# Patient Record
Sex: MALE | HISPANIC OR LATINO | Employment: FULL TIME | URBAN - METROPOLITAN AREA
[De-identification: names, ages, dates, MRNs, and addresses within clinical notes are randomized per-mention and may not be internally consistent; named-entity substitution may affect disease eponyms.]

---

## 2017-03-30 ENCOUNTER — TELEPHONE (OUTPATIENT)
Dept: NEPHROLOGY | Facility: MEDICAL CENTER | Age: 27
End: 2017-03-30

## 2017-03-30 DIAGNOSIS — N18.6 ESRD (END STAGE RENAL DISEASE) (HCC): ICD-10-CM

## 2017-03-30 NOTE — TELEPHONE ENCOUNTER
1. Caller Name: Shandra-transplant nurse with [employer]                      Call Back Number: -    2. Message: Requesting referral to [facility] so that they can finish his work up for transplant.    3. Patient approves office to leave a detailed voicemail/MyChart message: N/A

## 2017-04-24 ENCOUNTER — OFFICE VISIT (OUTPATIENT)
Dept: CARDIOLOGY | Facility: MEDICAL CENTER | Age: 27
End: 2017-04-24
Payer: COMMERCIAL

## 2017-04-24 VITALS
HEART RATE: 82 BPM | OXYGEN SATURATION: 97 % | BODY MASS INDEX: 26.9 KG/M2 | SYSTOLIC BLOOD PRESSURE: 138 MMHG | WEIGHT: 181.6 LBS | HEIGHT: 69 IN | DIASTOLIC BLOOD PRESSURE: 86 MMHG

## 2017-04-24 DIAGNOSIS — N18.6 ESRD (END STAGE RENAL DISEASE) (HCC): ICD-10-CM

## 2017-04-24 DIAGNOSIS — I10 ESSENTIAL HYPERTENSION: ICD-10-CM

## 2017-04-24 DIAGNOSIS — Z01.818 PREOPERATIVE CLEARANCE: Primary | ICD-10-CM

## 2017-04-24 LAB — EKG IMPRESSION: NORMAL

## 2017-04-24 PROCEDURE — 93000 ELECTROCARDIOGRAM COMPLETE: CPT | Performed by: INTERNAL MEDICINE

## 2017-04-24 PROCEDURE — 99214 OFFICE O/P EST MOD 30 MIN: CPT | Performed by: INTERNAL MEDICINE

## 2017-04-24 NOTE — PROGRESS NOTES
Arrhythmia Clinic Note (New patient)     DOS: 4/24/2017    Referring physician: Dr. Shipman    Chief complaint/Reason for consult: Cardiac evaluation prior to renal transplant    HPI:  Patient is a 25 yo male with history of ESRD, presumably because of long standing severe malignant hypertension, who presents for work up for renal transplant. He has no cardiac history. Nor does he have any cardiac symptoms suggestive of cardiac pathology. Denies chest pain, palpitations, HF symptoms. His nephrology office has ordered an echo and a cardiac stress test. We are awaiting the results.    ROS (+ highlighted in red):  Constitutional: Fevers/chills/fatigue/weightloss  HEENT: Blurry vision/eye pain/sore throat/hearing loss  Respiratory: Shortness of breath/cough  Cardiovascular: Chest pain/palpitations/edema/orthopnea/syncope  GI: Nausea/vomitting/diarrhea  MSK: Arthralgias/myagias/muscle weakness  Skin: Rash/sores  Neurological: Numbness/tremors/vertigo  Endocrine: Excessive thirst/polyuria/cold intolerance/heat intolerance  Psych: Depression/anxiety    Past Medical History   Diagnosis Date   • Hypertension    • Dialysis care    • Renal disorder      dailysis pt. 3 times per week.    • Dialysis        Past Surgical History   Procedure Laterality Date   • Other orthopedic surgery       r arm fx   • Av fistula creation  12/29/2010     Performed by ANT ALONZO at SURGERY [facility]   • Av fistula creation  6/7/2012     Performed by ANAI TYSON at SURGERY [facility]   • Cath placement  6/7/2012     Performed by ANAI TYSON at [facility] ORS   • Recovery  7/3/2012     Performed by SURGERY, IR-RECOVERY at SURGERY SAME DAY [facility] ORS   • Recovery  7/20/2012     Performed by SURGERY, IR-RECOVERY at SURGERY SAME DAY [facility] ORS   • Av fistula creation  8/21/2012     Performed by ANAI TYSON at [facility]   • Av fistula revision Right 11/28/2016     Procedure: AV FISTULA REVISION  "UPPER EXTREMITY (ANEURYSM REDUCTION);  Surgeon: Slade Perez M.D.;  Location: SURGERY [facility];  Service:        Social History     Social History   • Marital Status:      Spouse Name: N/A   • Number of Children: N/A   • Years of Education: N/A     Occupational History   • Not on file.     Social History Main Topics   • Smoking status: Never Smoker    • Smokeless tobacco: Not on file      Comment: quit 1/2009   • Alcohol Use: No   • Drug Use: No   • Sexual Activity: Not on file     Other Topics Concern   • Not on file     Social History Narrative       History reviewed. No pertinent family history.    No Known Allergies    Current Outpatient Prescriptions   Medication Sig Dispense Refill   • metoprolol SR (TOPROL XL) 25 MG TABLET SR 24 HR Take 25 mg by mouth every day.     • losartan (COZAAR) 100 MG Tab Take 100 mg by mouth every day.     • hydrALAZINE (APRESOLINE) 25 MG Tab Take 25 mg by mouth 3 times a day.     • clonidine (CATAPRES) 0.1 MG/24HR PATCH WEEKLY Apply 1 Patch to skin as directed every 7 days. On Mon     • amlodipine (NORVASC) 10 MG TABS Take 1 Tab by mouth every day. 30 Tab 3   • sevelamer (RENAGEL) 800 MG TABS Take 4 Tabs by mouth 3 times a day, with meals. 360 Tab 3   • cinacalcet (SENSIPAR) 30 MG TABS Take 1 Tab by mouth every day. 30 Tab 11   • hydrocodone-acetaminophen (NORCO) 5-325 MG Tab per tablet Take 1-2 Tabs by mouth every four hours as needed. (Patient not taking: Reported on 4/24/2017) 30 Tab 0     No current facility-administered medications for this visit.       Physical Exam:  Filed Vitals:    04/24/17 1624   BP: 138/86   Pulse: 82   Height: 1.753 m (5' 9.02\")   Weight: 82.373 kg (181 lb 9.6 oz)   SpO2: 97%     General appearance: NAD, conversant   Eyes: anicteric sclerae, moist conjunctivae; no lid-lag; PERRLA  HENT: Atraumatic; oropharynx clear with moist mucous membranes and no mucosal ulcerations; normal hard and soft palate  Neck: Trachea midline; FROM, supple, no " thyromegaly or lymphadenopathy  Lungs: CTA, with normal respiratory effort and no intercostal retractions  CV: RRR, no MRGs, no JVD   Abdomen: Soft, non-tender; no masses or HSM  Extremities: No peripheral edema or extremity lymphadenopathy. RUE fistula with palpable thrill.  Skin: Normal temperature, turgor and texture; no rash, ulcers or subcutaneous nodules  Psych: Appropriate affect, alert and oriented to person, place and time    Data:  Labs reviewed and outside records reviewed    EKG interpreted by me:  NSR, ? LVH    Impression/Plan:  1. Preoperative clearance     2. ESRD (end stage renal disease) (CMS-HCC)     3. Essential hypertension       -Will f/u on echo and stress test  -If no significant abnormalities he has no cardiac contraindications to proceeding with renal transplant    Elaine Rea MD

## 2017-04-24 NOTE — MR AVS SNAPSHOT
"        Elier Poseygo   2017 4:20 PM   Office Visit   MRN: [id number]    Department:  [facility]   Dept Phone:  [phone]    Description:  Male : 1990   Provider:  Elaine Rea M.D.           Reason for Visit     New Patient           Allergies as of 2017     No Known Allergies      You were diagnosed with     Palpitations   [785.1.ICD-9-CM]         Vital Signs     Blood Pressure Pulse Height Weight Body Mass Index Oxygen Saturation    138/86 mmHg 82 1.753 m (5' 9.02\") 82.373 kg (181 lb 9.6 oz) 26.81 kg/m2 97%    Smoking Status                   Never Smoker            Basic Information     Date Of Birth Sex Race Ethnicity Preferred Language    1990 Male  or   Origin (Mongolian,Kittitian,Congolese,Tajik, etc) English      Your appointments     May 04, 2017  8:15 AM   ECHO with [facility]   ECHOCARDIOLOGY [facility])    [address]  [place] [address]   [phone]           No prep            May 04, 2017 10:00 AM   US ABDOMEN FASTING (30 MINUTES) with [facility] US 1   [facility] IMAGING - ULTRASOUND [facility]  [address]  [place] [address]   [phone]           NPO 8 hours.  For  Abdomen, NPO 2 hours, schedule Abdomen Complete and include \" Abdomen\" in Appt Notes.            May 09, 2017  3:00 PM   NM CARDIAC STRESS TEST (30) with [facility] NM FORTE 1   [facility])    [address]  [place] [address]   [phone]           NPO for 4 hours prior to scan.  No caffeine for 24 hours prior to test (decaf, coffee, cola, tea, chocolate, Excedrin, and Anacin).  No Viagra or other ED meds for 48 hours.  Warn patient of length of test and to bring list of meds.              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   6/6/2012 - Present    HTN (hypertension) I10   6/6/2012 - Present    End stage renal disease (CMS-HCC) N18.6   11/28/2016 - Present      Health Maintenance        Date Due Completion Dates    IMM HEP B VACCINE (1 of 3 - Primary Series) 1989 ---    IMM HEP A VACCINE (1 of 2 - Standard Series) 8/18/1990 ---    IMM VARICELLA (CHICKENPOX) VACCINE (1 of 2 - 2 Dose Adolescent Series) 8/18/2002 ---    IMM DTaP/Tdap/Td Vaccine (1 - Tdap) 8/18/2008 ---    IMM PNEUMOCOCCAL 19-64 (ADULT) HIGHEST RISK SERIES (2 of 3 - PCV13) 6/6/2013 6/6/2012            Results       Current Immunizations     Influenza TIV (IM) 12/8/2011  8:00 PM    Pneumococcal polysaccharide vaccine (PPSV-23) 6/6/2012  6:48 AM    Tuberculin Skin Test  Incomplete      Below and/or attached are the medications your provider expects you to take. Review all of your home medications and newly ordered medications with your provider and/or pharmacist. Follow medication instructions as directed by your provider and/or pharmacist. Please keep your medication list with you and share with your provider. Update the information when medications are discontinued, doses are changed, or new medications (including over-the-counter products) are added; and carry medication information at all times in the event of emergency situations     Allergies:  No Known Allergies          Medications  Valid as of: April 24, 2017 -  4:50 PM    Generic Name Brand Name Tablet Size Instructions for use    AmLODIPine Besylate (Tab) NORVASC 10 MG Take 1 Tab by mouth every day.        Cinacalcet HCl (Tab) SENSIPAR 30 MG Take 1 Tab by mouth every day.        CloNIDine HCl (PATCH WEEKLY)  CATAPRES 0.1 MG/24HR Apply 1 Patch to skin as directed every 7 days. On Mon        HydrALAZINE HCl (Tab) APRESOLINE 25 MG Take 25 mg by mouth 3 times a day.        Hydrocodone-Acetaminophen (Tab) NORCO 5-325 MG Take 1-2 Tabs by mouth every four hours as needed.        Losartan Potassium (Tab) COZAAR 100 MG Take 100 mg by mouth every day.        Metoprolol Succinate (TABLET SR 24 HR) TOPROL XL 25 MG Take 25 mg by mouth every day.        Sevelamer HCl (Tab) RENAGEL 800 MG Take 4 Tabs by mouth 3 times a day, with meals.        .                 Medicines prescribed today were sent to:     [facility] #[id number] - [place], [place] - [address] AT [address] [place] [address]    Phone: [phone] Fax: [phone]    Open 24 Hours?: No      Medication refill instructions:       If your prescription bottle indicates you have medication refills left, it is not necessary to call your provider’s office. Please contact your pharmacy and they will refill your medication.    If your prescription bottle indicates you do not have any refills left, you may request refills at any time through one of the following ways: The online [employer] system (except Urgent Care), by calling your provider’s office, or by asking your pharmacy to contact your provider’s office with a refill request. Medication refills are processed only during regular business hours and may not be available until the next business day. Your provider may request additional information or to have a follow-up visit with you prior to refilling your medication.   *Please Note: Medication refills are assigned a new Rx number when refilled electronically. Your pharmacy may indicate that no refills were authorized even though a new prescription for the same medication is available at the pharmacy. Please request the medicine by name with the pharmacy before contacting your provider for a refill.           MyChart Status: Patient  Declined

## 2017-05-03 DIAGNOSIS — I10 ESSENTIAL HYPERTENSION: ICD-10-CM

## 2017-05-03 DIAGNOSIS — I10 HTN (HYPERTENSION), MALIGNANT: ICD-10-CM

## 2017-05-03 DIAGNOSIS — N18.6 END STAGE RENAL DISEASE (HCC): ICD-10-CM

## 2017-05-04 ENCOUNTER — TELEPHONE (OUTPATIENT)
Dept: CARDIOLOGY | Facility: MEDICAL CENTER | Age: 27
End: 2017-05-04

## 2017-05-04 ENCOUNTER — HOSPITAL ENCOUNTER (OUTPATIENT)
Dept: CARDIOLOGY | Facility: MEDICAL CENTER | Age: 27
End: 2017-05-04
Attending: INTERNAL MEDICINE | Admitting: RADIOLOGY
Payer: COMMERCIAL

## 2017-05-04 ENCOUNTER — HOSPITAL ENCOUNTER (OUTPATIENT)
Dept: RADIOLOGY | Facility: MEDICAL CENTER | Age: 27
End: 2017-05-04
Attending: INTERNAL MEDICINE
Payer: COMMERCIAL

## 2017-05-04 DIAGNOSIS — I15.9 SECONDARY HYPERTENSION: ICD-10-CM

## 2017-05-04 LAB
LV EJECT FRACT  99904: 60
LV EJECT FRACT MOD 2C 99903: 58.14
LV EJECT FRACT MOD 4C 99902: 64.96
LV EJECT FRACT MOD BP 99901: 60.1

## 2017-05-04 PROCEDURE — 76700 US EXAM ABDOM COMPLETE: CPT

## 2017-05-04 PROCEDURE — 93306 TTE W/DOPPLER COMPLETE: CPT

## 2017-05-04 NOTE — TELEPHONE ENCOUNTER
----- Message from Emily Lopez L.P.N. sent at 4/24/2017  4:50 PM PDT -----  Look for echo, stress test results.

## 2017-05-23 ENCOUNTER — HOSPITAL ENCOUNTER (OUTPATIENT)
Dept: RADIOLOGY | Facility: MEDICAL CENTER | Age: 27
End: 2017-05-23
Attending: INTERNAL MEDICINE
Payer: COMMERCIAL

## 2017-05-23 DIAGNOSIS — I10 ESSENTIAL HYPERTENSION: ICD-10-CM

## 2017-05-23 DIAGNOSIS — N18.6 END STAGE RENAL DISEASE (HCC): ICD-10-CM

## 2017-05-23 DIAGNOSIS — I10 HTN (HYPERTENSION), MALIGNANT: ICD-10-CM

## 2017-05-23 PROCEDURE — 71020 DX-CHEST-2 VIEWS: CPT

## 2017-05-23 PROCEDURE — 700111 HCHG RX REV CODE 636 W/ 250 OVERRIDE (IP)

## 2017-05-23 PROCEDURE — A9502 TC99M TETROFOSMIN: HCPCS

## 2017-05-23 RX ORDER — REGADENOSON 0.08 MG/ML
INJECTION, SOLUTION INTRAVENOUS
Status: COMPLETED
Start: 2017-05-23 | End: 2017-05-23

## 2017-05-23 RX ADMIN — REGADENOSON 0.4 MG: 0.08 INJECTION, SOLUTION INTRAVENOUS at 08:50

## 2017-05-30 ENCOUNTER — TELEPHONE (OUTPATIENT)
Dept: CARDIOLOGY | Facility: MEDICAL CENTER | Age: 27
End: 2017-05-30

## 2017-05-30 NOTE — TELEPHONE ENCOUNTER
Pt. And wife advised. Clearance letter for kidney transplant faxed to Dr. Theodore, Kidney Transplant Team, attention : Shandra Loo RN, [phone].

## 2017-08-07 ENCOUNTER — APPOINTMENT (OUTPATIENT)
Dept: RADIOLOGY | Facility: MEDICAL CENTER | Age: 27
End: 2017-08-07
Attending: EMERGENCY MEDICINE
Payer: COMMERCIAL

## 2017-08-07 ENCOUNTER — HOSPITAL ENCOUNTER (EMERGENCY)
Facility: MEDICAL CENTER | Age: 27
End: 2017-08-08
Attending: EMERGENCY MEDICINE
Payer: COMMERCIAL

## 2017-08-07 VITALS
DIASTOLIC BLOOD PRESSURE: 112 MMHG | TEMPERATURE: 98.4 F | WEIGHT: 176.59 LBS | HEIGHT: 70 IN | BODY MASS INDEX: 25.28 KG/M2 | RESPIRATION RATE: 16 BRPM | OXYGEN SATURATION: 98 % | SYSTOLIC BLOOD PRESSURE: 177 MMHG | HEART RATE: 73 BPM

## 2017-08-07 DIAGNOSIS — S01.01XA SCALP LACERATION, INITIAL ENCOUNTER: ICD-10-CM

## 2017-08-07 DIAGNOSIS — S09.90XA CLOSED HEAD INJURY, INITIAL ENCOUNTER: ICD-10-CM

## 2017-08-07 PROCEDURE — A9270 NON-COVERED ITEM OR SERVICE: HCPCS | Performed by: EMERGENCY MEDICINE

## 2017-08-07 PROCEDURE — 70450 CT HEAD/BRAIN W/O DYE: CPT

## 2017-08-07 PROCEDURE — 90471 IMMUNIZATION ADMIN: CPT

## 2017-08-07 PROCEDURE — 700111 HCHG RX REV CODE 636 W/ 250 OVERRIDE (IP)

## 2017-08-07 PROCEDURE — 305308 HCHG STAPLER,SKIN,DISP.

## 2017-08-07 PROCEDURE — 90715 TDAP VACCINE 7 YRS/> IM: CPT

## 2017-08-07 PROCEDURE — 304999 HCHG REPAIR-SIMPLE/INTERMED LEVEL 1

## 2017-08-07 PROCEDURE — 99284 EMERGENCY DEPT VISIT MOD MDM: CPT

## 2017-08-07 PROCEDURE — 700102 HCHG RX REV CODE 250 W/ 637 OVERRIDE(OP): Performed by: EMERGENCY MEDICINE

## 2017-08-07 RX ORDER — HYDROCODONE BITARTRATE AND ACETAMINOPHEN 5; 325 MG/1; MG/1
1 TABLET ORAL ONCE
Status: COMPLETED | OUTPATIENT
Start: 2017-08-07 | End: 2017-08-07

## 2017-08-07 RX ADMIN — HYDROCODONE BITARTRATE AND ACETAMINOPHEN 1 TABLET: 5; 325 TABLET ORAL at 22:41

## 2017-08-07 RX ADMIN — CLOSTRIDIUM TETANI TOXOID ANTIGEN (FORMALDEHYDE INACTIVATED), CORYNEBACTERIUM DIPHTHERIAE TOXOID ANTIGEN (FORMALDEHYDE INACTIVATED), BORDETELLA PERTUSSIS TOXOID ANTIGEN (GLUTARALDEHYDE INACTIVATED), BORDETELLA PERTUSSIS FILAMENTOUS HEMAGGLUTININ ANTIGEN (FORMALDEHYDE INACTIVATED), BORDETELLA PERTUSSIS PERTACTIN ANTIGEN, AND BORDETELLA PERTUSSIS FIMBRIAE 2/3 ANTIGEN 0.5 ML: 5; 2; 2.5; 5; 3; 5 INJECTION, SUSPENSION INTRAMUSCULAR at 23:38

## 2017-08-07 ASSESSMENT — PAIN SCALES - GENERAL: PAINLEVEL_OUTOF10: 4

## 2017-08-07 NOTE — ED AVS SNAPSHOT
Home Care Instructions                                                                                                                Elier Bustillos   MRN: [id number]    Department:  [facility], Emergency Dept   Date of Visit:  8/7/2017            [facility], Emergency Dept    [address]    [place] [address]    Phone:  [phone]      You were seen by     Guero Duke M.D.      Your Diagnosis Was     Closed head injury, initial encounter     S09.90XA       These are the medications you received during your hospitalization from 08/07/2017 2046 to 08/08/2017 0003     Date/Time Order Dose Route Action    08/07/2017 2241 hydrocodone-acetaminophen (NORCO) 5-325 MG per tablet 1 Tab 1 Tab Oral Given    08/07/2017 2338 tetanus-dipth-acell pertussis (ADACEL) inj 0.5 mL 0.5 mL Intramuscular Given      Follow-up Information     1. Follow up with Pcp Pt States None In 1 week.    Specialty:  Family Medicine    Why:  For repeat head injury exam        2. Follow up with [facility], Emergency Dept.    Specialty:  Emergency Medicine    Why:  For suture removal, For wound re-check    Contact information    [address] [id number] [phone]      Medication Information     Review all of your home medications and newly ordered medications with your primary doctor and/or pharmacist as soon as possible. Follow medication instructions as directed by your doctor and/or pharmacist.     Please keep your complete medication list with you and share with your physician. Update the information when medications are discontinued, doses are changed, or new medications (including over-the-counter products) are added; and carry medication information at all times in the event of emergency situations.               Medication List      ASK your doctor about these medications        Instructions    Morning Afternoon Evening  Bedtime    amlodipine 10 MG Tabs   Commonly known as:  NORVASC        Take 1 Tab by mouth every day.   Dose:  10 mg                        cinacalcet 30 MG Tabs   Commonly known as:  SENSIPAR        Take 1 Tab by mouth every day.   Dose:  30 mg                        clonidine 0.1 MG/24HR Ptwk   Commonly known as:  CATAPRES        Apply 1 Patch to skin as directed every 7 days. On Mon   Dose:  1 Patch                        hydrALAZINE 25 MG Tabs   Commonly known as:  APRESOLINE        Take 25 mg by mouth 3 times a day.   Dose:  25 mg                        hydrocodone-acetaminophen 5-325 MG Tabs per tablet   Last time this was given:  1 Tab on 8/7/2017 10:41 PM   Commonly known as:  NORCO        Take 1-2 Tabs by mouth every four hours as needed.   Dose:  1-2 Tab                        losartan 100 MG Tabs   Commonly known as:  COZAAR        Take 100 mg by mouth every day.   Dose:  100 mg                        metoprolol SR 25 MG Tb24   Commonly known as:  TOPROL XL        Take 25 mg by mouth every day.   Dose:  25 mg                        sevelamer 800 MG Tabs   Commonly known as:  RENAGEL        Take 4 Tabs by mouth 3 times a day, with meals.   Dose:  3200 mg                                Procedures and tests performed during your visit     CT-HEAD W/O        Discharge Instructions       Traumatismo en la adelia - Adultos  (Head Injury, Adult)  Tiene cisco lesión en la adelia. Después de sufrir cisco lesión en la adelia, es normal tener helen de adelia y vomitar. Si se duerme, debería resultar fácil despertarlo. Algunas veces debe permanecer en el hospital. La mayoría de los problemas ocurren hollie las primeras 24 horas. Los efectos secundarios pueden aparecer entre los 7 y 10 días posteriores a la lesión.   ¿CUÁLES SON LOS TIPOS DE LESIONES EN LA ADELIA?  Las lesiones en la adelia pueden ser leves y provocar un bulto. Algunas lesiones en la adelia pueden ser más graves. Algunas de las lesiones graves en la  adelia son:  · Lesión que provoque un impacto en el cerebro (conmoción).  · Hematoma en el cerebro (contusión). Melvin significa que hay hemorragia en el cerebro que puede causar un edema.  · Fisura en el cráneo (fractura de cráneo).  · Hemorragia en el cerebro que se acumula, se coagula y forma un bulto (hematoma).  ¿CUÁNDO BRITTNI OBTENER AYUDA DE INMEDIATO?   · Está confundido o somnoliento.  · No pueden despertarlo.  · Tiene malestar estomacal (náuseas) o vómitos.  · Los mareos o la inestabilidad empeoran.  · Sufre helen de adelia intensos y prolongados que no se alivian con medicamentos. [place] los medicamentos solamente serene se lo haya indicado el médico.  · No puede  los brazos o las piernas normalmente.  · No puede caminar.  · Observa cambios en los puntos negros en el centro de la parte coloreada del candida (pupila).  · Presenta carl secreción karma o con maxwell que proviene de la nariz o de los oídos.  · Tiene dificultad para finn.  Coretta las próximas 24 horas posteriores a la lesión, debe permanecer con alguna persona que pueda cuidarlo. Esta persona debe pedir ayuda de inmediato (llamar al 911 en los [place] si usted empieza a tener temblores y no puede controlarlos (tiene convulsiones), se desmaya o no puede despertarse.  ¿CÓMO PUEDO PREVENIR CARL LESIÓN EN LA ADELIA EN EL FUTURO?  · Use un cinturón de seguridad.  · Use un jakob si kierra en bicicleta y practica deportes, serene fútbol americano.  · Evite las actividades peligrosas que puedan realizarse en la casa.  ¿CUÁNDO PUEDO RETOMAR LAS ACTIVIDADES NORMALES Y EL ATLETISMO?  Consulte a chau médico antes de hacer estas actividades. No debe hacer actividades normales ni practicar deportes de contacto hasta 1 semana después de que hayan desaparecido los siguientes síntomas:  · Dolor de adelia fred.  · Mareos.  · Atención deficiente.  · Confusión.  · Problemas de memoria.  · Malestar estomacal o vómitos.  · Cansancio.  · Irritabilidad.  · Intolerancia a la  ai brillante o los ruidos jeremy.  · Ansiedad o depresión.  · Sueño agitado.  ASEGÚRESE DE QUE:   · Comprende estas instrucciones.  · Controlará chau afección.  · Recibirá ayuda de inmediato si no mejora o si empeora.     Esta información no tiene serene fin reemplazar el consejo del médico. Asegúrese de hacerle al médico cualquier pregunta que tenga.     Document Released: 10/08/2014 Document Revised: 01/08/2016  [employer] Interactive Patient Education ©2016 [employer]    Cuidado de un desgarro en los adultos  (Laceration Care, Adult)  Un desgarro es un ifeanyi que atraviesa todas las capas de la piel y llega al tejido que se encuentra debajo de la piel. Algunos desgarros cicatrizan por sí solos. Otros se deben cerrar con puntos (suturas), grapas, tiras adhesivas o adhesivo para la piel. El cuidado adecuado de un desgarro reduce al mínimo el riesgo de infecciones y ayuda a cisco mejor cicatrización.  CÓMO CUIDAR DEL DESGARRO  Si se utilizaron suturas o grapas:  · Mantenga la herida limpia y seca.  · Si le colocaron cisco venda (vendaje), debe cambiarla al menos cisco vez al día o serene se lo haya indicado el médico. También debe cambiarla si se moja o se ensucia.  · Mantenga la herida completamente seca hollie las primeras 24 horas o serene se lo haya indicado el médico. Transcurrido daren tiempo, puede ducharse o robbin román de inmersión. No obstante, asegúrese de no sumergir la herida en agua hasta que le hayan quitado las suturas o las grapas.  · Limpie la herida cisco vez al día o serene se lo haya indicado el médico:  ¨ Lave la herida con agua y jabón.  ¨ Enjuáguela con agua para quitar todo el jabón.  ¨ Seque dando palmaditas con cisco toalla limpia. No frote la herida.  · Después de limpiar la herida, aplique cisco delgada capa de ungüento con antibiótico serene se lo haya indicado el médico. [place] ayudará a prevenir las infecciones y a evitar que el vendaje se adhiera a la herida.  · Las suturas o las grapas deben retirarse serene  lo haya indicado el médico.  Si se utilizaron tiras adhesivas:  · Mantenga la herida limpia y seca.  · Si le colocaron cisco venda (vendaje), debe cambiarla al menos cisco vez al día o serene se lo haya indicado el médico. También debe cambiarla si se moja o se ensucia.  · No deje que las tiras adhesivas se mojen. Puede bañarse o ducharse, mariam tenga cuidado de no mojar la herida.  · Si se moja, séquela dando palmaditas con cisco toalla limpia. No frote la herida.  · Las tiras adhesivas se caen solas. Puede recortar las tiras a medida que la herida cicatriza. No quite las tiras adhesivas que aún están pegadas a la herida. Ellas se caerán cuando sea el momento.  Si se utilizó pegamento para la piel:  · Trate de mantener la herida seca; sin embargo, puede mojarla ligeramente cuando se bañe o se duche. No sumerja la herida en el agua, por ejemplo, al nadar.  · Después de ducharse o bañarse, seque la herida con cuidado dando palmaditas con cisco toalla limpia. No frote la herida.  · No practique actividades que lo denilson transpirar mucho hasta que el adhesivo se haya salido solo.  · No aplique líquidos, cremas ni ungüentos medicinales en la herida mientras esté el adhesivo. De lo contrario, puede despegar la película de adhesivo antes de que la herida cicatrice.  · Si le colocaron cisco venda (vendaje), debe cambiarla al menos cisco vez al día o serene se lo haya indicado el médico. También debe cambiarla si se moja o se ensucia.  · Si la herida está cubierta con un vendaje, tenga cuidado de no aplicar cinta adhesiva directamente sobre el adhesivo. De lo contrario, puede hacer que el adhesivo se despegue antes de que la herida haya cicatrizado.  · No toque el adhesivo. Normalmente, el adhesivo permanece sobre la piel de 5 a 10 días y luego se sale solo.  Instrucciones generales  · [place] los medicamentos de venta angelika y los recetados solamente serene se lo haya indicado el médico.  · Si le recetaron un ungüento o un medicamento con  antibiótico, aplíquelo o tómelo serene se lo haya indicado el médico. No deje de usarlo aunque la afección mejore.  · Para ayudar a evitar la formación de cicatrices, cúbrase la herida con pantalla solar siempre que esté al aire angelika después de que le hayan retirado los puntos o las tiras adhesivas o cuando todavía tenga el adhesivo en la piel y la herida haya cicatrizado. Use cisco pantalla solar con factor de protección solar (FPS) de por lo menos 30.  · No se rasque ni se toque la herida.  · Concurra a todas las visitas de control serene se lo haya indicado el médico. [place] es importante.  · Controle la herida todos los días para detectar signos de infección. Esté atento a lo siguiente:  ¨ Dolor, hinchazón o enrojecimiento.  ¨ Líquido, maxwell o pus.  · Cuando esté sentado o acostado, eleve la ed de la lesión por encima del nivel del corazón, si es posible.  SOLICITE ATENCIÓN MÉDICA SI:  · Le aplicaron la antitetánica y tiene hinchazón, dolor intenso, enrojecimiento o hemorragia en el sitio de la inyección.  · Tiene fiebre.  · La herida estaba cerrada y se abre.  · Percibe que sale mal olor de la herida o del vendaje.  · Nota un cuerpo extraño en la herida, serene un trozo de au o salvatore.  · El dolor no se guilherme con los medicamentos.  · Tiene más enrojecimiento, hinchazón o dolor en el lugar de la herida.  · Observa líquido, maxwell o pus que salen de la herida.  · Observa que la piel cerca de la herida cambia de color.  · Debe cambiar el vendaje con frecuencia debido a que hay secreción de líquido, maxwell o pus de la herida.  · Aparece cisco nueva erupción cutánea.  · Tiene entumecimiento alrededor de la herida.  SOLICITE ATENCIÓN MÉDICA DE INMEDIATO SI:  · Tiene mucha hinchazón alrededor de la herida.  · El dolor aumenta repentinamente y es intenso.  · Tiene bultos dolorosos cerca de la herida o en la piel en cualquier parte del cuerpo.  · Tiene cisco línea geremias que sale de la herida.  · La herida está en la mano o en  el pie y no puede  correctamente maxi de los dedos.  · La herida está en la mano o en el pie y observa que los dedos tienen un garcía pálido o azulado.     Esta información no tiene serene fin reemplazar el consejo del médico. Asegúrese de hacerle al médico cualquier pregunta que tenga.     Document Released: 12/18/2006 Document Revised: 05/03/2016  Elsevier Interactive Patient Education ©2016 [employer]            Patient Information     Patient Information    Following emergency treatment: all patient requiring follow-up care must return either to a private physician or a clinic if your condition worsens before you are able to obtain further medical attention, please return to the emergency room.     Billing Information    At [facility], we work to make the billing process streamlined for our patients.  Our Representatives are here to answer any questions you may have regarding your hospital bill.  If you have insurance coverage and have supplied your insurance information to us, we will submit a claim to your insurer on your behalf.  Should you have any questions regarding your bill, we can be reached online or by phone as follows:  Online: You are able pay your bills online or live chat with our representatives about any billing questions you may have. We are here to help Monday - Friday from 8:00am to 7:30pm and 9:00am - 12:00pm on Saturdays.  Please visit [email or web address]  for more information.   Phone:  [phone] or [phone]    Please note that your emergency physician, surgeon, pathologist, radiologist, anesthesiologist, and other specialists are not employed by [facility] and will therefore bill separately for their services.  Please contact them directly for any questions concerning their bills at the numbers below:     Emergency Physician Services:  [phone]  [facility]:  [phone]  [facility]:  [phone]  [facility]:  [phone]    1. Your final bill may vary from the amount quoted upon discharge if all procedures are not complete at that time, or if your doctor has additional procedures of which we are not aware. You will receive an additional bill if you return to the Emergency Department at [facility] for suture removal regardless of the facility of which the sutures were placed.     2. Please arrange for settlement of this account at the emergency registration.    3. All self-pay accounts are due in full at the time of treatment.  If you are unable to meet this obligation then payment is expected within 4-5 days.     4. If you have had radiology studies (CT, X-ray, Ultrasound, MRI), you have received a preliminary result during your emergency department visit. Please contact the radiology department [phone] to receive a copy of your final result. Please discuss the Final result with your primary physician or with the follow up physician provided.     Crisis Hotline:  [place] Crisis Hotline:  3-487-YLTSEZK or 1-480.235.6628  [place] Crisis Hotline:    1-662.331.9089 or 385-477-3716         ED Discharge Follow Up Questions    1. In order to provide you with very good care, we would like to follow up with a phone call in the next few days.  May we have your permission to contact you?     YES /  NO    2. What is the best phone number to call you? (       )_____-__________    3. What is the best time to call you?      Morning  /  Afternoon  /  Evening                   Patient Signature:  ____________________________________________________________    Date:  ____________________________________________________________

## 2017-08-07 NOTE — ED AVS SNAPSHOT
"[employer]" Access Code: Activation code not generated  Current "[employer]" Status: Patient Declined    Your email address is not on file at [facility].  Email Addresses are required for you to sign up for "[employer]", please contact [phone] to verify your personal information and to provide your email address prior to attempting to register for "[employer]".    Elier Bustillos  [address] [place] [address]    [employer]  A secure, online tool to manage your health information     [facility]’s "[employer]"® is a secure, online tool that connects you to your personalized health information from the privacy of your home -- day or night - making it very easy for you to manage your healthcare. Once the activation process is completed, you can even access your medical information using the "[employer]" precious, which is available for free in the Apple Precious store or Google Play store.     To learn more about "[employer]", visit [email or web address]    There are two levels of access available (as shown below):   My Chart Features  [facility] Primary Care Doctor [facility]  Specialists [facility]  Urgent  Care Non-[facility] Primary Care Doctor   Email your healthcare team securely and privately 24/7 X X X    Manage appointments: schedule your next appointment; view details of past/upcoming appointments X      Request prescription refills. X      View recent personal medical records, including lab and immunizations X X X X   View health record, including health history, allergies, medications X X X X   Read reports about your outpatient visits, procedures, consult and ER notes X X X X   See your discharge summary, which is a recap of your hospital and/or ER visit that includes your diagnosis, lab results, and care plan X X  X     How to register for [employer]:  Once your e-mail address has been verified, follow the following steps to sign up for [employer].     1. Go to  [email or web address]  2. Click on the Sign Up Now box, which takes  you to the New Member Sign Up page. You will need to provide the following information:  a. Enter your Touch[employer] Access Code exactly as it appears at the top of this page. (You will not need to use this code after you’ve completed the sign-up process. If you do not sign up before the expiration date, you must request a new code.)   b. Enter your date of birth.   c. Enter your home email address.   d. Click Submit, and follow the next screen’s instructions.  3. Create a Touch[employer] ID. This will be your Touch[employer] login ID and cannot be changed, so think of one that is secure and easy to remember.  4. Create a [employer] password. You can change your password at any time.  5. Enter your Password Reset Question and Answer. This can be used at a later time if you forget your password.   6. Enter your e-mail address. This allows you to receive e-mail notifications when new information is available in Touch[employer].  7. Click Sign Up. You can now view your health information.    For assistance activating your [employer] account, call [phone]

## 2017-08-07 NOTE — ED AVS SNAPSHOT
8/8/2017    Elier Bustillos  [address] [place] [address]    Dear Elier:    [facility] wants to ensure your discharge home is safe and you or your loved ones have had all of your questions answered regarding your care after you leave the hospital.    Below is a list of resources and contact information should you have any questions regarding your hospital stay, follow-up instructions, or active medical symptoms.    Questions or Concerns Regarding… Contact   Medical Questions Related to Your Discharge  (7 days a week, 8am-5pm) Contact a Nurse Care Coordinator   [phone]   Medical Questions Not Related to Your Discharge  (24 hours a day / 7 days a week)  Contact the Nurse Health Line   [phone]    Medications or Discharge Instructions Refer to your discharge packet   or contact your [facility] Primary Care Provider   [phone]   Follow-up Appointment(s) Schedule your appointment via [employer]   or contact Scheduling [phone]   Billing Review your statement via [employer]  or contact Billing [phone]   Medical Records Review your records via [employer]   or contact Medical Records [phone]     You may receive a telephone call within two days of discharge. This call is to make certain you understand your discharge instructions and have the opportunity to have any questions answered. You can also easily access your medical information, test results and upcoming appointments via the [employer] free online health management tool. You can learn more and sign up at [employer]om. For assistance setting up your [employer] account, please call [phone].    Once again, we want to ensure your discharge home is safe and that you have a clear understanding of any next steps in your care. If you have any questions or concerns, please do not hesitate to contact us, we are here for you. Thank you for choosing [facility] for your healthcare needs.    Sincerely,    Your [facility] Healthcare Team

## 2017-08-08 PROCEDURE — 99284 EMERGENCY DEPT VISIT MOD MDM: CPT

## 2017-08-08 PROCEDURE — 305308 HCHG STAPLER,SKIN,DISP.

## 2017-08-08 PROCEDURE — 304999 HCHG REPAIR-SIMPLE/INTERMED LEVEL 1
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
## 2017-08-08 NOTE — DISCHARGE INSTRUCTIONS
Traumatismo en la adelia - Adultos  (Head Injury, Adult)  Tiene cisco lesión en la adelia. Después de sufrir cisco lesión en la adelia, es normal tener helen de adelia y vomitar. Si se duerme, debería resultar fácil despertarlo. Algunas veces debe permanecer en el hospital. La mayoría de los problemas ocurren hollie las primeras 24 horas. Los efectos secundarios pueden aparecer entre los 7 y 10 días posteriores a la lesión.   ¿CUÁLES SON LOS TIPOS DE LESIONES EN LA ADELIA?  Las lesiones en la adelia pueden ser leves y provocar un bulto. Algunas lesiones en la adelia pueden ser más graves. Algunas de las lesiones graves en la adelia son:  · Lesión que provoque un impacto en el cerebro (conmoción).  · Hematoma en el cerebro (contusión). [place] significa que hay hemorragia en el cerebro que puede causar un edema.  · Fisura en el cráneo (fractura de cráneo).  · Hemorragia en el cerebro que se acumula, se coagula y forma un bulto (hematoma).  ¿CUÁNDO BRITTNI OBTENER AYUDA DE INMEDIATO?   · Está confundido o somnoliento.  · No pueden despertarlo.  · Tiene malestar estomacal (náuseas) o vómitos.  · Los mareos o la inestabilidad empeoran.  · Sufre helen de adelia intensos y prolongados que no se alivian con medicamentos. [place] los medicamentos solamente serene se lo haya indicado el médico.  · No puede  los brazos o las piernas normalmente.  · No puede caminar.  · Observa cambios en los puntos negros en el centro de la parte coloreada del candida (pupila).  · Presenta cisco secreción karma o con maxwell que proviene de la nariz o de los oídos.  · Tiene dificultad para finn.  Hollie las próximas 24 horas posteriores a la lesión, debe permanecer con alguna persona que pueda cuidarlo. Esta persona debe pedir ayuda de inmediato (llamar al 911 en los [place] si usted empieza a tener temblores y no puede controlarlos (tiene convulsiones), se desmaya o no puede despertarse.  ¿CÓMO PUEDO PREVENIR CISCO LESIÓN EN LA ADELIA EN EL  FUTURO?  · Use un cinturón de seguridad.  · Use un jakob si kierra en bicicleta y practica deportes, serene fútbol americano.  · Evite las actividades peligrosas que puedan realizarse en la casa.  ¿CUÁNDO PUEDO RETOMAR LAS ACTIVIDADES NORMALES Y EL ATLETISMO?  Consulte a chau médico antes de hacer estas actividades. No debe hacer actividades normales ni practicar deportes de contacto hasta 1 semana después de que hayan desaparecido los siguientes síntomas:  · Dolor de flavia fred.  · Mareos.  · Atención deficiente.  · Confusión.  · Problemas de memoria.  · Malestar estomacal o vómitos.  · Cansancio.  · Irritabilidad.  · Intolerancia a la ai brillante o los ruidos jeremy.  · Ansiedad o depresión.  · Sueño agitado.  ASEGÚRESE DE QUE:   · Comprende estas instrucciones.  · Controlará chau afección.  · Recibirá ayuda de inmediato si no mejora o si empeora.     Esta información no tiene serene fin reemplazar el consejo del médico. Asegúrese de hacerle al médico cualquier pregunta que tenga.     Document Released: 10/08/2014 Document Revised: 01/08/2016  Elsevier Interactive Patient Education ©2016 [employer]    Cuidado de un desgarro en los adultos  (Laceration Care, Adult)  Un desgarro es un ifeanyi que atraviesa todas las capas de la piel y llega al tejido que se encuentra debajo de la piel. Algunos desgarros cicatrizan por sí solos. Otros se deben cerrar con puntos (suturas), grapas, tiras adhesivas o adhesivo para la piel. El cuidado adecuado de un desgarro reduce al mínimo el riesgo de infecciones y ayuda a cisco mejor cicatrización.  CÓMO CUIDAR DEL DESGARRO  Si se utilizaron suturas o grapas:  · Mantenga la herida limpia y seca.  · Si le colocaron cisco venda (vendaje), debe cambiarla al menos cisco vez al día o serene se lo haya indicado el médico. También debe cambiarla si se moja o se ensucia.  · Mantenga la herida completamente seca hollie las primeras 24 horas o serene se lo haya indicado el médico. Transcurrido daren  tiempo, puede ducharse o robbin román de inmersión. No obstante, asegúrese de no sumergir la herida en agua hasta que le hayan quitado las suturas o las grapas.  · Limpie la herida cisco vez al día o serene se lo haya indicado el médico:  ¨ Lave la herida con agua y jabón.  ¨ Enjuáguela con agua para quitar todo el jabón.  ¨ Seque dando palmaditas con cisco toalla limpia. No frote la herida.  · Después de limpiar la herida, aplique cisco delgada capa de ungüento con antibiótico serene se lo haya indicado el médico. [place] ayudará a prevenir las infecciones y a evitar que el vendaje se adhiera a la herida.  · Las suturas o las grapas deben retirarse serene lo haya indicado el médico.  Si se utilizaron tiras adhesivas:  · Mantenga la herida limpia y seca.  · Si le colocaron cisco venda (vendaje), debe cambiarla al menos cisco vez al día o serene se lo haya indicado el médico. También debe cambiarla si se moja o se ensucia.  · No deje que las tiras adhesivas se mojen. Puede bañarse o ducharse, mariam tenga cuidado de no mojar la herida.  · Si se moja, séquela dando palmaditas con cisco toalla limpia. No frote la herida.  · Las tiras adhesivas se caen solas. Puede recortar las tiras a medida que la herida cicatriza. No quite las tiras adhesivas que aún están pegadas a la herida. Ellas se caerán cuando sea el momento.  Si se utilizó pegamento para la piel:  · Trate de mantener la herida seca; sin embargo, puede mojarla ligeramente cuando se bañe o se duche. No sumerja la herida en el agua, por ejemplo, al nadar.  · Después de ducharse o bañarse, seque la herida con cuidado dando palmaditas con cisco toalla limpia. No frote la herida.  · No practique actividades que lo denilson transpirar mucho hasta que el adhesivo se haya salido solo.  · No aplique líquidos, cremas ni ungüentos medicinales en la herida mientras esté el adhesivo. De lo contrario, puede despegar la película de adhesivo antes de que la herida cicatrice.  · Si le colocaron cisco venda  (vendaje), debe cambiarla al menos cisco vez al día o serene se lo haya indicado el médico. También debe cambiarla si se moja o se ensucia.  · Si la herida está cubierta con un vendaje, tenga cuidado de no aplicar cinta adhesiva directamente sobre el adhesivo. De lo contrario, puede hacer que el adhesivo se despegue antes de que la herida haya cicatrizado.  · No toque el adhesivo. Normalmente, el adhesivo permanece sobre la piel de 5 a 10 días y luego se sale solo.  Instrucciones generales  · [place] los medicamentos de venta angelika y los recetados solamente serene se lo haya indicado el médico.  · Si le recetaron un ungüento o un medicamento con antibiótico, aplíquelo o tómelo serene se lo haya indicado el médico. No deje de usarlo aunque la afección mejore.  · Para ayudar a evitar la formación de cicatrices, cúbrase la herida con pantalla solar siempre que esté al aire angelika después de que le hayan retirado los puntos o las tiras adhesivas o cuando todavía tenga el adhesivo en la piel y la herida haya cicatrizado. Use cisco pantalla solar con factor de protección solar (FPS) de por lo menos 30.  · No se rasque ni se toque la herida.  · Concurra a todas las visitas de control serene se lo haya indicado el médico. [place] es importante.  · Controle la herida todos los días para detectar signos de infección. Esté atento a lo siguiente:  ¨ Dolor, hinchazón o enrojecimiento.  ¨ Líquido, maxwell o pus.  · Cuando esté sentado o acostado, eleve la ed de la lesión por encima del nivel del corazón, si es posible.  SOLICITE ATENCIÓN MÉDICA SI:  · Le aplicaron la antitetánica y tiene hinchazón, dolor intenso, enrojecimiento o hemorragia en el sitio de la inyección.  · Tiene fiebre.  · La herida estaba cerrada y se abre.  · Percibe que sale mal olor de la herida o del vendaje.  · Nota un cuerpo extraño en la herida, serene un trozo de au o salvatore.  · El dolor no se guilherme con los medicamentos.  · Tiene más enrojecimiento, hinchazón o dolor en  el lugar de la herida.  · Observa líquido, maxwell o pus que salen de la herida.  · Observa que la piel cerca de la herida cambia de color.  · Debe cambiar el vendaje con frecuencia debido a que hay secreción de líquido, maxwell o pus de la herida.  · Aparece cisco nueva erupción cutánea.  · Tiene entumecimiento alrededor de la herida.  SOLICITE ATENCIÓN MÉDICA DE INMEDIATO SI:  · Tiene mucha hinchazón alrededor de la herida.  · El dolor aumenta repentinamente y es intenso.  · Tiene bultos dolorosos cerca de la herida o en la piel en cualquier parte del cuerpo.  · Tiene cisco línea geremias que sale de la herida.  · La herida está en la mano o en el pie y no puede  correctamente maxi de los dedos.  · La herida está en la mano o en el pie y observa que los dedos tienen un garcía pálido o azulado.     Esta información no tiene serene fin reemplazar el consejo del médico. Asegúrese de hacerle al médico cualquier pregunta que tenga.     Document Released: 12/18/2006 Document Revised: 05/03/2016  [employer] Interactive Patient Education ©2016 Elsevier Inc.
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
## 2017-08-08 NOTE — ED NOTES
Discharge instructions given. Educated on when to return to ed and when to have staples removed. Pt verbalized understanding.

## 2017-08-18 ENCOUNTER — HOSPITAL ENCOUNTER (EMERGENCY)
Facility: MEDICAL CENTER | Age: 27
End: 2017-08-18
Payer: COMMERCIAL

## 2017-08-18 VITALS — HEIGHT: 70 IN | BODY MASS INDEX: 24.62 KG/M2 | WEIGHT: 171.96 LBS

## 2017-08-18 PROCEDURE — 99281 EMR DPT VST MAYX REQ PHY/QHP: CPT

## 2017-08-19 NOTE — ED NOTES
Presents for 3 staples removal. Head wound appears healing properly with no sign of infection, nor exudate.

## 2018-05-23 ENCOUNTER — HOSPITAL ENCOUNTER (EMERGENCY)
Facility: MEDICAL CENTER | Age: 28
End: 2018-05-23
Attending: EMERGENCY MEDICINE
Payer: COMMERCIAL

## 2018-05-23 ENCOUNTER — APPOINTMENT (OUTPATIENT)
Dept: RADIOLOGY | Facility: MEDICAL CENTER | Age: 28
End: 2018-05-23
Attending: EMERGENCY MEDICINE
Payer: COMMERCIAL

## 2018-05-23 VITALS
BODY MASS INDEX: 24.07 KG/M2 | OXYGEN SATURATION: 97 % | HEIGHT: 71 IN | TEMPERATURE: 98.2 F | WEIGHT: 171.96 LBS | HEART RATE: 68 BPM | DIASTOLIC BLOOD PRESSURE: 82 MMHG | SYSTOLIC BLOOD PRESSURE: 134 MMHG | RESPIRATION RATE: 18 BRPM

## 2018-05-23 DIAGNOSIS — R03.0 ELEVATED BLOOD PRESSURE READING: ICD-10-CM

## 2018-05-23 DIAGNOSIS — N18.6 ESRD (END STAGE RENAL DISEASE) (HCC): ICD-10-CM

## 2018-05-23 DIAGNOSIS — R51.9 ACUTE NONINTRACTABLE HEADACHE, UNSPECIFIED HEADACHE TYPE: ICD-10-CM

## 2018-05-23 LAB
ANION GAP SERPL CALC-SCNC: 12 MMOL/L (ref 0–11.9)
BASOPHILS # BLD AUTO: 0.2 % (ref 0–1.8)
BASOPHILS # BLD: 0.01 K/UL (ref 0–0.12)
BUN SERPL-MCNC: 39 MG/DL (ref 8–22)
CALCIUM SERPL-MCNC: 9.9 MG/DL (ref 8.5–10.5)
CHLORIDE SERPL-SCNC: 98 MMOL/L (ref 96–112)
CO2 SERPL-SCNC: 28 MMOL/L (ref 20–33)
CREAT SERPL-MCNC: 8.51 MG/DL (ref 0.5–1.4)
EOSINOPHIL # BLD AUTO: 0.02 K/UL (ref 0–0.51)
EOSINOPHIL NFR BLD: 0.5 % (ref 0–6.9)
ERYTHROCYTE [DISTWIDTH] IN BLOOD BY AUTOMATED COUNT: 38.5 FL (ref 35.9–50)
GLUCOSE SERPL-MCNC: 97 MG/DL (ref 65–99)
HCT VFR BLD AUTO: 30.9 % (ref 42–52)
HGB BLD-MCNC: 10.9 G/DL (ref 14–18)
IMM GRANULOCYTES # BLD AUTO: 0.01 K/UL (ref 0–0.11)
IMM GRANULOCYTES NFR BLD AUTO: 0.2 % (ref 0–0.9)
LYMPHOCYTES # BLD AUTO: 0.61 K/UL (ref 1–4.8)
LYMPHOCYTES NFR BLD: 14.6 % (ref 22–41)
MCH RBC QN AUTO: 32.3 PG (ref 27–33)
MCHC RBC AUTO-ENTMCNC: 35.3 G/DL (ref 33.7–35.3)
MCV RBC AUTO: 91.7 FL (ref 81.4–97.8)
MONOCYTES # BLD AUTO: 0.24 K/UL (ref 0–0.85)
MONOCYTES NFR BLD AUTO: 5.7 % (ref 0–13.4)
NEUTROPHILS # BLD AUTO: 3.3 K/UL (ref 1.82–7.42)
NEUTROPHILS NFR BLD: 78.8 % (ref 44–72)
NRBC # BLD AUTO: 0 K/UL
NRBC BLD-RTO: 0 /100 WBC
PLATELET # BLD AUTO: 149 K/UL (ref 164–446)
PMV BLD AUTO: 10.4 FL (ref 9–12.9)
POTASSIUM SERPL-SCNC: 4 MMOL/L (ref 3.6–5.5)
RBC # BLD AUTO: 3.37 M/UL (ref 4.7–6.1)
SODIUM SERPL-SCNC: 138 MMOL/L (ref 135–145)
TROPONIN I SERPL-MCNC: 0.02 NG/ML (ref 0–0.04)
WBC # BLD AUTO: 4.2 K/UL (ref 4.8–10.8)

## 2018-05-23 PROCEDURE — A9270 NON-COVERED ITEM OR SERVICE: HCPCS | Performed by: EMERGENCY MEDICINE

## 2018-05-23 PROCEDURE — 85025 COMPLETE CBC W/AUTO DIFF WBC: CPT

## 2018-05-23 PROCEDURE — 84484 ASSAY OF TROPONIN QUANT: CPT

## 2018-05-23 PROCEDURE — 70450 CT HEAD/BRAIN W/O DYE: CPT

## 2018-05-23 PROCEDURE — 99284 EMERGENCY DEPT VISIT MOD MDM: CPT

## 2018-05-23 PROCEDURE — 700102 HCHG RX REV CODE 250 W/ 637 OVERRIDE(OP): Performed by: EMERGENCY MEDICINE

## 2018-05-23 PROCEDURE — 80048 BASIC METABOLIC PNL TOTAL CA: CPT

## 2018-05-23 PROCEDURE — 94760 N-INVAS EAR/PLS OXIMETRY 1: CPT

## 2018-05-23 RX ORDER — CLONIDINE HYDROCHLORIDE 0.1 MG/1
0.2 TABLET ORAL ONCE
Status: COMPLETED | OUTPATIENT
Start: 2018-05-23 | End: 2018-05-23

## 2018-05-23 RX ADMIN — CLONIDINE HYDROCHLORIDE 0.2 MG: 0.1 TABLET ORAL at 09:58

## 2018-05-23 ASSESSMENT — PAIN SCALES - GENERAL: PAINLEVEL_OUTOF10: 10

## 2018-05-23 ASSESSMENT — LIFESTYLE VARIABLES: DO YOU DRINK ALCOHOL: NO

## 2018-05-23 NOTE — DISCHARGE INSTRUCTIONS
Patient has a history of hypertension. Advised he replace his Clonidine patch. Please follow up with a primary physician for blood pressure management, diabetic screening, and all other preventive health concerns.

## 2018-05-23 NOTE — ED NOTES
Pt states headache started after completing dialysis yesterday; typical schedule (T/Th/Sat). Per pt completed dialysis as scheduled; denies any blurred vision, dizziness or previous history of headaches

## 2018-05-23 NOTE — ED PROVIDER NOTES
ED Provider Note    Scribed for Dennis Perera M.D. by Ani Dominguez. 5/23/2018  9:36 AM    Primary care provider: None  Means of arrival: Walk in  History obtained from: Patient  History limited by: None    CHIEF COMPLAINT  •  Headache    HPI  Elier Bustillos is a 27 y.o. male who presents to the Emergency Department for a headache with an onset of 1 day. History of end stage renal disorder and receives dialysis. He developed a headache yesterday evening after dialysis. He will commonly experience headaches after dialysis. Symptoms are described as constant pain to his occiput radiating to his right forehead. He presents hypertensive at 164/106 and states he is complaint with his anti-hypertensive medications.  Negative for head trauma, vision changes, neck pain, back pain, chest pain, focal weakness or numbness.       REVIEW OF SYSTEMS  Pertinent positives include occipital and right forehead headache and hypertension.   Pertinent negatives include no head trauma, vision changes, neck pain, back pain, chest pain, focal weakness or numbness.    All other systems reviewed and negative. See HPI for further details. C.       PAST MEDICAL HISTORY  Patient has a past medical history of Dialysis; Dialysis care; Hypertension; and Renal disorder.      SURGICAL HISTORY  Patient has a past surgical history that includes other orthopedic surgery; av fistula creation (12/29/2010); av fistula creation (6/7/2012); cath placement (6/7/2012); recovery (7/3/2012); recovery (7/20/2012); av fistula creation (8/21/2012); and av fistula revision (Right, 11/28/2016).      SOCIAL HISTORY  Social History   Substance Use Topics   • Smoking status: Never Smoker   • Smokeless tobacco: Never Used      Comment: quit 1/2009   • Alcohol use No      History   Drug Use No       FAMILY HISTORY  History reviewed. No pertinent family history.       CURRENT MEDICATIONS  Home Medications     Reviewed by Aryan Fink R.N.  "(Registered Nurse) on 05/23/18 at 0940  Med List Status: Partial   Medication Last Dose Status   amlodipine (NORVASC) 10 MG TABS 8/18/2017 Active   cinacalcet (SENSIPAR) 30 MG TABS 8/18/2017 Active   clonidine (CATAPRES) 0.1 MG/24HR PATCH WEEKLY 8/18/2017 Active   hydrALAZINE (APRESOLINE) 25 MG Tab 8/18/2017 Active   losartan (COZAAR) 100 MG Tab 8/18/2017 Active   metoprolol SR (TOPROL XL) 25 MG TABLET SR 24 HR 8/18/2017 Active   sevelamer (RENAGEL) 800 MG TABS 8/18/2017 Active                ALLERGIES  None      PHYSICAL EXAM  VITAL SIGNS: BP (!) 164/106   Pulse 81   Temp 36.8 °C (98.2 °F)   Resp 17   Ht 1.803 m (5' 11\")   Wt 78 kg (171 lb 15.3 oz)   SpO2 98%   BMI 23.98 kg/m²     Nursing note and vitals reviewed.    Constitutional: Well-developed and well-nourished. Mild distress secondary to pain.   HENT: Head is normocephalic and atraumatic. Oropharynx is clear and moist without exudate or erythema.   Eyes: Pupils are equal, round, and reactive to light. Conjunctiva are normal.   Neck: No signs of meningismus. Normal ROM. Non tender.  Cardiovascular: Normal rate and regular rhythm. No murmur heard. Normal radial pulses.  Pulmonary/Chest: Breath sounds normal. No wheezes or rales.   Abdominal: Soft and non-tender. No distention    Musculoskeletal: Extremities exhibit normal range of motion without edema or tenderness. Right AV fistula.   Neurological: Awake, alert and oriented to person, place, and time. No focal deficits noted.  Skin: Skin is warm and dry. No rash.   Psychiatric: Normal mood and affect. Appropriate for clinical situation.      DIAGNOSTIC STUDIES / PROCEDURES    LABS  Results for orders placed or performed during the hospital encounter of 05/23/18   CBC WITH DIFFERENTIAL   Result Value Ref Range    WBC 4.2 (L) 4.8 - 10.8 K/uL    RBC 3.37 (L) 4.70 - 6.10 M/uL    Hemoglobin 10.9 (L) 14.0 - 18.0 g/dL    Hematocrit 30.9 (L) 42.0 - 52.0 %    MCV 91.7 81.4 - 97.8 fL    MCH 32.3 27.0 - 33.0 pg    " MCHC 35.3 33.7 - 35.3 g/dL    RDW 38.5 35.9 - 50.0 fL    Platelet Count 149 (L) 164 - 446 K/uL    MPV 10.4 9.0 - 12.9 fL    Neutrophils-Polys 78.80 (H) 44.00 - 72.00 %    Lymphocytes 14.60 (L) 22.00 - 41.00 %    Monocytes 5.70 0.00 - 13.40 %    Eosinophils 0.50 0.00 - 6.90 %    Basophils 0.20 0.00 - 1.80 %    Immature Granulocytes 0.20 0.00 - 0.90 %    Nucleated RBC 0.00 /100 WBC    Neutrophils (Absolute) 3.30 1.82 - 7.42 K/uL    Lymphs (Absolute) 0.61 (L) 1.00 - 4.80 K/uL    Monos (Absolute) 0.24 0.00 - 0.85 K/uL    Eos (Absolute) 0.02 0.00 - 0.51 K/uL    Baso (Absolute) 0.01 0.00 - 0.12 K/uL    Immature Granulocytes (abs) 0.01 0.00 - 0.11 K/uL    NRBC (Absolute) 0.00 K/uL   BASIC METABOLIC PANEL   Result Value Ref Range    Sodium 138 135 - 145 mmol/L    Potassium 4.0 3.6 - 5.5 mmol/L    Chloride 98 96 - 112 mmol/L    Co2 28 20 - 33 mmol/L    Glucose 97 65 - 99 mg/dL    Bun 39 (H) 8 - 22 mg/dL    Creatinine 8.51 (HH) 0.50 - 1.40 mg/dL    Calcium 9.9 8.5 - 10.5 mg/dL    Anion Gap 12.0 (H) 0.0 - 11.9   TROPONIN   Result Value Ref Range    Troponin I 0.02 0.00 - 0.04 ng/mL   ESTIMATED GFR   Result Value Ref Range    GFR If  9 (A) >60 mL/min/1.73 m 2    GFR If Non African American 7 (A) >60 mL/min/1.73 m 2      All labs reviewed by me.      RADIOLOGY  CT-HEAD W/O   Final Result      Head CT without contrast within normal limits. No evidence of acute cerebral infarction, hemorrhage or mass lesion.        The radiologist's interpretation of all radiological studies have been reviewed by me.      COURSE & MEDICAL DECISION MAKING  Pertinent Labs & Imaging studies reviewed. (See chart for details)    Differential Diagnoses include but are not limited to: hypertension headache, intracranial hemorrhage or electrolyte abnormality.    9:36 AM Obtained and reviewed patient's electronic medical record which indicates an ED visit in 08/2017 after a head injury.    9:40 AM Patient seen and examined at bedside.  "Patient presents for a headache.  Exam shows no evidence of meningismus.    Initial orders in the Emergency Department included CT head and laboratory testing: CBC with differential, BMP, estimated GFR and troponin.  Initial treatment in the Emergency Department included 0.2 Clonidine PO.  Patient verbalized their understanding and agreement to this plan.    11:56 AM On repeat evaluation, headache is improved. Blood pressure has improved with Clonidine. Recommended he replace the Clonidine patch today.  CT shows no evidence of intracranial hemorrhage.     Discharge plan was discussed with the patient and includes following up with his physician as needed.  The patient will return for new or persisting symptoms including worsened headache, focal weakness or numbness or any additional concerns. The patient verbalizes understanding and will comply.      Patient is stable at the time of discharge.  Vital signs were reviewed: /104   Pulse 70   Temp 36.8 °C (98.2 °F)   Resp 17   Ht 1.803 m (5' 11\")   Wt 78 kg (171 lb 15.3 oz)   SpO2 98%   BMI 23.98 kg/m²        Decision Making:  Patient presents today with headache.  He has had a similar headache multiple times in the past.  This was not a thunderclap headache.  Is not the worst headache of the patient's life.  CT scan was obtained given the patient's elevated blood pressure and did not demonstrate any evidence of intracranial hemorrhage.  The patient is symptomatically significantly improved.  Blood pressure is improved after some oral clonidine.      DISPOSITION  Patient will be discharged home in stable condition.      FOLLOW UP  [facility], Emergency Dept  [address] [id number] [phone]    If symptoms worsen    Your Physician  Varies    Schedule an appointment as soon as possible for a visit        The patient is referred to a primary physician for blood pressure management, diabetic screening, and for all other " preventative health concerns.      DIAGNOSIS  1. Acute nonintractable headache, unspecified headache type    2. Elevated blood pressure reading    3. ESRD (end stage renal disease) (HCC)           The note accurately reflects work and decisions made by me.  Dennis Perera  5/23/2018  1:14 PM     Ani PANIAGUA (Scribe), am scribing for, and in the presence of, Dennis Perera M.D.    Electronically signed by: Ani Dominguez (Alexanderibe), 5/23/2018    IDennis M.D. personally performed the services described in this documentation, as scribed by Ani Dominguez in my presence, and it is both accurate and complete.

## 2018-05-23 NOTE — ED TRIAGE NOTES
Chief Complaint   Patient presents with   • Headache     since last night     Pt ambulated to triage , c/o head ache since last night with light sensitivity. Denies blurred vision. He is a dialysis pt, last dialysis was yesterday. bp 164/106

## 2018-05-23 NOTE — ED NOTES
Patient discharged with instructions for Hypertension, headache with prescriptions x0 signs and symptoms explained to patient for reasons to return to emergency department, Patient is understanding and has no further questions. Pt. To follow up with PCP for refill of BP medication. Pt understanding and ambulated to lobby with steady gait.

## 2018-05-23 NOTE — ED NOTES
[facility] Fall Risk Assessment Tool    Present to ED because of fall  NO  (Syncope, seizure, or ALOC)  Age>70   NO  Altered Mental Status:  (Intoxicated with Alcohol or Substance Confusion,  Inability to follow instructions, disorientation)   NO  Impaired Mobility:  Ambulates or transfers with assistive devices or assist  Ambulates with unsteady gait and no assistance  Unable to ambulate or transfer   NO  Nursing Judgement  (Bowel or bladder incontinence, diarrhea, urinary   frequency or urgency, leg weakness, orthostatic   hypotension, dizziness or vertigo, narcotic use).   NO

## 2018-11-09 ENCOUNTER — HOSPITAL ENCOUNTER (EMERGENCY)
Facility: MEDICAL CENTER | Age: 28
End: 2018-11-10
Attending: EMERGENCY MEDICINE
Payer: COMMERCIAL

## 2018-11-09 DIAGNOSIS — R10.13 EPIGASTRIC ABDOMINAL PAIN: ICD-10-CM

## 2018-11-09 DIAGNOSIS — R03.0 ELEVATED BLOOD PRESSURE READING: ICD-10-CM

## 2018-11-09 DIAGNOSIS — N18.9 CHRONIC KIDNEY DISEASE, UNSPECIFIED CKD STAGE: ICD-10-CM

## 2018-11-09 DIAGNOSIS — R11.2 NON-INTRACTABLE VOMITING WITH NAUSEA, UNSPECIFIED VOMITING TYPE: ICD-10-CM

## 2018-11-09 PROCEDURE — 96374 THER/PROPH/DIAG INJ IV PUSH: CPT

## 2018-11-09 PROCEDURE — 84100 ASSAY OF PHOSPHORUS: CPT

## 2018-11-09 PROCEDURE — 94760 N-INVAS EAR/PLS OXIMETRY 1: CPT

## 2018-11-09 PROCEDURE — 700111 HCHG RX REV CODE 636 W/ 250 OVERRIDE (IP): Performed by: EMERGENCY MEDICINE

## 2018-11-09 PROCEDURE — 83735 ASSAY OF MAGNESIUM: CPT

## 2018-11-09 PROCEDURE — 85025 COMPLETE CBC W/AUTO DIFF WBC: CPT

## 2018-11-09 PROCEDURE — 83690 ASSAY OF LIPASE: CPT

## 2018-11-09 PROCEDURE — 82330 ASSAY OF CALCIUM: CPT

## 2018-11-09 PROCEDURE — 80053 COMPREHEN METABOLIC PANEL: CPT

## 2018-11-09 PROCEDURE — 99285 EMERGENCY DEPT VISIT HI MDM: CPT

## 2018-11-09 PROCEDURE — 93005 ELECTROCARDIOGRAM TRACING: CPT | Performed by: EMERGENCY MEDICINE

## 2018-11-09 RX ORDER — AMLODIPINE BESYLATE 5 MG/1
5 TABLET ORAL ONCE
Status: COMPLETED | OUTPATIENT
Start: 2018-11-10 | End: 2018-11-10

## 2018-11-09 RX ORDER — HYDRALAZINE HYDROCHLORIDE 25 MG/1
25 TABLET, FILM COATED ORAL ONCE
Status: COMPLETED | OUTPATIENT
Start: 2018-11-10 | End: 2018-11-10

## 2018-11-09 RX ORDER — METOCLOPRAMIDE HYDROCHLORIDE 5 MG/ML
10 INJECTION INTRAMUSCULAR; INTRAVENOUS ONCE
Status: COMPLETED | OUTPATIENT
Start: 2018-11-09 | End: 2018-11-09

## 2018-11-09 RX ADMIN — METOCLOPRAMIDE 10 MG: 5 INJECTION, SOLUTION INTRAMUSCULAR; INTRAVENOUS at 23:52

## 2018-11-09 ASSESSMENT — PAIN SCALES - GENERAL: PAINLEVEL_OUTOF10: 10

## 2018-11-10 ENCOUNTER — APPOINTMENT (OUTPATIENT)
Dept: RADIOLOGY | Facility: MEDICAL CENTER | Age: 28
End: 2018-11-10
Attending: EMERGENCY MEDICINE
Payer: COMMERCIAL

## 2018-11-10 VITALS
WEIGHT: 157.85 LBS | RESPIRATION RATE: 18 BRPM | TEMPERATURE: 99.6 F | SYSTOLIC BLOOD PRESSURE: 211 MMHG | BODY MASS INDEX: 25.37 KG/M2 | DIASTOLIC BLOOD PRESSURE: 135 MMHG | HEIGHT: 66 IN | OXYGEN SATURATION: 94 % | HEART RATE: 101 BPM

## 2018-11-10 LAB
ALBUMIN SERPL BCP-MCNC: 4 G/DL (ref 3.2–4.9)
ALBUMIN/GLOB SERPL: 1.5 G/DL
ALP SERPL-CCNC: 56 U/L (ref 30–99)
ALT SERPL-CCNC: 11 U/L (ref 2–50)
ANION GAP SERPL CALC-SCNC: 16 MMOL/L (ref 0–11.9)
AST SERPL-CCNC: 12 U/L (ref 12–45)
BASOPHILS # BLD AUTO: 0.4 % (ref 0–1.8)
BASOPHILS # BLD: 0.04 K/UL (ref 0–0.12)
BILIRUB SERPL-MCNC: 0.5 MG/DL (ref 0.1–1.5)
BUN SERPL-MCNC: 56 MG/DL (ref 8–22)
CA-I SERPL-SCNC: 1.1 MMOL/L (ref 1.1–1.3)
CALCIUM SERPL-MCNC: 10.4 MG/DL (ref 8.5–10.5)
CHLORIDE SERPL-SCNC: 93 MMOL/L (ref 96–112)
CO2 SERPL-SCNC: 29 MMOL/L (ref 20–33)
CREAT SERPL-MCNC: 9 MG/DL (ref 0.5–1.4)
EKG IMPRESSION: NORMAL
EOSINOPHIL # BLD AUTO: 0.08 K/UL (ref 0–0.51)
EOSINOPHIL NFR BLD: 0.7 % (ref 0–6.9)
ERYTHROCYTE [DISTWIDTH] IN BLOOD BY AUTOMATED COUNT: 42.7 FL (ref 35.9–50)
GLOBULIN SER CALC-MCNC: 2.6 G/DL (ref 1.9–3.5)
GLUCOSE SERPL-MCNC: 101 MG/DL (ref 65–99)
HCT VFR BLD AUTO: 36.6 % (ref 42–52)
HGB BLD-MCNC: 12.5 G/DL (ref 14–18)
IMM GRANULOCYTES # BLD AUTO: 0.03 K/UL (ref 0–0.11)
IMM GRANULOCYTES NFR BLD AUTO: 0.3 % (ref 0–0.9)
LIPASE SERPL-CCNC: 23 U/L (ref 11–82)
LYMPHOCYTES # BLD AUTO: 1.43 K/UL (ref 1–4.8)
LYMPHOCYTES NFR BLD: 13.2 % (ref 22–41)
MAGNESIUM SERPL-MCNC: 2.7 MG/DL (ref 1.5–2.5)
MCH RBC QN AUTO: 32.4 PG (ref 27–33)
MCHC RBC AUTO-ENTMCNC: 34.2 G/DL (ref 33.7–35.3)
MCV RBC AUTO: 94.8 FL (ref 81.4–97.8)
MONOCYTES # BLD AUTO: 0.97 K/UL (ref 0–0.85)
MONOCYTES NFR BLD AUTO: 8.9 % (ref 0–13.4)
NEUTROPHILS # BLD AUTO: 8.31 K/UL (ref 1.82–7.42)
NEUTROPHILS NFR BLD: 76.5 % (ref 44–72)
NRBC # BLD AUTO: 0 K/UL
NRBC BLD-RTO: 0 /100 WBC
PHOSPHATE SERPL-MCNC: 6 MG/DL (ref 2.5–4.5)
PLATELET # BLD AUTO: 149 K/UL (ref 164–446)
PMV BLD AUTO: 10.4 FL (ref 9–12.9)
POTASSIUM SERPL-SCNC: 4.5 MMOL/L (ref 3.6–5.5)
PROT SERPL-MCNC: 6.6 G/DL (ref 6–8.2)
RBC # BLD AUTO: 3.86 M/UL (ref 4.7–6.1)
SODIUM SERPL-SCNC: 138 MMOL/L (ref 135–145)
WBC # BLD AUTO: 10.9 K/UL (ref 4.8–10.8)

## 2018-11-10 PROCEDURE — A9270 NON-COVERED ITEM OR SERVICE: HCPCS | Performed by: EMERGENCY MEDICINE

## 2018-11-10 PROCEDURE — 76705 ECHO EXAM OF ABDOMEN: CPT

## 2018-11-10 PROCEDURE — 700102 HCHG RX REV CODE 250 W/ 637 OVERRIDE(OP): Performed by: EMERGENCY MEDICINE

## 2018-11-10 RX ORDER — SUCRALFATE 1 G/1
1 TABLET ORAL ONCE
Status: COMPLETED | OUTPATIENT
Start: 2018-11-10 | End: 2018-11-10

## 2018-11-10 RX ORDER — ONDANSETRON 4 MG/1
4 TABLET, ORALLY DISINTEGRATING ORAL EVERY 6 HOURS PRN
Qty: 10 TAB | Refills: 0 | Status: SHIPPED | OUTPATIENT
Start: 2018-11-10 | End: 2018-11-11

## 2018-11-10 RX ORDER — RANITIDINE 150 MG/1
150 TABLET ORAL 2 TIMES DAILY
Qty: 30 TAB | Refills: 0 | Status: SHIPPED | OUTPATIENT
Start: 2018-11-10 | End: 2021-09-29

## 2018-11-10 RX ORDER — CLONIDINE HYDROCHLORIDE 0.1 MG/1
0.2 TABLET ORAL ONCE
Status: COMPLETED | OUTPATIENT
Start: 2018-11-10 | End: 2018-11-10

## 2018-11-10 RX ORDER — MORPHINE SULFATE 4 MG/ML
4 INJECTION, SOLUTION INTRAMUSCULAR; INTRAVENOUS ONCE
Status: DISCONTINUED | OUTPATIENT
Start: 2018-11-10 | End: 2018-11-10 | Stop reason: HOSPADM

## 2018-11-10 RX ADMIN — AMLODIPINE BESYLATE 5 MG: 5 TABLET ORAL at 00:12

## 2018-11-10 RX ADMIN — CLONIDINE HYDROCHLORIDE 0.2 MG: 0.1 TABLET ORAL at 02:15

## 2018-11-10 RX ADMIN — SUCRALFATE 1 G: 1 TABLET ORAL at 00:38

## 2018-11-10 RX ADMIN — HYDRALAZINE HYDROCHLORIDE 25 MG: 25 TABLET, FILM COATED ORAL at 00:27

## 2018-11-10 NOTE — ED TRIAGE NOTES
"Elier Rhodes Apollo  28 y.o.   BP (!) 190/135   Pulse (!) 104   Temp 37.6 °C (99.6 °F)   Resp 16   Ht 1.676 m (5' 6\")   Wt 71.6 kg (157 lb 13.6 oz)   SpO2 94%   BMI 25.48 kg/m²     Chief Complaint   Patient presents with   • N/V     since Tuesday, Dialysis lastnight,pt states he has chills since Tuesday   • Hypertension     History of, denies vision changes, ha.        Pt amb to triage with steady gait for above complaint. RT arm fistula Thrill present. Site free of redness. Pt takes daily medications but missed meds last week due to insurance issues, that issue is now resolved.  Pt is alert and oriented, speaking in full sentences, follows commands and responds appropriately to questions. NAD. Resp are even and unlabored.  Pt placed in lobby. Pt educated on triage process. Pt encouraged to alert staff for any changes.    "

## 2018-11-10 NOTE — ED NOTES
Patient tolerated PO challenge. BP remains elevated. MD Jin aware and in to speak with patient and wife about plan of care.

## 2018-11-10 NOTE — DISCHARGE INSTRUCTIONS
Thank you for trusting our Emergency Department with your health care.  The exact cause of your abdominal pain could not be determined today.  An Emergency Department visit offers a brief snapshot in time and your condition may change or evolve over time.  Some medical conditions are difficult to diagnose, especially when early or atypical.  Therefore, it is important for you to have a recheck with your provider or back in the Emergency Department in 12-24 hours as instructed unless you are feeling completely better.  If your condition worsens or you have any new symptoms or concerns, please return to the Emergency Department right away.

## 2018-11-10 NOTE — ED NOTES
MD Jin aware of continued hypertension after home meds. Awaiting new orders. Ultrasound tech at bedside

## 2018-11-10 NOTE — ED PROVIDER NOTES
ED Provider Note    CHIEF COMPLAINT  Chief Complaint   Patient presents with   • N/V     since Tuesday, Dialysis lastnight,pt states he has chills since Tuesday   • Hypertension     History of, denies vision changes, ha.         HPI    Primary care provider: Pcp Pt States None   History obtained from: Patient and wife  History limited by: None     Elier Bustillos is a 28 y.o. male who presents to the ED with wife complaining of severe sharp epigastric abdominal pain starting 2 days ago and has been constant since.  No radiation and denies pain anywhere else.  No prior history of similar pain.  He has had several episodes of nausea and vomiting with this pain.  He denies fever/diarrhea/constipation/dysuria.  He has not noticed anything that makes his pain better or worse.  Patient with history of renal failure due to hypertension and has been on hemodialysis for 5 years.  He goes on Tuesdays, Thursdays and Sundays and has not missed any sessions.  He also noticed that his blood pressure has been running high despite taking his medications as prescribed.  No recent changes to his medications.  He states that his blood pressure is usually no higher than 160 systolic.    REVIEW OF SYSTEMS  Please see HPI for pertinent positives/negatives.  All other systems reviewed and are negative.     PAST MEDICAL HISTORY  Past Medical History:   Diagnosis Date   • Dialysis    • Dialysis care    • Hypertension    • Renal disorder     dailysis pt. 3 times per week.         SURGICAL HISTORY  Past Surgical History:   Procedure Laterality Date   • AV FISTULA REVISION Right 11/28/2016    Procedure: AV FISTULA REVISION UPPER EXTREMITY (ANEURYSM REDUCTION);  Surgeon: Slade Perez M.D.;  Location: SURGERY [facility];  Service:    • AV FISTULA CREATION  8/21/2012    Performed by ANAI TYSON at SURGERY [facility]   • RECOVERY  7/20/2012    Performed by SURGERY, IR-RECOVERY at SURGERY SAME DAY [facility]   •  "RECOVERY  7/3/2012    Performed by SURGERY, IR-RECOVERY at SURGERY SAME DAY [facility] ORS   • AV FISTULA CREATION  6/7/2012    Performed by ANAI TYSON at SURGERY [facility] ORS   • CATH PLACEMENT  6/7/2012    Performed by ANAI TYSON at SURGERY [facility] ORS   • AV FISTULA CREATION  12/29/2010    Performed by ANT ALONZO at SURGERY [facility] ORS   • OTHER ORTHOPEDIC SURGERY      r arm fx        SOCIAL HISTORY  Social History     Social History Main Topics   • Smoking status: Never Smoker   • Smokeless tobacco: Never Used      Comment: quit 1/2009   • Alcohol use No   • Drug use: No   • Sexual activity: Not on file        FAMILY HISTORY  History reviewed. No pertinent family history.     CURRENT MEDICATIONS  Home Medications    **Home medications have not yet been reviewed for this encounter**          ALLERGIES  No Known Allergies     PHYSICAL EXAM  VITAL SIGNS: BP (!) 211/135   Pulse (!) 101   Temp 37.6 °C (99.6 °F)   Resp 18   Ht 1.676 m (5' 6\")   Wt 71.6 kg (157 lb 13.6 oz)   SpO2 94%   BMI 25.48 kg/m²  @BRITNEY[[id number]::@     Pulse ox interpretation: 92% I interpret this pulse ox as normal     Cardiac monitor interpretation: Sinus rhythm with heart rate in the 100s as interpreted by me.  The patient presented with hypertension and epigastric abdominal pain and cardiac monitor was ordered to monitor for dysrhythmia.    Constitutional: Well developed, well nourished, alert in no apparent distress, nontoxic appearance    HENT: No external signs of trauma, normocephalic, oropharynx moist and clear, nose normal    Eyes: PERRL, conjunctiva without erythema, no discharge, no icterus    Neck: Soft and supple, trachea midline, no stridor, no tenderness, no LAD, no JVD, good ROM    Cardiovascular: Tachycardia, no murmurs/rubs/gallops, strong distal pulses and good perfusion    Thorax & Lungs: No respiratory distress, CTAB   Abdomen: Soft, mild epigastric tenderness to palpation, nondistended, no " guarding, no rebound, normal BS    Back: No CVAT    Extremities: No cyanosis, no edema, no gross deformity, good ROM, no tenderness, intact distal pulses with brisk cap refill, right arm hemodialysis fistula  Skin: Warm, dry, no pallor/cyanosis, no rash noted    Lymphatic: No lymphadenopathy noted    Neuro: A/O times 3, no focal deficits noted    Psychiatric: Cooperative        DIAGNOSTIC STUDIES / PROCEDURES    EKG  12 Lead EKG obtained at 0010 and interpreted by me:   Rate: 107   Rhythm: Sinus tachycardia  Ectopy: None  Intervals: Normal   Axis: Normal   QRS: Normal   ST segments: Normal  T Waves: Normal    Clinical Impression: Sinus tachycardia without acute ischemic changes or dysrhythmia       LABS  All labs reviewed by me.     Results for orders placed or performed during the hospital encounter of 11/09/18   CBC WITH DIFFERENTIAL   Result Value Ref Range    WBC 10.9 (H) 4.8 - 10.8 K/uL    RBC 3.86 (L) 4.70 - 6.10 M/uL    Hemoglobin 12.5 (L) 14.0 - 18.0 g/dL    Hematocrit 36.6 (L) 42.0 - 52.0 %    MCV 94.8 81.4 - 97.8 fL    MCH 32.4 27.0 - 33.0 pg    MCHC 34.2 33.7 - 35.3 g/dL    RDW 42.7 35.9 - 50.0 fL    Platelet Count 149 (L) 164 - 446 K/uL    MPV 10.4 9.0 - 12.9 fL    Neutrophils-Polys 76.50 (H) 44.00 - 72.00 %    Lymphocytes 13.20 (L) 22.00 - 41.00 %    Monocytes 8.90 0.00 - 13.40 %    Eosinophils 0.70 0.00 - 6.90 %    Basophils 0.40 0.00 - 1.80 %    Immature Granulocytes 0.30 0.00 - 0.90 %    Nucleated RBC 0.00 /100 WBC    Neutrophils (Absolute) 8.31 (H) 1.82 - 7.42 K/uL    Lymphs (Absolute) 1.43 1.00 - 4.80 K/uL    Monos (Absolute) 0.97 (H) 0.00 - 0.85 K/uL    Eos (Absolute) 0.08 0.00 - 0.51 K/uL    Baso (Absolute) 0.04 0.00 - 0.12 K/uL    Immature Granulocytes (abs) 0.03 0.00 - 0.11 K/uL    NRBC (Absolute) 0.00 K/uL   COMP METABOLIC PANEL   Result Value Ref Range    Sodium 138 135 - 145 mmol/L    Potassium 4.5 3.6 - 5.5 mmol/L    Chloride 93 (L) 96 - 112 mmol/L    Co2 29 20 - 33 mmol/L    Anion Gap 16.0  (H) 0.0 - 11.9    Glucose 101 (H) 65 - 99 mg/dL    Bun 56 (H) 8 - 22 mg/dL    Creatinine 9.00 (HH) 0.50 - 1.40 mg/dL    Calcium 10.4 8.5 - 10.5 mg/dL    AST(SGOT) 12 12 - 45 U/L    ALT(SGPT) 11 2 - 50 U/L    Alkaline Phosphatase 56 30 - 99 U/L    Total Bilirubin 0.5 0.1 - 1.5 mg/dL    Albumin 4.0 3.2 - 4.9 g/dL    Total Protein 6.6 6.0 - 8.2 g/dL    Globulin 2.6 1.9 - 3.5 g/dL    A-G Ratio 1.5 g/dL   MAGNESIUM   Result Value Ref Range    Magnesium 2.7 (H) 1.5 - 2.5 mg/dL   PHOSPHORUS   Result Value Ref Range    Phosphorus 6.0 (H) 2.5 - 4.5 mg/dL   IONIZED CALCIUM   Result Value Ref Range    Ionized Calcium 1.1 1.1 - 1.3 mmol/L   ESTIMATED GFR   Result Value Ref Range    GFR If  8 (A) >60 mL/min/1.73 m 2    GFR If Non African American 7 (A) >60 mL/min/1.73 m 2   LIPASE   Result Value Ref Range    Lipase 23 11 - 82 U/L   EKG (NOW)   Result Value Ref Range    Report       [facility] Emergency Dept.    Test Date:  2018-11-10  Pt Name:    DIAMANTE MILLS        Department: ER  MRN:        [id number]                      Room:       [facility]  Gender:     Male                         Technician: [id number]  :        1990                   Requested By:CHIDI RIOS  Order #:    [id number]                    Reading MD:    Measurements  Intervals                                Axis  Rate:       107                          P:          77  DE:         136                          QRS:        71  QRSD:       90                           T:          66  QT:         356  QTc:        475    Interpretive Statements  SINUS TACHYCARDIA  BORDERLINE PROLONGED QT INTERVAL  Compared to ECG 2017 16:28:10  Sinus rhythm no longer present  Inferior Q waves no longer present  Q waves no longer present          RADIOLOGY  The radiologist's interpretation of all radiological studies have been reviewed by me.     US-RUQ   Final Result      1. Gallbladder sludge. No sonographic evidence of acute  cholecystitis.      2. Atrophic echogenic right kidney.                COURSE & MEDICAL DECISION MAKING  Nursing notes, VS, PMSFHx reviewed in chart.     Review of past medical records shows the patient was last seen in this ED May 23, 2018 for headache.      Differential diagnoses considered include but are not limited to: AMI, ileus, cholecystitis/ascending cholangitis, gallstone/biliary colic, pancreatitis, PUD, gastritis      History and physical exam as above.  Patient with main concern of epigastric abdominal pain along with nausea and vomiting as well as elevated blood pressure.  His nausea was treated with Reglan and subsequently Carafate for his epigastric abdominal pain.  He had not taken his blood pressure medicine tonight and was given his usual doses.  EKG showed sinus tachycardia without evidence of acute ischemic changes or dysrhythmia.  Lab abnormalities appear fairly consistent with his prior results.  Gallbladder ultrasound with findings as above.  Findings discussed with the patient and his wife.  Patient declined morphine for his pain and reports that his pain is very minimal now.  His nausea is improved.  His blood pressure remained elevated despite clonidine in the ED.  However, no clinical signs of hypertensive emergency.  No signs of acute abdomen on recheck to suggest a surgical process.  I discussed with them keeping a journal of his blood pressure readings for outpatient follow-up.  Also discussed with them worrisome signs and symptoms and return to ED precautions.  He will be prescribed Zofran and Zantac for his nausea and epigastric abdominal pain.  They verbalized understanding and agreed with plan of care with no further questions or concerns.      The patient is referred to a primary physician for blood pressure management, diabetic screening, and for all other preventative health concerns.       FINAL IMPRESSION  1. Epigastric abdominal pain Acute   2. Non-intractable vomiting with  nausea, unspecified vomiting type Acute   3. Elevated blood pressure reading Acute   4. Chronic kidney disease, unspecified CKD stage Chronic          DISPOSITION  Patient will be discharged home in stable condition.       FOLLOW UP  Please follow up with your doctor    Call in 2 days      [facility], Emergency Dept  [address]  [place] [id number] [phone]    If symptoms worsen         OUTPATIENT MEDICATIONS  Discharge Medication List as of 11/10/2018  3:34 AM      START taking these medications    Details   ondansetron (ZOFRAN ODT) 4 MG TABLET DISPERSIBLE Take 1 Tab by mouth every 6 hours as needed., Disp-10 Tab, R-0, Print Rx Paper      raNITidine (ZANTAC) 150 MG Tab Take 1 Tab by mouth 2 times a day., Disp-30 Tab, R-0, Print Rx Paper                Electronically signed by: Neil Jin, 11/9/2018 11:27 PM      Portions of this record were made with voice recognition software.  Despite my review, spelling/grammar/context errors may still remain.  Interpretation of this chart should be taken in this context.

## 2018-11-10 NOTE — ED NOTES
Agree with initial triage assessment. patient ambulatory to room 13 with wife. Changed into hospital gown and placed on monitor.

## 2018-11-11 ENCOUNTER — HOSPITAL ENCOUNTER (INPATIENT)
Facility: MEDICAL CENTER | Age: 28
LOS: 7 days | DRG: 356 | End: 2018-11-18
Attending: EMERGENCY MEDICINE | Admitting: INTERNAL MEDICINE
Payer: COMMERCIAL

## 2018-11-11 ENCOUNTER — APPOINTMENT (OUTPATIENT)
Dept: RADIOLOGY | Facility: MEDICAL CENTER | Age: 28
DRG: 356 | End: 2018-11-11
Attending: EMERGENCY MEDICINE
Payer: COMMERCIAL

## 2018-11-11 ENCOUNTER — OFFICE VISIT (OUTPATIENT)
Dept: URGENT CARE | Facility: CLINIC | Age: 28
End: 2018-11-11
Payer: COMMERCIAL

## 2018-11-11 VITALS
DIASTOLIC BLOOD PRESSURE: 110 MMHG | OXYGEN SATURATION: 97 % | TEMPERATURE: 99.7 F | RESPIRATION RATE: 14 BRPM | HEIGHT: 66 IN | BODY MASS INDEX: 23.63 KG/M2 | WEIGHT: 147 LBS | HEART RATE: 101 BPM | SYSTOLIC BLOOD PRESSURE: 220 MMHG

## 2018-11-11 DIAGNOSIS — R11.10 VOMITING, INTRACTABILITY OF VOMITING NOT SPECIFIED, PRESENCE OF NAUSEA NOT SPECIFIED, UNSPECIFIED VOMITING TYPE: ICD-10-CM

## 2018-11-11 DIAGNOSIS — I10 UNCONTROLLED HYPERTENSION: ICD-10-CM

## 2018-11-11 DIAGNOSIS — H53.8 BLURRY VISION, BILATERAL: ICD-10-CM

## 2018-11-11 DIAGNOSIS — N18.6 ESRD (END STAGE RENAL DISEASE) (HCC): ICD-10-CM

## 2018-11-11 PROBLEM — A41.9 SEPSIS (HCC): Status: ACTIVE | Noted: 2018-11-11

## 2018-11-11 PROBLEM — K55.9 ISCHEMIC BOWEL DISEASE (HCC): Status: ACTIVE | Noted: 2018-11-11

## 2018-11-11 LAB
ALBUMIN SERPL BCP-MCNC: 3.1 G/DL (ref 3.2–4.9)
ALBUMIN SERPL BCP-MCNC: 3.7 G/DL (ref 3.2–4.9)
ALBUMIN/GLOB SERPL: 1.4 G/DL
ALP SERPL-CCNC: 57 U/L (ref 30–99)
ALT SERPL-CCNC: 14 U/L (ref 2–50)
ANION GAP SERPL CALC-SCNC: 19 MMOL/L (ref 0–11.9)
AST SERPL-CCNC: 7 U/L (ref 12–45)
BASOPHILS # BLD AUTO: 0.3 % (ref 0–1.8)
BASOPHILS # BLD: 0.05 K/UL (ref 0–0.12)
BILIRUB SERPL-MCNC: 0.7 MG/DL (ref 0.1–1.5)
BUN SERPL-MCNC: 89 MG/DL (ref 8–22)
BUN SERPL-MCNC: 92 MG/DL (ref 8–22)
CALCIUM SERPL-MCNC: 8.7 MG/DL (ref 8.5–10.5)
CALCIUM SERPL-MCNC: 9.3 MG/DL (ref 8.5–10.5)
CHLORIDE SERPL-SCNC: 88 MMOL/L (ref 96–112)
CHLORIDE SERPL-SCNC: 91 MMOL/L (ref 96–112)
CO2 SERPL-SCNC: 25 MMOL/L (ref 20–33)
CO2 SERPL-SCNC: 26 MMOL/L (ref 20–33)
CREAT SERPL-MCNC: 13.78 MG/DL (ref 0.5–1.4)
CREAT SERPL-MCNC: 14.21 MG/DL (ref 0.5–1.4)
EOSINOPHIL # BLD AUTO: 0.03 K/UL (ref 0–0.51)
EOSINOPHIL NFR BLD: 0.2 % (ref 0–6.9)
ERYTHROCYTE [DISTWIDTH] IN BLOOD BY AUTOMATED COUNT: 41.7 FL (ref 35.9–50)
GLOBULIN SER CALC-MCNC: 2.6 G/DL (ref 1.9–3.5)
GLUCOSE SERPL-MCNC: 113 MG/DL (ref 65–99)
GLUCOSE SERPL-MCNC: 123 MG/DL (ref 65–99)
HCT VFR BLD AUTO: 36.9 % (ref 42–52)
HGB BLD-MCNC: 13.3 G/DL (ref 14–18)
IMM GRANULOCYTES # BLD AUTO: 0.06 K/UL (ref 0–0.11)
IMM GRANULOCYTES NFR BLD AUTO: 0.3 % (ref 0–0.9)
LACTATE BLD-SCNC: 0.8 MMOL/L (ref 0.5–2)
LACTATE BLD-SCNC: 0.9 MMOL/L (ref 0.5–2)
LIPASE SERPL-CCNC: 79 U/L (ref 11–82)
LYMPHOCYTES # BLD AUTO: 1.34 K/UL (ref 1–4.8)
LYMPHOCYTES NFR BLD: 7.3 % (ref 22–41)
MCH RBC QN AUTO: 33.3 PG (ref 27–33)
MCHC RBC AUTO-ENTMCNC: 36 G/DL (ref 33.7–35.3)
MCV RBC AUTO: 92.5 FL (ref 81.4–97.8)
MONOCYTES # BLD AUTO: 1.28 K/UL (ref 0–0.85)
MONOCYTES NFR BLD AUTO: 7 % (ref 0–13.4)
NEUTROPHILS # BLD AUTO: 15.5 K/UL (ref 1.82–7.42)
NEUTROPHILS NFR BLD: 84.9 % (ref 44–72)
NRBC # BLD AUTO: 0 K/UL
NRBC BLD-RTO: 0 /100 WBC
PHOSPHATE SERPL-MCNC: 8.2 MG/DL (ref 2.5–4.5)
PLATELET # BLD AUTO: 147 K/UL (ref 164–446)
PMV BLD AUTO: 10.6 FL (ref 9–12.9)
POTASSIUM SERPL-SCNC: 5 MMOL/L (ref 3.6–5.5)
POTASSIUM SERPL-SCNC: 5.8 MMOL/L (ref 3.6–5.5)
PROT SERPL-MCNC: 6.3 G/DL (ref 6–8.2)
RBC # BLD AUTO: 3.99 M/UL (ref 4.7–6.1)
SODIUM SERPL-SCNC: 133 MMOL/L (ref 135–145)
SODIUM SERPL-SCNC: 133 MMOL/L (ref 135–145)
WBC # BLD AUTO: 18.3 K/UL (ref 4.8–10.8)

## 2018-11-11 PROCEDURE — 700111 HCHG RX REV CODE 636 W/ 250 OVERRIDE (IP): Performed by: ANESTHESIOLOGY

## 2018-11-11 PROCEDURE — 83605 ASSAY OF LACTIC ACID: CPT

## 2018-11-11 PROCEDURE — 74176 CT ABD & PELVIS W/O CONTRAST: CPT

## 2018-11-11 PROCEDURE — 160041 HCHG SURGERY MINUTES - EA ADDL 1 MIN LEVEL 4: Performed by: SURGERY

## 2018-11-11 PROCEDURE — 80069 RENAL FUNCTION PANEL: CPT

## 2018-11-11 PROCEDURE — 36415 COLL VENOUS BLD VENIPUNCTURE: CPT

## 2018-11-11 PROCEDURE — 83690 ASSAY OF LIPASE: CPT

## 2018-11-11 PROCEDURE — 700101 HCHG RX REV CODE 250: Performed by: INTERNAL MEDICINE

## 2018-11-11 PROCEDURE — 160002 HCHG RECOVERY MINUTES (STAT): Performed by: SURGERY

## 2018-11-11 PROCEDURE — 160009 HCHG ANES TIME/MIN: Performed by: SURGERY

## 2018-11-11 PROCEDURE — 99204 OFFICE O/P NEW MOD 45 MIN: CPT | Performed by: PHYSICIAN ASSISTANT

## 2018-11-11 PROCEDURE — A4314 CATH W/DRAINAGE 2-WAY LATEX: HCPCS | Performed by: SURGERY

## 2018-11-11 PROCEDURE — 96375 TX/PRO/DX INJ NEW DRUG ADDON: CPT

## 2018-11-11 PROCEDURE — 500371 HCHG DRAIN, BLAKE 10MM: Performed by: SURGERY

## 2018-11-11 PROCEDURE — 96374 THER/PROPH/DIAG INJ IV PUSH: CPT

## 2018-11-11 PROCEDURE — 0WJP0ZZ INSPECTION OF GASTROINTESTINAL TRACT, OPEN APPROACH: ICD-10-PCS | Performed by: SURGERY

## 2018-11-11 PROCEDURE — 700105 HCHG RX REV CODE 258: Performed by: INTERNAL MEDICINE

## 2018-11-11 PROCEDURE — 87040 BLOOD CULTURE FOR BACTERIA: CPT

## 2018-11-11 PROCEDURE — 502704 HCHG DEVICE, LIGASURE IMPACT: Performed by: SURGERY

## 2018-11-11 PROCEDURE — 0WJG0ZZ INSPECTION OF PERITONEAL CAVITY, OPEN APPROACH: ICD-10-PCS | Performed by: SURGERY

## 2018-11-11 PROCEDURE — 700101 HCHG RX REV CODE 250

## 2018-11-11 PROCEDURE — A9270 NON-COVERED ITEM OR SERVICE: HCPCS | Performed by: EMERGENCY MEDICINE

## 2018-11-11 PROCEDURE — 99223 1ST HOSP IP/OBS HIGH 75: CPT | Performed by: INTERNAL MEDICINE

## 2018-11-11 PROCEDURE — 85025 COMPLETE CBC W/AUTO DIFF WBC: CPT

## 2018-11-11 PROCEDURE — 80053 COMPREHEN METABOLIC PANEL: CPT

## 2018-11-11 PROCEDURE — A9270 NON-COVERED ITEM OR SERVICE: HCPCS | Performed by: INTERNAL MEDICINE

## 2018-11-11 PROCEDURE — 770020 HCHG ROOM/CARE - TELE (206)

## 2018-11-11 PROCEDURE — 700111 HCHG RX REV CODE 636 W/ 250 OVERRIDE (IP): Performed by: INTERNAL MEDICINE

## 2018-11-11 PROCEDURE — 51798 US URINE CAPACITY MEASURE: CPT

## 2018-11-11 PROCEDURE — 700102 HCHG RX REV CODE 250 W/ 637 OVERRIDE(OP): Performed by: EMERGENCY MEDICINE

## 2018-11-11 PROCEDURE — 160048 HCHG OR STATISTICAL LEVEL 1-5: Performed by: SURGERY

## 2018-11-11 PROCEDURE — 700111 HCHG RX REV CODE 636 W/ 250 OVERRIDE (IP)

## 2018-11-11 PROCEDURE — 700102 HCHG RX REV CODE 250 W/ 637 OVERRIDE(OP): Performed by: INTERNAL MEDICINE

## 2018-11-11 PROCEDURE — 160035 HCHG PACU - 1ST 60 MINS PHASE I: Performed by: SURGERY

## 2018-11-11 PROCEDURE — 500389 HCHG DRAIN, RESERVOIR SUCT JP 100CC: Performed by: SURGERY

## 2018-11-11 PROCEDURE — 160036 HCHG PACU - EA ADDL 30 MINS PHASE I: Performed by: SURGERY

## 2018-11-11 PROCEDURE — 501838 HCHG SUTURE GENERAL: Performed by: SURGERY

## 2018-11-11 PROCEDURE — 86706 HEP B SURFACE ANTIBODY: CPT

## 2018-11-11 PROCEDURE — 160029 HCHG SURGERY MINUTES - 1ST 30 MINS LEVEL 4: Performed by: SURGERY

## 2018-11-11 PROCEDURE — 500002 HCHG ADHESIVE, DERMABOND: Performed by: SURGERY

## 2018-11-11 PROCEDURE — 700111 HCHG RX REV CODE 636 W/ 250 OVERRIDE (IP): Performed by: EMERGENCY MEDICINE

## 2018-11-11 PROCEDURE — 99291 CRITICAL CARE FIRST HOUR: CPT

## 2018-11-11 PROCEDURE — 501445 HCHG STAPLER, SKIN DISP: Performed by: SURGERY

## 2018-11-11 RX ORDER — BISACODYL 10 MG
10 SUPPOSITORY, RECTAL RECTAL
Status: DISCONTINUED | OUTPATIENT
Start: 2018-11-11 | End: 2018-11-18 | Stop reason: HOSPADM

## 2018-11-11 RX ORDER — POLYETHYLENE GLYCOL 3350 17 G/17G
1 POWDER, FOR SOLUTION ORAL
Status: DISCONTINUED | OUTPATIENT
Start: 2018-11-11 | End: 2018-11-18 | Stop reason: HOSPADM

## 2018-11-11 RX ORDER — AMOXICILLIN 250 MG
2 CAPSULE ORAL 2 TIMES DAILY
Status: DISCONTINUED | OUTPATIENT
Start: 2018-11-11 | End: 2018-11-18 | Stop reason: HOSPADM

## 2018-11-11 RX ORDER — HALOPERIDOL 5 MG/ML
1 INJECTION INTRAMUSCULAR
Status: DISCONTINUED | OUTPATIENT
Start: 2018-11-11 | End: 2018-11-11 | Stop reason: HOSPADM

## 2018-11-11 RX ORDER — LOSARTAN POTASSIUM 50 MG/1
100 TABLET ORAL DAILY
Status: DISCONTINUED | OUTPATIENT
Start: 2018-11-12 | End: 2018-11-11

## 2018-11-11 RX ORDER — DEXTROSE MONOHYDRATE 25 G/50ML
25 INJECTION, SOLUTION INTRAVENOUS ONCE
Status: COMPLETED | OUTPATIENT
Start: 2018-11-11 | End: 2018-11-11

## 2018-11-11 RX ORDER — LABETALOL HYDROCHLORIDE 5 MG/ML
5 INJECTION, SOLUTION INTRAVENOUS
Status: DISCONTINUED | OUTPATIENT
Start: 2018-11-11 | End: 2018-11-11 | Stop reason: HOSPADM

## 2018-11-11 RX ORDER — HYDROMORPHONE HYDROCHLORIDE 1 MG/ML
0.2 INJECTION, SOLUTION INTRAMUSCULAR; INTRAVENOUS; SUBCUTANEOUS
Status: DISCONTINUED | OUTPATIENT
Start: 2018-11-11 | End: 2018-11-11 | Stop reason: HOSPADM

## 2018-11-11 RX ORDER — HYDRALAZINE HYDROCHLORIDE 25 MG/1
25 TABLET, FILM COATED ORAL 3 TIMES DAILY
Status: DISCONTINUED | OUTPATIENT
Start: 2018-11-12 | End: 2018-11-18 | Stop reason: HOSPADM

## 2018-11-11 RX ORDER — METOPROLOL TARTRATE 1 MG/ML
1 INJECTION, SOLUTION INTRAVENOUS
Status: DISCONTINUED | OUTPATIENT
Start: 2018-11-11 | End: 2018-11-11 | Stop reason: HOSPADM

## 2018-11-11 RX ORDER — OXYCODONE HYDROCHLORIDE AND ACETAMINOPHEN 5; 325 MG/1; MG/1
1 TABLET ORAL
Status: DISCONTINUED | OUTPATIENT
Start: 2018-11-11 | End: 2018-11-11 | Stop reason: HOSPADM

## 2018-11-11 RX ORDER — HEPARIN SODIUM 5000 [USP'U]/ML
5000 INJECTION, SOLUTION INTRAVENOUS; SUBCUTANEOUS EVERY 12 HOURS
Status: DISCONTINUED | OUTPATIENT
Start: 2018-11-12 | End: 2018-11-18 | Stop reason: HOSPADM

## 2018-11-11 RX ORDER — METOPROLOL SUCCINATE 25 MG/1
25 TABLET, EXTENDED RELEASE ORAL DAILY
Status: DISCONTINUED | OUTPATIENT
Start: 2018-11-12 | End: 2018-11-14

## 2018-11-11 RX ORDER — PROMETHAZINE HYDROCHLORIDE 25 MG/1
12.5-25 TABLET ORAL EVERY 4 HOURS PRN
Status: DISCONTINUED | OUTPATIENT
Start: 2018-11-11 | End: 2018-11-18 | Stop reason: HOSPADM

## 2018-11-11 RX ORDER — OXYCODONE HYDROCHLORIDE 10 MG/1
10 TABLET ORAL
Status: DISCONTINUED | OUTPATIENT
Start: 2018-11-11 | End: 2018-11-12

## 2018-11-11 RX ORDER — SODIUM CHLORIDE 9 MG/ML
500 INJECTION, SOLUTION INTRAVENOUS
Status: COMPLETED | OUTPATIENT
Start: 2018-11-11 | End: 2018-11-13

## 2018-11-11 RX ORDER — HYDROMORPHONE HYDROCHLORIDE 1 MG/ML
1 INJECTION, SOLUTION INTRAMUSCULAR; INTRAVENOUS; SUBCUTANEOUS ONCE
Status: COMPLETED | OUTPATIENT
Start: 2018-11-11 | End: 2018-11-11

## 2018-11-11 RX ORDER — ACETAMINOPHEN 325 MG/1
650 TABLET ORAL EVERY 6 HOURS PRN
Status: DISCONTINUED | OUTPATIENT
Start: 2018-11-11 | End: 2018-11-18 | Stop reason: HOSPADM

## 2018-11-11 RX ORDER — AMLODIPINE BESYLATE 10 MG/1
10 TABLET ORAL
Status: DISCONTINUED | OUTPATIENT
Start: 2018-11-12 | End: 2018-11-18 | Stop reason: HOSPADM

## 2018-11-11 RX ORDER — FAMOTIDINE 20 MG/1
20 TABLET, FILM COATED ORAL DAILY
Status: DISCONTINUED | OUTPATIENT
Start: 2018-11-12 | End: 2018-11-18 | Stop reason: HOSPADM

## 2018-11-11 RX ORDER — SODIUM CHLORIDE 9 MG/ML
INJECTION, SOLUTION INTRAVENOUS CONTINUOUS
Status: DISCONTINUED | OUTPATIENT
Start: 2018-11-11 | End: 2018-11-13

## 2018-11-11 RX ORDER — HYDROMORPHONE HYDROCHLORIDE 1 MG/ML
0.5 INJECTION, SOLUTION INTRAMUSCULAR; INTRAVENOUS; SUBCUTANEOUS
Status: DISCONTINUED | OUTPATIENT
Start: 2018-11-11 | End: 2018-11-12

## 2018-11-11 RX ORDER — CINACALCET 30 MG/1
30 TABLET, FILM COATED ORAL DAILY
Status: DISCONTINUED | OUTPATIENT
Start: 2018-11-12 | End: 2018-11-18 | Stop reason: HOSPADM

## 2018-11-11 RX ORDER — ONDANSETRON 4 MG/1
4 TABLET, ORALLY DISINTEGRATING ORAL EVERY 4 HOURS PRN
Status: DISCONTINUED | OUTPATIENT
Start: 2018-11-11 | End: 2018-11-18 | Stop reason: HOSPADM

## 2018-11-11 RX ORDER — HYDROMORPHONE HYDROCHLORIDE 1 MG/ML
0.1 INJECTION, SOLUTION INTRAMUSCULAR; INTRAVENOUS; SUBCUTANEOUS
Status: DISCONTINUED | OUTPATIENT
Start: 2018-11-11 | End: 2018-11-11 | Stop reason: HOSPADM

## 2018-11-11 RX ORDER — ONDANSETRON 2 MG/ML
4 INJECTION INTRAMUSCULAR; INTRAVENOUS ONCE
Status: COMPLETED | OUTPATIENT
Start: 2018-11-11 | End: 2018-11-11

## 2018-11-11 RX ORDER — HYDROMORPHONE HYDROCHLORIDE 1 MG/ML
0.4 INJECTION, SOLUTION INTRAMUSCULAR; INTRAVENOUS; SUBCUTANEOUS
Status: DISCONTINUED | OUTPATIENT
Start: 2018-11-11 | End: 2018-11-11 | Stop reason: HOSPADM

## 2018-11-11 RX ORDER — HYDRALAZINE HYDROCHLORIDE 20 MG/ML
5 INJECTION INTRAMUSCULAR; INTRAVENOUS
Status: DISCONTINUED | OUTPATIENT
Start: 2018-11-11 | End: 2018-11-11 | Stop reason: HOSPADM

## 2018-11-11 RX ORDER — PROMETHAZINE HYDROCHLORIDE 25 MG/1
12.5-25 SUPPOSITORY RECTAL EVERY 4 HOURS PRN
Status: DISCONTINUED | OUTPATIENT
Start: 2018-11-11 | End: 2018-11-18 | Stop reason: HOSPADM

## 2018-11-11 RX ORDER — ONDANSETRON 2 MG/ML
4 INJECTION INTRAMUSCULAR; INTRAVENOUS EVERY 4 HOURS PRN
Status: DISCONTINUED | OUTPATIENT
Start: 2018-11-11 | End: 2018-11-18 | Stop reason: HOSPADM

## 2018-11-11 RX ORDER — OXYCODONE HYDROCHLORIDE AND ACETAMINOPHEN 5; 325 MG/1; MG/1
2 TABLET ORAL
Status: DISCONTINUED | OUTPATIENT
Start: 2018-11-11 | End: 2018-11-11 | Stop reason: HOSPADM

## 2018-11-11 RX ORDER — OXYCODONE HYDROCHLORIDE 5 MG/1
5 TABLET ORAL
Status: DISCONTINUED | OUTPATIENT
Start: 2018-11-11 | End: 2018-11-12

## 2018-11-11 RX ORDER — CLONIDINE HYDROCHLORIDE 0.1 MG/1
0.2 TABLET ORAL ONCE
Status: COMPLETED | OUTPATIENT
Start: 2018-11-11 | End: 2018-11-11

## 2018-11-11 RX ORDER — SEVELAMER CARBONATE 800 MG/1
3200 TABLET, FILM COATED ORAL
Status: DISCONTINUED | OUTPATIENT
Start: 2018-11-12 | End: 2018-11-18 | Stop reason: HOSPADM

## 2018-11-11 RX ADMIN — HYDROMORPHONE HYDROCHLORIDE 0.5 MG: 1 INJECTION, SOLUTION INTRAMUSCULAR; INTRAVENOUS; SUBCUTANEOUS at 21:51

## 2018-11-11 RX ADMIN — ONDANSETRON 4 MG: 2 INJECTION INTRAMUSCULAR; INTRAVENOUS at 15:52

## 2018-11-11 RX ADMIN — HYDROMORPHONE HYDROCHLORIDE 0.4 MG: 1 INJECTION, SOLUTION INTRAMUSCULAR; INTRAVENOUS; SUBCUTANEOUS at 21:09

## 2018-11-11 RX ADMIN — HYDRALAZINE HYDROCHLORIDE 5 MG: 20 INJECTION INTRAMUSCULAR; INTRAVENOUS at 20:32

## 2018-11-11 RX ADMIN — OXYCODONE HYDROCHLORIDE 10 MG: 10 TABLET ORAL at 23:03

## 2018-11-11 RX ADMIN — CLONIDINE HYDROCHLORIDE 0.2 MG: 0.1 TABLET ORAL at 15:52

## 2018-11-11 RX ADMIN — SODIUM CHLORIDE: 9 INJECTION, SOLUTION INTRAVENOUS at 23:05

## 2018-11-11 RX ADMIN — INSULIN HUMAN 10 UNITS: 100 INJECTION, SOLUTION PARENTERAL at 23:41

## 2018-11-11 RX ADMIN — HYDROMORPHONE HYDROCHLORIDE 0.4 MG: 1 INJECTION, SOLUTION INTRAMUSCULAR; INTRAVENOUS; SUBCUTANEOUS at 20:55

## 2018-11-11 RX ADMIN — HYDRALAZINE HYDROCHLORIDE 5 MG: 20 INJECTION INTRAMUSCULAR; INTRAVENOUS at 20:43

## 2018-11-11 RX ADMIN — HYDROMORPHONE HYDROCHLORIDE 0.5 MG: 1 INJECTION, SOLUTION INTRAMUSCULAR; INTRAVENOUS; SUBCUTANEOUS at 18:30

## 2018-11-11 RX ADMIN — LIDOCAINE HYDROCHLORIDE 30 ML: 20 SOLUTION OROPHARYNGEAL at 16:15

## 2018-11-11 RX ADMIN — METRONIDAZOLE 500 MG: 500 INJECTION, SOLUTION INTRAVENOUS at 23:06

## 2018-11-11 RX ADMIN — CLONIDINE 1 PATCH: 0.1 PATCH TRANSDERMAL at 23:07

## 2018-11-11 RX ADMIN — DEXTROSE MONOHYDRATE 50 ML: 500 INJECTION PARENTERAL at 23:25

## 2018-11-11 RX ADMIN — HYDROMORPHONE HYDROCHLORIDE 0.2 MG: 1 INJECTION, SOLUTION INTRAMUSCULAR; INTRAVENOUS; SUBCUTANEOUS at 21:00

## 2018-11-11 RX ADMIN — CEFTRIAXONE SODIUM 2 G: 2 INJECTION, POWDER, FOR SOLUTION INTRAMUSCULAR; INTRAVENOUS at 18:43

## 2018-11-11 RX ADMIN — HYDROMORPHONE HYDROCHLORIDE 1 MG: 1 INJECTION, SOLUTION INTRAMUSCULAR; INTRAVENOUS; SUBCUTANEOUS at 22:35

## 2018-11-11 RX ADMIN — STANDARDIZED SENNA CONCENTRATE AND DOCUSATE SODIUM 2 TABLET: 8.6; 5 TABLET, FILM COATED ORAL at 23:02

## 2018-11-11 ASSESSMENT — PAIN SCALES - GENERAL
PAINLEVEL_OUTOF10: 6
PAINLEVEL_OUTOF10: 6
PAINLEVEL_OUTOF10: 8
PAINLEVEL_OUTOF10: 10
PAINLEVEL_OUTOF10: 8
PAINLEVEL_OUTOF10: 5
PAINLEVEL_OUTOF10: 8
PAINLEVEL_OUTOF10: 5

## 2018-11-11 ASSESSMENT — ENCOUNTER SYMPTOMS
COUGH: 0
BLURRED VISION: 1
WEIGHT LOSS: 0
CHILLS: 0
FEVER: 0
DIAPHORESIS: 0
HEADACHES: 1
ABDOMINAL PAIN: 0
DIZZINESS: 0
WEAKNESS: 0
PALPITATIONS: 0
VOMITING: 1
SHORTNESS OF BREATH: 0

## 2018-11-11 ASSESSMENT — LIFESTYLE VARIABLES: EVER_SMOKED: YES

## 2018-11-11 NOTE — ED TRIAGE NOTES
"Chief Complaint   Patient presents with   • Sent by MD     Sent from , pt reports that he has been having nausea x days, did not vomit today. Pt reports that this morning he developed blurry vision. It has been constant. Pt is dialysis pt and next dialysis tonight. Pt has not been eating last 5 days. Pt is hypertensive in triage.   Blood pressure (!) 187/120, pulse 100, temperature 37.2 °C (99 °F), temperature source Temporal, resp. rate 16, height 1.753 m (5' 9\"), weight 72.3 kg (159 lb 6.3 oz), SpO2 97 %.    Charge RN notified of pt.  Pt informed of wait times. Educated on triage process.  Asked to return to triage RN for any new or worsening of symptoms. Thanked for patience.        "

## 2018-11-11 NOTE — PROGRESS NOTES
Subjective:      Elier Bustillos is a 28 y.o. male who presents with Blurred Vision (Gastritis x 4 days )            Blurred Vision   This is a new problem. The current episode started today. The problem occurs constantly. The problem has been unchanged. Associated symptoms include headaches and vomiting. Pertinent negatives include no abdominal pain, chest pain, chills, coughing, diaphoresis, fever, rash or weakness. The symptoms are aggravated by eating. He has tried nothing for the symptoms.       Review of Systems   Constitutional: Positive for malaise/fatigue. Negative for chills, diaphoresis, fever and weight loss.   Eyes: Positive for blurred vision.   Respiratory: Negative for cough and shortness of breath.    Cardiovascular: Negative for chest pain and palpitations.   Gastrointestinal: Positive for vomiting. Negative for abdominal pain.   Skin: Negative for itching and rash.   Neurological: Positive for headaches. Negative for dizziness and weakness.   All other systems reviewed and are negative.    PMH:  has a past medical history of Dialysis; Dialysis care; Hypertension; and Renal disorder.  MEDS:   Current Outpatient Prescriptions:   •  metoprolol SR (TOPROL XL) 25 MG TABLET SR 24 HR, Take 25 mg by mouth every day., Disp: , Rfl:   •  losartan (COZAAR) 100 MG Tab, Take 100 mg by mouth every day., Disp: , Rfl:   •  hydrALAZINE (APRESOLINE) 25 MG Tab, Take 25 mg by mouth 3 times a day., Disp: , Rfl:   •  clonidine (CATAPRES) 0.1 MG/24HR PATCH WEEKLY, Apply 1 Patch to skin as directed every 7 days. On Mon, Disp: , Rfl:   •  amlodipine (NORVASC) 10 MG TABS, Take 1 Tab by mouth every day., Disp: 30 Tab, Rfl: 3  •  cinacalcet (SENSIPAR) 30 MG TABS, Take 1 Tab by mouth every day., Disp: 30 Tab, Rfl: 11  •  ondansetron (ZOFRAN ODT) 4 MG TABLET DISPERSIBLE, Take 1 Tab by mouth every 6 hours as needed., Disp: 10 Tab, Rfl: 0  •  raNITidine (ZANTAC) 150 MG Tab, Take 1 Tab by mouth 2 times a day., Disp:  "30 Tab, Rfl: 0  •  sevelamer (RENAGEL) 800 MG TABS, Take 4 Tabs by mouth 3 times a day, with meals., Disp: 360 Tab, Rfl: 3  ALLERGIES: No Known Allergies  SURGHX:   Past Surgical History:   Procedure Laterality Date   • AV FISTULA REVISION Right 11/28/2016    Procedure: AV FISTULA REVISION UPPER EXTREMITY (ANEURYSM REDUCTION);  Surgeon: Slade Perez M.D.;  Location: [facility];  Service:    • AV FISTULA CREATION  8/21/2012    Performed by ANAI TYSON at SURGERY [facility]   • RECOVERY  7/20/2012    Performed by SURGERY, IR-RECOVERY at SURGERY [facility]   • RECOVERY  7/3/2012    Performed by SURGERY, IR-RECOVERY at [facility]GERY [facility]   • AV FISTULA CREATION  6/7/2012    Performed by ANAI TYSON at SURGERY [facility]   • CATH PLACEMENT  6/7/2012    Performed by ANAI TYSON at SURGERY [facility]   • AV FISTULA CREATION  12/29/2010    Performed by ANT ALONZO at SURGERY [facility]   • OTHER ORTHOPEDIC SURGERY      r arm fx     SOCHX:  reports that he has never smoked. He has never used smokeless tobacco. He reports that he does not drink alcohol or use drugs.  FH: Family history was reviewed, no pertinent findings to report  Medications, Allergies, and current problem list reviewed today in Epic       Objective:     BP (!) 220/110   Pulse (!) 101   Temp 37.6 °C (99.7 °F)   Resp 14   Ht 1.676 m (5' 6\")   Wt 66.7 kg (147 lb)   SpO2 97%   BMI 23.73 kg/m²      Physical Exam   Constitutional: He is oriented to person, place, and time. He appears well-developed and well-nourished. He appears ill.   HENT:   Head: Normocephalic and atraumatic. Head is without raccoon's eyes, without Montanez's sign and without contusion.   Right Ear: Hearing, tympanic membrane, external ear and ear canal normal.   Left Ear: Hearing, tympanic membrane, external ear and ear canal normal.   Nose: Nose normal.   Mouth/Throat: Oropharynx is clear and moist.   Eyes: Pupils " are equal, round, and reactive to light. Conjunctivae and EOM are normal.   Neck: Normal range of motion. Neck supple.   Cardiovascular: Normal rate, regular rhythm and normal heart sounds.  Exam reveals no gallop and no friction rub.    No murmur heard.  Pulmonary/Chest: Effort normal and breath sounds normal.   Abdominal: Soft. Bowel sounds are normal.   Musculoskeletal: Normal range of motion.   Neurological: He is alert and oriented to person, place, and time. He has normal strength and normal reflexes. He displays normal reflexes. No cranial nerve deficit or sensory deficit. He exhibits normal muscle tone. He displays a negative Romberg sign. Coordination and gait normal. GCS eye subscore is 4. GCS verbal subscore is 5. GCS motor subscore is 6.   CN II-XII intact. Cerebellar function  intact. Motor coordination intact.  strength strong and symmetrical bilaterally. Proprioception intact. Strength 5/5 equal in the upper and lower extremities. Facial features symmetric with equal movement. No focal deficits. Lower extremities normal- no sensory deficit. Distal n/v intact     Skin: Skin is warm and dry.   Psychiatric: He has a normal mood and affect. His behavior is normal. Judgment and thought content normal.   Vitals reviewed.              Assessment/Plan:   Patient is a 28-year-old male who presents with blurry vision, vomiting and high blood pressure.  Blurry vision set on today.  PMH of kidney failure.  He was seen in the emergency department 2 days ago for epigastric pain and vomiting.  Ultrasounds were done at that time which were negative for gallbladder disease.  He was given diagnosis of gastroenteritis.  His wife states that his symptoms have worsened and he cannot eat anything for the past 4 days.  He is not tolerating oral hydration as well.  Today he developed blurry vision and has a blood pressure of 220/110.  His eyesight was checked which was 20/100 and left and right.  20/70 in both.  Patient  appears ill.  We discussed diagnosis differential and due to his symptoms of high blood pressure and blurry vision and vomiting discussed this is an emergency.  Patient declines ambulance.  They will go by private vehicle to the emergency department.    1. Uncontrolled hypertension  -Transfer to ED     2. Blurry vision, bilateral      3. Vomiting, intractability of vomiting not specified, presence of nausea not specified, unspecified vomiting type      4. ESRD (end stage renal disease) (HCC)

## 2018-11-12 LAB
ALBUMIN SERPL BCP-MCNC: 3 G/DL (ref 3.2–4.9)
ALBUMIN SERPL BCP-MCNC: 3 G/DL (ref 3.2–4.9)
ALBUMIN/GLOB SERPL: 1.4 G/DL
ALP SERPL-CCNC: 45 U/L (ref 30–99)
ALT SERPL-CCNC: 9 U/L (ref 2–50)
ANION GAP SERPL CALC-SCNC: 17 MMOL/L (ref 0–11.9)
APPEARANCE UR: CLEAR
AST SERPL-CCNC: <5 U/L (ref 12–45)
BACTERIA #/AREA URNS HPF: NEGATIVE /HPF
BILIRUB SERPL-MCNC: 0.5 MG/DL (ref 0.1–1.5)
BILIRUB UR QL STRIP.AUTO: NEGATIVE
BUN SERPL-MCNC: 96 MG/DL (ref 8–22)
BUN SERPL-MCNC: 97 MG/DL (ref 8–22)
CALCIUM SERPL-MCNC: 8.7 MG/DL (ref 8.5–10.5)
CALCIUM SERPL-MCNC: 8.8 MG/DL (ref 8.5–10.5)
CHLORIDE SERPL-SCNC: 92 MMOL/L (ref 96–112)
CHLORIDE SERPL-SCNC: 92 MMOL/L (ref 96–112)
CO2 SERPL-SCNC: 24 MMOL/L (ref 20–33)
CO2 SERPL-SCNC: 25 MMOL/L (ref 20–33)
COLOR UR: YELLOW
CREAT SERPL-MCNC: 14.84 MG/DL (ref 0.5–1.4)
CREAT SERPL-MCNC: 15.03 MG/DL (ref 0.5–1.4)
EPI CELLS #/AREA URNS HPF: NEGATIVE /HPF
ERYTHROCYTE [DISTWIDTH] IN BLOOD BY AUTOMATED COUNT: 43.8 FL (ref 35.9–50)
GLOBULIN SER CALC-MCNC: 2.2 G/DL (ref 1.9–3.5)
GLUCOSE SERPL-MCNC: 108 MG/DL (ref 65–99)
GLUCOSE SERPL-MCNC: 108 MG/DL (ref 65–99)
GLUCOSE UR STRIP.AUTO-MCNC: 250 MG/DL
HAV IGM SERPL QL IA: NEGATIVE
HBV CORE IGM SER QL: NEGATIVE
HBV SURFACE AB SERPL IA-ACNC: 12.42 MIU/ML (ref 0–10)
HBV SURFACE AG SER QL: NEGATIVE
HCT VFR BLD AUTO: 30.6 % (ref 42–52)
HCV AB SER QL: NEGATIVE
HGB BLD-MCNC: 10.5 G/DL (ref 14–18)
HYALINE CASTS #/AREA URNS LPF: ABNORMAL /LPF
KETONES UR STRIP.AUTO-MCNC: NEGATIVE MG/DL
LACTATE BLD-SCNC: 0.8 MMOL/L (ref 0.5–2)
LACTATE BLD-SCNC: 0.9 MMOL/L (ref 0.5–2)
LEUKOCYTE ESTERASE UR QL STRIP.AUTO: NEGATIVE
MCH RBC QN AUTO: 31.7 PG (ref 27–33)
MCHC RBC AUTO-ENTMCNC: 32.9 G/DL (ref 33.7–35.3)
MCV RBC AUTO: 96.3 FL (ref 81.4–97.8)
MICRO URNS: ABNORMAL
NITRITE UR QL STRIP.AUTO: NEGATIVE
PH UR STRIP.AUTO: 8.5 [PH]
PHOSPHATE SERPL-MCNC: 9.1 MG/DL (ref 2.5–4.5)
PLATELET # BLD AUTO: 132 K/UL (ref 164–446)
PMV BLD AUTO: 10.8 FL (ref 9–12.9)
POTASSIUM SERPL-SCNC: 5.8 MMOL/L (ref 3.6–5.5)
POTASSIUM SERPL-SCNC: 5.9 MMOL/L (ref 3.6–5.5)
PROT SERPL-MCNC: 5.2 G/DL (ref 6–8.2)
PROT UR QL STRIP: 300 MG/DL
RBC # BLD AUTO: 3.25 M/UL (ref 4.7–6.1)
RBC # URNS HPF: ABNORMAL /HPF
RBC UR QL AUTO: ABNORMAL
SODIUM SERPL-SCNC: 134 MMOL/L (ref 135–145)
SODIUM SERPL-SCNC: 135 MMOL/L (ref 135–145)
SP GR UR STRIP.AUTO: 1.01
UROBILINOGEN UR STRIP.AUTO-MCNC: 0.2 MG/DL
WBC # BLD AUTO: 15.1 K/UL (ref 4.8–10.8)
WBC #/AREA URNS HPF: ABNORMAL /HPF

## 2018-11-12 PROCEDURE — 80069 RENAL FUNCTION PANEL: CPT

## 2018-11-12 PROCEDURE — 770020 HCHG ROOM/CARE - TELE (206)

## 2018-11-12 PROCEDURE — 36415 COLL VENOUS BLD VENIPUNCTURE: CPT

## 2018-11-12 PROCEDURE — 700111 HCHG RX REV CODE 636 W/ 250 OVERRIDE (IP): Performed by: SURGERY

## 2018-11-12 PROCEDURE — 700111 HCHG RX REV CODE 636 W/ 250 OVERRIDE (IP): Performed by: INTERNAL MEDICINE

## 2018-11-12 PROCEDURE — 51798 US URINE CAPACITY MEASURE: CPT

## 2018-11-12 PROCEDURE — 80053 COMPREHEN METABOLIC PANEL: CPT

## 2018-11-12 PROCEDURE — 90935 HEMODIALYSIS ONE EVALUATION: CPT

## 2018-11-12 PROCEDURE — 700101 HCHG RX REV CODE 250: Performed by: INTERNAL MEDICINE

## 2018-11-12 PROCEDURE — A9270 NON-COVERED ITEM OR SERVICE: HCPCS | Performed by: HOSPITALIST

## 2018-11-12 PROCEDURE — 80074 ACUTE HEPATITIS PANEL: CPT

## 2018-11-12 PROCEDURE — 700102 HCHG RX REV CODE 250 W/ 637 OVERRIDE(OP): Performed by: HOSPITALIST

## 2018-11-12 PROCEDURE — 700102 HCHG RX REV CODE 250 W/ 637 OVERRIDE(OP): Performed by: INTERNAL MEDICINE

## 2018-11-12 PROCEDURE — 700105 HCHG RX REV CODE 258: Performed by: INTERNAL MEDICINE

## 2018-11-12 PROCEDURE — 81001 URINALYSIS AUTO W/SCOPE: CPT

## 2018-11-12 PROCEDURE — 700101 HCHG RX REV CODE 250: Performed by: HOSPITALIST

## 2018-11-12 PROCEDURE — 700111 HCHG RX REV CODE 636 W/ 250 OVERRIDE (IP): Performed by: HOSPITALIST

## 2018-11-12 PROCEDURE — 85027 COMPLETE CBC AUTOMATED: CPT

## 2018-11-12 PROCEDURE — 99232 SBSQ HOSP IP/OBS MODERATE 35: CPT | Performed by: HOSPITALIST

## 2018-11-12 PROCEDURE — A9270 NON-COVERED ITEM OR SERVICE: HCPCS | Performed by: INTERNAL MEDICINE

## 2018-11-12 PROCEDURE — 83605 ASSAY OF LACTIC ACID: CPT

## 2018-11-12 PROCEDURE — 302129 PCA PLUS: Performed by: HOSPITALIST

## 2018-11-12 RX ORDER — DEXTROSE MONOHYDRATE 25 G/50ML
25 INJECTION, SOLUTION INTRAVENOUS ONCE
Status: COMPLETED | OUTPATIENT
Start: 2018-11-12 | End: 2018-11-12

## 2018-11-12 RX ORDER — OXYCODONE HYDROCHLORIDE 5 MG/1
5 TABLET ORAL
Status: DISCONTINUED | OUTPATIENT
Start: 2018-11-12 | End: 2018-11-14

## 2018-11-12 RX ORDER — METRONIDAZOLE 500 MG/1
500 TABLET ORAL EVERY 8 HOURS
Status: DISCONTINUED | OUTPATIENT
Start: 2018-11-12 | End: 2018-11-12

## 2018-11-12 RX ORDER — HYDROMORPHONE HYDROCHLORIDE 1 MG/ML
1 INJECTION, SOLUTION INTRAMUSCULAR; INTRAVENOUS; SUBCUTANEOUS
Status: DISCONTINUED | OUTPATIENT
Start: 2018-11-12 | End: 2018-11-12

## 2018-11-12 RX ORDER — OXYCODONE HYDROCHLORIDE 10 MG/1
10 TABLET ORAL
Status: DISCONTINUED | OUTPATIENT
Start: 2018-11-12 | End: 2018-11-12

## 2018-11-12 RX ORDER — METRONIDAZOLE 500 MG/1
500 TABLET ORAL EVERY 12 HOURS
Status: COMPLETED | OUTPATIENT
Start: 2018-11-12 | End: 2018-11-17

## 2018-11-12 RX ADMIN — DEXTROSE MONOHYDRATE 50 ML: 25 INJECTION, SOLUTION INTRAVENOUS at 05:58

## 2018-11-12 RX ADMIN — Medication: at 13:48

## 2018-11-12 RX ADMIN — OXYCODONE HYDROCHLORIDE 10 MG: 10 TABLET ORAL at 02:45

## 2018-11-12 RX ADMIN — INSULIN HUMAN 10 UNITS: 100 INJECTION, SOLUTION PARENTERAL at 06:10

## 2018-11-12 RX ADMIN — METRONIDAZOLE 500 MG: 500 INJECTION, SOLUTION INTRAVENOUS at 05:58

## 2018-11-12 RX ADMIN — OXYCODONE HYDROCHLORIDE 5 MG: 5 TABLET ORAL at 16:20

## 2018-11-12 RX ADMIN — SEVELAMER CARBONATE 3200 MG: 800 TABLET, FILM COATED ORAL at 17:25

## 2018-11-12 RX ADMIN — SODIUM CHLORIDE: 9 INJECTION, SOLUTION INTRAVENOUS at 13:48

## 2018-11-12 RX ADMIN — FAMOTIDINE 20 MG: 20 TABLET, FILM COATED ORAL at 05:12

## 2018-11-12 RX ADMIN — HEPARIN SODIUM 5000 UNITS: 5000 INJECTION, SOLUTION INTRAVENOUS; SUBCUTANEOUS at 05:09

## 2018-11-12 RX ADMIN — HEPARIN SODIUM 5000 UNITS: 5000 INJECTION, SOLUTION INTRAVENOUS; SUBCUTANEOUS at 17:25

## 2018-11-12 RX ADMIN — HYDRALAZINE HYDROCHLORIDE 25 MG: 25 TABLET, FILM COATED ORAL at 17:25

## 2018-11-12 RX ADMIN — HYDRALAZINE HYDROCHLORIDE 25 MG: 25 TABLET, FILM COATED ORAL at 05:10

## 2018-11-12 RX ADMIN — METOPROLOL SUCCINATE 25 MG: 25 TABLET, EXTENDED RELEASE ORAL at 05:13

## 2018-11-12 RX ADMIN — OXYCODONE HYDROCHLORIDE 10 MG: 10 TABLET ORAL at 05:58

## 2018-11-12 RX ADMIN — OXYCODONE HYDROCHLORIDE 10 MG: 10 TABLET ORAL at 09:25

## 2018-11-12 RX ADMIN — CINACALCET HYDROCHLORIDE 30 MG: 30 TABLET, COATED ORAL at 05:13

## 2018-11-12 RX ADMIN — STANDARDIZED SENNA CONCENTRATE AND DOCUSATE SODIUM 2 TABLET: 8.6; 5 TABLET, FILM COATED ORAL at 17:26

## 2018-11-12 RX ADMIN — STANDARDIZED SENNA CONCENTRATE AND DOCUSATE SODIUM 2 TABLET: 8.6; 5 TABLET, FILM COATED ORAL at 05:12

## 2018-11-12 RX ADMIN — AMLODIPINE BESYLATE 10 MG: 10 TABLET ORAL at 05:12

## 2018-11-12 RX ADMIN — METRONIDAZOLE 500 MG: 500 TABLET ORAL at 17:25

## 2018-11-12 RX ADMIN — HYDROMORPHONE HYDROCHLORIDE 1 MG: 1 INJECTION, SOLUTION INTRAMUSCULAR; INTRAVENOUS; SUBCUTANEOUS at 10:22

## 2018-11-12 ASSESSMENT — PAIN SCALES - GENERAL
PAINLEVEL_OUTOF10: 9
PAINLEVEL_OUTOF10: 2
PAINLEVEL_OUTOF10: 4
PAINLEVEL_OUTOF10: 0
PAINLEVEL_OUTOF10: 10
PAINLEVEL_OUTOF10: 2
PAINLEVEL_OUTOF10: 8
PAINLEVEL_OUTOF10: 6
PAINLEVEL_OUTOF10: 5

## 2018-11-12 ASSESSMENT — ENCOUNTER SYMPTOMS
DIAPHORESIS: 0
EYES NEGATIVE: 1
NEUROLOGICAL NEGATIVE: 1
NAUSEA: 0
CARDIOVASCULAR NEGATIVE: 1
FEVER: 0
DOUBLE VISION: 0
DIZZINESS: 0
ABDOMINAL PAIN: 1
NERVOUS/ANXIOUS: 0
BLOOD IN STOOL: 0
BRUISES/BLEEDS EASILY: 0
HEARTBURN: 0
CHILLS: 0
COUGH: 0
FOCAL WEAKNESS: 0
HEADACHES: 0
VOMITING: 0
RESPIRATORY NEGATIVE: 1
PALPITATIONS: 0
HEMOPTYSIS: 0
WHEEZING: 0
MUSCULOSKELETAL NEGATIVE: 1
CONSTIPATION: 1
PSYCHIATRIC NEGATIVE: 1
DIARRHEA: 0
NAUSEA: 1
LOSS OF CONSCIOUSNESS: 0
CONSTITUTIONAL NEGATIVE: 1
SEIZURES: 0

## 2018-11-12 ASSESSMENT — COGNITIVE AND FUNCTIONAL STATUS - GENERAL
DAILY ACTIVITIY SCORE: 24
MOBILITY SCORE: 24
SUGGESTED CMS G CODE MODIFIER DAILY ACTIVITY: CH
SUGGESTED CMS G CODE MODIFIER MOBILITY: CH

## 2018-11-12 ASSESSMENT — PATIENT HEALTH QUESTIONNAIRE - PHQ9
2. FEELING DOWN, DEPRESSED, IRRITABLE, OR HOPELESS: NOT AT ALL
SUM OF ALL RESPONSES TO PHQ9 QUESTIONS 1 AND 2: 0
1. LITTLE INTEREST OR PLEASURE IN DOING THINGS: NOT AT ALL

## 2018-11-12 NOTE — ASSESSMENT & PLAN NOTE
This is sepsis (without associated acute organ dysfunction).   Patient does not appear to be septic.  The patient's white blood cell count is elevated secondary to the inflammation.  Lactic acid levels are normal.  Vital signs are normal.  He has not been hypotensive.

## 2018-11-12 NOTE — PROGRESS NOTES
Progress Note               Author: Faith Martin Date & Time created: 11/12/2018  11:19 AM     Interval History:   Feeling overall better overnight. Having incisional pain. +flatus, no BM. Tolerating clear liquids.     Review of Systems:  Review of Systems   Constitutional: Negative for chills and fever.   Gastrointestinal: Positive for abdominal pain. Negative for nausea and vomiting.       Physical Exam:  Physical Exam   Constitutional: He is oriented to person, place, and time. He appears well-developed and well-nourished. No distress.   HENT:   Head: Normocephalic and atraumatic.   Eyes: Conjunctivae are normal.   Neck: Normal range of motion.   Pulmonary/Chest: Effort normal.   Abdominal: Soft. He exhibits no distension. There is tenderness.   Incision c/d/i   Neurological: He is alert and oriented to person, place, and time.   Skin: Skin is warm and dry. He is not diaphoretic.   Psychiatric: He has a normal mood and affect. His behavior is normal.       Labs:          Recent Labs      11/09/18 2359 11/11/18 1545 11/11/18 2117 11/12/18   0202   SODIUM  138  133*  133*  135  134*   POTASSIUM  4.5  5.0  5.8*  5.9*  5.8*   CHLORIDE  93*  88*  91*  92*  92*   CO2  29  26  25  24  25   BUN  56*  89*  92*  96*  97*   CREATININE  9.00*  13.78*  14.21*  14.84*  15.03*   MAGNESIUM  2.7*   --    --    --    PHOSPHORUS  6.0*   --   8.2*  9.1*   CALCIUM  10.4  9.3  8.7  8.8  8.7     Recent Labs      11/09/18 2359 11/11/18   1545  11/11/18 2117 11/12/18   0202   ALTSGPT  11  14   --   9   ASTSGOT  12  7*   --   <5*   ALKPHOSPHAT  56  57   --   45   TBILIRUBIN  0.5  0.7   --   0.5   LIPASE  23  79   --    --    GLUCOSE  101*  113*  123*  108*  108*     Recent Labs      11/09/18 2359 11/11/18   1545  11/12/18   0202   RBC  3.86*  3.99*  3.25*   HEMOGLOBIN  12.5*  13.3*  10.5*   HEMATOCRIT  36.6*  36.9*  30.6*   PLATELETCT  149*  147*  132*     Recent Labs      11/09/18   9669  11/11/18   1543   18   0202   WBC  10.9*  18.3*  15.1*   NEUTSPOLYS  76.50*  84.90*   --    LYMPHOCYTES  13.20*  7.30*   --    MONOCYTES  8.90  7.00   --    EOSINOPHILS  0.70  0.20   --    BASOPHILS  0.40  0.30   --    ASTSGOT  12  7*  <5*   ALTSGPT  11  14  9   ALKPHOSPHAT  56  57  45   TBILIRUBIN  0.5  0.7  0.5     Hemodynamics:  Temp (24hrs), Av.1 °C (98.7 °F), Min:36.3 °C (97.3 °F), Max:37.4 °C (99.4 °F)  Temperature: 37.1 °C (98.8 °F)  Pulse  Av.4  Min: 75  Max: 109Heart Rate (Monitored): 90  Blood Pressure: 158/90, NIBP: 123/85     Respiratory:    Respiration: 18, Pulse Oximetry: 93 %           Fluids:    Intake/Output Summary (Last 24 hours) at 18 1119  Last data filed at 18 0600   Gross per 24 hour   Intake           818.75 ml   Output               10 ml   Net           808.75 ml     Weight: 75.9 kg (167 lb 5.3 oz)  GI/Nutrition:  Orders Placed This Encounter   Procedures   • Diet NPO     Standing Status:   Standing     Number of Occurrences:   1     Order Specific Question:   Restrict to:     Answer:   Sips with Medications [3]     Medical Decision Making, by Problem:  Active Hospital Problems    Diagnosis   • *Ischemic bowel disease (HCC) [K55.9]   • ESRD (end stage renal disease) on dialysis (HCC) [N18.6, Z99.2]   • Sepsis (HCC) [A41.9]       Plan:  PCA for pain control for surgical incision.   No evidence of bowel ischemia, cholecystitis or other intra-abdominal infection on ex lap, though there was certainly edema of the proximal small bowel and mesentery. No bowel resection indicated.   Advance to full liquid diet today.   Will follow.     Quality-Core Measures   Reviewed items::  Labs reviewed

## 2018-11-12 NOTE — CONSULTS
DATE OF SERVICE:  11/11/2018    REQUESTING PHYSICIAN:  Dr. Montgomery of the emergency department.    REASON FOR REQUEST:  Suspected ischemic intestine.    CHIEF COMPLAINT:  Abdominal pain, nausea and vomiting.    HISTORY OF PRESENT ILLNESS:  This is a 28-year-old gentleman who is dialysis   dependent with severe hypertension who has had 6 days of abdominal pain and   vomiting.  He presented to the emergency department 2 days ago and was found   to have a relatively normal white blood cell count and a gallbladder   ultrasound that was fairly normal looking without evidence of acute   cholecystitis.  He was given pain medicine, which has not helped him and he   feels the pain has gotten worse.  He has not had any vomiting today, but had   some even yesterday.  He has not been unable to eat anything and has barely   been able to keep down liquids for about 6 days now.  He has severe epigastric   pain.  He has never had pain like this before.  His wife is at bedside and   assisting with interpretation, though he understands most of what I am saying.    PAST MEDICAL HISTORY:  Severe hypertension, end-stage renal disease, dialysis   dependent.  He has dialysis per [facility] on Tuesday, Thursday, and   Sunday.  Tonight is his usual dialysis today.    PAST SURGICAL HISTORY:  His right arm fistula was initially created by Dr. Magallanes in 2010.  He has had multiple revisions since that time by Dr. Ayoub   and Dr. Perez.    FAMILY HISTORY:  No problems with bleeding or anesthesia.    SOCIAL HISTORY:  He is  and has multiple children.  He lives here in   [place].  He does not smoke, use alcohol or any other drugs.    ALLERGIES:  No known drug allergies.    REVIEW OF SYSTEMS:  He states he has been in his usual state of health up   until Monday.    PHYSICAL EXAMINATION:  VITAL SIGNS:  Temperature is 37.2, heart rate is 93, blood pressure 156/95 on   the monitor, respiratory rate 17, and weight is 72.3  kilos.  GENERAL:  He is alert and oriented x4, and very uncomfortable appearing, lying   on the hospital bed.  He winces every time he moves.  HEENT:  Normocephalic, atraumatic.  He appears very thin.  There is some loss   of the buccal fat pads.  His dentition is in moderately poor repair.  Mucous   membranes are dry.  NECK:  Supple.  No lymphadenopathy or thyromegaly.  HEART:  Regular rate.  LUNGS:  Clear.  ABDOMEN:  Soft, exquisitely tender with rebound and guarding and other   peritoneal signs, especially in the left upper quadrant and epigastrium.    Guarding and rebound are minimal to none in the right lower quadrant.  EXTREMITIES:  He has a very enlarged forearm fistula on the right with an   excellent bruit and thrill.  No clubbing, cyanosis or edema.  VASCULAR:  2+ radial and DP pulses bilaterally.  SKIN:  No rashes or lesions.  Warm and dry.    LABORATORY DATA:  Show a white blood cell count of 18.3, H and H of 13.3 and   36.9, platelets of 147, left shift with 84% neutrophils.  Chemistry shows   sodium of 133, potassium 5.0, chloride 88, bicarbonate 26, glucose 113, BUN   89, creatinine 13.78 up from baseline of about 8.6-8, AST is 7, ALT is 14,   alkaline phosphatase is 57, total bilirubin 0.7, lipase is 79.  Lactate is   0.9.    IMAGING:  CT of the abdomen and pelvis shows multiple thick walled loops of   small intestine within the left upper quadrant with surrounding mesenteric   inflammatory change.  Given the patient's history of renal failure on   dialysis, this is most suspicious for ischemic bowel related to dialysis.    Crohn's disease or infectious enteritis would be less likely.  Small amount of   free fluid dependently within the pelvis and atrophic kidneys.  Ultrasound of   the right upper quadrant done on 11/10/2018 shows gallbladder sludge.  No   sonographic evidence of acute cholecystitis.  The gallbladder wall thickness   is 3 mm.  No pericholecystic fluid.  Common duct is normal at 0.46  dolly.    ASSESSMENT AND PLAN:  This is a 28-year-old gentleman with end-stage renal   disease, on dialysis with an exam and imaging concerning for intestinal   ischemia.  I discussed with the patient and his wife that this requires a   surgical evaluation comprised of an exploratory laparotomy for inspection of   the intestine and removal of all compromised intestine.  We discussed the   possibilities that I may be able to re-anastomose him and close his abdomen at   this initial surgery.  We also discussed that it was possible that I may have   to leave his abdomen open and bring him back to the operating room for a   second look.  She understands that he would need to stay on a ventilator in   the ICU if that were to happen.  Dr. Banks also evaluated him and contacted   his nephrologist who does not feel that he needs emergent dialysis tonight.  I   appreciate his assistance.  All of his and his family's questions were   answered to their apparent satisfaction and they agreed to proceed.  We will   proceed to the operating room on an emergent basis.       ____________________________________     MD COCO Alcantara / NTS    DD:  11/11/2018 19:09:19  DT:  11/11/2018 20:16:47    D#:  [id number]  Job#:  [id number]

## 2018-11-12 NOTE — CONSULTS
[facility] Consultation Note       DATE OF SERVICE:  11/12/2018     REQUESTING PHYSICIAN:  Dr. Montgomery of the emergency department.     REASON FOR REQUEST: Providing maintenance   RRT      CHIEF COMPLAINT:  Abdominal pain, nausea and vomiting.     HISTORY OF PRESENT ILLNESS:  This is a 28-year-old gentleman who is on  nocturnal HD, severe hypertension who presented with  6 days of abdominal pain and   Vomiting, he could not keep anything down except for some fluid over the last 6 days.  CT of the abdomen and pelvis shows multiple thick walled loops of  small intestine within the left upper quadrant with surrounding mesenteric inflammatory change.  Ultrasound of the right upper quadrant done on 11/10/2018 shows gallbladder sludge.  No   sonographic evidence of acute cholecystitis. He was taken from the ER straight to the OR for suspected ischemic Bowel.      PAST MEDICAL HISTORY:    Severe hypertension  End-stage renal disease on nocturnal HD, Tuesday, Thursday, and Sunday.      PAST SURGICAL HISTORY:    Right arm AVF fistula creation    FAMILY HISTORY:   Noncontributary       SOCIAL HISTORY:  He denies smoking, alcohol or elicit drug abuse       ALLERGIES:  No known drug allergies.     REVIEW OF SYSTEMS:   Negative except for what  Have been  mentioned in HPI        PHYSICAL EXAMINATION:    11/12/2018 11/12/2018      Pulse: 81 75 86    Temp: - 36.3 °C (97.3 °F) -    BP: 117/82 129/90 -    Resp: - 18 -          GENERAL:  He is alert and oriented x3,   HEENT:  Normocephalic, atraumatic.    NECK:  Supple.  No lymphadenopathy or thyromegaly.  HEART:  S1 S2 RRR  LUNGS: Clear to auscultation.   ABDOMEN:  Soft, tender.   EXTREMITIES:   No clubbing, cyanosis or edema.        Recent Labs      11/09/18   2359  11/11/18   1545  11/11/18   2117  11/12/18   0202   SODIUM  138  133*  133*  135  134*   POTASSIUM  4.5  5.0  5.8*  5.9*  5.8*   CHLORIDE  93*  88*  91*  92*  92*   CO2  29  26  25  24  25   BUN  56*  89*  92*   96*  97*   CREATININE  9.00*  13.78*  14.21*  14.84*  15.03*   MAGNESIUM  2.7*   --    --    --    PHOSPHORUS  6.0*   --   8.2*  9.1*   CALCIUM  10.4  9.3  8.7  8.8  8.7     Recent Labs      18   1545  18   0202   RBC  3.86*  3.99*  3.25*   HEMOGLOBIN  12.5*  13.3*  10.5*   HEMATOCRIT  36.6*  36.9*  30.6*   PLATELETCT  149*  147*  132*     Recent Labs      18   1545  18   2117  18   0202   ALTSGPT  11  14   --   9   ASTSGOT  12  7*   --   <5*   ALKPHOSPHAT  56  57   --   45   TBILIRUBIN  0.5  0.7   --   0.5   LIPASE  23  79   --    --    GLUCOSE  101*  113*  123*  108*  108*     Weight: 75.9 kg (167 lb 5.3 oz)    Temp  Av.1 °C (98.7 °F)  Min: 36.3 °C (97.3 °F)  Max: 37.4 °C (99.4 °F)  Recent Labs      18   1545  18   0202   WBC  10.9*  18.3*  15.1*   NEUTSPOLYS  76.50*  84.90*   --    LYMPHOCYTES  13.20*  7.30*   --    MONOCYTES  8.90  7.00   --    EOSINOPHILS  0.70  0.20   --    BASOPHILS  0.40  0.30   --    ASTSGOT  12  7*  <5*   ALTSGPT  11  14  9   ALKPHOSPHAT  56  57  45   TBILIRUBIN  0.5  0.7  0.5       Radiology  CT-RENAL COLIC EVALUATION(A/P W/O)   Final Result      1.  Multiple thick-walled loops of small intestine within the left upper quadrant with surrounding mesenteric inflammatory change. Given the patient's history of renal failure on dialysis, this would be most suspicious for ischemic bowel related to    dialysis. Crohn's disease or infectious enteritis would be less likely possibility.      2.  Small amount of free fluid dependently within the pelvis.      3.  Atrophic kidneys.      This was discussed with SUJEY MERIDA at 5:40 PM on 2018.          ASSESSMENT AND PLAN:    # End-stage renal disease, on nocturnal Hemodialysis.  # CT scan concerning  for intestinal ischemia, S/p exploratory laparotomy  # Anemia of CKD  # BMD of CKD   # Hypertension  # Ischemic bowel disease (HCC)  # Sepsis      - We will arrange for HD this Am with 2 k path and no heparin   - Continue Empiric antibiotics for possible underling abdominal infectious process   - Obtain PTH, Vit D and  Phos levels   - HGB target 10-11 , ESAs per outpatient protocol   - Renal dosing of meds per eGFR less than 10    Thank you

## 2018-11-12 NOTE — CARE PLAN
Problem: Communication  Goal: The ability to communicate needs accurately and effectively will improve  Pt able to communicate needs to staff.    Problem: Infection  Goal: Will remain free from infection  Pt receiving antibiotics.

## 2018-11-12 NOTE — ASSESSMENT & PLAN NOTE
Patient came in with progressively worsening abdominal pain since Tuesday of last week.  T patient's abdomen was evaluated by surgery and took him for an exploratory laparotomy.  The surgery findings showed no ischemia but edema and inflammation.  The intestines were affected in the left upper quadrant with edema and of the mesentery and free clear serous fluid.  Postoperatively the patient still has severe pain 10 out of 10 and at this point the patient has been started on a PCA.    11/14-denies any abdominal pain today  Nausea and vomiting today  Continue antibiotics  Diet per surgery  Dilaudid PCA, will consider transition to as needed, will follow up with surgery    11/17 improving abdominal discomfort  Status post hemodialysis  Ongoing intermittent nausea and vomiting, Zofran scheduled in a.m. and as needed  Surgery signed off

## 2018-11-12 NOTE — PROGRESS NOTES
Assumed care of pt.  Assessment complete.  No signs of distress.  Pt denies any pain.  Discussed plan of care.  Call light within reach.  Bed alarm active.  Treaded socks on pt.  Will continue to monitor.

## 2018-11-12 NOTE — OP REPORT
DATE OF SERVICE:  11/11/2018    PREOPERATIVE DIAGNOSES:  End-stage renal disease, dialysis dependent, severe   hypertension, and ischemic intestine.    POSTOPERATIVE DIAGNOSES:  Intestinal ischemia versus infectious enteritis,   end-stage renal disease, dialysis dependent, and severe hypertension.    PROCEDURE:  Exploratory laparotomy.    SURGEON:  Faith Martin MD    ASSISTANT:  STEPHANIE Vargas    ANESTHESIOLOGIST:  Jameson Orantes DO    ANESTHESIA:  General with endotracheal anesthesia.    SPECIMENS:  None.    ESTIMATED BLOOD LOSS:  10 mL    FINDINGS:  The patient has edematous small bowel in the left upper quadrant   with edema of the mesentery and a clear serous free fluid.  No evidence of   necrosis or clinically significant ischemia.  No other abnormalities noted.    INDICATIONS:  This is a 28-year-old man who is on dialysis who presented with   nearly a week of severe abdominal pain, anorexia, nausea and vomiting.  He was   evaluated in the emergency department 2 days ago with minimal findings and   returned today when the pain did not resolve even with pain medicine.  A CT   scan showed signs of ischemia and he had la peritonitis on exam and so I   recommended he come to the operating room for an exploratory laparotomy and   possible bowel resection.  We discussed risks, benefits, and alternatives with   risks including, but not limited to bleeding, infection, damage to   surrounding structures including large and small intestine, need for an open   abdomen with an ICU stay and possible staged procedure, possible loss of the   majority of his intestine and need for prolonged ICU stay.  The patient and   his wife understood and agreed to proceed.  The patient understands a lot of   English, but his wife is assisted with interpretation as well.    DESCRIPTION OF PROCEDURE:  The patient was brought to the operating room and   he was placed in a comfortable supine position on the operating table  with all   appropriate points padded.  General anesthesia was induced without   complications.  A time-out was held and completed confirming correct patient,   correct site, correct procedure and SCDs on and functioning.  He received   antibiotics prior to incision.  His abdomen was clipped, prepped with   ChloraPrep and draped in the usual sterile fashion.  I made a vertical upper   midline incision with a 10 blade and took this down to the fascia using Bovie   electrocautery.  The fascia was elevated and entered just superior to the   umbilicus in the midline.  The fascia was opened along the length of the   incision and the peritoneum was grasped and again entered with a cut mode on   the Bovie electrocautery having this elevated a bit quite high above the   intraabdominal contents.  This also was opened along the length of the   incision.  On initial inspection, there was only clear serous fluid.  I   delivered the omentum and transverse colon out of the abdomen and delivered   the small intestine through the wound.  I ran the small intestine in its   entirety from the ligament of Treitz to the ileocecal valve.  He has dilated   edematous intestine starting about 10 cm from the ileocecal valve and   extending for about 40 cm.  The intestine is pink and peristalsis well.  There   is edema of the mesentery.  There are no signs of necrosis or active   ischemia.  The remainder of the intestine is decompressed and also very   healthy looking.  The remainder of the colon is pink and viable.  No   abnormalities noted in the stomach or liver.  The abdomen was then cleared of   the serous free fluid and irrigated with a liter of warm saline.  We then   closed the fascial incision using interrupted 0 Vicryl, irrigated the   subcutaneous tissue and closed the skin using running subcuticular 4-0   Monocryl.  This was then dressed with Dermabond.  The patient was then awoken   from anesthesia in good condition having  tolerated the procedure well.    Findings were discussed with the patient's wife and Dr. Banks after surgery.       ____________________________________     MD COCO Alcantara / HALLE    DD:  11/11/2018 21:40:32  DT:  11/11/2018 22:34:08    D#:  [id number]  Job#:  [id number]    cc: HETAL ROBERTSON [facility], _____ _____

## 2018-11-12 NOTE — PROGRESS NOTES
General Surgery Note  29 yo man dialysis dependent, severe hypertension, here with 6 days of abdominal pain and vomiting. Work up two days ago here on Friday was unrevealing. He has continued to feel poorly.   Alert, uncomfortable, Welsh speaking.   Abd soft, exquisitely tender, +rebound, +guarding +peritoneal signs  WBC 18  CT with thickened small bowel, concerning for ischemia, limited by non con study  A/P: Exam and imaging concerning for intestinal ischemia  To OR emergently for ex lap. Discussed with patient and wife, who assisted with interpretation. Discussed risks of bleeding, infection, possible open abdomen, others as dictated. Patient and wife agree to proceed. Dictation: [id number]

## 2018-11-12 NOTE — OR SURGEON
Immediate Post OP Note    PreOp Diagnosis: Ischemic intestine    PostOp Diagnosis: Ischemia vs. Infectious enteritis    Procedure(s):  EXPLORATORY LAPAROTOMY - Wound Class: Clean Contaminated    Surgeon(s):  Faith Martin M.D.    Anesthesiologist/Type of Anesthesia:  Anesthesiologist: Jameson Orantes D.O./General    Surgical Staff:  Circulator: Valarie Rhodes R.N.  Operating Room Assistant (ORA): David Presley Circulator: eNla Holt R.N.  Relief Scrub: Concha Redd  Scrub Person: Tino Hernandez  Count Waldo: Casie Otero R.N.; Shree Reyes    Specimens removed if any:  * No specimens in log *    Estimated Blood Loss: 10mL    Findings: Edematous small bowel in the left upper quadrant with edema of the mesentery and clear serous free fluid. No evidence of necrosis or clinically significant ischemia.     Complications: none apparent    Dictation: [id number]        11/11/2018 8:27 PM Faith Martin M.D.

## 2018-11-12 NOTE — ED NOTES
Pt medicated for pain, cultures drawn, ABX hung before transport to surgery.  Report called to OR RN.

## 2018-11-12 NOTE — PROGRESS NOTES
Pt arrived on unit. Transferred to hospital bed. Tele monitor placed and SR on monitor. Bed in lowest position, locked, and alarm activated. Call light within reach. Post-op VS in place

## 2018-11-12 NOTE — PROGRESS NOTES
Hospital Medicine Daily Progress Note    Date of Service  11/12/2018    Chief Complaint  28 y.o. male admitted 11/11/2018 with abdominal pain    Hospital Course   The patient's abdominal pain has been progressively worse since Tuesday of last week.  It had escalated 10 out of 10 and was accompanied by nausea but no vomiting.  He does not say that he has had any diarrhea.  Patient was a valid by surgery had to go for urgent exploratory laparotomy.  The laparotomy finds that in the right upper quadrant the small intestines are swollen tender inflamed but does not appear to be ischemic or infected.  Patient at this point will be continued on pain management fluid resuscitation we will monitor at this point for him to be able to pass gas and have a diet.    Interval Problem Update  Overnight patient's pain has escalated to 10 out of 10 once again and thus he was started on a PCA with morphine.  Continue at this point with fluid resuscitation monitor at this point diet and monitor for bowel movements.    Consultants/Specialty  Surgery    Code Status  Full code    Disposition  To be determined    Review of Systems  Review of Systems   Constitutional: Negative.  Negative for chills, diaphoresis and fever.   HENT: Negative.    Eyes: Negative.  Negative for double vision.   Respiratory: Negative.  Negative for cough, hemoptysis and wheezing.    Cardiovascular: Negative.  Negative for chest pain, palpitations and leg swelling.   Gastrointestinal: Positive for abdominal pain, constipation and nausea. Negative for blood in stool, diarrhea, heartburn and vomiting.   Genitourinary: Negative.  Negative for frequency, hematuria and urgency.   Musculoskeletal: Negative.  Negative for joint pain.   Skin: Negative.  Negative for itching and rash.   Neurological: Negative.  Negative for dizziness, focal weakness, seizures, loss of consciousness and headaches.   Endo/Heme/Allergies: Negative.  Does not bruise/bleed easily.    Psychiatric/Behavioral: Negative.  Negative for suicidal ideas. The patient is not nervous/anxious.    All other systems reviewed and are negative.       Physical Exam  Temp:  [36.3 °C (97.3 °F)-37.4 °C (99.4 °F)] 37.1 °C (98.8 °F)  Pulse:  [] 87  Resp:  [12-19] 18  BP: (113-187)/() 176/107    Physical Exam   Constitutional: He is oriented to person, place, and time. He appears well-developed and well-nourished. He is cooperative.  Non-toxic appearance. He does not have a sickly appearance. He appears ill.   HENT:   Head: Normocephalic and atraumatic.   Right Ear: External ear normal.   Left Ear: External ear normal.   Nose: Nose normal.   Mouth/Throat: Oropharynx is clear and moist.   Eyes: Pupils are equal, round, and reactive to light. Conjunctivae and EOM are normal.   Neck: Normal range of motion. Neck supple. No JVD present. No thyromegaly present.   Cardiovascular: Normal rate and regular rhythm.    No murmur heard.  Pulmonary/Chest: He has no wheezes. He has no rales. He exhibits no tenderness.   Abdominal: He exhibits distension. He exhibits no mass. There is tenderness. There is no rebound and no guarding.       Musculoskeletal: Normal range of motion. He exhibits no edema or tenderness.   Lymphadenopathy:     He has no cervical adenopathy.   Neurological: He is alert and oriented to person, place, and time. He has normal reflexes. No cranial nerve deficit. Coordination normal.   Skin: Skin is warm. No rash noted. He is diaphoretic. No erythema.   Psychiatric: He has a normal mood and affect. His behavior is normal. Judgment and thought content normal.   Nursing note and vitals reviewed.      Fluids    Intake/Output Summary (Last 24 hours) at 11/12/18 1356  Last data filed at 11/12/18 0900   Gross per 24 hour   Intake           918.75 ml   Output               10 ml   Net           908.75 ml       Laboratory  Recent Labs      11/09/18   2359  11/11/18   1545  11/12/18   0202   WBC  10.9*   18.3*  15.1*   RBC  3.86*  3.99*  3.25*   HEMOGLOBIN  12.5*  13.3*  10.5*   HEMATOCRIT  36.6*  36.9*  30.6*   MCV  94.8  92.5  96.3   MCH  32.4  33.3*  31.7   MCHC  34.2  36.0*  32.9*   RDW  42.7  41.7  43.8   PLATELETCT  149*  147*  132*   MPV  10.4  10.6  10.8     Recent Labs      11/11/18   1545  11/11/18   2117  11/12/18   0202   SODIUM  133*  133*  135  134*   POTASSIUM  5.0  5.8*  5.9*  5.8*   CHLORIDE  88*  91*  92*  92*   CO2  26  25  24  25   GLUCOSE  113*  123*  108*  108*   BUN  89*  92*  96*  97*   CREATININE  13.78*  14.21*  14.84*  15.03*   CALCIUM  9.3  8.7  8.8  8.7                   Imaging   CT-RENAL COLIC EVALUATION(A/P W/O)   Final Result      1.  Multiple thick-walled loops of small intestine within the left upper quadrant with surrounding mesenteric inflammatory change. Given the patient's history of renal failure on dialysis, this would be most suspicious for ischemic bowel related to    dialysis. Crohn's disease or infectious enteritis would be less likely possibility.      2.  Small amount of free fluid dependently within the pelvis.      3.  Atrophic kidneys.      This was discussed with SUJEY MERIDA at 5:40 PM on 11/11/2018.           Assessment/Plan  * Ischemic bowel disease (HCC)   Assessment & Plan    Patient came in with progressively worsening abdominal pain since Tuesday of last week.  T patient's abdomen was evaluated by surgery and took him for an exploratory laparotomy.  The surgery findings showed no ischemia but edema and inflammation.  The intestines were affected in the left upper quadrant with edema and of the mesentery and free clear serous fluid.  Postoperatively the patient still has severe pain 10 out of 10 and at this point the patient has been started on a PCA.     ESRD (end stage renal disease) on dialysis (HCC)- (present on admission)   Assessment & Plan    Continue with hemodialysis as scheduled every Monday when Friday.   of nephrology has been  following.     Sepsis ([facility])   Assessment & Plan    This is sepsis (without associated acute organ dysfunction).   Patient does not appear to be septic.  The patient's white blood cell count is elevated secondary to the inflammation.  Lactic acid levels are normal.  Vital signs are normal.  He has not been hypotensive.          VTE prophylaxis: Heparin

## 2018-11-12 NOTE — CARE PLAN
Problem: Safety  Goal: Will remain free from injury  Outcome: PROGRESSING AS EXPECTED  Pt remains free from injury. Bed in lowest position, locked, and alarm activated. Nonskid footwear in place. Call light and personal belongings within reach. Hourly rounding.    Problem: Pain Management  Goal: Pain level will decrease to patient's comfort goal  Outcome: PROGRESSING AS EXPECTED   11/12/18 0245 11/12/18 0400   OTHER   Pain Scale 0 - 10  5 --    Non Verbal Scale  --  Calm;Sleeping;Unlabored Breathing   Comfort Goal --  Comfort at Rest   Discussed plan of care for pain management. Medication information per MAR, and non-pharmacological interventions: ice, rest/repositioning. Pt verbalizes agreement and understanding.

## 2018-11-12 NOTE — PROGRESS NOTES
Pt continues to have pain 10/10 in abdomen unrelieved by repeat 0.5mg dilaudid (see MAR). MD paged regarding pain management, diet, and VTE prophylaxis orders.

## 2018-11-12 NOTE — PROGRESS NOTES
AM labs resulted. BUN and creatinine continue to increase. K remains 5.8. Per Dr. Banks orders, Dr. Najjar with nephrology paged to assess need for dialysis.

## 2018-11-12 NOTE — ED NOTES
Med rec updated and complete.  Allergies reviewed.  Interviewed family ( wife) who is currently at bedside.  Wife verified medications and last doses taken.  No antibiotic use in last 30 days at home.

## 2018-11-12 NOTE — PROGRESS NOTES
2 RN skin assessment performed by NAUN Montoya and NAUN Swann. Midline abdominal incision. EDNA closed with dermabond. No other skin breakdown present on admission.

## 2018-11-12 NOTE — H&P
Hospital Medicine History & Physical Note    Date of Service  11/11/2018    Primary Care Physician  Pcp Pt States None    Consultants  Dr. Martin, Surgery  Dr. Najjar, Nephrology    Code Status  full    Chief Complaint  Sent by MD      History of Presenting Illness  28 y.o. male who presented 11/11/2018 with abdominal pain.  History of hypertensive kidney problems, ESRD on dialysis over 5 years, see [facility]    Tuesday prior to admission he complained of epigastric pain. Food made it worse. Nausea. Denied diarrhea. No unusual food, recent travel. Wife says at home, people were having cold like symptoms but no abdominal pain. He was first seen at ED last Friday and discharged as his abdominal pain improved and discharged on pain medications.  Today having blurry vision. Dizziness. Chills, No fevers. Epigastric pain returned and is constant no, even with pain medications  Ate very little in the morning. Resulting in nausea and continue pain.  He has now GI history or bowel surgeries. Denies smoking or alcohol.  Not clear on this as he said he was supposed to be on dialysis today (but today is Sunday). [facility] Nephrology mentioned he likely is a MWF.    At the ED, afebrile but hypertensive.   US gallbladder:  1. Gallbladder sludge. No sonographic evidence of acute cholecystitis.  2. Atrophic echogenic right kidney.  CT Ab/P:  1.  Multiple thick-walled loops of small intestine within the left upper quadrant with surrounding mesenteric inflammatory change. Given the patient's history of renal failure on dialysis, this would be most suspicious for ischemic bowel related to   dialysis. Crohn's disease or infectious enteritis would be less likely possibility.  2.  Small amount of free fluid dependently within the pelvis.  3.  Atrophic kidneys.  Labs notable for azotemia and elevated Cr at 11.  Transaminases, hepatobiliary enzymes and lipase normal.  I instructed ED to call Surgery  Dr. Martin consulted.  Will need to go to OR urgently  I called Dr. Israel. Reviewed initial labs. Currently not volume overloaded/respiratory distress. Not hyperkalemic. Not acidotic. Hence no urgent need for dialysis prior to OR.  When I saw him at ED, looked ill but otherwise alert. Epigastric tenderness. Auscultation clear. Wife at bedside. I personally reported and explained CT findings to patient and wife as they were asking.      Review of Systems  Review of Systems   Unable to perform ROS: Critical illness       Past Medical History   has a past medical history of Dialysis; Dialysis care; Hypertension; and Renal disorder.    Surgical History   has a past surgical history that includes other orthopedic surgery; av fistula creation (12/29/2010); av fistula creation (6/7/2012); cath placement (6/7/2012); recovery (7/3/2012); recovery (7/20/2012); av fistula creation (8/21/2012); and av fistula revision (Right, 11/28/2016).     Family History  family history is not on file.     Social History   reports that he has never smoked. He has never used smokeless tobacco. He reports that he does not drink alcohol or use drugs.    Allergies  No Known Allergies    Medications  Prior to Admission Medications   Prescriptions Last Dose Informant Patient Reported? Taking?   amlodipine (NORVASC) 10 MG TABS Taking at 1500 Rx Bottle (For Med Information) No No   Sig: Take 1 Tab by mouth every day.   cinacalcet (SENSIPAR) 30 MG TABS Taking at 1500 Rx Bottle (For Med Information) No No   Sig: Take 1 Tab by mouth every day.   clonidine (CATAPRES) 0.1 MG/24HR PATCH WEEKLY Taking at PM Patient Yes No   Sig: Apply 1 Patch to skin as directed every 7 days. On Mon   hydrALAZINE (APRESOLINE) 25 MG Tab Taking at 1500 Rx Bottle (For Med Information) Yes No   Sig: Take 25 mg by mouth 3 times a day.   losartan (COZAAR) 100 MG Tab Taking at 1500 Rx Bottle (For Med Information) Yes No   Sig: Take 100 mg by mouth every day.   metoprolol SR (TOPROL XL) 25 MG TABLET SR  24 HR Taking at 1500 Rx Bottle (For Med Information) Yes No   Sig: Take 25 mg by mouth every day.   ondansetron (ZOFRAN ODT) 4 MG TABLET DISPERSIBLE   No No   Sig: Take 1 Tab by mouth every 6 hours as needed.   raNITidine (ZANTAC) 150 MG Tab   No No   Sig: Take 1 Tab by mouth 2 times a day.   sevelamer (RENAGEL) 800 MG TABS 8/18/2017 at 2100 Patient No No   Sig: Take 4 Tabs by mouth 3 times a day, with meals.      Facility-Administered Medications: None       Physical Exam  Temp:  [37.2 °C (99 °F)] 37.2 °C (99 °F)  Pulse:  [] 109  Resp:  [16-17] 17  BP: (187)/(120) 187/120    Physical Exam   Constitutional: He appears well-developed and well-nourished.   HENT:   Head: Normocephalic and atraumatic.   Eyes: Conjunctivae and EOM are normal. No scleral icterus.   Neck: Normal range of motion. Neck supple.   Cardiovascular: Normal rate and regular rhythm.  Exam reveals no gallop and no friction rub.    No murmur heard.  Pulmonary/Chest: Effort normal and breath sounds normal. No respiratory distress. He has no wheezes. He has no rales.   Abdominal: Soft. Bowel sounds are normal. He exhibits no distension. There is tenderness (Epigastric). There is no rebound and no guarding.   Musculoskeletal: He exhibits no edema or tenderness.   AV fistula with thrill   Neurological: He is alert.   Skin: Skin is warm.   Psychiatric: He has a normal mood and affect. His behavior is normal.       Laboratory:  Recent Labs      11/09/18 2359 11/11/18   1545   WBC  10.9*  18.3*   RBC  3.86*  3.99*   HEMOGLOBIN  12.5*  13.3*   HEMATOCRIT  36.6*  36.9*   MCV  94.8  92.5   MCH  32.4  33.3*   MCHC  34.2  36.0*   RDW  42.7  41.7   PLATELETCT  149*  147*   MPV  10.4  10.6     Recent Labs      11/09/18   2359  11/11/18   1545   SODIUM  138  133*   POTASSIUM  4.5  5.0   CHLORIDE  93*  88*   CO2  29  26   GLUCOSE  101*  113*   BUN  56*  89*   CREATININE  9.00*  13.78*   CALCIUM  10.4  9.3     Recent Labs      11/09/18   2359  11/11/18    1545   ALTSGPT  11  14   ASTSGOT  12  7*   ALKPHOSPHAT  56  57   TBILIRUBIN  0.5  0.7   LIPASE  23  79   GLUCOSE  101*  113*                 No results for input(s): TROPONINI in the last 72 hours.    Urinalysis:    No results found     Imaging:  CT-RENAL COLIC EVALUATION(A/P W/O)   Final Result      1.  Multiple thick-walled loops of small intestine within the left upper quadrant with surrounding mesenteric inflammatory change. Given the patient's history of renal failure on dialysis, this would be most suspicious for ischemic bowel related to    dialysis. Crohn's disease or infectious enteritis would be less likely possibility.      2.  Small amount of free fluid dependently within the pelvis.      3.  Atrophic kidneys.      This was discussed with SUJEY MERIDA at 5:40 PM on 11/11/2018.            Assessment/Plan:  I anticipate this patient will require at least two midnights for appropriate medical management, necessitating inpatient admission.    * Ischemic bowel disease (HCC)   Assessment & Plan    Ordered antibiotics, IV fluids, pain cntrol  Dr. Martin to OR for ex lap 11/11  Will get labs again after surgery     ESRD (end stage renal disease) on dialysis (HCC)- (present on admission)   Assessment & Plan    Called consulted Dr. Turcios  Postoperative labs similar, not hyperkalemic, not acidotic. No indication now of dialysis.  Dr. Israel, Nephrology reviewed and is following.     Sepsis (HCC)   Assessment & Plan    This is sepsis (without associated acute organ dysfunction).   Probably more due to abdominal process  Empiric antibiotics covering abdomen  Ordered lactic acid. Came back normal range.  IV fluids  Follow cultures.         VTE prophylaxis: heparin SQ hold prior to procedure    Reviewed the chart, notes, vitals, labs, imaging, ordering labs, evaluating Elier Bustillos for assessment, enacting the plan above. Counseled Elier Bustillos and coordinating care including  review of records, pertinent lab data and studies, as well as discussing diagnostic evaluation and work up, planned therapeutic interventions and future disposition of care. Discussed with ED cooper, Dr. Martin, Dr. Israel. Reassurance. Medical decision making is therefore complex. Time was devoted to counseling and coordinating care including review of records, pertinent lab data and studies, as well as discussing diagnostic evaluation and work up, planned therapeutic interventions and future disposition of care. Where indicated, the assessment and plan reflect discussion of patient with consultants, other healthcare providers, family members, and additional research needed to obtain further information in formulating the plan of care for Elier Bustillos. This time spent includes no procedures or overlap with other providers.

## 2018-11-13 LAB
ALBUMIN SERPL BCP-MCNC: 2.9 G/DL (ref 3.2–4.9)
BUN SERPL-MCNC: 72 MG/DL (ref 8–22)
CALCIUM SERPL-MCNC: 8.4 MG/DL (ref 8.5–10.5)
CHLORIDE SERPL-SCNC: 95 MMOL/L (ref 96–112)
CO2 SERPL-SCNC: 26 MMOL/L (ref 20–33)
CREAT SERPL-MCNC: 11.13 MG/DL (ref 0.5–1.4)
GLUCOSE SERPL-MCNC: 101 MG/DL (ref 65–99)
PHOSPHATE SERPL-MCNC: 7.7 MG/DL (ref 2.5–4.5)
POTASSIUM SERPL-SCNC: 5 MMOL/L (ref 3.6–5.5)
SODIUM SERPL-SCNC: 135 MMOL/L (ref 135–145)

## 2018-11-13 PROCEDURE — 5A1D70Z PERFORMANCE OF URINARY FILTRATION, INTERMITTENT, LESS THAN 6 HOURS PER DAY: ICD-10-PCS | Performed by: INTERNAL MEDICINE

## 2018-11-13 PROCEDURE — 90935 HEMODIALYSIS ONE EVALUATION: CPT

## 2018-11-13 PROCEDURE — 700102 HCHG RX REV CODE 250 W/ 637 OVERRIDE(OP): Performed by: HOSPITALIST

## 2018-11-13 PROCEDURE — 700102 HCHG RX REV CODE 250 W/ 637 OVERRIDE(OP): Performed by: INTERNAL MEDICINE

## 2018-11-13 PROCEDURE — A9270 NON-COVERED ITEM OR SERVICE: HCPCS | Performed by: HOSPITALIST

## 2018-11-13 PROCEDURE — 700111 HCHG RX REV CODE 636 W/ 250 OVERRIDE (IP): Performed by: FAMILY MEDICINE

## 2018-11-13 PROCEDURE — 36415 COLL VENOUS BLD VENIPUNCTURE: CPT

## 2018-11-13 PROCEDURE — 51798 US URINE CAPACITY MEASURE: CPT

## 2018-11-13 PROCEDURE — 770020 HCHG ROOM/CARE - TELE (206)

## 2018-11-13 PROCEDURE — 80069 RENAL FUNCTION PANEL: CPT

## 2018-11-13 PROCEDURE — 700111 HCHG RX REV CODE 636 W/ 250 OVERRIDE (IP): Performed by: INTERNAL MEDICINE

## 2018-11-13 PROCEDURE — 700111 HCHG RX REV CODE 636 W/ 250 OVERRIDE (IP): Performed by: SURGERY

## 2018-11-13 PROCEDURE — 99232 SBSQ HOSP IP/OBS MODERATE 35: CPT | Performed by: INTERNAL MEDICINE

## 2018-11-13 PROCEDURE — 700105 HCHG RX REV CODE 258: Performed by: INTERNAL MEDICINE

## 2018-11-13 PROCEDURE — A9270 NON-COVERED ITEM OR SERVICE: HCPCS | Performed by: INTERNAL MEDICINE

## 2018-11-13 RX ORDER — HYDRALAZINE HYDROCHLORIDE 20 MG/ML
10 INJECTION INTRAMUSCULAR; INTRAVENOUS ONCE
Status: COMPLETED | OUTPATIENT
Start: 2018-11-13 | End: 2018-11-13

## 2018-11-13 RX ADMIN — SEVELAMER CARBONATE 3200 MG: 800 TABLET, FILM COATED ORAL at 06:35

## 2018-11-13 RX ADMIN — HEPARIN SODIUM 5000 UNITS: 5000 INJECTION, SOLUTION INTRAVENOUS; SUBCUTANEOUS at 18:04

## 2018-11-13 RX ADMIN — CINACALCET HYDROCHLORIDE 30 MG: 30 TABLET, COATED ORAL at 06:35

## 2018-11-13 RX ADMIN — Medication: at 11:27

## 2018-11-13 RX ADMIN — HYDRALAZINE HYDROCHLORIDE 25 MG: 25 TABLET, FILM COATED ORAL at 06:36

## 2018-11-13 RX ADMIN — SODIUM CHLORIDE: 9 INJECTION, SOLUTION INTRAVENOUS at 06:34

## 2018-11-13 RX ADMIN — ONDANSETRON 4 MG: 2 INJECTION INTRAMUSCULAR; INTRAVENOUS at 21:13

## 2018-11-13 RX ADMIN — HYDRALAZINE HYDROCHLORIDE 10 MG: 20 INJECTION INTRAMUSCULAR; INTRAVENOUS at 21:13

## 2018-11-13 RX ADMIN — STANDARDIZED SENNA CONCENTRATE AND DOCUSATE SODIUM 2 TABLET: 8.6; 5 TABLET, FILM COATED ORAL at 18:04

## 2018-11-13 RX ADMIN — METRONIDAZOLE 500 MG: 500 TABLET ORAL at 18:04

## 2018-11-13 RX ADMIN — ONDANSETRON 4 MG: 2 INJECTION INTRAMUSCULAR; INTRAVENOUS at 04:21

## 2018-11-13 RX ADMIN — HEPARIN SODIUM 5000 UNITS: 5000 INJECTION, SOLUTION INTRAVENOUS; SUBCUTANEOUS at 06:34

## 2018-11-13 RX ADMIN — SEVELAMER CARBONATE 3200 MG: 800 TABLET, FILM COATED ORAL at 11:27

## 2018-11-13 RX ADMIN — FAMOTIDINE 20 MG: 20 TABLET, FILM COATED ORAL at 06:35

## 2018-11-13 RX ADMIN — HYDRALAZINE HYDROCHLORIDE 25 MG: 25 TABLET, FILM COATED ORAL at 11:28

## 2018-11-13 RX ADMIN — Medication: at 16:05

## 2018-11-13 RX ADMIN — STANDARDIZED SENNA CONCENTRATE AND DOCUSATE SODIUM 2 TABLET: 8.6; 5 TABLET, FILM COATED ORAL at 06:36

## 2018-11-13 RX ADMIN — HYDRALAZINE HYDROCHLORIDE 25 MG: 25 TABLET, FILM COATED ORAL at 18:04

## 2018-11-13 RX ADMIN — CEFTRIAXONE SODIUM 2 G: 2 INJECTION, POWDER, FOR SOLUTION INTRAMUSCULAR; INTRAVENOUS at 06:34

## 2018-11-13 RX ADMIN — SODIUM CHLORIDE: 9 INJECTION, SOLUTION INTRAVENOUS at 03:08

## 2018-11-13 RX ADMIN — METRONIDAZOLE 500 MG: 500 TABLET ORAL at 06:36

## 2018-11-13 RX ADMIN — METOPROLOL SUCCINATE 25 MG: 25 TABLET, EXTENDED RELEASE ORAL at 06:36

## 2018-11-13 RX ADMIN — AMLODIPINE BESYLATE 10 MG: 10 TABLET ORAL at 06:36

## 2018-11-13 RX ADMIN — SEVELAMER CARBONATE 3200 MG: 800 TABLET, FILM COATED ORAL at 18:04

## 2018-11-13 ASSESSMENT — PAIN SCALES - GENERAL
PAINLEVEL_OUTOF10: 5
PAINLEVEL_OUTOF10: 5
PAINLEVEL_OUTOF10: 2
PAINLEVEL_OUTOF10: 5
PAINLEVEL_OUTOF10: 2
PAINLEVEL_OUTOF10: 2
PAINLEVEL_OUTOF10: 3
PAINLEVEL_OUTOF10: 2

## 2018-11-13 ASSESSMENT — ENCOUNTER SYMPTOMS
WHEEZING: 0
CONSTIPATION: 1
ABDOMINAL PAIN: 1
DIZZINESS: 0
VOMITING: 0
HEADACHES: 0
NAUSEA: 0
WEAKNESS: 1
FEVER: 0
HEMOPTYSIS: 0
PSYCHIATRIC NEGATIVE: 1
CARDIOVASCULAR NEGATIVE: 1
DIAPHORESIS: 0
FOCAL WEAKNESS: 0
HEARTBURN: 0
BLURRED VISION: 0
DIARRHEA: 0
NERVOUS/ANXIOUS: 0
CHILLS: 0
MYALGIAS: 0
MUSCULOSKELETAL NEGATIVE: 1
SHORTNESS OF BREATH: 1
PALPITATIONS: 0
LOSS OF CONSCIOUSNESS: 0
DOUBLE VISION: 0
DEPRESSION: 0

## 2018-11-13 NOTE — PROGRESS NOTES
HEMODIALYSIS NOTES:     HD today x 3.5 hours per Dr. Ewing. Initiated at 1147 and ended at 1518. Patient tolerated treatment. Please see paper flowsheet for details.    UF Net: 2,000 mL    Blood returned. Applied gauze and held R AVF site for 10 minutes. Verified no bleeding. Bruit and thrill present post dialysis. Instructions given to Primary RN that if bleeding occurs on the AVF site, change dressing and held the site with pressure.     Report given to CHELSEA Jansen RN.

## 2018-11-13 NOTE — PROGRESS NOTES
"[facility] -  PROGRESS NOTE               Author: Jimenez Ewing M.D. Date & Time: 11/13/2018  7:00 AM     HPI:  This is a 28-year-old gentleman who is on  nocturnal HD, severe hypertension who presented with  6 days of abdominal pain and Vomiting, he could not keep anything down except for some fluid over the last 6 days.  CT of the abdomen and pelvis shows multiple thick walled loops of  small intestine within the left upper quadrant with surrounding mesenteric inflammatory change.  Ultrasound of the right upper quadrant done on 11/10/2018 shows gallbladder sludge.  No   sonographic evidence of acute cholecystitis. He was taken from the ER straight to the OR for suspected ischemic Bowel.     DAILY NEPHROLOGY SUMMARY:  11/12: consult done  11/13: s/p ex-lap, no e/o ischemic bowel. S/p iHD.  Mild abdominal pain    PAST FAMILY HISTORY: Reviewed and Unchanged  SOCIAL HISTORY: Reviewed and Unchanged  CURRENT MEDICATIONS: Reviewed  IMAGING STUDIES: Reviewed    ROS  General: No weakness, no malaise  HEENT: No vision changes, no hearing changes  CV: No chest pain, no palpitations  Lungs: No cough; no PND  GI: No nausea; + abdominal pain  : No dysuria or gross hematuria  MSK: No joint pain; no trauma  Skin: No rashes; no lesions  Neuro: No tremors; no LOC  Psych: No depression; no anxiety    PHYSICAL EXAM  VS:  BP (!) 172/74 Comment: rn notified  Pulse 93   Temp 37.2 °C (99 °F)   Resp 16   Ht 1.753 m (5' 9\")   Wt 75.1 kg (165 lb 9.1 oz)   SpO2 93%   BMI 24.45 kg/m²   GENERAL: Well apearing, no acute distress  HEAD: Normocephalic, atraumatic  EYES: No scleral icterus; normal conjunctiva  NECK: Supple, trachea midline  CV: RRR, S1 and S2 present  LUNGS: Clear to ausculation bilaterally, No rhales or wheezes  ABDOMEN: Soft, non-tender  EXTREMETIES: no lower extremity edema, no clubbing or cyanosis  SKIN: Warm and dry, no rashes  NEURO: A&O, no focal deficits  PSYCH: Cooperative, appropriate " mood and affect  ACCESS: Aneurysmal right upper extremity AV fistula.  Positive thrill    Fluids:  In: 1683.8 [P.O.:240; I.V.:843.8; Other:100; Dialysis:500]  Out: 3200     LABS:  Recent Results (from the past 24 hour(s))   HEPATITIS PANEL ACUTE(4 COMPONENTS)    Collection Time: 11/12/18  9:43 AM   Result Value Ref Range    Hepatitis B Surface Antigen Negative Negative    Hepatitis B Cors Ab,IgM Negative Negative    Hepatitis C Antibody Negative Negative    Hepatitis A Virus Ab, IgM Negative Negative   Urinalysis    Collection Time: 11/12/18  9:00 PM   Result Value Ref Range    Color Yellow     Character Clear     Specific Gravity 1.012 <1.035    Ph 8.5 (A) 5.0 - 8.0    Glucose 250 (A) Negative mg/dL    Ketones Negative Negative mg/dL    Protein 300 (A) Negative mg/dL    Bilirubin Negative Negative    Urobilinogen, Urine 0.2 Negative    Nitrite Negative Negative    Leukocyte Esterase Negative Negative    Occult Blood Trace (A) Negative    Micro Urine Req Microscopic    URINE MICROSCOPIC (W/UA)    Collection Time: 11/12/18  9:00 PM   Result Value Ref Range    WBC 0-2 (A) /hpf    RBC 2-5 (A) /hpf    Bacteria Negative None /hpf    Epithelial Cells Negative /hpf    Hyaline Cast 0-2 /lpf       (click the triangle to expand results)      ASSESSMENT:  # End-stage renal disease, on nocturnal Hemodialysis T/T/Sun.  # Anemia of CKD  # CKD-MBD  # Hypertension  # Bowel edema: ex-lap neg for ischemia, cholecystitis or other intra-abdominal infection      PLAN:  -iHD today, then to continue T/T/Sun iHD during stay  -Dose all meds for ESRD  -Renal Diet  -Limit IVFs/Strict I/Os  -Limit peripheral lines, avoid PICCs  -Continue home blood pressure meds  -Continue home phos binders and Sensipar  -Holding Epogen  -Antibiotics per primary       Thank you

## 2018-11-13 NOTE — PROGRESS NOTES
Pt dialyzed for 3 hrs today from 1215 to 1515 per MD order.  She tolerated tx well.  Net UF 2.0L.   8 mins to achieve hemostasis and dsgs applied  To AVF sites.  See paper dialysis flow sheet for details.  Report called to CHELSEA Jansen RN.

## 2018-11-13 NOTE — PROGRESS NOTES
Progress Note               Author: Faith Martin Date & Time created: 11/13/2018  11:57 AM     Interval History:  Feels his pain is about the same. Small amount of flatus, no BM.     Review of Systems:  Review of Systems   Constitutional: Negative for chills and fever.   Gastrointestinal: Positive for abdominal pain. Negative for nausea and vomiting.       Physical Exam:  Physical Exam   Constitutional: He is oriented to person, place, and time. He appears well-developed and well-nourished. No distress.   HENT:   Head: Normocephalic and atraumatic.   Eyes: Conjunctivae are normal.   Neck: Normal range of motion.   Pulmonary/Chest: Effort normal.   Abdominal: Soft. He exhibits no distension. There is tenderness. There is no rebound and no guarding.   Incision c/d/i. Tenderness much improved, no peritoneal signs. Most pain around umbilicus (inferior edge of the incision).    Neurological: He is alert and oriented to person, place, and time.   Skin: Skin is warm and dry. He is not diaphoretic.       Labs:          Recent Labs      11/11/18 2117 11/12/18 0202 11/13/18   0829   SODIUM  133*  135  134*  135   POTASSIUM  5.8*  5.9*  5.8*  5.0   CHLORIDE  91*  92*  92*  95*   CO2  25  24  25  26   BUN  92*  96*  97*  72*   CREATININE  14.21*  14.84*  15.03*  11.13*   PHOSPHORUS  8.2*  9.1*  7.7*   CALCIUM  8.7  8.8  8.7  8.4*     Recent Labs      11/11/18   1545  11/11/18 2117 11/12/18 0202  11/13/18   0829   ALTSGPT  14   --   9   --    ASTSGOT  7*   --   <5*   --    ALKPHOSPHAT  57   --   45   --    TBILIRUBIN  0.7   --   0.5   --    LIPASE  79   --    --    --    GLUCOSE  113*  123*  108*  108*  101*     Recent Labs      11/11/18   1545  11/12/18   0202   RBC  3.99*  3.25*   HEMOGLOBIN  13.3*  10.5*   HEMATOCRIT  36.9*  30.6*   PLATELETCT  147*  132*     Recent Labs      11/11/18   1545  11/12/18   0202   WBC  18.3*  15.1*   NEUTSPOLYS  84.90*   --    LYMPHOCYTES  7.30*   --    MONOCYTES  7.00    --    EOSINOPHILS  0.20   --    BASOPHILS  0.30   --    ASTSGOT  7*  <5*   ALTSGPT  14  9   ALKPHOSPHAT  57  45   TBILIRUBIN  0.7  0.5     Hemodynamics:  Temp (24hrs), Av.3 °C (99.2 °F), Min:37.2 °C (99 °F), Max:37.4 °C (99.3 °F)  Temperature: 37.3 °C (99.1 °F)  Pulse  Av.2  Min: 75  Max: 109   Blood Pressure: 158/112     Respiratory:    Respiration: 16, Pulse Oximetry: 91 %           Fluids:    Intake/Output Summary (Last 24 hours) at 18 1157  Last data filed at 18 0723   Gross per 24 hour   Intake             1607 ml   Output             3200 ml   Net            -1593 ml     Weight: 75.1 kg (165 lb 9.1 oz)  GI/Nutrition:  Orders Placed This Encounter   Procedures   • Diet Order Full Liquid     Standing Status:   Standing     Number of Occurrences:   1     Order Specific Question:   Diet:     Answer:   Full Liquid [11]     Medical Decision Making, by Problem:  Active Hospital Problems    Diagnosis   • *Ischemic bowel disease (HCC) [K55.9]   • ESRD (end stage renal disease) on dialysis (HCC) [N18.6, Z99.2]   • Sepsis (HCC) [A41.9]       Plan:  Advance diet as tolerated. Patient prefers to stay with clear liquids today.   Increased PCA dose and increased time interval as he feels that it is not helping much. Dose now 1.5mg q 10 min (from 0.75 q 8 min).  Will follow.   AM CBC/BMP    Quality-Core Measures

## 2018-11-13 NOTE — PROGRESS NOTES
[facility] Medicine Daily Progress Note    Date of Service  11/13/2018    Chief Complaint  28 y.o. male admitted 11/11/2018 with abdominal pain    Hospital Course   The patient's abdominal pain has been progressively worse since Tuesday of last week.  It had escalated 10 out of 10 and was accompanied by nausea but no vomiting.  He does not say that he has had any diarrhea.  Patient was a valid by surgery had to go for urgent exploratory laparotomy.  The laparotomy finds that in the right upper quadrant the small intestines are swollen tender inflamed but does not appear to be ischemic or infected.  Patient at this point will be continued on pain management fluid resuscitation we will monitor at this point for him to be able to pass gas and have a diet.    Interval Problem Update  Patient seen during hemodialysis  Reports diffuse abdominal pain, pain level of 5/10 with minimal relief with pain prescription  Denies any nausea    Consultants/Specialty  Surgery    Code Status  Full code    Disposition  To be determined    Review of Systems  Review of Systems   Constitutional: Negative for chills, diaphoresis, fever and malaise/fatigue.   HENT: Negative.  Negative for congestion and hearing loss.    Eyes: Negative for blurred vision and double vision.   Respiratory: Positive for shortness of breath. Negative for hemoptysis and wheezing.    Cardiovascular: Negative.  Negative for palpitations and leg swelling.   Gastrointestinal: Positive for abdominal pain and constipation. Negative for diarrhea, heartburn, nausea and vomiting.   Genitourinary: Negative.  Negative for dysuria and urgency.   Musculoskeletal: Negative.  Negative for joint pain and myalgias.   Skin: Negative.  Negative for itching and rash.   Neurological: Positive for weakness. Negative for dizziness, focal weakness, loss of consciousness and headaches.   Endo/Heme/Allergies: Negative.    Psychiatric/Behavioral: Negative.  Negative for depression and  suicidal ideas. The patient is not nervous/anxious.    All other systems reviewed and are negative.       Physical Exam  Temp:  [37.1 °C (98.7 °F)-37.4 °C (99.3 °F)] 37.1 °C (98.7 °F)  Pulse:  [] 98  Resp:  [16] 16  BP: (140-172)/() 162/94    Physical Exam   Constitutional: He is oriented to person, place, and time. He appears well-developed and well-nourished. He is cooperative.  Non-toxic appearance. He does not have a sickly appearance. He appears ill. No distress.   HENT:   Head: Normocephalic and atraumatic.   Mouth/Throat: Oropharynx is clear and moist. No oropharyngeal exudate.   Eyes: Pupils are equal, round, and reactive to light. EOM are normal. Right eye exhibits no discharge. Left eye exhibits no discharge.   Neck: Normal range of motion. Neck supple. No JVD present. No thyromegaly present.   Cardiovascular: Normal rate, regular rhythm and intact distal pulses.    No murmur heard.  Pulmonary/Chest: Effort normal. No respiratory distress. He has no wheezes. He has rales. He exhibits no tenderness.   Abdominal: He exhibits no distension. There is tenderness. There is no rebound.       Musculoskeletal: Normal range of motion. He exhibits no edema or tenderness.   Lymphadenopathy:     He has no cervical adenopathy.   Neurological: He is alert and oriented to person, place, and time. He has normal reflexes. No cranial nerve deficit. He exhibits normal muscle tone.   Skin: Skin is warm and dry. He is not diaphoretic. No erythema. No pallor.   Psychiatric: He has a normal mood and affect. His behavior is normal. Judgment and thought content normal.   Nursing note and vitals reviewed.      Fluids    Intake/Output Summary (Last 24 hours) at 11/13/18 1653  Last data filed at 11/13/18 1520   Gross per 24 hour   Intake             1607 ml   Output             3200 ml   Net            -1593 ml       Laboratory  Recent Labs      11/11/18   1545  11/12/18   0202   WBC  18.3*  15.1*   RBC  3.99*  3.25*    HEMOGLOBIN  13.3*  10.5*   HEMATOCRIT  36.9*  30.6*   MCV  92.5  96.3   MCH  33.3*  31.7   MCHC  36.0*  32.9*   RDW  41.7  43.8   PLATELETCT  147*  132*   MPV  10.6  10.8     Recent Labs      11/11/18 2117  11/12/18   0202  11/13/18   0829   SODIUM  133*  135  134*  135   POTASSIUM  5.8*  5.9*  5.8*  5.0   CHLORIDE  91*  92*  92*  95*   CO2  25  24  25  26   GLUCOSE  123*  108*  108*  101*   BUN  92*  96*  97*  72*   CREATININE  14.21*  14.84*  15.03*  11.13*   CALCIUM  8.7  8.8  8.7  8.4*                   Imaging   CT-RENAL COLIC EVALUATION(A/P W/O)   Final Result      1.  Multiple thick-walled loops of small intestine within the left upper quadrant with surrounding mesenteric inflammatory change. Given the patient's history of renal failure on dialysis, this would be most suspicious for ischemic bowel related to    dialysis. Crohn's disease or infectious enteritis would be less likely possibility.      2.  Small amount of free fluid dependently within the pelvis.      3.  Atrophic kidneys.      This was discussed with SUJEY MERIDA at 5:40 PM on 11/11/2018.           Assessment/Plan  * Ischemic bowel disease (HCC)   Assessment & Plan    Patient came in with progressively worsening abdominal pain since Tuesday of last week.  T patient's abdomen was evaluated by surgery and took him for an exploratory laparotomy.  The surgery findings showed no ischemia but edema and inflammation.  The intestines were affected in the left upper quadrant with edema and of the mesentery and free clear serous fluid.  Postoperatively the patient still has severe pain 10 out of 10 and at this point the patient has been started on a PCA.    11/13- on PCA, adjusted per surgery  Diet per surgery  Tolerating CLD     ESRD (end stage renal disease) on dialysis (HCC)- (present on admission)   Assessment & Plan    Continue with hemodialysis , T, Th,Sat per nephro /Francis     Sepsis ([facility])   Assessment & Plan    This is  sepsis (without associated acute organ dysfunction).   Patient does not appear to be septic.  The patient's white blood cell count is elevated secondary to the inflammation.  Lactic acid levels are normal.  Vital signs are normal.  He has not been hypotensive.          VTE prophylaxis: Heparin

## 2018-11-13 NOTE — CARE PLAN
Problem: Safety  Goal: Will remain free from injury  Outcome: PROGRESSING AS EXPECTED  Pt remains free from injury. Bed in lowest position, locked, and alarm activated. Nonskid footwear in place. Call light and personal belongings within reach. Hourly rounding.    Problem: Pain Management  Goal: Pain level will decrease to patient's comfort goal  Outcome: PROGRESSING AS EXPECTED   11/12/18 2000 11/13/18 0200   OTHER   Pain Scale 0 - 10  --  2   Non Verbal Scale  Calm;Sleeping;Unlabored Breathing --    Comfort Goal --  Comfort at Rest

## 2018-11-13 NOTE — CARE PLAN
Problem: Venous Thromboembolism (VTW)/Deep Vein Thrombosis (DVT) Prevention:  Goal: Patient will participate in Venous Thrombosis (VTE)/Deep Vein Thrombosis (DVT)Prevention Measures  Pt receiving heparin for VTE prophylaxis.    Problem: Urinary Elimination:  Goal: Ability to reestablish a normal urinary elimination pattern will improve  Pt not urinating, straight cath'ed x2.

## 2018-11-13 NOTE — PROGRESS NOTES
Assumed pt care. Pt ambulated to BR attempted to void, unable to void. Back to bed. RN will straight cath pt per MD order. PCA pump settings verified. Bed in lowest position, locked, and alarm activated.  in place. Call light within reach. SR on monitor.

## 2018-11-14 ENCOUNTER — APPOINTMENT (OUTPATIENT)
Dept: RADIOLOGY | Facility: MEDICAL CENTER | Age: 28
DRG: 356 | End: 2018-11-14
Attending: NURSE PRACTITIONER
Payer: COMMERCIAL

## 2018-11-14 PROBLEM — R07.9 CHEST PAIN: Status: ACTIVE | Noted: 2018-11-14

## 2018-11-14 LAB
ANION GAP SERPL CALC-SCNC: 8 MMOL/L (ref 0–11.9)
BASOPHILS # BLD AUTO: 0.2 % (ref 0–1.8)
BASOPHILS # BLD: 0.02 K/UL (ref 0–0.12)
BUN SERPL-MCNC: 41 MG/DL (ref 8–22)
CALCIUM SERPL-MCNC: 8.3 MG/DL (ref 8.5–10.5)
CHLORIDE SERPL-SCNC: 97 MMOL/L (ref 96–112)
CO2 SERPL-SCNC: 28 MMOL/L (ref 20–33)
CREAT SERPL-MCNC: 7.11 MG/DL (ref 0.5–1.4)
EKG IMPRESSION: NORMAL
EOSINOPHIL # BLD AUTO: 0.08 K/UL (ref 0–0.51)
EOSINOPHIL NFR BLD: 0.9 % (ref 0–6.9)
ERYTHROCYTE [DISTWIDTH] IN BLOOD BY AUTOMATED COUNT: 44.6 FL (ref 35.9–50)
GLUCOSE SERPL-MCNC: 99 MG/DL (ref 65–99)
HCT VFR BLD AUTO: 28.7 % (ref 42–52)
HGB BLD-MCNC: 9.4 G/DL (ref 14–18)
IMM GRANULOCYTES # BLD AUTO: 0.02 K/UL (ref 0–0.11)
IMM GRANULOCYTES NFR BLD AUTO: 0.2 % (ref 0–0.9)
LYMPHOCYTES # BLD AUTO: 1.15 K/UL (ref 1–4.8)
LYMPHOCYTES NFR BLD: 13 % (ref 22–41)
MCH RBC QN AUTO: 32.3 PG (ref 27–33)
MCHC RBC AUTO-ENTMCNC: 32.8 G/DL (ref 33.7–35.3)
MCV RBC AUTO: 98.6 FL (ref 81.4–97.8)
MONOCYTES # BLD AUTO: 0.88 K/UL (ref 0–0.85)
MONOCYTES NFR BLD AUTO: 10 % (ref 0–13.4)
NEUTROPHILS # BLD AUTO: 6.67 K/UL (ref 1.82–7.42)
NEUTROPHILS NFR BLD: 75.7 % (ref 44–72)
NRBC # BLD AUTO: 0 K/UL
NRBC BLD-RTO: 0 /100 WBC
PLATELET # BLD AUTO: 113 K/UL (ref 164–446)
PMV BLD AUTO: 11.1 FL (ref 9–12.9)
POTASSIUM SERPL-SCNC: 4.6 MMOL/L (ref 3.6–5.5)
RBC # BLD AUTO: 2.91 M/UL (ref 4.7–6.1)
SODIUM SERPL-SCNC: 133 MMOL/L (ref 135–145)
TROPONIN I SERPL-MCNC: 0.09 NG/ML (ref 0–0.04)
TROPONIN I SERPL-MCNC: 0.1 NG/ML (ref 0–0.04)
TROPONIN I SERPL-MCNC: 0.11 NG/ML (ref 0–0.04)
WBC # BLD AUTO: 8.8 K/UL (ref 4.8–10.8)

## 2018-11-14 PROCEDURE — A9270 NON-COVERED ITEM OR SERVICE: HCPCS | Performed by: SURGERY

## 2018-11-14 PROCEDURE — 80048 BASIC METABOLIC PNL TOTAL CA: CPT

## 2018-11-14 PROCEDURE — 93005 ELECTROCARDIOGRAM TRACING: CPT | Performed by: INTERNAL MEDICINE

## 2018-11-14 PROCEDURE — 700111 HCHG RX REV CODE 636 W/ 250 OVERRIDE (IP): Performed by: SURGERY

## 2018-11-14 PROCEDURE — 700102 HCHG RX REV CODE 250 W/ 637 OVERRIDE(OP): Performed by: SURGERY

## 2018-11-14 PROCEDURE — A9270 NON-COVERED ITEM OR SERVICE: HCPCS | Performed by: HOSPITALIST

## 2018-11-14 PROCEDURE — 51798 US URINE CAPACITY MEASURE: CPT

## 2018-11-14 PROCEDURE — 770020 HCHG ROOM/CARE - TELE (206)

## 2018-11-14 PROCEDURE — 84484 ASSAY OF TROPONIN QUANT: CPT

## 2018-11-14 PROCEDURE — 85025 COMPLETE CBC W/AUTO DIFF WBC: CPT

## 2018-11-14 PROCEDURE — 700105 HCHG RX REV CODE 258: Performed by: INTERNAL MEDICINE

## 2018-11-14 PROCEDURE — 99233 SBSQ HOSP IP/OBS HIGH 50: CPT | Performed by: INTERNAL MEDICINE

## 2018-11-14 PROCEDURE — 700102 HCHG RX REV CODE 250 W/ 637 OVERRIDE(OP): Performed by: INTERNAL MEDICINE

## 2018-11-14 PROCEDURE — 93010 ELECTROCARDIOGRAM REPORT: CPT | Performed by: INTERNAL MEDICINE

## 2018-11-14 PROCEDURE — 94760 N-INVAS EAR/PLS OXIMETRY 1: CPT

## 2018-11-14 PROCEDURE — 700101 HCHG RX REV CODE 250: Performed by: FAMILY MEDICINE

## 2018-11-14 PROCEDURE — 700102 HCHG RX REV CODE 250 W/ 637 OVERRIDE(OP): Performed by: HOSPITALIST

## 2018-11-14 PROCEDURE — 71045 X-RAY EXAM CHEST 1 VIEW: CPT

## 2018-11-14 PROCEDURE — 700111 HCHG RX REV CODE 636 W/ 250 OVERRIDE (IP): Performed by: FAMILY MEDICINE

## 2018-11-14 PROCEDURE — 700111 HCHG RX REV CODE 636 W/ 250 OVERRIDE (IP): Performed by: INTERNAL MEDICINE

## 2018-11-14 PROCEDURE — A9270 NON-COVERED ITEM OR SERVICE: HCPCS | Performed by: INTERNAL MEDICINE

## 2018-11-14 PROCEDURE — 36415 COLL VENOUS BLD VENIPUNCTURE: CPT

## 2018-11-14 RX ORDER — METOPROLOL SUCCINATE 50 MG/1
50 TABLET, EXTENDED RELEASE ORAL DAILY
Status: DISCONTINUED | OUTPATIENT
Start: 2018-11-15 | End: 2018-11-15

## 2018-11-14 RX ORDER — HYDRALAZINE HYDROCHLORIDE 20 MG/ML
10 INJECTION INTRAMUSCULAR; INTRAVENOUS EVERY 4 HOURS PRN
Status: DISCONTINUED | OUTPATIENT
Start: 2018-11-14 | End: 2018-11-18 | Stop reason: HOSPADM

## 2018-11-14 RX ORDER — LABETALOL HYDROCHLORIDE 5 MG/ML
20 INJECTION, SOLUTION INTRAVENOUS ONCE
Status: COMPLETED | OUTPATIENT
Start: 2018-11-14 | End: 2018-11-14

## 2018-11-14 RX ORDER — MORPHINE SULFATE 4 MG/ML
2-4 INJECTION, SOLUTION INTRAMUSCULAR; INTRAVENOUS
Status: DISCONTINUED | OUTPATIENT
Start: 2018-11-14 | End: 2018-11-18 | Stop reason: HOSPADM

## 2018-11-14 RX ORDER — IPRATROPIUM BROMIDE AND ALBUTEROL SULFATE 2.5; .5 MG/3ML; MG/3ML
3 SOLUTION RESPIRATORY (INHALATION)
Status: DISCONTINUED | OUTPATIENT
Start: 2018-11-14 | End: 2018-11-18 | Stop reason: HOSPADM

## 2018-11-14 RX ORDER — ALBUTEROL SULFATE 90 UG/1
2 AEROSOL, METERED RESPIRATORY (INHALATION) EVERY 4 HOURS PRN
Status: DISCONTINUED | OUTPATIENT
Start: 2018-11-14 | End: 2018-11-18 | Stop reason: HOSPADM

## 2018-11-14 RX ORDER — OXYCODONE HYDROCHLORIDE 5 MG/1
5-10 TABLET ORAL
Status: DISCONTINUED | OUTPATIENT
Start: 2018-11-14 | End: 2018-11-18 | Stop reason: HOSPADM

## 2018-11-14 RX ADMIN — HYDRALAZINE HYDROCHLORIDE 25 MG: 25 TABLET, FILM COATED ORAL at 11:00

## 2018-11-14 RX ADMIN — FAMOTIDINE 20 MG: 20 TABLET, FILM COATED ORAL at 06:01

## 2018-11-14 RX ADMIN — HYDRALAZINE HYDROCHLORIDE 25 MG: 25 TABLET, FILM COATED ORAL at 17:29

## 2018-11-14 RX ADMIN — CINACALCET HYDROCHLORIDE 30 MG: 30 TABLET, COATED ORAL at 06:11

## 2018-11-14 RX ADMIN — METOPROLOL SUCCINATE 25 MG: 25 TABLET, EXTENDED RELEASE ORAL at 06:00

## 2018-11-14 RX ADMIN — PROMETHAZINE HYDROCHLORIDE 25 MG: 25 TABLET ORAL at 10:45

## 2018-11-14 RX ADMIN — SEVELAMER CARBONATE 3200 MG: 800 TABLET, FILM COATED ORAL at 17:29

## 2018-11-14 RX ADMIN — ONDANSETRON 4 MG: 4 TABLET, ORALLY DISINTEGRATING ORAL at 04:01

## 2018-11-14 RX ADMIN — Medication: at 00:32

## 2018-11-14 RX ADMIN — METRONIDAZOLE 500 MG: 500 TABLET ORAL at 17:29

## 2018-11-14 RX ADMIN — LABETALOL HYDROCHLORIDE 20 MG: 5 INJECTION, SOLUTION INTRAVENOUS at 04:01

## 2018-11-14 RX ADMIN — HYDRALAZINE HYDROCHLORIDE 25 MG: 25 TABLET, FILM COATED ORAL at 06:01

## 2018-11-14 RX ADMIN — HEPARIN SODIUM 5000 UNITS: 5000 INJECTION, SOLUTION INTRAVENOUS; SUBCUTANEOUS at 17:30

## 2018-11-14 RX ADMIN — AMLODIPINE BESYLATE 10 MG: 10 TABLET ORAL at 06:01

## 2018-11-14 RX ADMIN — STANDARDIZED SENNA CONCENTRATE AND DOCUSATE SODIUM 2 TABLET: 8.6; 5 TABLET, FILM COATED ORAL at 18:00

## 2018-11-14 RX ADMIN — METRONIDAZOLE 500 MG: 500 TABLET ORAL at 06:01

## 2018-11-14 RX ADMIN — STANDARDIZED SENNA CONCENTRATE AND DOCUSATE SODIUM 2 TABLET: 8.6; 5 TABLET, FILM COATED ORAL at 06:00

## 2018-11-14 RX ADMIN — ONDANSETRON 4 MG: 4 TABLET, ORALLY DISINTEGRATING ORAL at 08:53

## 2018-11-14 RX ADMIN — CEFTRIAXONE SODIUM 2 G: 2 INJECTION, POWDER, FOR SOLUTION INTRAMUSCULAR; INTRAVENOUS at 06:01

## 2018-11-14 RX ADMIN — OXYCODONE HYDROCHLORIDE 5 MG: 5 TABLET ORAL at 17:29

## 2018-11-14 RX ADMIN — HEPARIN SODIUM 5000 UNITS: 5000 INJECTION, SOLUTION INTRAVENOUS; SUBCUTANEOUS at 06:01

## 2018-11-14 RX ADMIN — SEVELAMER CARBONATE 3200 MG: 800 TABLET, FILM COATED ORAL at 06:45

## 2018-11-14 RX ADMIN — HYDRALAZINE HYDROCHLORIDE 10 MG: 20 INJECTION INTRAMUSCULAR; INTRAVENOUS at 01:26

## 2018-11-14 ASSESSMENT — LIFESTYLE VARIABLES: EVER_SMOKED: YES

## 2018-11-14 ASSESSMENT — ENCOUNTER SYMPTOMS
WEAKNESS: 1
CHILLS: 0
BLURRED VISION: 0
HEARTBURN: 0
FEVER: 0
CARDIOVASCULAR NEGATIVE: 1
HEMOPTYSIS: 0
FOCAL WEAKNESS: 0
MUSCULOSKELETAL NEGATIVE: 1
SHORTNESS OF BREATH: 0
PALPITATIONS: 0
DOUBLE VISION: 0
CONSTIPATION: 1
HEADACHES: 0
NAUSEA: 1
MYALGIAS: 0
DIARRHEA: 0
NAUSEA: 0
VOMITING: 1
VOMITING: 0
ABDOMINAL PAIN: 1
ABDOMINAL PAIN: 0
PSYCHIATRIC NEGATIVE: 1
NERVOUS/ANXIOUS: 0

## 2018-11-14 ASSESSMENT — PAIN SCALES - GENERAL
PAINLEVEL_OUTOF10: 2
PAINLEVEL_OUTOF10: 5
PAINLEVEL_OUTOF10: 5
PAINLEVEL_OUTOF10: 0

## 2018-11-14 ASSESSMENT — COPD QUESTIONNAIRES
COPD SCREENING SCORE: 3
DURING THE PAST 4 WEEKS HOW MUCH DID YOU FEEL SHORT OF BREATH: NONE/LITTLE OF THE TIME
DO YOU EVER COUGH UP ANY MUCUS OR PHLEGM?: NO/ONLY WITH OCCASIONAL COLDS OR INFECTIONS
HAVE YOU SMOKED AT LEAST 100 CIGARETTES IN YOUR ENTIRE LIFE: YES

## 2018-11-14 NOTE — CARE PLAN
Respiratory Rapid Response Note    Symptoms     Increased WOB. Mild respiratory distress. Diaphoresis.         ABG Results Not indicated     O2 (LPM): 2.5 (11/14/18 0406)  O2 Daily Delivery Respiratory : Silicone Nasal Cannula (11/14/18 0406)       Transferred to ICU No. Not indicated.     Pt provided care in unit. Bronchodilator offered with relief of major symptoms. No distress after end of rapid response.

## 2018-11-14 NOTE — CODE DOCUMENTATION
Rapid response called for chest pain radiating down right arm, EKG negative. Pt self-bolused on morphine PCA to attempt to control chest pain.

## 2018-11-14 NOTE — CARE PLAN
Problem: Communication  Goal: The ability to communicate needs accurately and effectively will improve  Outcome: PROGRESSING AS EXPECTED    Intervention: [place] patient and significant other/support system to call light to alert staff of needs  Patient has been oriented to the call light system and has been able to communicate his needs accurately and effectively during the shift.      Problem: Safety  Goal: Will remain free from injury  Outcome: PROGRESSING AS EXPECTED    Intervention: Provide assistance with mobility  Patient has been provided assistance with mobility and has remained free from injury during the shift.

## 2018-11-14 NOTE — PROGRESS NOTES
Progress Note               Author: Faith Martin Date & Time created: 11/14/2018  6:59 AM     Interval History:  Feeling better today. Tolerating full liquid diet.     Review of Systems:  Review of Systems   Constitutional: Negative for chills and fever.   Gastrointestinal: Positive for abdominal pain. Negative for nausea and vomiting.       Physical Exam:  Physical Exam   Constitutional: He is oriented to person, place, and time. He appears well-developed and well-nourished. No distress.   HENT:   Head: Normocephalic and atraumatic.   Eyes: Conjunctivae are normal.   Neck: Normal range of motion.   Cardiovascular: Normal rate.    Pulmonary/Chest: Effort normal.   Abdominal: Soft. He exhibits no distension. There is tenderness.   MIld incisional tenderness, Incision c/d/i. LUQ pain resolved, no peritoneal signs.    Neurological: He is alert and oriented to person, place, and time.   Skin: Skin is warm and dry. He is not diaphoretic.       Labs:      Recent Labs      11/14/18 0352   TROPONINI  0.11*     Recent Labs      11/11/18 2117  11/12/18 0202 11/13/18 0829 11/14/18   0058   SODIUM  133*  135  134*  135  133*   POTASSIUM  5.8*  5.9*  5.8*  5.0  4.6   CHLORIDE  91*  92*  92*  95*  97   CO2  25  24  25  26  28   BUN  92*  96*  97*  72*  41*   CREATININE  14.21*  14.84*  15.03*  11.13*  7.11*   PHOSPHORUS  8.2*  9.1*  7.7*   --    CALCIUM  8.7  8.8  8.7  8.4*  8.3*     Recent Labs      11/11/18   1545   11/12/18   0202  11/13/18 0829  11/14/18   0058   ALTSGPT  14   --   9   --    --    ASTSGOT  7*   --   <5*   --    --    ALKPHOSPHAT  57   --   45   --    --    TBILIRUBIN  0.7   --   0.5   --    --    LIPASE  79   --    --    --    --    GLUCOSE  113*   < >  108*  108*  101*  99    < > = values in this interval not displayed.     Recent Labs      11/11/18   1545  11/12/18   0202  11/14/18   0058   RBC  3.99*  3.25*  2.91*   HEMOGLOBIN  13.3*  10.5*  9.4*   HEMATOCRIT  36.9*  30.6*  28.7*    PLATELETCT  147*  132*  113*     Recent Labs      18   1545  18   0202  18   0058   WBC  18.3*  15.1*  8.8   NEUTSPOLYS  84.90*   --   75.70*   LYMPHOCYTES  7.30*   --   13.00*   MONOCYTES  7.00   --   10.00   EOSINOPHILS  0.20   --   0.90   BASOPHILS  0.30   --   0.20   ASTSGOT  7*  <5*   --    ALTSGPT  14  9   --    ALKPHOSPHAT  57  45   --    TBILIRUBIN  0.7  0.5   --      Hemodynamics:  Temp (24hrs), Av.4 °C (99.4 °F), Min:37.1 °C (98.7 °F), Max:37.9 °C (100.3 °F)  Temperature: 37.7 °C (99.8 °F)  Pulse  Av.9  Min: 75  Max: 109   Blood Pressure: (!) 170/111 (rn notified)     Respiratory:    Respiration: 17, Pulse Oximetry: 96 %, O2 Daily Delivery Respiratory : Silicone Nasal Cannula           Fluids:    Intake/Output Summary (Last 24 hours) at 18 0659  Last data filed at 18 0400   Gross per 24 hour   Intake           801.25 ml   Output             3025 ml   Net         -2223.75 ml     Weight: 72.1 kg (158 lb 15.2 oz)  GI/Nutrition:  Orders Placed This Encounter   Procedures   • Diet Order Full Liquid     Standing Status:   Standing     Number of Occurrences:   1     Order Specific Question:   Diet:     Answer:   Full Liquid [11]     Medical Decision Making, by Problem:  Active Hospital Problems    Diagnosis   • *Ischemic bowel disease (HCC) [K55.9]   • ESRD (end stage renal disease) on dialysis (HCC) [N18.6, Z99.2]   • Sepsis (HCC) [A41.9]       Plan:  Advance diet as tolerated.   Start PO pain meds, d/c PCA after first dose.       Quality-Core Measures

## 2018-11-14 NOTE — PROGRESS NOTES
"[facility] -  PROGRESS NOTE               Author: Jimenez Ewing M.D. Date & Time: 11/14/2018  11:15 AM     HPI:  This is a 28-year-old gentleman who is on  nocturnal HD, severe hypertension who presented with  6 days of abdominal pain and Vomiting, he could not keep anything down except for some fluid over the last 6 days.  CT of the abdomen and pelvis shows multiple thick walled loops of  small intestine within the left upper quadrant with surrounding mesenteric inflammatory change.  Ultrasound of the right upper quadrant done on 11/10/2018 shows gallbladder sludge.  No   sonographic evidence of acute cholecystitis. He was taken from the ER straight to the OR for suspected ischemic Bowel.     DAILY NEPHROLOGY SUMMARY:  11/12: consult done  11/13: s/p ex-lap, no e/o ischemic bowel. S/p iHD.  Mild abdominal pain  11/14: Episode of chest pain this morning.  Emesis.  Now resolved    PAST FAMILY HISTORY: Reviewed and Unchanged  SOCIAL HISTORY: Reviewed and Unchanged  CURRENT MEDICATIONS: Reviewed  IMAGING STUDIES: Reviewed    ROS  General: No weakness, no malaise  HEENT: No vision changes, no hearing changes  CV: +chest pain, no palpitations  Lungs: No cough; no PND  GI: +nausea; + abdominal pain  : No dysuria or gross hematuria  MSK: No joint pain; no trauma  Skin: No rashes; no lesions  Neuro: No tremors; no LOC  Psych: No depression; no anxiety    PHYSICAL EXAM  VS:  BP (!) 168/110 Comment: RN notified  Pulse 92   Temp 37.6 °C (99.7 °F)   Resp 14   Ht 1.753 m (5' 9\")   Wt 72.1 kg (158 lb 15.2 oz)   SpO2 97%   BMI 23.47 kg/m²   GENERAL: Lying in bed, no acute distress  HEAD: Normocephalic, atraumatic  EYES: No scleral icterus; normal conjunctiva  NECK: Supple, trachea midline  CV: RRR, S1 and S2 present  LUNGS: Clear to ausculation bilaterally, No rhales or wheezes  ABDOMEN: Soft, mild tenderness, midline incision  EXTREMETIES: no lower extremity edema, no clubbing or " cyanosis  SKIN: Warm and dry, no rashes  NEURO: A&O, no focal deficits  PSYCH: Cooperative, appropriate mood and affect  ACCESS: Aneurysmal right upper extremity AV fistula.  Positive thrill    Fluids:  In: 801.3 [P.O.:240; I.V.:61.3; Dialysis:500]  Out: 3025     LABS:  Recent Results (from the past 24 hour(s))   CBC WITH DIFFERENTIAL    Collection Time: 11/14/18 12:58 AM   Result Value Ref Range    WBC 8.8 4.8 - 10.8 K/uL    RBC 2.91 (L) 4.70 - 6.10 M/uL    Hemoglobin 9.4 (L) 14.0 - 18.0 g/dL    Hematocrit 28.7 (L) 42.0 - 52.0 %    MCV 98.6 (H) 81.4 - 97.8 fL    MCH 32.3 27.0 - 33.0 pg    MCHC 32.8 (L) 33.7 - 35.3 g/dL    RDW 44.6 35.9 - 50.0 fL    Platelet Count 113 (L) 164 - 446 K/uL    MPV 11.1 9.0 - 12.9 fL    Neutrophils-Polys 75.70 (H) 44.00 - 72.00 %    Lymphocytes 13.00 (L) 22.00 - 41.00 %    Monocytes 10.00 0.00 - 13.40 %    Eosinophils 0.90 0.00 - 6.90 %    Basophils 0.20 0.00 - 1.80 %    Immature Granulocytes 0.20 0.00 - 0.90 %    Nucleated RBC 0.00 /100 WBC    Neutrophils (Absolute) 6.67 1.82 - 7.42 K/uL    Lymphs (Absolute) 1.15 1.00 - 4.80 K/uL    Monos (Absolute) 0.88 (H) 0.00 - 0.85 K/uL    Eos (Absolute) 0.08 0.00 - 0.51 K/uL    Baso (Absolute) 0.02 0.00 - 0.12 K/uL    Immature Granulocytes (abs) 0.02 0.00 - 0.11 K/uL    NRBC (Absolute) 0.00 K/uL   BASIC METABOLIC PANEL    Collection Time: 11/14/18 12:58 AM   Result Value Ref Range    Sodium 133 (L) 135 - 145 mmol/L    Potassium 4.6 3.6 - 5.5 mmol/L    Chloride 97 96 - 112 mmol/L    Co2 28 20 - 33 mmol/L    Glucose 99 65 - 99 mg/dL    Bun 41 (H) 8 - 22 mg/dL    Creatinine 7.11 (HH) 0.50 - 1.40 mg/dL    Calcium 8.3 (L) 8.5 - 10.5 mg/dL    Anion Gap 8.0 0.0 - 11.9   ESTIMATED GFR    Collection Time: 11/14/18 12:58 AM   Result Value Ref Range    GFR If  11 (A) >60 mL/min/1.73 m 2    GFR If Non African American 9 (A) >60 mL/min/1.73 m 2   EKG    Collection Time: 11/14/18  3:39 AM   Result Value Ref Range    Report       [facility]    Test Date:  2018  Pt Name:    DIAMANTE MILLS        Department: 183  MRN:        [id number]                      Room:       T834  Gender:     Male                         Technician: ENA  :        1990                   Requested By:BRIDGET DIAL  Order #:    [id number]                    Reading MD: Shameka Prado MD    Measurements  Intervals                                Axis  Rate:       102                          P:          11  KS:         160                          QRS:        13  QRSD:       92                           T:          54  QT:         328  QTc:        428    Interpretive Statements  SINUS TACHYCARDIA  Compared to ECG 11/10/2018 00:10:06  No significant changes    Electronically Signed On 2018 9:13:12 PST by Shameka Prado MD     TROPONIN    Collection Time: 18  3:52 AM   Result Value Ref Range    Troponin I 0.11 (H) 0.00 - 0.04 ng/mL   TROPONIN    Collection Time: 18  7:51 AM   Result Value Ref Range    Troponin I 0.10 (H) 0.00 - 0.04 ng/mL       (click the triangle to expand results)      ASSESSMENT:  # End-stage renal disease, on nocturnal Hemodialysis T/T/Sun.  # Anemia of CKD  # CKD-MBD  # Hypertension  # Bowel edema: ex-lap neg for ischemia, cholecystitis or other intra-abdominal infection      PLAN:  -no iHD today, to continue T/T/Sun iHD during stay  -Dose all meds for ESRD  -Renal Diet  -Limit IVFs/Strict I/Os  -Limit peripheral lines, avoid PICCs  -Continue home blood pressure meds  -Continue home phos binders and Sensipar  -Holding Epogen  -Antibiotics per primary       Thank you

## 2018-11-14 NOTE — PROGRESS NOTES
Assumed care at 1900, bedside report received from day shift RN. Pt on the monitor. Orders reviewed, call light within reach, bed alarm in use, and hourly rounding in place.

## 2018-11-14 NOTE — PROGRESS NOTES
Pt awake and alert this am, c/o some nausea with emesis after eating a few bites of breakfast.  Denies pain at this time, pt has PCA, educated about use of PCA to control pain. Call light in reach.

## 2018-11-14 NOTE — CODE DOCUMENTATION
After pain meds, pt still complaining of chest pain 5/10 which is an improvement from 8/10. Chest pain is tight in nature and is worse during inspiration.

## 2018-11-15 LAB
ANION GAP SERPL CALC-SCNC: 11 MMOL/L (ref 0–11.9)
BASOPHILS # BLD AUTO: 0.5 % (ref 0–1.8)
BASOPHILS # BLD: 0.06 K/UL (ref 0–0.12)
BUN SERPL-MCNC: 63 MG/DL (ref 8–22)
CALCIUM SERPL-MCNC: 8.7 MG/DL (ref 8.5–10.5)
CHLORIDE SERPL-SCNC: 94 MMOL/L (ref 96–112)
CO2 SERPL-SCNC: 26 MMOL/L (ref 20–33)
CREAT SERPL-MCNC: 10.48 MG/DL (ref 0.5–1.4)
EOSINOPHIL # BLD AUTO: 0.17 K/UL (ref 0–0.51)
EOSINOPHIL NFR BLD: 1.5 % (ref 0–6.9)
ERYTHROCYTE [DISTWIDTH] IN BLOOD BY AUTOMATED COUNT: 43.7 FL (ref 35.9–50)
GLUCOSE SERPL-MCNC: 102 MG/DL (ref 65–99)
HCT VFR BLD AUTO: 27 % (ref 42–52)
HGB BLD-MCNC: 8.8 G/DL (ref 14–18)
IMM GRANULOCYTES # BLD AUTO: 0.05 K/UL (ref 0–0.11)
IMM GRANULOCYTES NFR BLD AUTO: 0.4 % (ref 0–0.9)
LYMPHOCYTES # BLD AUTO: 0.38 K/UL (ref 1–4.8)
LYMPHOCYTES NFR BLD: 3.4 % (ref 22–41)
MCH RBC QN AUTO: 32.1 PG (ref 27–33)
MCHC RBC AUTO-ENTMCNC: 32.6 G/DL (ref 33.7–35.3)
MCV RBC AUTO: 98.5 FL (ref 81.4–97.8)
MONOCYTES # BLD AUTO: 0.81 K/UL (ref 0–0.85)
MONOCYTES NFR BLD AUTO: 7.2 % (ref 0–13.4)
NEUTROPHILS # BLD AUTO: 9.78 K/UL (ref 1.82–7.42)
NEUTROPHILS NFR BLD: 87 % (ref 44–72)
NRBC # BLD AUTO: 0 K/UL
NRBC BLD-RTO: 0 /100 WBC
PLATELET # BLD AUTO: 128 K/UL (ref 164–446)
PMV BLD AUTO: 11.3 FL (ref 9–12.9)
POTASSIUM SERPL-SCNC: 5.4 MMOL/L (ref 3.6–5.5)
RBC # BLD AUTO: 2.74 M/UL (ref 4.7–6.1)
SODIUM SERPL-SCNC: 131 MMOL/L (ref 135–145)
WBC # BLD AUTO: 11.3 K/UL (ref 4.8–10.8)

## 2018-11-15 PROCEDURE — A9270 NON-COVERED ITEM OR SERVICE: HCPCS | Performed by: INTERNAL MEDICINE

## 2018-11-15 PROCEDURE — 80048 BASIC METABOLIC PNL TOTAL CA: CPT

## 2018-11-15 PROCEDURE — 90935 HEMODIALYSIS ONE EVALUATION: CPT

## 2018-11-15 PROCEDURE — 700102 HCHG RX REV CODE 250 W/ 637 OVERRIDE(OP): Performed by: INTERNAL MEDICINE

## 2018-11-15 PROCEDURE — 700105 HCHG RX REV CODE 258: Performed by: INTERNAL MEDICINE

## 2018-11-15 PROCEDURE — 5A1D70Z PERFORMANCE OF URINARY FILTRATION, INTERMITTENT, LESS THAN 6 HOURS PER DAY: ICD-10-PCS | Performed by: INTERNAL MEDICINE

## 2018-11-15 PROCEDURE — 51798 US URINE CAPACITY MEASURE: CPT

## 2018-11-15 PROCEDURE — 700102 HCHG RX REV CODE 250 W/ 637 OVERRIDE(OP): Performed by: HOSPITALIST

## 2018-11-15 PROCEDURE — 700111 HCHG RX REV CODE 636 W/ 250 OVERRIDE (IP): Performed by: INTERNAL MEDICINE

## 2018-11-15 PROCEDURE — 36415 COLL VENOUS BLD VENIPUNCTURE: CPT

## 2018-11-15 PROCEDURE — 770020 HCHG ROOM/CARE - TELE (206)

## 2018-11-15 PROCEDURE — 99233 SBSQ HOSP IP/OBS HIGH 50: CPT | Performed by: INTERNAL MEDICINE

## 2018-11-15 PROCEDURE — 85025 COMPLETE CBC W/AUTO DIFF WBC: CPT

## 2018-11-15 PROCEDURE — A9270 NON-COVERED ITEM OR SERVICE: HCPCS | Performed by: HOSPITALIST

## 2018-11-15 RX ORDER — METOPROLOL SUCCINATE 25 MG/1
75 TABLET, EXTENDED RELEASE ORAL DAILY
Status: DISCONTINUED | OUTPATIENT
Start: 2018-11-16 | End: 2018-11-16

## 2018-11-15 RX ADMIN — ONDANSETRON 4 MG: 2 INJECTION INTRAMUSCULAR; INTRAVENOUS at 07:54

## 2018-11-15 RX ADMIN — AMLODIPINE BESYLATE 10 MG: 10 TABLET ORAL at 06:15

## 2018-11-15 RX ADMIN — SEVELAMER CARBONATE 3200 MG: 800 TABLET, FILM COATED ORAL at 18:16

## 2018-11-15 RX ADMIN — HEPARIN SODIUM 5000 UNITS: 5000 INJECTION, SOLUTION INTRAVENOUS; SUBCUTANEOUS at 06:16

## 2018-11-15 RX ADMIN — POLYETHYLENE GLYCOL 3350 1 PACKET: 17 POWDER, FOR SOLUTION ORAL at 13:03

## 2018-11-15 RX ADMIN — SEVELAMER CARBONATE 3200 MG: 800 TABLET, FILM COATED ORAL at 12:56

## 2018-11-15 RX ADMIN — CINACALCET HYDROCHLORIDE 30 MG: 30 TABLET, COATED ORAL at 06:36

## 2018-11-15 RX ADMIN — METOPROLOL SUCCINATE 50 MG: 50 TABLET, EXTENDED RELEASE ORAL at 06:15

## 2018-11-15 RX ADMIN — HEPARIN SODIUM 5000 UNITS: 5000 INJECTION, SOLUTION INTRAVENOUS; SUBCUTANEOUS at 18:17

## 2018-11-15 RX ADMIN — HYDRALAZINE HYDROCHLORIDE 25 MG: 25 TABLET, FILM COATED ORAL at 18:17

## 2018-11-15 RX ADMIN — CEFTRIAXONE SODIUM 2 G: 2 INJECTION, POWDER, FOR SOLUTION INTRAMUSCULAR; INTRAVENOUS at 06:18

## 2018-11-15 RX ADMIN — METRONIDAZOLE 500 MG: 500 TABLET ORAL at 06:16

## 2018-11-15 RX ADMIN — FAMOTIDINE 20 MG: 20 TABLET, FILM COATED ORAL at 06:15

## 2018-11-15 RX ADMIN — STANDARDIZED SENNA CONCENTRATE AND DOCUSATE SODIUM 2 TABLET: 8.6; 5 TABLET, FILM COATED ORAL at 18:16

## 2018-11-15 RX ADMIN — HYDRALAZINE HYDROCHLORIDE 25 MG: 25 TABLET, FILM COATED ORAL at 12:57

## 2018-11-15 RX ADMIN — METRONIDAZOLE 500 MG: 500 TABLET ORAL at 18:16

## 2018-11-15 RX ADMIN — STANDARDIZED SENNA CONCENTRATE AND DOCUSATE SODIUM 2 TABLET: 8.6; 5 TABLET, FILM COATED ORAL at 06:00

## 2018-11-15 RX ADMIN — HYDRALAZINE HYDROCHLORIDE 25 MG: 25 TABLET, FILM COATED ORAL at 06:15

## 2018-11-15 ASSESSMENT — ENCOUNTER SYMPTOMS
DIZZINESS: 0
CHILLS: 0
DEPRESSION: 0
PSYCHIATRIC NEGATIVE: 1
PALPITATIONS: 0
VOMITING: 1
ABDOMINAL PAIN: 0
CARDIOVASCULAR NEGATIVE: 1
VOMITING: 0
FOCAL WEAKNESS: 0
HEADACHES: 0
ABDOMINAL PAIN: 1
NAUSEA: 1
COUGH: 0
CONSTIPATION: 1
DIARRHEA: 0
WEAKNESS: 1
MYALGIAS: 0
MUSCULOSKELETAL NEGATIVE: 1
FEVER: 0
DOUBLE VISION: 0
HEMOPTYSIS: 0

## 2018-11-15 ASSESSMENT — PAIN SCALES - GENERAL
PAINLEVEL_OUTOF10: 0
PAINLEVEL_OUTOF10: 4

## 2018-11-15 NOTE — ASSESSMENT & PLAN NOTE
With hypertensive urgency-improving  PRN labetalol  Questionable associated with hydralazine after initiation, RN to monitor closely  metoprolol  Troponin trending down, likely secondary to associated chronic kidney disease  EKG with no changes as compared to prior  Resolved

## 2018-11-15 NOTE — PROGRESS NOTES
Progress Note               Author: Fiath C Veronica Date & Time created: 11/15/2018  6:58 AM     Interval History:  Had an episode of N/V after breakfast yesterday, that has now resolved. Tolerating full liquids. Would like wife to bring in food from home. +flatus.     Review of Systems:  Review of Systems   Constitutional: Negative for chills and fever.   Gastrointestinal: Positive for abdominal pain, nausea and vomiting.       Physical Exam:  Physical Exam   Constitutional: He is oriented to person, place, and time. He appears well-developed and well-nourished. No distress.   HENT:   Head: Normocephalic and atraumatic.   Eyes: Conjunctivae are normal.   Neck: Normal range of motion.   Pulmonary/Chest: Effort normal.   Abdominal: Soft. He exhibits no distension. There is tenderness.   MIld appropriate incisional tenderness   Neurological: He is alert and oriented to person, place, and time.   Skin: Skin is warm and dry. He is not diaphoretic.   Psychiatric: He has a normal mood and affect. His behavior is normal.       Labs:      Recent Labs      18   0352  18   0751  18   1200   TROPONINI  0.11*  0.10*  0.09*     Recent Labs      18   0829  18   0058  11/15/18   0243   SODIUM  135  133*  131*   POTASSIUM  5.0  4.6  5.4   CHLORIDE  95*  97  94*   CO2  26  28  26   BUN  72*  41*  63*   CREATININE  11.13*  7.11*  10.48*   PHOSPHORUS  7.7*   --    --    CALCIUM  8.4*  8.3*  8.7     Recent Labs      18   0829  18   0058  11/15/18   0243   GLUCOSE  101*  99  102*     Recent Labs      18   0058  11/15/18   0243   RBC  2.91*  2.74*   HEMOGLOBIN  9.4*  8.8*   HEMATOCRIT  28.7*  27.0*   PLATELETCT  113*  128*     Recent Labs      18   0058  11/15/18   0243   WBC  8.8  11.3*   NEUTSPOLYS  75.70*  87.00*   LYMPHOCYTES  13.00*  3.40*   MONOCYTES  10.00  7.20   EOSINOPHILS  0.90  1.50   BASOPHILS  0.20  0.50     Hemodynamics:  Temp (24hrs), Av.5 °C (99.5 °F),  Min:37.2 °C (99 °F), Max:37.7 °C (99.8 °F)  Temperature: 37.4 °C (99.3 °F)  Pulse  Av.6  Min: 75  Max: 109   Blood Pressure: 145/108     Respiratory:    Respiration: 17, Pulse Oximetry: 95 %           Fluids:    Intake/Output Summary (Last 24 hours) at 11/15/18 0658  Last data filed at 11/15/18 0129   Gross per 24 hour   Intake             65.8 ml   Output              350 ml   Net           -284.2 ml     Weight: 74.2 kg (163 lb 9.3 oz)  GI/Nutrition:  Orders Placed This Encounter   Procedures   • Diet Order Full Liquid     Standing Status:   Standing     Number of Occurrences:   1     Order Specific Question:   Diet:     Answer:   Full Liquid [11]     Medical Decision Making, by Problem:  Active Hospital Problems    Diagnosis   • *Ischemic bowel disease (HCC) [K55.9]   • ESRD (end stage renal disease) on dialysis (HCC) [N18.6, Z99.2]   • Chest pain [R07.9]   • Sepsis (HCC) [A41.9]       Plan:  Advance diet as tolerated, ok for wife to bring in food from home.   Renal and other medical care per Dr. Sandra.   Will follow.     Quality-Core Measures   Reviewed items::  Labs reviewed  Wing catheter::  No Wing

## 2018-11-15 NOTE — PROGRESS NOTES
"[facility] -  PROGRESS NOTE               Author: Jimenez Ewing M.D. Date & Time: 11/15/2018  7:01 AM     HPI:  This is a 28-year-old gentleman who is on  nocturnal HD, severe hypertension who presented with  6 days of abdominal pain and Vomiting, he could not keep anything down except for some fluid over the last 6 days.  CT of the abdomen and pelvis shows multiple thick walled loops of  small intestine within the left upper quadrant with surrounding mesenteric inflammatory change.  Ultrasound of the right upper quadrant done on 11/10/2018 shows gallbladder sludge.  No   sonographic evidence of acute cholecystitis. He was taken from the ER straight to the OR for suspected ischemic Bowel.     DAILY NEPHROLOGY SUMMARY:  11/12: consult done  11/13: s/p ex-lap, no e/o ischemic bowel. S/p iHD.  Mild abdominal pain  11/14: Episode of chest pain this morning.  Emesis.  Now resolved  11/15: Ongoing intermittent nausea, abd pain improved.     PAST FAMILY HISTORY: Reviewed and Unchanged  SOCIAL HISTORY: Reviewed and Unchanged  CURRENT MEDICATIONS: Reviewed  IMAGING STUDIES: Reviewed    ROS  General: No weakness, no malaise  HEENT: No vision changes, no hearing changes  CV: no chest pain, no palpitations  Lungs: No cough; no PND  GI: +nausea; + abdominal pain  : No dysuria or gross hematuria  MSK: No joint pain; no trauma  Skin: No rashes; no lesions  Neuro: No tremors; no LOC  Psych: No depression; no anxiety    PHYSICAL EXAM  VS:  /108   Pulse (!) 103   Temp 37.4 °C (99.3 °F) (Temporal)   Resp 17   Ht 1.753 m (5' 9\")   Wt 74.2 kg (163 lb 9.3 oz)   SpO2 95%   BMI 24.16 kg/m²   GENERAL: Lying in bed, no acute distress  HEAD: Normocephalic, atraumatic  EYES: No scleral icterus; normal conjunctiva  NECK: Supple, trachea midline  CV: RRR, S1 and S2 present  LUNGS: Clear to ausculation bilaterally, No rhales or wheezes  ABDOMEN: Soft, mild tenderness, midline incision c/d/i  EXTREMETIES: " no lower extremity edema, no clubbing or cyanosis  SKIN: Warm and dry, no rashes  NEURO: A&O, no focal deficits  PSYCH: Cooperative, appropriate mood and affect  ACCESS: Aneurysmal right upper extremity AV fistula.  Positive thrill    Fluids:  In: 65.8 [I.V.:65.8]  Out: 350     LABS:  Recent Results (from the past 24 hour(s))   TROPONIN    Collection Time: 11/14/18  7:51 AM   Result Value Ref Range    Troponin I 0.10 (H) 0.00 - 0.04 ng/mL   TROPONIN    Collection Time: 11/14/18 12:00 PM   Result Value Ref Range    Troponin I 0.09 (H) 0.00 - 0.04 ng/mL   CBC WITH DIFFERENTIAL    Collection Time: 11/15/18  2:43 AM   Result Value Ref Range    WBC 11.3 (H) 4.8 - 10.8 K/uL    RBC 2.74 (L) 4.70 - 6.10 M/uL    Hemoglobin 8.8 (L) 14.0 - 18.0 g/dL    Hematocrit 27.0 (L) 42.0 - 52.0 %    MCV 98.5 (H) 81.4 - 97.8 fL    MCH 32.1 27.0 - 33.0 pg    MCHC 32.6 (L) 33.7 - 35.3 g/dL    RDW 43.7 35.9 - 50.0 fL    Platelet Count 128 (L) 164 - 446 K/uL    MPV 11.3 9.0 - 12.9 fL    Neutrophils-Polys 87.00 (H) 44.00 - 72.00 %    Lymphocytes 3.40 (L) 22.00 - 41.00 %    Monocytes 7.20 0.00 - 13.40 %    Eosinophils 1.50 0.00 - 6.90 %    Basophils 0.50 0.00 - 1.80 %    Immature Granulocytes 0.40 0.00 - 0.90 %    Nucleated RBC 0.00 /100 WBC    Neutrophils (Absolute) 9.78 (H) 1.82 - 7.42 K/uL    Lymphs (Absolute) 0.38 (L) 1.00 - 4.80 K/uL    Monos (Absolute) 0.81 0.00 - 0.85 K/uL    Eos (Absolute) 0.17 0.00 - 0.51 K/uL    Baso (Absolute) 0.06 0.00 - 0.12 K/uL    Immature Granulocytes (abs) 0.05 0.00 - 0.11 K/uL    NRBC (Absolute) 0.00 K/uL   BASIC METABOLIC PANEL    Collection Time: 11/15/18  2:43 AM   Result Value Ref Range    Sodium 131 (L) 135 - 145 mmol/L    Potassium 5.4 3.6 - 5.5 mmol/L    Chloride 94 (L) 96 - 112 mmol/L    Co2 26 20 - 33 mmol/L    Glucose 102 (H) 65 - 99 mg/dL    Bun 63 (H) 8 - 22 mg/dL    Creatinine 10.48 (HH) 0.50 - 1.40 mg/dL    Calcium 8.7 8.5 - 10.5 mg/dL    Anion Gap 11.0 0.0 - 11.9   ESTIMATED GFR    Collection  Time: 11/15/18  2:43 AM   Result Value Ref Range    GFR If  7 (A) >60 mL/min/1.73 m 2    GFR If Non African American 6 (A) >60 mL/min/1.73 m 2       (click the triangle to expand results)    ASSESSMENT:  # End-stage renal disease, on nocturnal Hemodialysis T/T/Sun.  # Anemia of CKD  # CKD-MBD  # Hypertension  # Bowel edema: ex-lap neg for ischemia, cholecystitis or other intra-abdominal infection      PLAN:  -iHD today, to continue T/T/Sun iHD during stay  -challenging volume removal today  -Dose all meds for ESRD  -Renal Diet  -Limit IVFs/Strict I/Os  -Limit peripheral lines, avoid PICCs  -Continue home blood pressure meds  -Continue home phos binders and Sensipar  -Holding Epogen  -Antibiotics per primary       Thank you, we will continue to follow

## 2018-11-15 NOTE — DOCUMENTATION QUERY
DOCUMENTATION QUERY    PROVIDERS: Please select “Cosign w/ note”to reply to query.    Dr. Sandra,    To better represent the severity of illness of your patient, please review the following information and exercise your independent professional judgment in responding to this query.     Sodium 133 to 135 range while given IV NS at 75 ml/hr from 11/11 - 11/13. Now Sodium down to 131 is noted in the Lab Results . Based upon the clinical findings, risk factors, and treatment, can a diagnosis be provided to support this finding?     • Hyponatremia  • Findings of no clinical significance   • Other explanation of clinical findings  • Unable to determine (no explanation for clinical findings)    The medical record reflects the following:   Clinical Findings · Sodium 133 to 135 range while given IV NS at 75 ml/hr from 11/11 to 11/13.   · Today Sodium down to 131   Treatment  IV NS at 75 ml/hr from Nov 11 to Nov 13th.    Risk Factors  Ischemic bowel disease, ESRD on dialysis.    Location within medical record  Lab Results      Thank you,   Huma Hooks RN  Clinical   [phone]

## 2018-11-15 NOTE — PROGRESS NOTES
Hospital Medicine Daily Progress Note    Date of Service  11/15/2018    Chief Complaint  28 y.o. male admitted 11/11/2018 with abdominal pain    Hospital Course   The patient's abdominal pain has been progressively worse since Tuesday of last week.  It had escalated 10 out of 10 and was accompanied by nausea but no vomiting.  He does not say that he has had any diarrhea.  Patient was a valid by surgery had to go for urgent exploratory laparotomy.  The laparotomy finds that in the right upper quadrant the small intestines are swollen tender inflamed but does not appear to be ischemic or infected.  Patient at this point will be continued on pain management fluid resuscitation we will monitor at this point for him to be able to pass gas and have a diet.    Interval Problem Update  Denies any abdominal pain today  Nausea and vomiting after breakfast      Consultants/Specialty  Surgery    Code Status  Full code    Disposition  To home in 1-2-days with clinical improvement    Review of Systems  Review of Systems   Constitutional: Negative for chills and fever.   HENT: Negative.  Negative for congestion.    Eyes: Negative for double vision.   Respiratory: Negative for cough and hemoptysis.    Cardiovascular: Negative.  Negative for palpitations and leg swelling.   Gastrointestinal: Positive for constipation and nausea. Negative for abdominal pain, diarrhea and vomiting.   Genitourinary: Negative.  Negative for dysuria and urgency.   Musculoskeletal: Negative.  Negative for myalgias.   Skin: Negative.  Negative for itching and rash.   Neurological: Positive for weakness. Negative for dizziness, focal weakness and headaches.   Endo/Heme/Allergies: Negative.    Psychiatric/Behavioral: Negative.  Negative for depression and suicidal ideas.   All other systems reviewed and are negative.       Physical Exam  Temp:  [37.4 °C (99.3 °F)-37.7 °C (99.8 °F)] 37.5 °C (99.5 °F)  Pulse:  [] 105  Resp:  [15-91] 91  BP:  (144-156)/() 147/92    Physical Exam   Constitutional: He is oriented to person, place, and time. He appears well-developed and well-nourished. He is cooperative.  Non-toxic appearance. He does not have a sickly appearance. He appears ill. No distress.   HENT:   Head: Normocephalic and atraumatic.   Eyes: Pupils are equal, round, and reactive to light. EOM are normal. No scleral icterus.   Neck: Normal range of motion. No thyromegaly present.   Cardiovascular: Normal rate, regular rhythm and intact distal pulses.    No murmur heard.  Pulmonary/Chest: Effort normal. No respiratory distress. He has no wheezes. He has rales.   Abdominal: Soft. He exhibits no distension. There is no tenderness.       Musculoskeletal: Normal range of motion. He exhibits no edema or tenderness.   Neurological: He is alert and oriented to person, place, and time. He has normal reflexes. No cranial nerve deficit. Coordination normal.   Skin: Skin is warm and dry. No erythema.   Psychiatric: He has a normal mood and affect. His behavior is normal. Judgment and thought content normal.   Nursing note and vitals reviewed.      Fluids    Intake/Output Summary (Last 24 hours) at 11/15/18 1559  Last data filed at 11/15/18 1300   Gross per 24 hour   Intake            742.5 ml   Output             4850 ml   Net          -4107.5 ml       Laboratory  Recent Labs      11/14/18   0058  11/15/18   0243   WBC  8.8  11.3*   RBC  2.91*  2.74*   HEMOGLOBIN  9.4*  8.8*   HEMATOCRIT  28.7*  27.0*   MCV  98.6*  98.5*   MCH  32.3  32.1   MCHC  32.8*  32.6*   RDW  44.6  43.7   PLATELETCT  113*  128*   MPV  11.1  11.3     Recent Labs      11/13/18   0829  11/14/18   0058  11/15/18   0243   SODIUM  135  133*  131*   POTASSIUM  5.0  4.6  5.4   CHLORIDE  95*  97  94*   CO2  26  28  26   GLUCOSE  101*  99  102*   BUN  72*  41*  63*   CREATININE  11.13*  7.11*  10.48*   CALCIUM  8.4*  8.3*  8.7                   Imaging   DX-CHEST-PORTABLE (1 VIEW)   Final Result       1.  Hypoinflation and pulmonary vascular congestion.   2.  Apparent mild cardiac enlargement, possibly due to hypoinflation and AP technique.      CT-RENAL COLIC EVALUATION(A/P W/O)   Final Result      1.  Multiple thick-walled loops of small intestine within the left upper quadrant with surrounding mesenteric inflammatory change. Given the patient's history of renal failure on dialysis, this would be most suspicious for ischemic bowel related to    dialysis. Crohn's disease or infectious enteritis would be less likely possibility.      2.  Small amount of free fluid dependently within the pelvis.      3.  Atrophic kidneys.      This was discussed with SUJEY MERIDA at 5:40 PM on 11/11/2018.           Assessment/Plan  * Ischemic bowel disease (HCC)   Assessment & Plan    Patient came in with progressively worsening abdominal pain since Tuesday of last week.  T patient's abdomen was evaluated by surgery and took him for an exploratory laparotomy.  The surgery findings showed no ischemia but edema and inflammation.  The intestines were affected in the left upper quadrant with edema and of the mesentery and free clear serous fluid.  Postoperatively the patient still has severe pain 10 out of 10 and at this point the patient has been started on a PCA.    11/14-denies any abdominal pain today  Nausea and vomiting today  Continue antibiotics  Diet per surgery  Dilaudid PCA, will consider transition to as needed, will follow up with surgery    11/15 improving abdominal pain  Narcotics on as needed , off PCA  Minimal nausea, no vomiting today       ESRD (end stage renal disease) on dialysis (HCC)- (present on admission)   Assessment & Plan    Continue with hemodialysis , T, Th,Sat per nephro /[facility]     Chest pain   Assessment & Plan    With hypertensive urgency-improving  PRN labetalol  Questionable associated with hydralazine after initiation, RN to monitor closely  Increase metoprolol  Troponin  trending down, likely secondary to associated chronic kidney disease  EKG with no changes as compared to prior  We will follow-up EKG in a.m.         Sepsis ([facility])   Assessment & Plan    This is sepsis (without associated acute organ dysfunction).   Patient does not appear to be septic.  The patient's white blood cell count is elevated secondary to the inflammation.  Lactic acid levels are normal.  Vital signs are normal.  He has not been hypotensive.          VTE prophylaxis: Heparin

## 2018-11-15 NOTE — PROGRESS NOTES
Assumed care of  Patient, pt awake, alert, oriented. IVABX as ordered. SBA per night shift report. Nephrology at bedside stating no longer needing to straight cath patient. Pt nauseated, zofran given.    Taken to dialysis.

## 2018-11-15 NOTE — PROGRESS NOTES
[facility] Medicine Daily Progress Note    Date of Service  11/14/2018    Chief Complaint  28 y.o. male admitted 11/11/2018 with abdominal pain    Hospital Course   The patient's abdominal pain has been progressively worse since Tuesday of last week.  It had escalated 10 out of 10 and was accompanied by nausea but no vomiting.  He does not say that he has had any diarrhea.  Patient was a valid by surgery had to go for urgent exploratory laparotomy.  The laparotomy finds that in the right upper quadrant the small intestines are swollen tender inflamed but does not appear to be ischemic or infected.  Patient at this point will be continued on pain management fluid resuscitation we will monitor at this point for him to be able to pass gas and have a diet.    Interval Problem Update  Denies any abdominal pain today  Nausea and vomiting after breakfast      Consultants/Specialty  Surgery    Code Status  Full code    Disposition  To be determined    Review of Systems  Review of Systems   Constitutional: Negative for chills, fever and malaise/fatigue.   HENT: Negative.  Negative for congestion and hearing loss.    Eyes: Negative for blurred vision and double vision.   Respiratory: Negative for hemoptysis and shortness of breath.    Cardiovascular: Negative.  Negative for chest pain, palpitations and leg swelling.   Gastrointestinal: Positive for constipation, nausea and vomiting. Negative for abdominal pain, diarrhea and heartburn.   Genitourinary: Negative.  Negative for dysuria and urgency.   Musculoskeletal: Negative.  Negative for joint pain and myalgias.   Skin: Negative.  Negative for itching and rash.   Neurological: Positive for weakness. Negative for focal weakness and headaches.   Endo/Heme/Allergies: Negative.    Psychiatric/Behavioral: Negative.  Negative for suicidal ideas. The patient is not nervous/anxious.    All other systems reviewed and are negative.       Physical Exam  Temp:  [37.2 °C (99 °F)-37.7 °C  (99.8 °F)] 37.2 °C (99 °F)  Pulse:  [] 99  Resp:  [14-20] 16  BP: (144-175)/() 144/94    Physical Exam   Constitutional: He is oriented to person, place, and time. He appears well-developed and well-nourished. He is cooperative.  Non-toxic appearance. He does not have a sickly appearance. He appears ill. No distress.   HENT:   Head: Normocephalic and atraumatic.   Mouth/Throat: Oropharynx is clear and moist. No oropharyngeal exudate.   Eyes: Pupils are equal, round, and reactive to light. EOM are normal. Right eye exhibits no discharge. Left eye exhibits no discharge. No scleral icterus.   Neck: Normal range of motion. Neck supple. No JVD present. No thyromegaly present.   Cardiovascular: Normal rate, regular rhythm and intact distal pulses.    No murmur heard.  Pulmonary/Chest: Effort normal. No respiratory distress. He has no wheezes. He has rales.   Abdominal: Soft. He exhibits no distension. There is no tenderness.       Musculoskeletal: Normal range of motion. He exhibits no edema or tenderness.   Lymphadenopathy:     He has no cervical adenopathy.   Neurological: He is alert and oriented to person, place, and time. He has normal reflexes. No cranial nerve deficit. Coordination normal.   Skin: Skin is warm and dry. No rash noted. He is not diaphoretic. No erythema.   Psychiatric: He has a normal mood and affect. His behavior is normal. Judgment and thought content normal.   Nursing note and vitals reviewed.      Fluids    Intake/Output Summary (Last 24 hours) at 11/14/18 1703  Last data filed at 11/14/18 0719   Gross per 24 hour   Intake            321.3 ml   Output              525 ml   Net           -203.7 ml       Laboratory  Recent Labs      11/12/18   0202  11/14/18   0058   WBC  15.1*  8.8   RBC  3.25*  2.91*   HEMOGLOBIN  10.5*  9.4*   HEMATOCRIT  30.6*  28.7*   MCV  96.3  98.6*   MCH  31.7  32.3   MCHC  32.9*  32.8*   RDW  43.8  44.6   PLATELETCT  132*  113*   MPV  10.8  11.1     Recent Labs       11/12/18   0202  11/13/18   0829  11/14/18   0058   SODIUM  135  134*  135  133*   POTASSIUM  5.9*  5.8*  5.0  4.6   CHLORIDE  92*  92*  95*  97   CO2  24  25  26  28   GLUCOSE  108*  108*  101*  99   BUN  96*  97*  72*  41*   CREATININE  14.84*  15.03*  11.13*  7.11*   CALCIUM  8.8  8.7  8.4*  8.3*                   Imaging   DX-CHEST-PORTABLE (1 VIEW)   Final Result      1.  Hypoinflation and pulmonary vascular congestion.   2.  Apparent mild cardiac enlargement, possibly due to hypoinflation and AP technique.      CT-RENAL COLIC EVALUATION(A/P W/O)   Final Result      1.  Multiple thick-walled loops of small intestine within the left upper quadrant with surrounding mesenteric inflammatory change. Given the patient's history of renal failure on dialysis, this would be most suspicious for ischemic bowel related to    dialysis. Crohn's disease or infectious enteritis would be less likely possibility.      2.  Small amount of free fluid dependently within the pelvis.      3.  Atrophic kidneys.      This was discussed with SUJEY MERIDA at 5:40 PM on 11/11/2018.           Assessment/Plan  * Ischemic bowel disease (HCC)   Assessment & Plan    Patient came in with progressively worsening abdominal pain since Tuesday of last week.  T patient's abdomen was evaluated by surgery and took him for an exploratory laparotomy.  The surgery findings showed no ischemia but edema and inflammation.  The intestines were affected in the left upper quadrant with edema and of the mesentery and free clear serous fluid.  Postoperatively the patient still has severe pain 10 out of 10 and at this point the patient has been started on a PCA.    11/14-denies any abdominal pain today  Nausea and vomiting today  Continue antibiotics  Diet per surgery  Dilaudid PCA, will consider transition to as needed, will follow up with surgery       ESRD (end stage renal disease) on dialysis (HCC)- (present on admission)   Assessment & Plan     Continue with hemodialysis , T, Th,Sat per nephro /Francis     Chest pain   Assessment & Plan    With hypertensive urgency  PRN labetalol  Questionable associated with hydralazine after initiation, RN to monitor closely  Increase metoprolol, dose now  Troponin trending down, likely secondary to associated chronic kidney disease  EKG with no changes as compared to prior  We will follow-up EKG in a.m.         Sepsis ([facility])   Assessment & Plan    This is sepsis (without associated acute organ dysfunction).   Patient does not appear to be septic.  The patient's white blood cell count is elevated secondary to the inflammation.  Lactic acid levels are normal.  Vital signs are normal.  He has not been hypotensive.          VTE prophylaxis: Heparin

## 2018-11-15 NOTE — PROGRESS NOTES
HD ordered by Dr. Ewing. Treatment started at 0816 and ended at 1146.     Total Net UF 4000 mL.    Pt tolerated treatment well with no issues. See flow sheet for details. Cannulation needles taken out, held pressure for 10 min and placed gauze dressing with no bleeding. JONATHAN AVF + for bruit/thrill. Report given to primary RN.

## 2018-11-16 LAB
ANION GAP SERPL CALC-SCNC: 9 MMOL/L (ref 0–11.9)
BACTERIA BLD CULT: NORMAL
BACTERIA BLD CULT: NORMAL
BUN SERPL-MCNC: 51 MG/DL (ref 8–22)
CALCIUM SERPL-MCNC: 8.8 MG/DL (ref 8.5–10.5)
CHLORIDE SERPL-SCNC: 95 MMOL/L (ref 96–112)
CO2 SERPL-SCNC: 27 MMOL/L (ref 20–33)
CREAT SERPL-MCNC: 8.83 MG/DL (ref 0.5–1.4)
ERYTHROCYTE [DISTWIDTH] IN BLOOD BY AUTOMATED COUNT: 42.7 FL (ref 35.9–50)
GLUCOSE SERPL-MCNC: 97 MG/DL (ref 65–99)
HCT VFR BLD AUTO: 26 % (ref 42–52)
HGB BLD-MCNC: 8.6 G/DL (ref 14–18)
MCH RBC QN AUTO: 31.9 PG (ref 27–33)
MCHC RBC AUTO-ENTMCNC: 33.1 G/DL (ref 33.7–35.3)
MCV RBC AUTO: 96.3 FL (ref 81.4–97.8)
PLATELET # BLD AUTO: 181 K/UL (ref 164–446)
PMV BLD AUTO: 11 FL (ref 9–12.9)
POTASSIUM SERPL-SCNC: 4.5 MMOL/L (ref 3.6–5.5)
RBC # BLD AUTO: 2.7 M/UL (ref 4.7–6.1)
SIGNIFICANT IND 70042: NORMAL
SIGNIFICANT IND 70042: NORMAL
SITE SITE: NORMAL
SITE SITE: NORMAL
SODIUM SERPL-SCNC: 131 MMOL/L (ref 135–145)
SOURCE SOURCE: NORMAL
SOURCE SOURCE: NORMAL
WBC # BLD AUTO: 8.8 K/UL (ref 4.8–10.8)

## 2018-11-16 PROCEDURE — G8980 MOBILITY D/C STATUS: HCPCS | Mod: CI

## 2018-11-16 PROCEDURE — A9270 NON-COVERED ITEM OR SERVICE: HCPCS | Performed by: INTERNAL MEDICINE

## 2018-11-16 PROCEDURE — G8987 SELF CARE CURRENT STATUS: HCPCS | Mod: CI

## 2018-11-16 PROCEDURE — G8978 MOBILITY CURRENT STATUS: HCPCS | Mod: CI

## 2018-11-16 PROCEDURE — 700111 HCHG RX REV CODE 636 W/ 250 OVERRIDE (IP): Performed by: INTERNAL MEDICINE

## 2018-11-16 PROCEDURE — 700102 HCHG RX REV CODE 250 W/ 637 OVERRIDE(OP): Performed by: INTERNAL MEDICINE

## 2018-11-16 PROCEDURE — G8979 MOBILITY GOAL STATUS: HCPCS | Mod: CI

## 2018-11-16 PROCEDURE — G8988 SELF CARE GOAL STATUS: HCPCS | Mod: CI

## 2018-11-16 PROCEDURE — 80048 BASIC METABOLIC PNL TOTAL CA: CPT

## 2018-11-16 PROCEDURE — 99232 SBSQ HOSP IP/OBS MODERATE 35: CPT | Performed by: INTERNAL MEDICINE

## 2018-11-16 PROCEDURE — 51798 US URINE CAPACITY MEASURE: CPT

## 2018-11-16 PROCEDURE — 85027 COMPLETE CBC AUTOMATED: CPT

## 2018-11-16 PROCEDURE — 36415 COLL VENOUS BLD VENIPUNCTURE: CPT

## 2018-11-16 PROCEDURE — 97165 OT EVAL LOW COMPLEX 30 MIN: CPT

## 2018-11-16 PROCEDURE — 700105 HCHG RX REV CODE 258: Performed by: INTERNAL MEDICINE

## 2018-11-16 PROCEDURE — 97161 PT EVAL LOW COMPLEX 20 MIN: CPT

## 2018-11-16 PROCEDURE — G8989 SELF CARE D/C STATUS: HCPCS | Mod: CI

## 2018-11-16 PROCEDURE — 770020 HCHG ROOM/CARE - TELE (206)

## 2018-11-16 RX ORDER — METOPROLOL SUCCINATE 50 MG/1
100 TABLET, EXTENDED RELEASE ORAL DAILY
Status: DISCONTINUED | OUTPATIENT
Start: 2018-11-17 | End: 2018-11-18 | Stop reason: HOSPADM

## 2018-11-16 RX ORDER — ONDANSETRON 2 MG/ML
4 INJECTION INTRAMUSCULAR; INTRAVENOUS DAILY
Status: DISCONTINUED | OUTPATIENT
Start: 2018-11-17 | End: 2018-11-18 | Stop reason: HOSPADM

## 2018-11-16 RX ADMIN — HEPARIN SODIUM 5000 UNITS: 5000 INJECTION, SOLUTION INTRAVENOUS; SUBCUTANEOUS at 18:03

## 2018-11-16 RX ADMIN — HYDRALAZINE HYDROCHLORIDE 25 MG: 25 TABLET, FILM COATED ORAL at 12:48

## 2018-11-16 RX ADMIN — METRONIDAZOLE 500 MG: 500 TABLET ORAL at 06:22

## 2018-11-16 RX ADMIN — METOPROLOL SUCCINATE 75 MG: 25 TABLET, EXTENDED RELEASE ORAL at 06:22

## 2018-11-16 RX ADMIN — CEFTRIAXONE SODIUM 2 G: 2 INJECTION, POWDER, FOR SOLUTION INTRAMUSCULAR; INTRAVENOUS at 06:22

## 2018-11-16 RX ADMIN — AMLODIPINE BESYLATE 10 MG: 10 TABLET ORAL at 06:22

## 2018-11-16 RX ADMIN — FAMOTIDINE 20 MG: 20 TABLET, FILM COATED ORAL at 06:22

## 2018-11-16 RX ADMIN — STANDARDIZED SENNA CONCENTRATE AND DOCUSATE SODIUM 2 TABLET: 8.6; 5 TABLET, FILM COATED ORAL at 18:02

## 2018-11-16 RX ADMIN — CINACALCET HYDROCHLORIDE 30 MG: 30 TABLET, COATED ORAL at 06:30

## 2018-11-16 RX ADMIN — STANDARDIZED SENNA CONCENTRATE AND DOCUSATE SODIUM 2 TABLET: 8.6; 5 TABLET, FILM COATED ORAL at 06:22

## 2018-11-16 RX ADMIN — METRONIDAZOLE 500 MG: 500 TABLET ORAL at 18:02

## 2018-11-16 RX ADMIN — HYDRALAZINE HYDROCHLORIDE 25 MG: 25 TABLET, FILM COATED ORAL at 18:02

## 2018-11-16 RX ADMIN — ONDANSETRON 4 MG: 2 INJECTION INTRAMUSCULAR; INTRAVENOUS at 22:29

## 2018-11-16 RX ADMIN — POLYETHYLENE GLYCOL 3350 1 PACKET: 17 POWDER, FOR SOLUTION ORAL at 23:43

## 2018-11-16 RX ADMIN — ONDANSETRON 4 MG: 2 INJECTION INTRAMUSCULAR; INTRAVENOUS at 09:33

## 2018-11-16 RX ADMIN — HEPARIN SODIUM 5000 UNITS: 5000 INJECTION, SOLUTION INTRAVENOUS; SUBCUTANEOUS at 06:21

## 2018-11-16 RX ADMIN — SEVELAMER CARBONATE 3200 MG: 800 TABLET, FILM COATED ORAL at 18:02

## 2018-11-16 RX ADMIN — HYDRALAZINE HYDROCHLORIDE 25 MG: 25 TABLET, FILM COATED ORAL at 06:22

## 2018-11-16 RX ADMIN — SEVELAMER CARBONATE 3200 MG: 800 TABLET, FILM COATED ORAL at 13:08

## 2018-11-16 ASSESSMENT — COGNITIVE AND FUNCTIONAL STATUS - GENERAL
DAILY ACTIVITIY SCORE: 23
SUGGESTED CMS G CODE MODIFIER MOBILITY: CH
HELP NEEDED FOR BATHING: A LITTLE
MOBILITY SCORE: 24
SUGGESTED CMS G CODE MODIFIER DAILY ACTIVITY: CI

## 2018-11-16 ASSESSMENT — GAIT ASSESSMENTS
DISTANCE (FEET): 200
DEVIATION: ANTALGIC;BRADYKINETIC
GAIT LEVEL OF ASSIST: STAND BY ASSIST

## 2018-11-16 ASSESSMENT — ENCOUNTER SYMPTOMS
NAUSEA: 0
DIAPHORESIS: 0
CARDIOVASCULAR NEGATIVE: 1
DIARRHEA: 0
HEADACHES: 0
RESPIRATORY NEGATIVE: 1
DOUBLE VISION: 0
CONSTIPATION: 1
PALPITATIONS: 0
COUGH: 0
FEVER: 0
VOMITING: 0
CHILLS: 0
NAUSEA: 1
PSYCHIATRIC NEGATIVE: 1
HEMOPTYSIS: 0
MYALGIAS: 0
ABDOMINAL PAIN: 1
VOMITING: 1
WEAKNESS: 1
MUSCULOSKELETAL NEGATIVE: 1

## 2018-11-16 ASSESSMENT — PAIN SCALES - GENERAL
PAINLEVEL_OUTOF10: 0

## 2018-11-16 ASSESSMENT — ACTIVITIES OF DAILY LIVING (ADL): TOILETING: INDEPENDENT

## 2018-11-16 NOTE — PROGRESS NOTES
Bladder scan volume is 288 at 0600. No need to straight cath at this time per MD who would like to cath >350 to help with bladder training

## 2018-11-16 NOTE — PROGRESS NOTES
Assumed care of patient, pt found to be on RA, 88-93% laying in bed. IS given to patient, encouraged to use and to ambulate today. Denies pain. Appears comfortable.    No set DC plans at this time.

## 2018-11-16 NOTE — PROGRESS NOTES
Diet advanced to renal. Pt wife brought in some food from home. Pt tolerating OK. Refusing bed bath today. Encouraged self care.

## 2018-11-16 NOTE — PROGRESS NOTES
Progress Note               Author: Faith Martin Date & Time created: 11/16/2018  7:08 AM     Interval History:  Tolerating regular diet, +flatus, no BM. Pain much better.     Review of Systems:  Review of Systems   Constitutional: Negative for chills and fever.   Gastrointestinal: Positive for abdominal pain. Negative for nausea and vomiting.       Physical Exam:  Physical Exam   Constitutional: He is oriented to person, place, and time. He appears well-developed and well-nourished. No distress.   HENT:   Head: Normocephalic and atraumatic.   Eyes: Conjunctivae are normal.   Neck: Normal range of motion.   Pulmonary/Chest: Effort normal.   Abdominal: Soft. He exhibits no distension. There is tenderness.   Mild appropriate incisional tenderness, incision c/d/i   Musculoskeletal: Normal range of motion.   Neurological: He is alert and oriented to person, place, and time.   Skin: Skin is warm and dry. He is not diaphoretic.   Psychiatric: He has a normal mood and affect. His behavior is normal.       Labs:      Recent Labs      11/14/18   0352  11/14/18   0751  11/14/18   1200   TROPONINI  0.11*  0.10*  0.09*     Recent Labs      11/13/18   0829  11/14/18   0058  11/15/18   0243  11/16/18   0303   SODIUM  135  133*  131*  131*   POTASSIUM  5.0  4.6  5.4  4.5   CHLORIDE  95*  97  94*  95*   CO2  26  28  26  27   BUN  72*  41*  63*  51*   CREATININE  11.13*  7.11*  10.48*  8.83*   PHOSPHORUS  7.7*   --    --    --    CALCIUM  8.4*  8.3*  8.7  8.8     Recent Labs      11/14/18   0058  11/15/18   0243  11/16/18   0303   GLUCOSE  99  102*  97     Recent Labs      11/14/18   0058  11/15/18   0243  11/16/18   0303   RBC  2.91*  2.74*  2.70*   HEMOGLOBIN  9.4*  8.8*  8.6*   HEMATOCRIT  28.7*  27.0*  26.0*   PLATELETCT  113*  128*  181     Recent Labs      11/14/18   0058  11/15/18   0243  11/16/18   0303   WBC  8.8  11.3*  8.8   NEUTSPOLYS  75.70*  87.00*   --    LYMPHOCYTES  13.00*  3.40*   --    MONOCYTES  10.00   7.20   --    EOSINOPHILS  0.90  1.50   --    BASOPHILS  0.20  0.50   --      Hemodynamics:  Temp (24hrs), Av.4 °C (99.3 °F), Min:37.1 °C (98.8 °F), Max:37.8 °C (100 °F)  Temperature: 37.3 °C (99.1 °F)  Pulse  Av.8  Min: 75  Max: 109   Blood Pressure: 154/96     Respiratory:    Respiration: 12, Pulse Oximetry: 94 %           Fluids:    Intake/Output Summary (Last 24 hours) at 18 0708  Last data filed at 11/15/18 1815   Gross per 24 hour   Intake              840 ml   Output             4900 ml   Net            -4060 ml     Weight: 67.3 kg (148 lb 5.9 oz)  GI/Nutrition:  Orders Placed This Encounter   Procedures   • Diet Order Renal     Standing Status:   Standing     Number of Occurrences:   1     Order Specific Question:   Diet:     Answer:   Renal [8]     Medical Decision Making, by Problem:  Active Hospital Problems    Diagnosis   • *Ischemic bowel disease (HCC) [K55.9]   • ESRD (end stage renal disease) on dialysis (HCC) [N18.6, Z99.2]   • Chest pain [R07.9]   • Sepsis (HCC) [A41.9]       Plan:  On regular diet, no leukocytosis, pain resolved.   Will sign off, please call with questions or concerns.     Quality-Core Measures   Reviewed items::  Medications reviewed and Labs reviewed

## 2018-11-16 NOTE — PROGRESS NOTES
Pt assisted to bathroom, pt trying to urinate, unable. Bladder scan >500. Straight cath with 400ml.

## 2018-11-16 NOTE — THERAPY
"Occupational Therapy Evaluation completed.   Functional Status:  SPV level BADLs in this setting, SBA functional mobility no AD  Plan of Care: No further Acute OT needs at this time.   Discharge Recommendations:  Equipment: No Equipment Needed. Post-acute therapy Anticipate that the patient will have no further occupational therapy needs after discharge from the hospital.       See \"Rehab Therapy-Acute\" Patient Summary Report for complete documentation.      Pt is a 29 y/o male who presents to acute 2/2 abdominal pain. Pt is now s/p laparotomy finding edema and inflammation in intestines. Pt lives in 2nd story apt w/ his spouse and 2 kids. Pt demo's some deconditioning due to present illness, however appears close to functional baseline. No further Acute OT needs noted at this time.   "

## 2018-11-16 NOTE — PROGRESS NOTES
"[facility] -  PROGRESS NOTE               Author: Jimenez Ewing M.D. Date & Time: 11/16/2018  7:22 AM     HPI:  This is a 28-year-old gentleman who is on  nocturnal HD, severe hypertension who presented with  6 days of abdominal pain and Vomiting, he could not keep anything down except for some fluid over the last 6 days.  CT of the abdomen and pelvis shows multiple thick walled loops of  small intestine within the left upper quadrant with surrounding mesenteric inflammatory change.  Ultrasound of the right upper quadrant done on 11/10/2018 shows gallbladder sludge.  No   sonographic evidence of acute cholecystitis. He was taken from the ER straight to the OR for suspected ischemic Bowel.     DAILY NEPHROLOGY SUMMARY:  11/12: consult done  11/13: s/p ex-lap, no e/o ischemic bowel. S/p iHD.  Mild abdominal pain  11/14: Episode of chest pain this morning.  Emesis.  Now resolved  11/15: Ongoing intermittent nausea, abd pain improved.   11/16: Abdominal pain improved, nausea improved, tolerated dialysis, surgery signed off.  Some urinary retention    PAST FAMILY HISTORY: Reviewed and Unchanged  SOCIAL HISTORY: Reviewed and Unchanged  CURRENT MEDICATIONS: Reviewed  IMAGING STUDIES: Reviewed    ROS  General: No weakness, no malaise  HEENT: No vision changes, no hearing changes  CV: no chest pain, no palpitations  Lungs: No cough; no PND  GI: no nausea; mild abdominal pain  : No dysuria or gross hematuria  MSK: No joint pain; no trauma  Skin: No rashes; no lesions  Neuro: No tremors; no LOC  Psych: No depression; no anxiety    PHYSICAL EXAM  VS:  /96   Pulse 84   Temp 37.3 °C (99.1 °F) (Temporal)   Resp 12   Ht 1.753 m (5' 9\")   Wt 67.3 kg (148 lb 5.9 oz)   SpO2 90%   BMI 21.91 kg/m²   GENERAL: Lying in bed, no acute distress  HEAD: Normocephalic, atraumatic  EYES: No scleral icterus; normal conjunctiva  NECK: Supple, trachea midline  CV: RRR, S1 and S2 present  LUNGS: Clear to " ausculation bilaterally, No rhales or wheezes  ABDOMEN: Soft, mild tenderness, midline incision c/d/i  EXTREMETIES: no lower extremity edema, no clubbing or cyanosis  SKIN: Warm and dry, no rashes  NEURO: A&O, no focal deficits  PSYCH: Cooperative, appropriate mood and affect  ACCESS: Aneurysmal right upper extremity AV fistula.  Positive thrill    Fluids:  In: 840 [P.O.:340; Dialysis:500]  Out: 4900     LABS:  Recent Results (from the past 24 hour(s))   CBC without Differential    Collection Time: 11/16/18  3:03 AM   Result Value Ref Range    WBC 8.8 4.8 - 10.8 K/uL    RBC 2.70 (L) 4.70 - 6.10 M/uL    Hemoglobin 8.6 (L) 14.0 - 18.0 g/dL    Hematocrit 26.0 (L) 42.0 - 52.0 %    MCV 96.3 81.4 - 97.8 fL    MCH 31.9 27.0 - 33.0 pg    MCHC 33.1 (L) 33.7 - 35.3 g/dL    RDW 42.7 35.9 - 50.0 fL    Platelet Count 181 164 - 446 K/uL    MPV 11.0 9.0 - 12.9 fL   Basic Metabolic Panel (BMP)    Collection Time: 11/16/18  3:03 AM   Result Value Ref Range    Sodium 131 (L) 135 - 145 mmol/L    Potassium 4.5 3.6 - 5.5 mmol/L    Chloride 95 (L) 96 - 112 mmol/L    Co2 27 20 - 33 mmol/L    Glucose 97 65 - 99 mg/dL    Bun 51 (H) 8 - 22 mg/dL    Creatinine 8.83 (HH) 0.50 - 1.40 mg/dL    Calcium 8.8 8.5 - 10.5 mg/dL    Anion Gap 9.0 0.0 - 11.9   ESTIMATED GFR    Collection Time: 11/16/18  3:03 AM   Result Value Ref Range    GFR If  9 (A) >60 mL/min/1.73 m 2    GFR If Non African American 7 (A) >60 mL/min/1.73 m 2       (click the triangle to expand results)    ASSESSMENT:  # End-stage renal disease, on nocturnal Hemodialysis T/T/Sun.  # Anemia of CKD  # CKD-MBD  # Hypertension  # Bowel edema: ex-lap neg for ischemia, cholecystitis or other intra-abdominal infection      PLAN:  -No IHD today, tentative plan for dialysis tomorrow prior to discharge and then to continue normal home schedule   -challenging volume removal with dialysis  -Dose all meds for ESRD  -Renal Diet  -Limit IVFs/Strict I/Os  -Limit peripheral lines, avoid  PICCs  -Continue home blood pressure meds  -Continue home phos binders and Sensipar  -Holding Epogen  -Antibiotics per primary     Thank you, we will continue to follow

## 2018-11-17 ENCOUNTER — PATIENT OUTREACH (OUTPATIENT)
Dept: HEALTH INFORMATION MANAGEMENT | Facility: OTHER | Age: 28
End: 2018-11-17

## 2018-11-17 ENCOUNTER — APPOINTMENT (OUTPATIENT)
Dept: RADIOLOGY | Facility: MEDICAL CENTER | Age: 28
DRG: 356 | End: 2018-11-17
Attending: INTERNAL MEDICINE
Payer: COMMERCIAL

## 2018-11-17 PROBLEM — K59.00 CONSTIPATION: Status: ACTIVE | Noted: 2018-11-17

## 2018-11-17 LAB — PHOSPHATE SERPL-MCNC: 8.2 MG/DL (ref 2.5–4.5)

## 2018-11-17 PROCEDURE — A9270 NON-COVERED ITEM OR SERVICE: HCPCS | Performed by: INTERNAL MEDICINE

## 2018-11-17 PROCEDURE — 700105 HCHG RX REV CODE 258: Performed by: INTERNAL MEDICINE

## 2018-11-17 PROCEDURE — 74018 RADEX ABDOMEN 1 VIEW: CPT

## 2018-11-17 PROCEDURE — 700111 HCHG RX REV CODE 636 W/ 250 OVERRIDE (IP): Performed by: INTERNAL MEDICINE

## 2018-11-17 PROCEDURE — 36415 COLL VENOUS BLD VENIPUNCTURE: CPT

## 2018-11-17 PROCEDURE — 700102 HCHG RX REV CODE 250 W/ 637 OVERRIDE(OP): Performed by: INTERNAL MEDICINE

## 2018-11-17 PROCEDURE — 5A1D70Z PERFORMANCE OF URINARY FILTRATION, INTERMITTENT, LESS THAN 6 HOURS PER DAY: ICD-10-PCS | Performed by: INTERNAL MEDICINE

## 2018-11-17 PROCEDURE — 84100 ASSAY OF PHOSPHORUS: CPT

## 2018-11-17 PROCEDURE — 770020 HCHG ROOM/CARE - TELE (206)

## 2018-11-17 PROCEDURE — 99233 SBSQ HOSP IP/OBS HIGH 50: CPT | Performed by: INTERNAL MEDICINE

## 2018-11-17 PROCEDURE — 51798 US URINE CAPACITY MEASURE: CPT

## 2018-11-17 PROCEDURE — 90935 HEMODIALYSIS ONE EVALUATION: CPT

## 2018-11-17 PROCEDURE — 700111 HCHG RX REV CODE 636 W/ 250 OVERRIDE (IP): Performed by: SURGERY

## 2018-11-17 RX ORDER — ALBUTEROL SULFATE 90 UG/1
2 AEROSOL, METERED RESPIRATORY (INHALATION) EVERY 4 HOURS PRN
Qty: 8.5 G | Refills: 2 | Status: SHIPPED | OUTPATIENT
Start: 2018-11-17 | End: 2021-09-29

## 2018-11-17 RX ORDER — ONDANSETRON 4 MG/1
4 TABLET, ORALLY DISINTEGRATING ORAL EVERY 4 HOURS PRN
Qty: 20 TAB | Refills: 1 | Status: ON HOLD | OUTPATIENT
Start: 2018-11-17 | End: 2018-12-17

## 2018-11-17 RX ORDER — LACTULOSE 20 G/30ML
30 SOLUTION ORAL 3 TIMES DAILY
Status: DISCONTINUED | OUTPATIENT
Start: 2018-11-17 | End: 2018-11-18 | Stop reason: HOSPADM

## 2018-11-17 RX ORDER — ACETAMINOPHEN 325 MG/1
650 TABLET ORAL EVERY 6 HOURS PRN
Qty: 100 TAB | Refills: 0 | Status: SHIPPED | OUTPATIENT
Start: 2018-11-17 | End: 2021-09-29

## 2018-11-17 RX ORDER — METOPROLOL SUCCINATE 100 MG/1
100 TABLET, EXTENDED RELEASE ORAL DAILY
Qty: 30 TAB | Refills: 2 | Status: SHIPPED | OUTPATIENT
Start: 2018-11-18 | End: 2021-09-29

## 2018-11-17 RX ORDER — LACTULOSE 20 G/30ML
30 SOLUTION ORAL ONCE
Status: COMPLETED | OUTPATIENT
Start: 2018-11-17 | End: 2018-11-17

## 2018-11-17 RX ORDER — AMOXICILLIN 250 MG
2 CAPSULE ORAL 2 TIMES DAILY
Qty: 30 TAB | Refills: 0 | Status: ON HOLD | OUTPATIENT
Start: 2018-11-17 | End: 2018-12-17

## 2018-11-17 RX ADMIN — METRONIDAZOLE 500 MG: 500 TABLET ORAL at 05:17

## 2018-11-17 RX ADMIN — STANDARDIZED SENNA CONCENTRATE AND DOCUSATE SODIUM 2 TABLET: 8.6; 5 TABLET, FILM COATED ORAL at 05:18

## 2018-11-17 RX ADMIN — LACTULOSE 30 ML: 20 SOLUTION ORAL at 14:47

## 2018-11-17 RX ADMIN — ONDANSETRON 4 MG: 2 INJECTION INTRAMUSCULAR; INTRAVENOUS at 05:18

## 2018-11-17 RX ADMIN — AMLODIPINE BESYLATE 10 MG: 10 TABLET ORAL at 05:17

## 2018-11-17 RX ADMIN — SEVELAMER CARBONATE 3200 MG: 800 TABLET, FILM COATED ORAL at 12:12

## 2018-11-17 RX ADMIN — MORPHINE SULFATE 2 MG: 4 INJECTION INTRAVENOUS at 06:06

## 2018-11-17 RX ADMIN — STANDARDIZED SENNA CONCENTRATE AND DOCUSATE SODIUM 2 TABLET: 8.6; 5 TABLET, FILM COATED ORAL at 17:38

## 2018-11-17 RX ADMIN — HEPARIN SODIUM 5000 UNITS: 5000 INJECTION, SOLUTION INTRAVENOUS; SUBCUTANEOUS at 05:18

## 2018-11-17 RX ADMIN — SEVELAMER CARBONATE 3200 MG: 800 TABLET, FILM COATED ORAL at 17:37

## 2018-11-17 RX ADMIN — FAMOTIDINE 20 MG: 20 TABLET, FILM COATED ORAL at 05:17

## 2018-11-17 RX ADMIN — METOPROLOL SUCCINATE 100 MG: 50 TABLET, EXTENDED RELEASE ORAL at 05:17

## 2018-11-17 RX ADMIN — CINACALCET HYDROCHLORIDE 30 MG: 30 TABLET, COATED ORAL at 05:17

## 2018-11-17 RX ADMIN — HEPARIN SODIUM 5000 UNITS: 5000 INJECTION, SOLUTION INTRAVENOUS; SUBCUTANEOUS at 17:36

## 2018-11-17 RX ADMIN — HYDRALAZINE HYDROCHLORIDE 25 MG: 25 TABLET, FILM COATED ORAL at 05:17

## 2018-11-17 RX ADMIN — BISACODYL 10 MG: 10 SUPPOSITORY RECTAL at 17:37

## 2018-11-17 RX ADMIN — ONDANSETRON 4 MG: 2 INJECTION INTRAMUSCULAR; INTRAVENOUS at 16:28

## 2018-11-17 RX ADMIN — CEFTRIAXONE SODIUM 2 G: 2 INJECTION, POWDER, FOR SOLUTION INTRAMUSCULAR; INTRAVENOUS at 05:19

## 2018-11-17 RX ADMIN — CLONIDINE 1 PATCH: 0.2 PATCH TRANSDERMAL at 12:10

## 2018-11-17 RX ADMIN — METRONIDAZOLE 500 MG: 500 TABLET ORAL at 17:38

## 2018-11-17 RX ADMIN — HYDRALAZINE HYDROCHLORIDE 25 MG: 25 TABLET, FILM COATED ORAL at 17:38

## 2018-11-17 RX ADMIN — HYDRALAZINE HYDROCHLORIDE 25 MG: 25 TABLET, FILM COATED ORAL at 12:12

## 2018-11-17 ASSESSMENT — PAIN SCALES - GENERAL
PAINLEVEL_OUTOF10: 9
PAINLEVEL_OUTOF10: 0

## 2018-11-17 ASSESSMENT — ENCOUNTER SYMPTOMS
VOMITING: 1
CHILLS: 0
CARDIOVASCULAR NEGATIVE: 1
HEARTBURN: 0
NAUSEA: 1
CONSTIPATION: 1
WEAKNESS: 1
HEADACHES: 0
PALPITATIONS: 0
PSYCHIATRIC NEGATIVE: 1
BLURRED VISION: 0
COUGH: 0
HEMOPTYSIS: 0
ABDOMINAL PAIN: 1
DIARRHEA: 0
RESPIRATORY NEGATIVE: 1
MYALGIAS: 0
FEVER: 0
DIZZINESS: 0
DEPRESSION: 0
MUSCULOSKELETAL NEGATIVE: 1

## 2018-11-17 ASSESSMENT — PATIENT HEALTH QUESTIONNAIRE - PHQ9
SUM OF ALL RESPONSES TO PHQ9 QUESTIONS 1 AND 2: 0
1. LITTLE INTEREST OR PLEASURE IN DOING THINGS: NOT AT ALL
2. FEELING DOWN, DEPRESSED, IRRITABLE, OR HOPELESS: NOT AT ALL

## 2018-11-17 NOTE — DISCHARGE INSTRUCTIONS
Discharge Instructions    Discharged to home by car with relative. Discharged via wheelchair, hospital escort: Yes.  Special equipment needed: Not Applicable    Be sure to schedule a follow-up appointment with your primary care doctor or any specialists as instructed.     Discharge Plan:   Diet Plan: Discussed  Activity Level: Discussed  Smoking Cessation Offered: Patient Refused  Confirmed Follow up Appointment: Appointment Scheduled  Confirmed Symptoms Management: Discussed  Medication Reconciliation Updated: Yes  Influenza Vaccine Indication: Not indicated: Previously immunized this influenza season and > 8 years of age    I understand that a diet low in cholesterol, fat, and sodium is recommended for good health. Unless I have been given specific instructions below for another diet, I accept this instruction as my diet prescription.   Other diet: Renal    Special Instructions: Sepsis, adultos  (Sepsis, Adult)  La sepsis es cisco infección grave de la maxwell o los tejidos que afecta todo el cuerpo. La infección que causa la sepsis puede ser bacteriana, viral, fúngica o parasitaria. La sepsis es potencialmente mortal. Puede hacer descender la tensión arterial. [place] consecuencia puede producirse un shock. El shock hace que el sistema nervioso central y los órganos dejen de funcionar correctamente.  FACTORES DE RIESGO  Las sepsis le puede ocurrir a cualquier persona mariam es más probable en personas que tienen el sistema inmunológico debilitado.  SIGNOS Y SÍNTOMAS  Los síntomas de la sepsis pueden ser:  · Fiebre o temperatura corporal baja (hipotermia).  · Respiración rápida (hiperventilación).  · Escalofríos.  · Frecuencia cardíaca rápida (ta[place]ard[place]).  · Confusión o mareos.  · Problemas respiratorios.  · Orinar mucho menos que lo habitual.  · Piel fría y pegajosa o geremias e irritada.  · Otros problemas cardíacos, renales o cerebrales.  DIAGNÓSTICO  Christians[place] médico probablemente le indicará estudios para buscar cisco  infección, para finn si la infección se ha diseminado a la maxwell, y para conocer la gravedad de chau enfermedad. Los estudios pueden incluir:  · Análisis de maxwell, incluyendo cultivos de maxwell.  · Cultivos de otros líquidos del cuerpo, serene:  ¨ Orina.  ¨ Pus de las heridas.  ¨ Mucosidad expulsada de los pulmones.  · Análisis de orina que no lory cultivos.  · Radiografías u otras pruebas de diagnóstico por imagen.  TRATAMIENTO  El tratamiento comenzará con la eliminación del origen de la infección. Si la causa probable de la sepsis es cisco infección bacteriana o fúngica, se administrarán medicamentos antibióticos o antifúngicos.  También puede recibir:  · Oxígeno.  · Líquidos a través de cisco vía intravenosa.  · Medicamentos para aumentar la presión arterial.  · Cisco máquina para limpiar la maxwell (diálisis) si tiene insuficiencia renal.  · Cisco máquina para ayudarlo a respirar si tiene insuficiencia pulmonar.  SOLICITE ATENCIÓN MÉDICA DE INMEDIATO SI:  Tiene alguna infección o desarrolla algún signo o síntoma de sepsis después de cisco cirugía o cisco hospitalización.  Esta información no tiene serene fin reemplazar el consejo del médico. Asegúrese de hacerle al médico cualquier pregunta que tenga.  Document Released: 08/29/2012 Document Revised: 05/03/2016 Document Reviewed: 08/25/2014  Elsevier Interactive Patient Education © 2017 Elsevier Inc.      · Is patient discharged on Warfarin / Coumadin?   No       Enfermedad renal en etapa terminal  (End-Stage Kidney Disease)  La enfermedad renal terminal ocurre cuando los riñones están hoffman dañados que priyanka de funcionar. Los riñones son dos órganos que desempeñan muchas funciones importantes en el cuerpo, serene las siguientes:  · Eliminar desechos y el exceso de líquido de la maxwell.  · Producir hormonas que mantienen la cantidad de líquido en los tejidos y los vasos sanguíneos.  · Mantener la cantidad correcta de líquidos y de sustancias químicas en el cuerpo.  Cuando los riñones  están dañados y no pueden funcionar, ocurren problemas potencialmente mortales. Sin la ayuda de los riñones, las toxinas se acumulan en la maxwell. En la enfermedad renal en etapa terminal, los riñones no pueden mejorar.  CAUSAS  Por lo general, la enfermedad renal en etapa terminal ocurre cuando cisco enfermedad renal de larga duración (crónica) empeora. También puede ocurrir después de un daño repentino en los riñones (lesión renal aguda).  FACTORES DE RIESGO  Es más probable que esta afección se manifieste en las personas con estas características:  · Mayores de 60 años.  · Ser hombre.  · Ascendencia afroamericana.  · Fumadores actuales o exfumadores.  · Obesos.  También puede correr un riesgo más alto de tener enfermedad renal en etapa terminal en los siguientes casos:  · Si tiene antecedentes familiares de enfermedad renal crónica.  · Si tuvo enfermedad renal hollie muchos años.  · Si tiene otras enfermedades prolongadas que afectan los riñones, por ejemplo:  ¨ Enfermedad cardiovascular, incluida hipertensión arterial.  ¨ Diabetes.  ¨ Algunas enfermedades que afectan el sistema inmunitario.  SIGNOS Y SÍNTOMAS  · Hinchazón (edema) de la mer, las piernas, los tobillos o los pies.  · Adormecimiento, hormigueo o pérdida de la sensibilidad (sensación) en las audi o los pies.  · Cansancio (letargo).  · Náuseas o vómitos.  · Confusión, dificultad para concentrarse o pérdida de la conciencia.  · Dolor en el pecho.  · Falta de aire.  · [place] o ninguna producción de orina.  · Contracciones y calambres musculares, especialmente en las piernas.  · Picazón permanente.  · Pérdida del apetito.  · Palidez de la piel y del tejido que recubre los párpados (conjuntiva).  · Jeannette de flavia.  · La piel se oscurece o se aclara de manera anormal.  · Disminución de la masa muscular (atrofia muscular).  · Aparecen hematomas con facilidad.  · Hipo frecuente.  · Ausencia de la menstruación en las  mujeres.  · Convulsiones.  DIAGNÓSTICO  El médico le tomará la presión arterial y le hará algunos estudios. Estos pueden incluir los siguientes:  · Análisis de orina.  · Análisis de maxwell.  · Estudios de diagnóstico por imágenes.  · Un estudio en el que se dominguez cisco muestra de tejido de los riñones para analizarla con un microscopio (biopsia de riñón).  TRATAMIENTO  Existen dos tipos de tratamiento para la enfermedad renal en etapa terminal:  · Un procedimiento que elimina los desechos tóxicos del organismo (diálisis). En función del tipo de diálisis que elija, se la puede realizar más de cisco vez al día (diálisis peritoneal) o varias veces por semana (hemodiálisis).  · Cirugía para recibir un nuevo riñón (trasplante de riñón).  Además de la diálisis o de un trasplante de riñón, sharath vez deba robbin medicamentos:  · Para controlar la presión arterial elevada (hipertensión).  · Para controlar el colesterol.  · Para mantener un nivel erin de electrólitos en la maxwell.  También pueden indicarle que siga cisco dieta específica que incluye requisitos o límites para lo siguiente:  · Deon (sodio).  · Proteínas.  · Fósforo.  · Potasio.  · Calcio.  INSTRUCCIONES PARA EL CUIDADO EN EL HOGAR  · Siga la dieta que le armas indicado.  · [place] los medicamentos de venta angelika y los recetados solamente serene se lo haya indicado el médico.  ¨ No tome ningún medicamento nuevo a menos que lo haya autorizado el médico. Muchos medicamentos pueden empeorar el daño renal.  ¨ No tome ningún suplemento vitamínico y mineral a menos que lo haya autorizado el médico. Muchos suplementos nutricionales pueden empeorar el daño renal.  ¨ Es posible que sea necesario ajustar la dosis de algunos medicamentos que dominguez.  · No consuma ningún producto que contenga tabaco, lo que incluye cigarrillos, tabaco de mascar y cigarrillos electrónicos. Si necesita ayuda para dejar de fumar, consulte al médico.  · Concurra a todas las visitas de control serene se lo haya  indicado el médico. [place] es importante.  · Lleve un control de la presión arterial. Informe al médico los cambios en la presión arterial serene blanca se lo haya indicado.  · Alcance y mantenga un peso saludable. Si necesita ayuda para lograrlo, consulte a chau médico.  · Comience o continúe un plan de ejercicios. Intente hacer ejercicios al menos 30 minutos al día, 5 días a la semana.  · Esté al día con las vacunas serene se lo haya indicado el médico.  SOLICITE ATENCIÓN MÉDICA SI:  · Los síntomas empeoran.  · Presenta nuevos síntomas.  SOLICITE ATENCIÓN MÉDICA DE INMEDIATO SI:  · Siente debilidad en un brazo o cisco pierna de un lado del cuerpo.  · Tiene dificultad para hablar o habla arrastrando las palabras.  · Tiene cambios repentinos en la visión.  · Sufre cisco cefalea intensa.  · Aumenta repentinamente de peso.  · Tiene dificultad para respirar.  · Los síntomas empeoran repentinamente.  PARA OBTENER MÁS INFORMACIÓN  · Asociación Americana de Pacientes Renales (American Association of Kidney Patients, AAKP): www.aakp.org  · Fundación Nacional del Riñón (National Kidney Foundation): www.kidney.org  · Fondo Americano para Problemas Renales (American Kidney Fund): www.akfinc.org  · Programa de rehabilitación de Life Options: www.lifeoptions.org y www.kidneyschool.org  Esta información no tiene serene fin reemplazar el consejo del médico. Asegúrese de hacerle al médico cualquier pregunta que tenga.  Document Released: 03/26/2009 Document Revised: 04/10/2017 Document Reviewed: 08/16/2013  Elsevier Interactive Patient Education © 2017 Elsevier Inc.    Ondansetron tablets  ¿Qué es blanca medicamento?  El ONDANSETRÓN se utiliza para tratar las náuseas y el vómito causados por la quimioterapia. También se puede utilizar para prevenir o tratar las náuseas y el vómito después de cisco operación.  Blanca medicamento puede ser utilizado para otros usos; si tiene alguna pregunta consulte con chau proveedor de atención médica o con chau  farmacéutico.  MARCAS COMUNES: Zofran  ¿Qué le osmar informar a mi profesional de la lukas antes de robbin blanca medicamento?  Necesita saber si usted presenta alguno de los siguientes problemas o situaciones:  -enfermedad cardiaca  -antecedentes de pulso cardiaco irregular  -enfermedad hepática  -niveles bajos de magnesio o potasio en la maxwell  -cisco reacción alérgica o inusual al ondansetrón, granisetrón, a otros medicamentos, alimentos, colorantes o conservantes  -si está embarazada o buscando quedar embarazada  -si está amamantando a un bebé  ¿Cómo osmar utilizar blanca medicamento?  [place] blanca medicamento por vía oral con un vaso de agua. Siga las instrucciones de la etiqueta del medicamento. [place] tobias dosis a intervalos regulares. No tome chau medicamento con cisco frecuencia mayor que la indicada.  Hable con chau pediatra para informarse acerca del uso de blanca medicamento en niños. Puede requerir atención especial.  Sobredosis: Póngase en contacto inmediatamente con un centro toxicológico o cisco fransico de urgencia si usted shahbaz que haya tomado demasiado medicamento.  ATENCIÓN: Blanca medicamento es solo para usted. No comparta blanca medicamento con nadie.  ¿Qué sucede si me olvido de cisco dosis?  Si olvida cisco dosis, tómela lo antes posible. Si es eric la hora de la próxima dosis, tome sólo christy dosis. No tome dosis adicionales o dobles.  ¿Qué puede interactuar con blanca medicamento?  No tome esta medicina con ninguno de los siguientes medicamentos:  apomorfina ciertos medicamentos para infecciones micóticas, tales serene fluconazol, quetoconazol, itraconazol, posaconazol, voriconazol cisapride dofetilida dronedarona pimozida tioridazina ziprasidona  Esta medicina también puede interactuar con los siguientes medicamentos:  carbamazepina ciertos medicamentos para la depresión, ansiedad o trastornos psicóticos fentanilo linezolid IMAOs, tales serene Carbex, Eldepryl, Marplan, [place] y Parnate darnell de metileno (inyección por vía  intravenosa) otros medicamentos que prolongan el intervalo QT (causa un ritmo cardiaco anormal) fenitoína rifampicina tramadol  Puede ser que esta lista no menciona todas las posibles interacciones. Informe a chau profesional de la lukas de todos los productos a base de hierbas, medicamentos de venta angelika o suplementos nutritivos que esté tomando. Si usted fuma, consume bebidas alcohólicas o si utiliza drogas ilegales, indíqueselo también a chau profesional de la lukas. Algunas sustancias pueden interactuar con chau medicamento.  ¿A qué osmar estar atento al usar blanca medicamento?  Si experimenta algún signo de reacción alérgica, consulte a chau médico o a chau profesional de la lukas lo antes posible.  ¿Qué efectos secundarios puedo tener al utilizar blanca medicamento?  Efectos secundarios que debe informar a chau médico o a chau profesional de la lukas tan pronto serene sea posible:  -reacciones alérgicas serene erupción cutánea, picazón o urticarias, hinchazón de la mer, labios o lengua  -problemas respiratorios  -confusión  -mareos  -pulso cardiaco rápido o irregular  -sensación de desmayos o aturdimiento, caídas  -fiebre y escalofríos  -pérdida del equilibrio o coordinación  -convulsiones  -sudoración  -hinchazón de las audi o pies  -opresión en el pecho  -temblores  -cansancio o debilidad inusual  Efectos secundarios que, por lo general, no requieren atención médica (debe informarlos a chau médico o a chau profesional de la lukas si persisten o si son molestos):  -estreñimiento o diarrea  -dolor de flavia  Puede ser que esta lista no menciona todos los posibles efectos secundarios. Comuníquese a chau médico por asesoramiento médico sobre los efectos secundarios. [place] puede informar los efectos secundarios a la FDA por teléfono al 1-800-FDA-1088.  ¿Dónde osmar guardar mi medicina?  [place] fuera del alcance de los niños.  Guárdela a cisco temperatura de entre 2 y 30 grados C (36 y 86 grados F). Deseche todo el medicamento que no  haya utilizado, después de la fecha de vencimiento.  ATENCIÓN: Sarita folleto es un resumen. Puede ser que no cubra toda la posible información. Si usted tiene preguntas acerca de esta medicina, consulte con chau médico, chau farmacéutico o chau profesional de la lukas.  © 2018 Elsevier/Gold Standard (2016-02-09 00:00:00)    Clonidine skin patches  ¿Qué es sarita medicamento?  La CLONIDINA se utiliza para tratar la jesenia presión sanguínea.  Sarita medicamento puede ser utilizado para otros usos; si tiene alguna pregunta consulte con cahu proveedor de atención médica o con chau farmacéutico.  MARCAS COMUNES: Catapres-TTS  ¿Qué le osmar informar a mi profesional de la lukas antes de robbin sarita medicamento?  Necesita saber si usted presenta alguno de los siguientes problemas o situaciones:  -enfermedad renal  -cisco reacción alérgica o inusual a la clonidina, a otros medicamentos, alimentos, colorantes o conservantes  -si está embarazada o buscando quedar embarazada  -si está amamantando a un bebé  ¿Cómo osmar utilizar sarita medicamento?  Sarita medicamento es sólo para uso externo. Siga las instrucciones de la etiqueta del medicamento. Aplique el parche en cisco ed de la parte superior del brazo o del cuerpo que esté limpia, seca y sin pelo. Evite las zonas con lesiones, irritaciones, callosidades o cicatrices. Utilice un lugar distinto en cada ocasión para evitar la irritación de la piel. No ifeanyi ni recorte el parche. Cada parche debe durar 7 días. No utilice el medicamento con cisco frecuencia mayor a la indicada. No deje de usarlo excepto si así lo indica chau médico o chau profesional de la lukas. Debe reducir gradualmente la dosis para no correr el riesgo de aumentar chau presión sanguínea.  Hable con chau pediatra para informarse acerca del uso de sarita medicamento en niños. Puede requerir atención especial.  Sobredosis: Póngase en contacto inmediatamente con un centro toxicológico o cisco fransico de urgencia si usted shahbaz que haya tomado demasiado  medicamento.  ATENCIÓN: Sarita medicamento es solo para usted. No comparta sarita medicamento con nadie.  ¿Qué sucede si me olvido de cisco dosis?  Recambie los parches el mismo día cada semana o bernardo cuando el parche se desprenda. Si olvida cambiar el parche hollie dos o am[place] días, consulte con chau médico o chau profesional de la lukas.  ¿Qué puede interactuar con sarita medicamento?  No tome esta medicina con ninguno de los siguientes medicamentos:  -IMAOs, tales serene Carbex, Eldepryl, Marplan, Nardil y Parnate  Esta medicina también puede interactuar con los siguientes medicamentos:  -barbitúricos para inducir el sueño o para tratar convulsiones,serene fenobarbital  -ciertos medicamentos para presión sanguínea, enfermedad cardiaca, pulso cardiaco irregular  -ciertos medicamentos para la depresión, ansiedad o trastornos psicóticos  -analgésicos recetados  Puede ser que esta lista no menciona todas las posibles interacciones. Informe a chau profesional de la lukas de todos los productos a base de hierbas, medicamentos de venta angelika o suplementos nutritivos que esté tomando. Si usted fuma, consume bebidas alcohólicas o si utiliza drogas ilegales, indíqueselo también a chau profesional de la lukas. Algunas sustancias pueden interactuar con chau medicamento.  ¿A qué osmar estar atento al usar sarita medicamento?  Visite a chau médico o a chau profesional de la lukas para chequear chau evolución periódicamente. Controle chau presión sanguínea y frecuencia cardiaca regularmente mientras esté usando sarita medicamento. Pregunte a chau médico o a chau profesional de la lukas cuál debe ser chau frecuencia cardiaca y cuándo deberá comunicarse con él o shyann.  Puede bañarse o darse cisco ducha con el parche colocado sobre la piel. Si el parche se afloja, cúbralo con la capa adhesiva suministrada.  Puede experimentar somnolencia o mareos. No conduzca ni utilice maquinaria, ni indio nada que le exija permanecer en estado de alerta hasta que sepa cómo le afecta sarita  medicamento. Para evitar mareos o desmayos, no se ponga de pie ni se siente rápidamente, especialmente si es cisco persona de edad avanzada. El alcohol puede aumentar la somnolencia y los mareos. Evite consumir bebidas alcohólicas.  Se le podrá secar la boca. Masticar chicle sin azúcar, chupar caramelos duros y beber agua en abundancia le ayudarán.  No se trate usted mismo si tiene tos, resfrío o dolor mientras está usando blanca medicamento sin consultar a chau médico o a chau profesional de la lukas. Algunos ingredientes pueden aumentar chau presión sanguínea.  Si va a someterse a cisco operación, informe a chau médico o a chau profesional de la lukas que está usando blanca medicamento.  Si va a someterse a un procedimiento de imágenes por resonancia magnética (IRM), informe a chau técnico de IRM si tiene blanca parche aplicado sobre chau cuerpo. Debe quitarlo antes de un IRM.  ¿Qué efectos secundarios puedo tener al utilizar blanca medicamento?  Efectos secundarios que debe informar a chau médico o a chau profesional de la lukas tan pronto serene sea posible:  -reacciones alérgicas serene erupción cutánea, picazón o urticarias, hinchazón de la mer, labios o lengua  -ansiedad, nerviosismo  -dolor en el pecho  -depresión  -pulso cardiaco rápido, irregular  -hinchazón de pies o piernas  -cansancio o debilidad inusual  Efectos secundarios que, por lo general, no requieren atención médica (debe informarlos a chau médico o a chau profesional de la lukas si persisten o si son molestos):  -cambios en el deseo sexual o capacidad  -estreñimiento  -dolor de flavia  -enrojecimiento, irritación u oscurecimiento de la piel debajo del área donde se encuentra el parche  Puede ser que esta lista no menciona todos los posibles efectos secundarios. Comuníquese a chau médico por asesoramiento médico sobre los efectos secundarios. [place] puede informar los efectos secundarios a la FDA por teléfono al 1-800FDA-1458.  ¿Dónde osmar guardar mi medicina?  Mantener fuera del  alcance de los niños.  Guardar a temperatura ambiente, entre 15 y 30 grados Celsius (59 y 86 grados Fahrenheit). Deseche todo el medicamento que no haya utilizado después de la fecha de vencimiento.  ATENCIÓN: Sarita folleto es un resumen. Puede ser que no cubra toda la posible información. Si usted tiene preguntas acerca de esta medicina, consulte con chau médico, chau farmacéutico o chau profesional de la lukas.  © 2018 Elsevier/Gold Standard (2016-10-19 00:00:00)    Depression / Suicide Risk    As you are discharged from this [facility] facility, it is important to learn how to keep safe from harming yourself.    Recognize the warning signs:  · Abrupt changes in personality, positive or negative- including increase in energy   · Giving away possessions  · Change in eating patterns- significant weight changes-  positive or negative  · Change in sleeping patterns- unable to sleep or sleeping all the time   · Unwillingness or inability to communicate  · Depression  · Unusual sadness, discouragement and loneliness  · Talk of wanting to die  · Neglect of personal appearance   · Rebelliousness- reckless behavior  · Withdrawal from people/activities they love  · Confusion- inability to concentrate     If you or a loved one observes any of these behaviors or has concerns about self-harm, here's what you can do:  · Talk about it- your feelings and reasons for harming yourself  · Remove any means that you might use to hurt yourself (examples: pills, rope, extension cords, firearm)  · Get professional help from the community (Mental Health, Substance Abuse, psychological counseling)  · Do not be alone:Call your Safe Contact- someone whom you trust who will be there for you.  · Call your local CRISIS HOTLINE 499-1350 or 737-269-1860  · Call your local Children's Mobile Crisis Response Team [place] (940) 910-6123 or www.MedLink  · Call the toll free National Suicide Prevention Hotlines   · National Suicide  Prevention Lifeline 244-533-WPZR (1281)  · National [place] Line Network 800-SUICIDE (865-4048)

## 2018-11-17 NOTE — CARE PLAN
Problem: Safety  Goal: Will remain free from falls  Outcome: DISCHARGED-GOAL NOT MET Date Met: 11/17/18      Problem: Urinary Elimination:  Goal: Ability to reestablish a normal urinary elimination pattern will improve  Outcome: PROGRESSING SLOWER THAN EXPECTED

## 2018-11-17 NOTE — PROGRESS NOTES
Hospital Medicine Daily Progress Note    Date of Service  11/16/2018    Chief Complaint  28 y.o. male admitted 11/11/2018 with abdominal pain    Hospital Course   The patient's abdominal pain has been progressively worse since Tuesday of last week.  It had escalated 10 out of 10 and was accompanied by nausea but no vomiting.  He does not say that he has had any diarrhea.  Patient was a valid by surgery had to go for urgent exploratory laparotomy.  The laparotomy finds that in the right upper quadrant the small intestines are swollen tender inflamed but does not appear to be ischemic or infected.  Patient at this point will be continued on pain management fluid resuscitation we will monitor at this point for him to be able to pass gas and have a diet.    Interval Problem Update  Reports intermittent abdominal pain with a.m. nausea and vomiting  After PT  Improved strength  Feels better on examination    Consultants/Specialty  Surgery    Code Status  Full code    Disposition  To home in 1-2-days with clinical improvement  Encourage activity    Review of Systems  Review of Systems   Constitutional: Negative for chills, diaphoresis, fever and malaise/fatigue.   HENT: Negative.  Negative for hearing loss.    Eyes: Negative for double vision.   Respiratory: Negative.  Negative for cough and hemoptysis.    Cardiovascular: Negative.  Negative for chest pain and palpitations.   Gastrointestinal: Positive for abdominal pain, constipation, nausea and vomiting. Negative for diarrhea and melena.   Genitourinary: Negative.  Negative for dysuria and urgency.   Musculoskeletal: Negative.  Negative for joint pain and myalgias.   Skin: Negative.    Neurological: Positive for weakness. Negative for headaches.   Endo/Heme/Allergies: Negative.    Psychiatric/Behavioral: Negative.  Negative for suicidal ideas.   All other systems reviewed and are negative.       Physical Exam  Temp:  [37.1 °C (98.8 °F)-37.3 °C (99.2 °F)] 37.3 °C (99.2  °F)  Pulse:  [84-99] 96  Resp:  [12-18] 12  BP: (139-164)/() 158/102    Physical Exam   Constitutional: He is oriented to person, place, and time. He appears well-developed. He is cooperative.  Non-toxic appearance. He does not have a sickly appearance. He appears ill. No distress.   HENT:   Head: Normocephalic and atraumatic.   Mouth/Throat: Oropharynx is clear and moist.   Eyes: Pupils are equal, round, and reactive to light. EOM are normal.   Neck: Normal range of motion. Neck supple.   Cardiovascular: Normal rate, regular rhythm and intact distal pulses.    Pulmonary/Chest: Effort normal. No respiratory distress. He has no wheezes. He has rales.   Abdominal: Soft. He exhibits no distension and no mass. There is tenderness.       Musculoskeletal: Normal range of motion. He exhibits no edema or tenderness.   Neurological: He is alert and oriented to person, place, and time. He has normal reflexes. No cranial nerve deficit. Coordination normal.   Skin: Skin is warm and dry. He is not diaphoretic. No erythema. No pallor.   Psychiatric: He has a normal mood and affect. His behavior is normal. Judgment and thought content normal.   Nursing note and vitals reviewed.      Fluids    Intake/Output Summary (Last 24 hours) at 11/16/18 1734  Last data filed at 11/16/18 1500   Gross per 24 hour   Intake              460 ml   Output                0 ml   Net              460 ml       Laboratory  Recent Labs      11/14/18   0058  11/15/18   0243  11/16/18   0303   WBC  8.8  11.3*  8.8   RBC  2.91*  2.74*  2.70*   HEMOGLOBIN  9.4*  8.8*  8.6*   HEMATOCRIT  28.7*  27.0*  26.0*   MCV  98.6*  98.5*  96.3   MCH  32.3  32.1  31.9   MCHC  32.8*  32.6*  33.1*   RDW  44.6  43.7  42.7   PLATELETCT  113*  128*  181   MPV  11.1  11.3  11.0     Recent Labs      11/14/18   0058  11/15/18   0243  11/16/18   0303   SODIUM  133*  131*  131*   POTASSIUM  4.6  5.4  4.5   CHLORIDE  97  94*  95*   CO2  28  26  27   GLUCOSE  99  102*  97   BUN   41*  63*  51*   CREATININE  7.11*  10.48*  8.83*   CALCIUM  8.3*  8.7  8.8                   Imaging   DX-CHEST-PORTABLE (1 VIEW)   Final Result      1.  Hypoinflation and pulmonary vascular congestion.   2.  Apparent mild cardiac enlargement, possibly due to hypoinflation and AP technique.      CT-RENAL COLIC EVALUATION(A/P W/O)   Final Result      1.  Multiple thick-walled loops of small intestine within the left upper quadrant with surrounding mesenteric inflammatory change. Given the patient's history of renal failure on dialysis, this would be most suspicious for ischemic bowel related to    dialysis. Crohn's disease or infectious enteritis would be less likely possibility.      2.  Small amount of free fluid dependently within the pelvis.      3.  Atrophic kidneys.      This was discussed with SUJEY MERIDA at 5:40 PM on 11/11/2018.           Assessment/Plan  * Ischemic bowel disease (HCC)   Assessment & Plan    Patient came in with progressively worsening abdominal pain since Tuesday of last week.  T patient's abdomen was evaluated by surgery and took him for an exploratory laparotomy.  The surgery findings showed no ischemia but edema and inflammation.  The intestines were affected in the left upper quadrant with edema and of the mesentery and free clear serous fluid.  Postoperatively the patient still has severe pain 10 out of 10 and at this point the patient has been started on a PCA.    11/14-denies any abdominal pain today  Nausea and vomiting today  Continue antibiotics  Diet per surgery  Dilaudid PCA, will consider transition to as needed, will follow up with surgery    11/16 intermittent discomfort, no improvement on narcotics  Minimal pain  Tolerating diet  PT/OT evaluation, encourage activity  Nausea, in the a.m., plan for scheduled Zofran in the a.m. and as needed     ESRD (end stage renal disease) on dialysis (HCC)- (present on admission)   Assessment & Plan    Continue with hemodialysis , T,  Th,Sat per nephro /Francis     Chest pain   Assessment & Plan    With hypertensive urgency-improving  PRN labetalol  Questionable associated with hydralazine after initiation, RN to monitor closely  metoprolol  Troponin trending down, likely secondary to associated chronic kidney disease  EKG with no changes as compared to prior  Resolved         Sepsis (HCC)   Assessment & Plan    This is sepsis (without associated acute organ dysfunction).   Patient does not appear to be septic.  The patient's white blood cell count is elevated secondary to the inflammation.  Lactic acid levels are normal.  Vital signs are normal.  He has not been hypotensive.          VTE prophylaxis: Heparin

## 2018-11-17 NOTE — PROGRESS NOTES
Hospital Medicine Daily Progress Note    Date of Service  11/17/2018    Chief Complaint  28 y.o. male admitted 11/11/2018 with abdominal pain    Hospital Course   The patient's abdominal pain has been progressively worse since Tuesday of last week.  It had escalated 10 out of 10 and was accompanied by nausea but no vomiting.  He does not say that he has had any diarrhea.  Patient was a valid by surgery had to go for urgent exploratory laparotomy.  The laparotomy finds that in the right upper quadrant the small intestines are swollen tender inflamed but does not appear to be ischemic or infected.  Patient at this point will be continued on pain management fluid resuscitation we will monitor at this point for him to be able to pass gas and have a diet.    Interval Problem Update  Ongoing constipation  Reports minimal intermittent abdominal discomfort  Ambulating without support  RN to educate on straight cath daily as needed    Consultants/Specialty  Surgery    Code Status  Full code    Disposition  To home in 1-2-days with clinical improvement after patient has a bowel movement encourage activity    Review of Systems  Review of Systems   Constitutional: Negative for chills, fever and malaise/fatigue.   HENT: Negative.    Eyes: Negative for blurred vision.   Respiratory: Negative.  Negative for cough and hemoptysis.    Cardiovascular: Negative.  Negative for chest pain and palpitations.   Gastrointestinal: Positive for abdominal pain, constipation, nausea and vomiting. Negative for diarrhea and heartburn.   Genitourinary: Negative for dysuria and urgency.   Musculoskeletal: Negative.  Negative for myalgias.   Skin: Negative.    Neurological: Positive for weakness. Negative for dizziness and headaches.   Endo/Heme/Allergies: Negative.    Psychiatric/Behavioral: Negative.  Negative for depression and suicidal ideas.   All other systems reviewed and are negative.       Physical Exam  Temp:  [36.3 °C (97.4 °F)-37 °C (98.6  °F)] 36.7 °C (98.1 °F)  Pulse:  [81-92] 88  Resp:  [16-18] 18  BP: (147-159)/() 159/100    Physical Exam   Constitutional: He is oriented to person, place, and time. He appears well-developed. He is cooperative.  Non-toxic appearance. He does not have a sickly appearance. He appears ill. No distress.   HENT:   Head: Normocephalic and atraumatic.   Mouth/Throat: Oropharynx is clear and moist.   Eyes: Pupils are equal, round, and reactive to light. EOM are normal. Right eye exhibits no discharge. Left eye exhibits no discharge.   Neck: Normal range of motion. Neck supple.   Cardiovascular: Normal rate, regular rhythm and intact distal pulses.    Pulmonary/Chest: Effort normal. No respiratory distress. He has no wheezes. He has no rales. He exhibits no tenderness.   Abdominal: Soft. He exhibits no distension and no mass. There is tenderness. There is no rebound.       Incision is clean dry and intact   Musculoskeletal: Normal range of motion. He exhibits no edema or tenderness.   Neurological: He is alert and oriented to person, place, and time. He has normal reflexes. No cranial nerve deficit. He exhibits normal muscle tone. Coordination normal.   Skin: Skin is warm and dry. No rash noted. No erythema. No pallor.   Psychiatric: He has a normal mood and affect. His behavior is normal. Judgment and thought content normal.   Nursing note and vitals reviewed.      Fluids    Intake/Output Summary (Last 24 hours) at 11/17/18 1723  Last data filed at 11/17/18 0820   Gross per 24 hour   Intake              740 ml   Output             4320 ml   Net            -3580 ml       Laboratory  Recent Labs      11/15/18   0243  11/16/18   0303   WBC  11.3*  8.8   RBC  2.74*  2.70*   HEMOGLOBIN  8.8*  8.6*   HEMATOCRIT  27.0*  26.0*   MCV  98.5*  96.3   MCH  32.1  31.9   MCHC  32.6*  33.1*   RDW  43.7  42.7   PLATELETCT  128*  181   MPV  11.3  11.0     Recent Labs      11/15/18   0243  11/16/18   0303   SODIUM  131*  131*    POTASSIUM  5.4  4.5   CHLORIDE  94*  95*   CO2  26  27   GLUCOSE  102*  97   BUN  63*  51*   CREATININE  10.48*  8.83*   CALCIUM  8.7  8.8                   Imaging   [id number] VIEW   Final Result      Mildly above-average colonic stool volume      Otherwise normal radiographs of the abdomen and pelvis      DX-CHEST-PORTABLE (1 VIEW)   Final Result      1.  Hypoinflation and pulmonary vascular congestion.   2.  Apparent mild cardiac enlargement, possibly due to hypoinflation and AP technique.      CT-RENAL COLIC EVALUATION(A/P W/O)   Final Result      1.  Multiple thick-walled loops of small intestine within the left upper quadrant with surrounding mesenteric inflammatory change. Given the patient's history of renal failure on dialysis, this would be most suspicious for ischemic bowel related to    dialysis. Crohn's disease or infectious enteritis would be less likely possibility.      2.  Small amount of free fluid dependently within the pelvis.      3.  Atrophic kidneys.      This was discussed with SUJEY MERIDA at 5:40 PM on 11/11/2018.           Assessment/Plan  * Ischemic bowel disease (HCC)   Assessment & Plan    Patient came in with progressively worsening abdominal pain since Tuesday of last week.  T patient's abdomen was evaluated by surgery and took him for an exploratory laparotomy.  The surgery findings showed no ischemia but edema and inflammation.  The intestines were affected in the left upper quadrant with edema and of the mesentery and free clear serous fluid.  Postoperatively the patient still has severe pain 10 out of 10 and at this point the patient has been started on a PCA.    11/14-denies any abdominal pain today  Nausea and vomiting today  Continue antibiotics  Diet per surgery  Dilaudid PCA, will consider transition to as needed, will follow up with surgery    11/17 improving abdominal discomfort  Status post hemodialysis  Ongoing intermittent nausea and vomiting, Zofran scheduled  in a.m. and as needed  Surgery signed off     ESRD (end stage renal disease) on dialysis (HCC)- (present on admission)   Assessment & Plan    Continue with hemodialysis , T, Th,Sat per nephro /Francis     Constipation   Assessment & Plan    Likely narcotic induced  Continue  bowel protocol  Add lactulose  Consider addition of enema if ongoing constipation       Chest pain   Assessment & Plan    With hypertensive urgency-improving  PRN labetalol  Questionable associated with hydralazine after initiation, RN to monitor closely  metoprolol  Troponin trending down, likely secondary to associated chronic kidney disease  EKG with no changes as compared to prior  Resolved         Sepsis ([facility])   Assessment & Plan    This is sepsis (without associated acute organ dysfunction).   Patient does not appear to be septic.  The patient's white blood cell count is elevated secondary to the inflammation.  Lactic acid levels are normal.  Vital signs are normal.  He has not been hypotensive.          VTE prophylaxis: Heparin

## 2018-11-17 NOTE — PROGRESS NOTES
Pt able to void this afternoon. Bladder scan checked after, 350ml. Pt walking in [place]ichael more today, encouraged to ambulate frequently. Pt using IS as well, O2 95% on RA.     Will monitor UOP.

## 2018-11-17 NOTE — PROGRESS NOTES
"[facility] -  PROGRESS NOTE               Author: Jimenez Ewing M.D. Date & Time: 11/17/2018  10:32 AM     HPI:  This is a 28-year-old gentleman who is on  nocturnal HD, severe hypertension who presented with  6 days of abdominal pain and Vomiting, he could not keep anything down except for some fluid over the last 6 days.  CT of the abdomen and pelvis shows multiple thick walled loops of  small intestine within the left upper quadrant with surrounding mesenteric inflammatory change.  Ultrasound of the right upper quadrant done on 11/10/2018 shows gallbladder sludge.  No   sonographic evidence of acute cholecystitis. He was taken from the ER straight to the OR for suspected ischemic Bowel.     DAILY NEPHROLOGY SUMMARY:  11/12: consult done  11/13: s/p ex-lap, no e/o ischemic bowel. S/p iHD.  Mild abdominal pain  11/14: Episode of chest pain this morning.  Emesis.  Now resolved  11/15: Ongoing intermittent nausea, abd pain improved.   11/16: Abdominal pain improved, nausea improved, tolerated dialysis, surgery signed off.  Some urinary retention  11/17: No acute events, tolerated dialysis this morning, still with some intermittent nausea, discharge pending    PAST FAMILY HISTORY: Reviewed and Unchanged  SOCIAL HISTORY: Reviewed and Unchanged  CURRENT MEDICATIONS: Reviewed  IMAGING STUDIES: Reviewed    ROS  General: No weakness, no malaise  HEENT: No vision changes, no hearing changes  CV: no chest pain, no palpitations  Lungs: No cough; no PND  GI: +nausea; mild abdominal pain  : No dysuria or gross hematuria  MSK: No joint pain; no trauma  Skin: No rashes; no lesions  Neuro: No tremors; no LOC  Psych: No depression; no anxiety    PHYSICAL EXAM  VS:  /103   Pulse 92   Temp 36.3 °C (97.4 °F) (Temporal)   Resp 16   Ht 1.753 m (5' 9\")   Wt 66.6 kg (146 lb 13.2 oz)   SpO2 93%   BMI 21.68 kg/m²   GENERAL: Lying in bed, no acute distress  HEAD: Normocephalic, atraumatic  EYES: No " scleral icterus; normal conjunctiva  NECK: Supple, trachea midline  CV: RRR, S1 and S2 present  LUNGS: Clear to ausculation bilaterally, No rhales or wheezes  ABDOMEN: Soft, mild tenderness, midline incision c/d/i  EXTREMETIES: no lower extremity edema, no clubbing or cyanosis  SKIN: Warm and dry, no rashes  NEURO: A&O, no focal deficits  PSYCH: Cooperative, appropriate mood and affect  ACCESS: Aneurysmal right upper extremity AV fistula.  Positive thrill    Fluids:  In: 580 [P.O.:580]  Out: 820     LABS:  Recent Results (from the past 24 hour(s))   PHOSPHORUS    Collection Time: 11/17/18  2:01 AM   Result Value Ref Range    Phosphorus 8.2 (H) 2.5 - 4.5 mg/dL       (click the triangle to expand results)    ASSESSMENT:  # End-stage renal disease, on nocturnal Hemodialysis T/T/Sun.  # Anemia of CKD  # CKD-MBD  # Hypertension  # Bowel edema: ex-lap neg for ischemia, cholecystitis or other intra-abdominal infection      PLAN:  -IHD today, to continue normal home schedule if discharged today  -challenging volume removal with dialysis  -Dose all meds for ESRD  -Renal Diet  -Limit IVFs/Strict I/Os  -Limit peripheral lines, avoid PICCs  -Continue home blood pressure meds  -Continue home phos binders and Sensipar  -Holding Epogen  -Antibiotics per primary     Thank you, we will continue to follow

## 2018-11-17 NOTE — DISCHARGE SUMMARY
Discharge Summary    CHIEF COMPLAINT ON ADMISSION  Chief Complaint   Patient presents with   • Sent by MD       Reason for Admission  Sent by Urgent Care; High Blood Pr*     Admission Date  11/11/2018    CODE STATUS  Prior    HPI & HOSPITAL COURSE  This is a 28 y.o. male here with abdominal pain concerning for ischemic bowel disease.  Patient underwent exploratory laparotomy with surgery with no findings of ischemia.  There was noted edema and inflammation.  Per surgery, patient required ongoing dialysis for fluid removal.  He does have a known history of end-stage renal disease on hemodialysis, Tuesday, Thursday, Saturday.  Patient's abdominal pain did improve during this admission.  He was noted to have constipation and did have a bowel movement prior to discharge.  He is now tolerating oral diet.    Patient was originally admitted for possible sepsis, however this has been ruled out.  Lactic acid levels are within normal limits.  Patient has been cleared by surgery and nephrology for discharge to home.    Of note, patient still has ongoing small amounts of urine output.  He did require straight catheterization as needed.  Patient will be discharged to home on daily straight cath as needed.  As patient continues to ambulate with improved strength, urinary retention should improve.  Otherwise he has been educated on straight cath as needed.    Interval Problem Update  Reports intermittent abdominal pain with a.m. nausea and vomiting  After PT  Improved strength  Feels better on examination    Consultants/Specialty  Surgery    Code Status  Full code    Disposition  To home in 1-2-days with clinical improvement  Encourage activity      DC to home if cleared by nephrology  F/u with pcp/dc clinic in 1 week  Nephrology as scheduled    Review of Systems  Review of Systems   Constitutional: Negative for chills, diaphoresis, fever and malaise/fatigue.   HENT: Negative.  Negative for hearing loss.    Eyes: Negative for  double vision.   Respiratory: Negative.  Negative for cough and hemoptysis.    Cardiovascular: Negative.  Negative for chest pain and palpitations.   Gastrointestinal: Positive for abdominal pain, constipation, nausea and vomiting. Negative for diarrhea and melena.   Genitourinary: Negative.  Negative for dysuria and urgency.   Musculoskeletal: Negative.  Negative for joint pain and myalgias.   Skin: Negative.    Neurological: Positive for weakness. Negative for headaches.   Endo/Heme/Allergies: Negative.    Psychiatric/Behavioral: Negative.  Negative for suicidal ideas.   All other systems reviewed and are negative.       Physical Exam  Temp:  [37.1 °C (98.8 °F)-37.3 °C (99.2 °F)] 37.3 °C (99.2 °F)  Pulse:  [84-99] 96  Resp:  [12-18] 12  BP: (139-164)/() 158/102    Physical Exam   Constitutional: He is oriented to person, place, and time. He appears well-developed. He is cooperative.  Non-toxic appearance. He does not have a sickly appearance. He appears ill. No distress.   HENT:   Head: Normocephalic and atraumatic.   Mouth/Throat: Oropharynx is clear and moist.   Eyes: Pupils are equal, round, and reactive to light. EOM are normal.   Neck: Normal range of motion. Neck supple.   Cardiovascular: Normal rate, regular rhythm and intact distal pulses.    Pulmonary/Chest: Effort normal. No respiratory distress. He has no wheezes. He has rales.   Abdominal: Soft. He exhibits no distension and no mass. There is tenderness.       Musculoskeletal: Normal range of motion. He exhibits no edema or tenderness.   Neurological: He is alert and oriented to person, place, and time. He has normal reflexes. No cranial nerve deficit. Coordination normal.   Skin: Skin is warm and dry. He is not diaphoretic. No erythema. No pallor.   Psychiatric: He has a normal mood and affect. His behavior is normal. Judgment and thought content normal.   Nursing note and vitals reviewed.      Fluids    Intake/Output Summary (Last 24 hours) at  11/16/18 1734  Last data filed at 11/16/18 1500   Gross per 24 hour   Intake              460 ml   Output                0 ml   Net              460 ml       Laboratory  Recent Labs      11/14/18   0058  11/15/18   0243  11/16/18   0303   WBC  8.8  11.3*  8.8   RBC  2.91*  2.74*  2.70*   HEMOGLOBIN  9.4*  8.8*  8.6*   HEMATOCRIT  28.7*  27.0*  26.0*   MCV  98.6*  98.5*  96.3   MCH  32.3  32.1  31.9   MCHC  32.8*  32.6*  33.1*   RDW  44.6  43.7  42.7   PLATELETCT  113*  128*  181   MPV  11.1  11.3  11.0     Recent Labs      11/14/18   0058  11/15/18   0243  11/16/18   0303   SODIUM  133*  131*  131*   POTASSIUM  4.6  5.4  4.5   CHLORIDE  97  94*  95*   CO2  28  26  27   GLUCOSE  99  102*  97   BUN  41*  63*  51*   CREATININE  7.11*  10.48*  8.83*   CALCIUM  8.3*  8.7  8.8                   Imaging   DX-CHEST-PORTABLE (1 VIEW)   Final Result      1.  Hypoinflation and pulmonary vascular congestion.   2.  Apparent mild cardiac enlargement, possibly due to hypoinflation and AP technique.      CT-RENAL COLIC EVALUATION(A/P W/O)   Final Result      1.  Multiple thick-walled loops of small intestine within the left upper quadrant with surrounding mesenteric inflammatory change. Given the patient's history of renal failure on dialysis, this would be most suspicious for ischemic bowel related to    dialysis. Crohn's disease or infectious enteritis would be less likely possibility.      2.  Small amount of free fluid dependently within the pelvis.      3.  Atrophic kidneys.      This was discussed with SUJEY MERIDA at 5:40 PM on 11/11/2018.           Assessment/Plan  * Ischemic bowel disease (HCC)   Assessment & Plan    Patient came in with progressively worsening abdominal pain since Tuesday of last week.  T patient's abdomen was evaluated by surgery and took him for an exploratory laparotomy.  The surgery findings showed no ischemia but edema and inflammation.  The intestines were affected in the left upper quadrant  with edema and of the mesentery and free clear serous fluid.  Postoperatively the patient still has severe pain 10 out of 10 and at this point the patient has been started on a PCA.    11/14-denies any abdominal pain today  Nausea and vomiting today  Continue antibiotics  Diet per surgery  Dilaudid PCA, will consider transition to as needed, will follow up with surgery    11/16 intermittent discomfort, no improvement on narcotics  Minimal pain  Tolerating diet  PT/OT evaluation, encourage activity  Nausea, in the a.m., plan for scheduled Zofran in the a.m. and as needed     ESRD (end stage renal disease) on dialysis (HCC)- (present on admission)   Assessment & Plan    Continue with hemodialysis , T, Th,Sat per nephro /Francis     Chest pain   Assessment & Plan    With hypertensive urgency-improving  PRN labetalol  Questionable associated with hydralazine after initiation, RN to monitor closely  metoprolol  Troponin trending down, likely secondary to associated chronic kidney disease  EKG with no changes as compared to prior  Resolved         Sepsis ([facility])   Assessment & Plan    This is sepsis (without associated acute organ dysfunction).   Patient does not appear to be septic.  The patient's white blood cell count is elevated secondary to the inflammation.  Lactic acid levels are normal.  Vital signs are normal.  He has not been hypotensive.          VTE prophylaxis: Heparin          Therefore, he is discharged in good and stable condition to home with close outpatient follow-up.    The patient met 2-midnight criteria for an inpatient stay at the time of discharge.    Discharge Date  November 18, 2018    FOLLOW UP ITEMS POST DISCHARGE  Primary care provider/discharge clinic in 1 week  Nephrology as scheduled for hemodialysis      DISCHARGE DIAGNOSES  Principal Problem:    Ischemic bowel disease (HCC) POA: Unknown  Active Problems:    ESRD (end stage renal disease) on dialysis ([facility]) POA: Yes    Sepsis (HCC)  POA: Unknown    Chest pain POA: Unknown    Constipation POA: Unknown  Resolved Problems:    * No resolved hospital problems. *      FOLLOW UP  No future appointments.  Jimenez Ewing M.D.  [address] [place] [address] Dr Miguel Angel RICHARDS [phone]    On 11/19/2018  Please call to schedule your follow up appointment, Office is closed over the weekend thank you       MEDICATIONS ON DISCHARGE     Medication List      START taking these medications      Instructions   acetaminophen 325 MG Tabs  Commonly known as:  TYLENOL   Take 2 Tabs by mouth every 6 hours as needed (Mild Pain; (Pain scale 1-3); Temp greater than 100.5 F).  Dose:  650 mg     albuterol 108 (90 Base) MCG/ACT Aers inhalation aerosol   Inhale 2 Puffs by mouth every four hours as needed.  Dose:  2 Puff     cloNIDine 0.2 MG/24HR Ptwk  Commonly known as:  CATAPRES  Replaces:  cloNIDine 0.1 MG/24HR Ptwk   Apply 1 Patch to skin as directed every 7 days.  Dose:  1 Patch     ondansetron 4 MG Tbdp  Commonly known as:  ZOFRAN ODT   Take 1 Tab by mouth every four hours as needed for Nausea (give PO if no IV route available).  Dose:  4 mg     senna-docusate 8.6-50 MG Tabs  Commonly known as:  PERICOLACE or SENOKOT S   Take 2 Tabs by mouth 2 Times a Day.  Dose:  2 Tab        CHANGE how you take these medications      Instructions   metoprolol  MG Tb24  What changed:  · medication strength  · how much to take  Commonly known as:  TOPROL XL   Take 1 Tab by mouth every day.  Dose:  100 mg        CONTINUE taking these medications      Instructions   amLODIPine 10 MG Tabs  Commonly known as:  NORVASC   Take 1 Tab by mouth every day.  Dose:  10 mg     cinacalcet 30 MG Tabs  Commonly known as:  SENSIPAR   Take 1 Tab by mouth every day.  Dose:  30 mg     hydrALAZINE 25 MG Tabs  Commonly known as:  APRESOLINE   Take 25 mg by mouth 3 times a day.  Dose:  25 mg     raNITidine 150 MG Tabs  Commonly known as:  ZANTAC   Take 1 Tab by mouth 2 times a day.  Dose:  150 mg      sevelamer 800 MG Tabs  Commonly known as:  RENAGEL   Take 4 Tabs by mouth 3 times a day, with meals.  Dose:  3200 mg        STOP taking these medications    cloNIDine 0.1 MG/24HR Ptwk  Commonly known as:  CATAPRES  Replaced by:  cloNIDine 0.2 MG/24HR Ptwk     losartan 100 MG Tabs  Commonly known as:  COZAAR            Allergies  No Known Allergies    DIET  No orders of the defined types were placed in this encounter.      ACTIVITY  As tolerated.  Weight bearing as tolerated    CONSULTATIONS  Nephrology  Surgery    PROCEDURES  Exploratory laparotomy    LABORATORY  Lab Results   Component Value Date    SODIUM 134 (L) 11/18/2018    POTASSIUM 4.6 11/18/2018    CHLORIDE 97 11/18/2018    CO2 25 11/18/2018    GLUCOSE 98 11/18/2018    BUN 56 (H) 11/18/2018    CREATININE 10.23 (HH) 11/18/2018        Lab Results   Component Value Date    WBC 8.8 11/16/2018    HEMOGLOBIN 8.6 (L) 11/16/2018    HEMATOCRIT 26.0 (L) 11/16/2018    PLATELETCT 181 11/16/2018        Total time of the discharge process exceeds 45 minutes.

## 2018-11-17 NOTE — CARE PLAN
Problem: Urinary Elimination:  Goal: Ability to reestablish a normal urinary elimination pattern will improve  Outcome: PROGRESSING AS EXPECTED  Pt able to urinate on his own, pt still experiencing retention. Pt encouraged to ambulate to encourage urination.  Bladder scans performed at needed.     Problem: Mobility  Goal: Risk for activity intolerance will decrease  Outcome: PROGRESSING AS EXPECTED  Pt up self, steady on feet. Pt ambulates around unit, tolerates well.

## 2018-11-17 NOTE — ASSESSMENT & PLAN NOTE
Likely narcotic induced  Continue  bowel protocol  Add lactulose  Consider addition of enema if ongoing constipation

## 2018-11-17 NOTE — PROGRESS NOTES
[facility] Progress Note    Hemodialysis treatment ordered today per Dr. Ewing x 3 hours. Treatment initiated at 0715, ended at 1015.     Patient tolerated tx; see paper flow sheet for details.     Net UF 3000 mL.     Needles removed from access site. Dressings applied and sites held x 10 minutes; verified no bleeding. Positive bruit/thrill post tx. Staff RN instructed to monitor AVF for breakthrough bleeding. Should breakthrough bleeding occur staff RN instructed to apply pressure to access sites until bleeding resolved. Notify Dialysis and Nephrologist for follow-up.    Report given to Primary RN.

## 2018-11-17 NOTE — PROGRESS NOTES
Spoke with patient after bladder scan of 350. He wants to wait a little bit more before he gets straight cathed to see if he can urinate more out by himself. Will continue to monitor.

## 2018-11-18 VITALS
DIASTOLIC BLOOD PRESSURE: 100 MMHG | SYSTOLIC BLOOD PRESSURE: 143 MMHG | TEMPERATURE: 98.4 F | BODY MASS INDEX: 21.78 KG/M2 | WEIGHT: 147.05 LBS | HEART RATE: 80 BPM | HEIGHT: 69 IN | RESPIRATION RATE: 18 BRPM | OXYGEN SATURATION: 94 %

## 2018-11-18 LAB
ALBUMIN SERPL BCP-MCNC: 3.1 G/DL (ref 3.2–4.9)
BUN SERPL-MCNC: 56 MG/DL (ref 8–22)
CALCIUM SERPL-MCNC: 9.3 MG/DL (ref 8.5–10.5)
CHLORIDE SERPL-SCNC: 97 MMOL/L (ref 96–112)
CO2 SERPL-SCNC: 25 MMOL/L (ref 20–33)
CREAT SERPL-MCNC: 10.23 MG/DL (ref 0.5–1.4)
GLUCOSE SERPL-MCNC: 98 MG/DL (ref 65–99)
PHOSPHATE SERPL-MCNC: 6.4 MG/DL (ref 2.5–4.5)
POTASSIUM SERPL-SCNC: 4.6 MMOL/L (ref 3.6–5.5)
SODIUM SERPL-SCNC: 134 MMOL/L (ref 135–145)

## 2018-11-18 PROCEDURE — 36415 COLL VENOUS BLD VENIPUNCTURE: CPT

## 2018-11-18 PROCEDURE — A9270 NON-COVERED ITEM OR SERVICE: HCPCS | Performed by: INTERNAL MEDICINE

## 2018-11-18 PROCEDURE — 700102 HCHG RX REV CODE 250 W/ 637 OVERRIDE(OP): Performed by: INTERNAL MEDICINE

## 2018-11-18 PROCEDURE — 700111 HCHG RX REV CODE 636 W/ 250 OVERRIDE (IP): Performed by: INTERNAL MEDICINE

## 2018-11-18 PROCEDURE — 80069 RENAL FUNCTION PANEL: CPT

## 2018-11-18 PROCEDURE — 99239 HOSP IP/OBS DSCHRG MGMT >30: CPT | Performed by: INTERNAL MEDICINE

## 2018-11-18 RX ADMIN — HYDRALAZINE HYDROCHLORIDE 25 MG: 25 TABLET, FILM COATED ORAL at 14:08

## 2018-11-18 RX ADMIN — HYDRALAZINE HYDROCHLORIDE 25 MG: 25 TABLET, FILM COATED ORAL at 06:23

## 2018-11-18 RX ADMIN — FAMOTIDINE 20 MG: 20 TABLET, FILM COATED ORAL at 06:21

## 2018-11-18 RX ADMIN — AMLODIPINE BESYLATE 10 MG: 10 TABLET ORAL at 06:22

## 2018-11-18 RX ADMIN — STANDARDIZED SENNA CONCENTRATE AND DOCUSATE SODIUM 2 TABLET: 8.6; 5 TABLET, FILM COATED ORAL at 06:22

## 2018-11-18 RX ADMIN — ONDANSETRON 4 MG: 2 INJECTION INTRAMUSCULAR; INTRAVENOUS at 06:22

## 2018-11-18 RX ADMIN — CINACALCET HYDROCHLORIDE 30 MG: 30 TABLET, COATED ORAL at 06:24

## 2018-11-18 RX ADMIN — SEVELAMER CARBONATE 3200 MG: 800 TABLET, FILM COATED ORAL at 07:58

## 2018-11-18 RX ADMIN — HEPARIN SODIUM 5000 UNITS: 5000 INJECTION, SOLUTION INTRAVENOUS; SUBCUTANEOUS at 06:22

## 2018-11-18 RX ADMIN — METOPROLOL SUCCINATE 100 MG: 50 TABLET, EXTENDED RELEASE ORAL at 06:22

## 2018-11-18 ASSESSMENT — PAIN SCALES - GENERAL: PAINLEVEL_OUTOF10: 0

## 2018-11-18 NOTE — PROGRESS NOTES
"[facility] -  PROGRESS NOTE               Author: Jimenez Ewing M.D. Date & Time: 11/18/2018  9:21 AM     HPI:  This is a 28-year-old gentleman who is on  nocturnal HD, severe hypertension who presented with  6 days of abdominal pain and Vomiting, he could not keep anything down except for some fluid over the last 6 days.  CT of the abdomen and pelvis shows multiple thick walled loops of  small intestine within the left upper quadrant with surrounding mesenteric inflammatory change.  Ultrasound of the right upper quadrant done on 11/10/2018 shows gallbladder sludge.  No   sonographic evidence of acute cholecystitis. He was taken from the ER straight to the OR for suspected ischemic Bowel.     DAILY NEPHROLOGY SUMMARY:  11/12: consult done  11/13: s/p ex-lap, no e/o ischemic bowel. S/p iHD.  Mild abdominal pain  11/14: Episode of chest pain this morning.  Emesis.  Now resolved  11/15: Ongoing intermittent nausea, abd pain improved.   11/16: Abdominal pain improved, nausea improved, tolerated dialysis, surgery signed off.  Some urinary retention  11/17: No acute events, tolerated dialysis this morning, still with some intermittent nausea, discharge pending  11/18: Tolerated dialysis, discharging home today    PAST FAMILY HISTORY: Reviewed and Unchanged  SOCIAL HISTORY: Reviewed and Unchanged  CURRENT MEDICATIONS: Reviewed  IMAGING STUDIES: Reviewed    ROS  General: No weakness, no malaise  HEENT: No vision changes, no hearing changes  CV: no chest pain, no palpitations  Lungs: No cough; no PND  GI: +nausea; mild abdominal pain  : No dysuria or gross hematuria  MSK: No joint pain; no trauma  Skin: No rashes; no lesions  Neuro: No tremors; no LOC  Psych: No depression; no anxiety    PHYSICAL EXAM  VS:  BP (!) 166/96 Comment: RN notified  Pulse 89   Temp 37.2 °C (99 °F) (Temporal)   Resp 18   Ht 1.753 m (5' 9\")   Wt 66.7 kg (147 lb 0.8 oz)   SpO2 93%   BMI 21.72 kg/m²   GENERAL: Lying " in bed, no acute distress  HEAD: Normocephalic, atraumatic  EYES: No scleral icterus; normal conjunctiva  NECK: Supple, trachea midline  CV: RRR, S1 and S2 present  LUNGS: Clear to ausculation bilaterally, No rhales or wheezes  ABDOMEN: Soft, mild tenderness, midline incision c/d/i  EXTREMETIES: no lower extremity edema, no clubbing or cyanosis  SKIN: Warm and dry, no rashes  NEURO: A&O, no focal deficits  PSYCH: Cooperative, appropriate mood and affect  ACCESS: Aneurysmal right upper extremity AV fistula.  Positive thrill    Fluids:  In: 740 [P.O.:240; Dialysis:500]  Out: 3500     LABS:  No results found for this or any previous visit (from the past 24 hour(s)).    (click the triangle to expand results)    ASSESSMENT:  # End-stage renal disease, on nocturnal Hemodialysis T/T/Sun.  # Anemia of CKD  # CKD-MBD  # Hypertension  # Bowel edema: ex-lap neg for ischemia, cholecystitis or other intra-abdominal infection      PLAN:  -no dialysis in house today, to perform nocturnal HD tonight per home schedule   -Dose all meds for ESRD  -Renal Diet  -Limit IVFs/Strict I/Os  -Limit peripheral lines, avoid PICCs  -Continue home blood pressure meds  -Continue home phos binders and Sensipar  -Epogen 10,000 units with dialysis   -Antibiotics per primary     Thank you, we will continue to follow

## 2018-11-18 NOTE — PROGRESS NOTES
Patient has a fistula in Right arm. Good bruit  and thrill. Site is clean, dry and intact from having dialysis today.

## 2018-11-18 NOTE — PROGRESS NOTES
Patient is visiting with family in the front area. Denies any pain or discomfort at this time. Received  Report from Lida.

## 2018-11-18 NOTE — PROGRESS NOTES
Patient discharged home with wife. Patient alert and oriented x4. Paperwork reviewed with patient and wife and straight cath instructions and both feel comfortable with doing straight cath. Both agreeable to discharge. PIV removed as well as telemetry. Belongings packed and patient refused escort downstairs. Patient left without signs or symptoms of distress.

## 2018-11-18 NOTE — CARE PLAN
Problem: Fluid Volume:  Goal: Will maintain balanced intake and output    Intervention: Monitor, educate, and encourage compliance with therapeutic intake of liquids  If have issues with going to bathroom bladder scan patient.

## 2018-11-19 ENCOUNTER — PATIENT OUTREACH (OUTPATIENT)
Dept: HEALTH INFORMATION MANAGEMENT | Facility: OTHER | Age: 28
End: 2018-11-19

## 2018-11-19 NOTE — PROGRESS NOTES
"Placed discharge outreach phone call to pt s/p hospital discharge 11/18/18.  Male answered and stated, \"This is Edward, you have the wrong number.\"  Phone number removed from demographics, discharge outreach letter mailed to address listed in Epic record.  "

## 2018-11-19 NOTE — LETTER
Elier [place] [address]    November 19, 2018      Dear Elier Bustillos,    [facility] wants to ensure your discharge home is safe and you or your loved ones have had all of your questions answered regarding your care after you leave the hospital.    Our discharge team was unsuccessful in our attempts to contact you telephonically and we wanted to be sure that you had a list of resources and contact information should you have any questions regarding your hospital stay, follow-up instructions, or active medical symptoms.    Questions or Concerns Regarding… Contact   Medical Questions Related to Your Discharge  (7 days a week, 8am-5pm) Contact a Nurse Care Coordinator   [phone]   Medical Questions Not Related to Your Discharge  (24 hours a day / 7 days a week)  Contact the Nurse Health Line   [phone]    Medications or Discharge Instructions Refer to your discharge packet   or contact your -[phone]   Follow-up Appointment(s) Schedule your appointment via [employer]   or contact Scheduling [phone]   Billing Review your statement via [employer]  or contact Billing [phone]   Medical Records Review your records via [employer]   or contact Medical Records [phone]     You can also easily access your medical information, test results and upcoming appointments via the [employer] free online health management tool. You can learn more and sign up at [employer]. For assistance setting up your [employer] account, please call [phone].    Once again, we want to ensure your discharge home is safe and that you have a clear understanding of any next steps in your care. If you have any questions or concerns, please do not hesitate to contact us, we are here for you. Thank you for choosing [facility] for your healthcare needs.    Sincerely,      Your [facility] Healthcare Team

## 2018-12-16 ENCOUNTER — HOSPITAL ENCOUNTER (EMERGENCY)
Facility: MEDICAL CENTER | Age: 28
End: 2018-12-17
Attending: EMERGENCY MEDICINE
Payer: COMMERCIAL

## 2018-12-16 ENCOUNTER — APPOINTMENT (OUTPATIENT)
Dept: RADIOLOGY | Facility: MEDICAL CENTER | Age: 28
End: 2018-12-16
Attending: EMERGENCY MEDICINE
Payer: COMMERCIAL

## 2018-12-16 DIAGNOSIS — I16.0 HYPERTENSIVE URGENCY: ICD-10-CM

## 2018-12-16 DIAGNOSIS — R51.9 INTRACTABLE HEADACHE, UNSPECIFIED CHRONICITY PATTERN, UNSPECIFIED HEADACHE TYPE: ICD-10-CM

## 2018-12-16 PROCEDURE — 99285 EMERGENCY DEPT VISIT HI MDM: CPT

## 2018-12-16 RX ORDER — DIPHENHYDRAMINE HYDROCHLORIDE 50 MG/ML
50 INJECTION INTRAMUSCULAR; INTRAVENOUS ONCE
Status: COMPLETED | OUTPATIENT
Start: 2018-12-16 | End: 2018-12-17

## 2018-12-16 RX ORDER — SODIUM CHLORIDE 9 MG/ML
500 INJECTION, SOLUTION INTRAVENOUS ONCE
Status: COMPLETED | OUTPATIENT
Start: 2018-12-16 | End: 2018-12-17

## 2018-12-16 RX ORDER — METOCLOPRAMIDE HYDROCHLORIDE 5 MG/ML
10 INJECTION INTRAMUSCULAR; INTRAVENOUS ONCE
Status: COMPLETED | OUTPATIENT
Start: 2018-12-16 | End: 2018-12-17

## 2018-12-16 RX ORDER — DIPHENHYDRAMINE HCL 12.5MG/5ML
50 LIQUID (ML) ORAL ONCE
Status: DISCONTINUED | OUTPATIENT
Start: 2018-12-16 | End: 2018-12-16

## 2018-12-16 ASSESSMENT — PAIN SCALES - GENERAL
PAINLEVEL_OUTOF10: 10
PAINLEVEL_OUTOF10: 10

## 2018-12-17 ENCOUNTER — APPOINTMENT (OUTPATIENT)
Dept: RADIOLOGY | Facility: MEDICAL CENTER | Age: 28
DRG: 304 | End: 2018-12-17
Attending: EMERGENCY MEDICINE
Payer: COMMERCIAL

## 2018-12-17 ENCOUNTER — HOSPITAL ENCOUNTER (INPATIENT)
Facility: MEDICAL CENTER | Age: 28
LOS: 1 days | DRG: 304 | End: 2018-12-18
Attending: EMERGENCY MEDICINE | Admitting: INTERNAL MEDICINE
Payer: COMMERCIAL

## 2018-12-17 VITALS
OXYGEN SATURATION: 97 % | SYSTOLIC BLOOD PRESSURE: 197 MMHG | HEART RATE: 104 BPM | DIASTOLIC BLOOD PRESSURE: 126 MMHG | RESPIRATION RATE: 18 BRPM | TEMPERATURE: 99.6 F | WEIGHT: 160.27 LBS | BODY MASS INDEX: 23.67 KG/M2

## 2018-12-17 PROBLEM — I16.1 HYPERTENSIVE EMERGENCY: Status: ACTIVE | Noted: 2018-12-17

## 2018-12-17 PROBLEM — I67.83 PRES (POSTERIOR REVERSIBLE ENCEPHALOPATHY SYNDROME): Status: ACTIVE | Noted: 2018-12-17

## 2018-12-17 PROBLEM — R00.0 TACHYCARDIA: Status: ACTIVE | Noted: 2018-12-17

## 2018-12-17 LAB
ALBUMIN SERPL BCP-MCNC: 3.6 G/DL (ref 3.2–4.9)
ALBUMIN/GLOB SERPL: 1 G/DL
ALP SERPL-CCNC: 71 U/L (ref 30–99)
ALT SERPL-CCNC: 12 U/L (ref 2–50)
ANION GAP SERPL CALC-SCNC: 9 MMOL/L (ref 0–11.9)
APPEARANCE UR: CLEAR
AST SERPL-CCNC: 12 U/L (ref 12–45)
BACTERIA #/AREA URNS HPF: ABNORMAL /HPF
BASOPHILS # BLD AUTO: 0.4 % (ref 0–1.8)
BASOPHILS # BLD: 0.03 K/UL (ref 0–0.12)
BILIRUB SERPL-MCNC: 1.2 MG/DL (ref 0.1–1.5)
BILIRUB UR QL STRIP.AUTO: NEGATIVE
BUN SERPL-MCNC: 26 MG/DL (ref 8–22)
CALCIUM SERPL-MCNC: 9.5 MG/DL (ref 8.4–10.2)
CHLORIDE SERPL-SCNC: 98 MMOL/L (ref 96–112)
CO2 SERPL-SCNC: 28 MMOL/L (ref 20–33)
COLOR UR: YELLOW
CREAT SERPL-MCNC: 7.09 MG/DL (ref 0.5–1.4)
EKG IMPRESSION: NORMAL
EOSINOPHIL # BLD AUTO: 0.15 K/UL (ref 0–0.51)
EOSINOPHIL NFR BLD: 1.8 % (ref 0–6.9)
EPI CELLS #/AREA URNS HPF: ABNORMAL /HPF
ERYTHROCYTE [DISTWIDTH] IN BLOOD BY AUTOMATED COUNT: 42.8 FL (ref 35.9–50)
GLOBULIN SER CALC-MCNC: 3.6 G/DL (ref 1.9–3.5)
GLUCOSE SERPL-MCNC: 94 MG/DL (ref 65–99)
GLUCOSE UR STRIP.AUTO-MCNC: 250 MG/DL
HCT VFR BLD AUTO: 30.7 % (ref 42–52)
HGB BLD-MCNC: 10.7 G/DL (ref 14–18)
IMM GRANULOCYTES # BLD AUTO: 0.03 K/UL (ref 0–0.11)
IMM GRANULOCYTES NFR BLD AUTO: 0.4 % (ref 0–0.9)
KETONES UR STRIP.AUTO-MCNC: ABNORMAL MG/DL
LACTATE BLD-SCNC: 0.8 MMOL/L (ref 0.5–2)
LEUKOCYTE ESTERASE UR QL STRIP.AUTO: NEGATIVE
LYMPHOCYTES # BLD AUTO: 1.16 K/UL (ref 1–4.8)
LYMPHOCYTES NFR BLD: 13.9 % (ref 22–41)
MCH RBC QN AUTO: 31.8 PG (ref 27–33)
MCHC RBC AUTO-ENTMCNC: 34.9 G/DL (ref 33.7–35.3)
MCV RBC AUTO: 91.4 FL (ref 81.4–97.8)
MICRO URNS: ABNORMAL
MONOCYTES # BLD AUTO: 0.75 K/UL (ref 0–0.85)
MONOCYTES NFR BLD AUTO: 9 % (ref 0–13.4)
MUCOUS THREADS #/AREA URNS HPF: ABNORMAL /HPF
NEUTROPHILS # BLD AUTO: 6.24 K/UL (ref 1.82–7.42)
NEUTROPHILS NFR BLD: 74.5 % (ref 44–72)
NITRITE UR QL STRIP.AUTO: NEGATIVE
NRBC # BLD AUTO: 0 K/UL
NRBC BLD-RTO: 0 /100 WBC
PH UR STRIP.AUTO: >=9 [PH]
PLATELET # BLD AUTO: 144 K/UL (ref 164–446)
PMV BLD AUTO: 9.5 FL (ref 9–12.9)
POTASSIUM SERPL-SCNC: 4.3 MMOL/L (ref 3.6–5.5)
PROT SERPL-MCNC: 7.2 G/DL (ref 6–8.2)
PROT UR QL STRIP: 100 MG/DL
RBC # BLD AUTO: 3.36 M/UL (ref 4.7–6.1)
RBC # URNS HPF: ABNORMAL /HPF
RBC UR QL AUTO: ABNORMAL
SODIUM SERPL-SCNC: 135 MMOL/L (ref 135–145)
SP GR UR STRIP.AUTO: 1.01
T4 FREE SERPL-MCNC: 0.9 NG/DL (ref 0.53–1.43)
TSH SERPL DL<=0.005 MIU/L-ACNC: 1.47 UIU/ML (ref 0.38–5.33)
WBC # BLD AUTO: 8.4 K/UL (ref 4.8–10.8)
WBC #/AREA URNS HPF: ABNORMAL /HPF

## 2018-12-17 PROCEDURE — 96374 THER/PROPH/DIAG INJ IV PUSH: CPT

## 2018-12-17 PROCEDURE — 700111 HCHG RX REV CODE 636 W/ 250 OVERRIDE (IP): Performed by: INTERNAL MEDICINE

## 2018-12-17 PROCEDURE — 99291 CRITICAL CARE FIRST HOUR: CPT

## 2018-12-17 PROCEDURE — 93010 ELECTROCARDIOGRAM REPORT: CPT | Performed by: INTERNAL MEDICINE

## 2018-12-17 PROCEDURE — 70450 CT HEAD/BRAIN W/O DYE: CPT

## 2018-12-17 PROCEDURE — 93005 ELECTROCARDIOGRAM TRACING: CPT | Performed by: EMERGENCY MEDICINE

## 2018-12-17 PROCEDURE — 700105 HCHG RX REV CODE 258: Performed by: EMERGENCY MEDICINE

## 2018-12-17 PROCEDURE — 83036 HEMOGLOBIN GLYCOSYLATED A1C: CPT

## 2018-12-17 PROCEDURE — 99223 1ST HOSP IP/OBS HIGH 75: CPT | Performed by: PSYCHIATRY & NEUROLOGY

## 2018-12-17 PROCEDURE — A9270 NON-COVERED ITEM OR SERVICE: HCPCS | Performed by: EMERGENCY MEDICINE

## 2018-12-17 PROCEDURE — 80053 COMPREHEN METABOLIC PANEL: CPT

## 2018-12-17 PROCEDURE — 700111 HCHG RX REV CODE 636 W/ 250 OVERRIDE (IP): Performed by: EMERGENCY MEDICINE

## 2018-12-17 PROCEDURE — 700102 HCHG RX REV CODE 250 W/ 637 OVERRIDE(OP): Performed by: EMERGENCY MEDICINE

## 2018-12-17 PROCEDURE — 70551 MRI BRAIN STEM W/O DYE: CPT

## 2018-12-17 PROCEDURE — 700101 HCHG RX REV CODE 250: Performed by: INTERNAL MEDICINE

## 2018-12-17 PROCEDURE — A9270 NON-COVERED ITEM OR SERVICE: HCPCS | Performed by: INTERNAL MEDICINE

## 2018-12-17 PROCEDURE — 81001 URINALYSIS AUTO W/SCOPE: CPT

## 2018-12-17 PROCEDURE — 96375 TX/PRO/DX INJ NEW DRUG ADDON: CPT

## 2018-12-17 PROCEDURE — 83605 ASSAY OF LACTIC ACID: CPT

## 2018-12-17 PROCEDURE — 85025 COMPLETE CBC W/AUTO DIFF WBC: CPT

## 2018-12-17 PROCEDURE — 93005 ELECTROCARDIOGRAM TRACING: CPT

## 2018-12-17 PROCEDURE — 87040 BLOOD CULTURE FOR BACTERIA: CPT

## 2018-12-17 PROCEDURE — 700105 HCHG RX REV CODE 258: Performed by: INTERNAL MEDICINE

## 2018-12-17 PROCEDURE — 96376 TX/PRO/DX INJ SAME DRUG ADON: CPT

## 2018-12-17 PROCEDURE — 96365 THER/PROPH/DIAG IV INF INIT: CPT

## 2018-12-17 PROCEDURE — 36415 COLL VENOUS BLD VENIPUNCTURE: CPT

## 2018-12-17 PROCEDURE — 700101 HCHG RX REV CODE 250: Performed by: EMERGENCY MEDICINE

## 2018-12-17 PROCEDURE — 84439 ASSAY OF FREE THYROXINE: CPT

## 2018-12-17 PROCEDURE — 700102 HCHG RX REV CODE 250 W/ 637 OVERRIDE(OP): Performed by: INTERNAL MEDICINE

## 2018-12-17 PROCEDURE — 99291 CRITICAL CARE FIRST HOUR: CPT | Performed by: INTERNAL MEDICINE

## 2018-12-17 PROCEDURE — 84443 ASSAY THYROID STIM HORMONE: CPT

## 2018-12-17 PROCEDURE — 770022 HCHG ROOM/CARE - ICU (200)

## 2018-12-17 RX ORDER — HYDRALAZINE HYDROCHLORIDE 20 MG/ML
20 INJECTION INTRAMUSCULAR; INTRAVENOUS ONCE
Status: COMPLETED | OUTPATIENT
Start: 2018-12-17 | End: 2018-12-17

## 2018-12-17 RX ORDER — CINACALCET 30 MG/1
30 TABLET, FILM COATED ORAL DAILY
Status: DISCONTINUED | OUTPATIENT
Start: 2018-12-17 | End: 2018-12-18 | Stop reason: HOSPADM

## 2018-12-17 RX ORDER — SEVELAMER CARBONATE 800 MG/1
3200 TABLET, FILM COATED ORAL
Status: DISCONTINUED | OUTPATIENT
Start: 2018-12-17 | End: 2018-12-18 | Stop reason: HOSPADM

## 2018-12-17 RX ORDER — METOPROLOL SUCCINATE 25 MG/1
100 TABLET, EXTENDED RELEASE ORAL DAILY
Status: DISCONTINUED | OUTPATIENT
Start: 2018-12-17 | End: 2018-12-18 | Stop reason: HOSPADM

## 2018-12-17 RX ORDER — MORPHINE SULFATE 10 MG/ML
4 INJECTION, SOLUTION INTRAMUSCULAR; INTRAVENOUS ONCE
Status: COMPLETED | OUTPATIENT
Start: 2018-12-17 | End: 2018-12-17

## 2018-12-17 RX ORDER — ACETAMINOPHEN 325 MG/1
650 TABLET ORAL EVERY 6 HOURS PRN
Status: DISCONTINUED | OUTPATIENT
Start: 2018-12-17 | End: 2018-12-18 | Stop reason: HOSPADM

## 2018-12-17 RX ORDER — MORPHINE SULFATE 4 MG/ML
4 INJECTION, SOLUTION INTRAMUSCULAR; INTRAVENOUS ONCE
Status: COMPLETED | OUTPATIENT
Start: 2018-12-17 | End: 2018-12-17

## 2018-12-17 RX ORDER — AMOXICILLIN 250 MG
2 CAPSULE ORAL 2 TIMES DAILY
Status: DISCONTINUED | OUTPATIENT
Start: 2018-12-17 | End: 2018-12-18 | Stop reason: HOSPADM

## 2018-12-17 RX ORDER — POLYETHYLENE GLYCOL 3350 17 G/17G
1 POWDER, FOR SOLUTION ORAL
Status: DISCONTINUED | OUTPATIENT
Start: 2018-12-17 | End: 2018-12-18 | Stop reason: HOSPADM

## 2018-12-17 RX ORDER — HEPARIN SODIUM 5000 [USP'U]/ML
5000 INJECTION, SOLUTION INTRAVENOUS; SUBCUTANEOUS EVERY 8 HOURS
Status: DISCONTINUED | OUTPATIENT
Start: 2018-12-17 | End: 2018-12-18 | Stop reason: HOSPADM

## 2018-12-17 RX ORDER — BISACODYL 10 MG
10 SUPPOSITORY, RECTAL RECTAL
Status: DISCONTINUED | OUTPATIENT
Start: 2018-12-17 | End: 2018-12-18 | Stop reason: HOSPADM

## 2018-12-17 RX ORDER — HYDRALAZINE HYDROCHLORIDE 25 MG/1
25 TABLET, FILM COATED ORAL 3 TIMES DAILY
Status: DISCONTINUED | OUTPATIENT
Start: 2018-12-17 | End: 2018-12-18 | Stop reason: HOSPADM

## 2018-12-17 RX ORDER — CLONIDINE HYDROCHLORIDE 0.1 MG/1
0.1 TABLET ORAL ONCE
Status: COMPLETED | OUTPATIENT
Start: 2018-12-17 | End: 2018-12-17

## 2018-12-17 RX ORDER — ONDANSETRON 4 MG/1
4 TABLET, ORALLY DISINTEGRATING ORAL EVERY 4 HOURS PRN
Status: DISCONTINUED | OUTPATIENT
Start: 2018-12-17 | End: 2018-12-18 | Stop reason: HOSPADM

## 2018-12-17 RX ORDER — IPRATROPIUM BROMIDE AND ALBUTEROL SULFATE 2.5; .5 MG/3ML; MG/3ML
3 SOLUTION RESPIRATORY (INHALATION)
Status: DISCONTINUED | OUTPATIENT
Start: 2018-12-17 | End: 2018-12-18 | Stop reason: HOSPADM

## 2018-12-17 RX ORDER — FAMOTIDINE 20 MG/1
20 TABLET, FILM COATED ORAL DAILY
Status: DISCONTINUED | OUTPATIENT
Start: 2018-12-17 | End: 2018-12-18 | Stop reason: HOSPADM

## 2018-12-17 RX ORDER — RANITIDINE 150 MG/1
150 TABLET ORAL 2 TIMES DAILY
Status: DISCONTINUED | OUTPATIENT
Start: 2018-12-17 | End: 2018-12-17

## 2018-12-17 RX ADMIN — METOCLOPRAMIDE 10 MG: 5 INJECTION, SOLUTION INTRAMUSCULAR; INTRAVENOUS at 00:32

## 2018-12-17 RX ADMIN — HEPARIN SODIUM 5000 UNITS: 5000 INJECTION, SOLUTION INTRAVENOUS; SUBCUTANEOUS at 09:00

## 2018-12-17 RX ADMIN — CINACALCET HYDROCHLORIDE 30 MG: 30 TABLET, COATED ORAL at 10:19

## 2018-12-17 RX ADMIN — HYDRALAZINE HYDROCHLORIDE 25 MG: 25 TABLET, FILM COATED ORAL at 17:35

## 2018-12-17 RX ADMIN — CLONIDINE HYDROCHLORIDE 0.1 MG: 0.1 TABLET ORAL at 03:38

## 2018-12-17 RX ADMIN — SEVELAMER CARBONATE 3200 MG: 800 TABLET, FILM COATED ORAL at 18:29

## 2018-12-17 RX ADMIN — HYDRALAZINE HYDROCHLORIDE 25 MG: 25 TABLET, FILM COATED ORAL at 13:08

## 2018-12-17 RX ADMIN — HYDRALAZINE HYDROCHLORIDE 20 MG: 20 INJECTION INTRAMUSCULAR; INTRAVENOUS at 01:11

## 2018-12-17 RX ADMIN — MORPHINE SULFATE 4 MG: 10 INJECTION INTRAVENOUS at 05:20

## 2018-12-17 RX ADMIN — MORPHINE SULFATE 4 MG: 4 INJECTION INTRAVENOUS at 01:50

## 2018-12-17 RX ADMIN — FAMOTIDINE 20 MG: 20 TABLET ORAL at 10:19

## 2018-12-17 RX ADMIN — SODIUM CHLORIDE 500 ML: 9 INJECTION, SOLUTION INTRAVENOUS at 00:33

## 2018-12-17 RX ADMIN — HEPARIN SODIUM 5000 UNITS: 5000 INJECTION, SOLUTION INTRAVENOUS; SUBCUTANEOUS at 21:10

## 2018-12-17 RX ADMIN — SODIUM CHLORIDE 6 MG/HR: 9 INJECTION, SOLUTION INTRAVENOUS at 21:09

## 2018-12-17 RX ADMIN — DIPHENHYDRAMINE HYDROCHLORIDE 50 MG: 50 INJECTION, SOLUTION INTRAMUSCULAR; INTRAVENOUS at 00:32

## 2018-12-17 RX ADMIN — STANDARDIZED SENNA CONCENTRATE AND DOCUSATE SODIUM 2 TABLET: 8.6; 5 TABLET, FILM COATED ORAL at 10:19

## 2018-12-17 RX ADMIN — STANDARDIZED SENNA CONCENTRATE AND DOCUSATE SODIUM 2 TABLET: 8.6; 5 TABLET, FILM COATED ORAL at 17:35

## 2018-12-17 RX ADMIN — HEPARIN SODIUM 5000 UNITS: 5000 INJECTION, SOLUTION INTRAVENOUS; SUBCUTANEOUS at 14:00

## 2018-12-17 RX ADMIN — METOPROLOL SUCCINATE 100 MG: 25 TABLET, EXTENDED RELEASE ORAL at 10:18

## 2018-12-17 RX ADMIN — SODIUM CHLORIDE 5 MG/HR: 9 INJECTION, SOLUTION INTRAVENOUS at 05:17

## 2018-12-17 RX ADMIN — SODIUM CHLORIDE 3 MG/HR: 9 INJECTION, SOLUTION INTRAVENOUS at 13:08

## 2018-12-17 RX ADMIN — SEVELAMER CARBONATE 3200 MG: 800 TABLET, FILM COATED ORAL at 13:08

## 2018-12-17 ASSESSMENT — COGNITIVE AND FUNCTIONAL STATUS - GENERAL
SUGGESTED CMS G CODE MODIFIER MOBILITY: CJ
MOBILITY SCORE: 21
DRESSING REGULAR LOWER BODY CLOTHING: A LITTLE
SUGGESTED CMS G CODE MODIFIER DAILY ACTIVITY: CI
CLIMB 3 TO 5 STEPS WITH RAILING: A LITTLE
STANDING UP FROM CHAIR USING ARMS: A LITTLE
DAILY ACTIVITIY SCORE: 23
WALKING IN HOSPITAL ROOM: A LITTLE

## 2018-12-17 ASSESSMENT — ENCOUNTER SYMPTOMS
PHOTOPHOBIA: 0
NAUSEA: 1
NERVOUS/ANXIOUS: 0
SHORTNESS OF BREATH: 0
WEAKNESS: 0
BLOOD IN STOOL: 0
CONFUSION: 0
BACK PAIN: 0
DIARRHEA: 0
SPEECH DIFFICULTY: 0
FOCAL WEAKNESS: 0
CONSTIPATION: 0
HEMOPTYSIS: 0
SEIZURES: 0
PALPITATIONS: 0
BLURRED VISION: 0
NUMBNESS: 0
SINUS PAIN: 0
AGITATION: 0
COUGH: 0
NECK PAIN: 0
DIZZINESS: 0
DEPRESSION: 0
NAUSEA: 0
DOUBLE VISION: 0
CHEST TIGHTNESS: 0
HEADACHES: 1
VOMITING: 0
FLANK PAIN: 0
DIAPHORESIS: 0
TREMORS: 0
FEVER: 0
SORE THROAT: 0
NECK STIFFNESS: 0
ABDOMINAL PAIN: 0
CHILLS: 0
BRUISES/BLEEDS EASILY: 0

## 2018-12-17 ASSESSMENT — PAIN SCALES - GENERAL
PAINLEVEL_OUTOF10: 5
PAINLEVEL_OUTOF10: 5
PAINLEVEL_OUTOF10: 4
PAINLEVEL_OUTOF10: 4
PAINLEVEL_OUTOF10: 8
PAINLEVEL_OUTOF10: 5
PAINLEVEL_OUTOF10: 0
PAINLEVEL_OUTOF10: 6
PAINLEVEL_OUTOF10: 10
PAINLEVEL_OUTOF10: 0
PAINLEVEL_OUTOF10: 4

## 2018-12-17 NOTE — ED NOTES
Nicardipine infusing per order, pt now reports 10/10 HA, no behavioral indicators of pain, pt medicated per MAR.

## 2018-12-17 NOTE — CONSULTS
Critical Care Consultation    Date of consult: 12/17/2018    Referring Physician  Klaudia Severino M.D.    Reason for Consultation  Abnormal CT & MRI associsted with HTN    History of Presenting Illness  28 y.o. male who presented 12/17/2018 with headache and significant hypertension.  Patient seen initially at [facility] emergency room after he developed headache during his usual dialysis run on 12/16.  There was some associated nausea without vomiting is all of which have resolved with control of blood pressure.  Headache is about 5 out of 10 while blood pressure is controlled with an intravenous continuous infusion of nicardipine, currently at a dose of 10.  He denied any infectious symptoms.  Denied any right or left heart failure symptoms.  History taken from records and from patient using  although patient had reasonable understanding of English but could not speak English well.  CT scan had suggested possible lesion transferred for MRI.  MRI suggested PRES and neurological consultation obtained.  I reviewed the case with neurology at length.    Code Status  Prior    Review of Systems  Review of Systems   Constitutional: Negative for chills, diaphoresis and fever.   HENT: Negative for congestion and sore throat.    Eyes: Negative for photophobia and visual disturbance.   Respiratory: Negative for cough, chest tightness and shortness of breath.    Cardiovascular: Negative for chest pain and leg swelling.   Gastrointestinal: Positive for nausea. Negative for abdominal pain, blood in stool and vomiting.   Genitourinary: Positive for decreased urine volume (Chronic). Negative for flank pain.   Musculoskeletal: Negative for back pain, neck pain and neck stiffness.   Neurological: Positive for headaches. Negative for dizziness, tremors, seizures, syncope, speech difficulty, weakness and numbness.   Hematological: Does not bruise/bleed easily.   Psychiatric/Behavioral: Negative for agitation and  confusion. The patient is not nervous/anxious.        Past Medical History   has a past medical history of Dialysis; Dialysis care; Hypertension; and Renal disorder.    Surgical History   has a past surgical history that includes other orthopedic surgery; av fistula creation (12/29/2010); av fistula creation (6/7/2012); cath placement (6/7/2012); recovery (7/3/2012); recovery (7/20/2012); av fistula creation (8/21/2012); av fistula revision (Right, 11/28/2016); and exploratory laparotomy (11/11/2018).    Family History  family history is not on file.    Social History   reports that he has never smoked. He has never used smokeless tobacco. He reports that he does not drink alcohol or use drugs.    Medications  Home Medications    **Home medications have not yet been reviewed for this encounter**       Current Facility-Administered Medications   Medication Dose Route Frequency Provider Last Rate Last Dose   • niCARdipine (CARDENE) 25 mg in  mL Infusion  0-15 mg/hr Intravenous Continuous Klaudia Severino M.D. 50 mL/hr at 12/17/18 0517 5 mg/hr at 12/17/18 0517     Current Outpatient Prescriptions   Medication Sig Dispense Refill   • acetaminophen (TYLENOL) 325 MG Tab Take 2 Tabs by mouth every 6 hours as needed (Mild Pain; (Pain scale 1-3); Temp greater than 100.5 F). 100 Tab 0   • albuterol 108 (90 Base) MCG/ACT Aero Soln inhalation aerosol Inhale 2 Puffs by mouth every four hours as needed. 8.5 g 2   • cloNIDine (CATAPRES) 0.2 MG/24HR PATCH WEEKLY Apply 1 Patch to skin as directed every 7 days. 4 Patch 2   • metoprolol SR (TOPROL XL) 100 MG TABLET SR 24 HR Take 1 Tab by mouth every day. 30 Tab 2   • senna-docusate (PERICOLACE OR SENOKOT S) 8.6-50 MG Tab Take 2 Tabs by mouth 2 Times a Day. 30 Tab 0   • ondansetron (ZOFRAN ODT) 4 MG TABLET DISPERSIBLE Take 1 Tab by mouth every four hours as needed for Nausea (give PO if no IV route available). 20 Tab 1   • raNITidine (ZANTAC) 150 MG Tab Take 1 Tab by mouth 2  times a day. 30 Tab 0   • hydrALAZINE (APRESOLINE) 25 MG Tab Take 25 mg by mouth 3 times a day.     • amlodipine (NORVASC) 10 MG TABS Take 1 Tab by mouth every day. 30 Tab 3   • sevelamer (RENAGEL) 800 MG TABS Take 4 Tabs by mouth 3 times a day, with meals. 360 Tab 3   • cinacalcet (SENSIPAR) 30 MG TABS Take 1 Tab by mouth every day. 30 Tab 11       Allergies  No Known Allergies    Vital Signs last 24 hours  Temp:  [37.5 °C (99.5 °F)-37.7 °C (99.9 °F)] 37.7 °C (99.9 °F)  Pulse:  [] 125  Resp:  [16-22] 18  BP: (171-197)/(105-140) 171/134    Physical Exam  Physical Exam   Constitutional: He is oriented to person, place, and time. He appears well-developed. No distress.   HENT:   Head: Normocephalic and atraumatic.   Eyes: Pupils are equal, round, and reactive to light. EOM are normal. No scleral icterus.   Neck: Neck supple. No JVD present. No tracheal deviation present. No thyromegaly present.   Cardiovascular: Normal rate, regular rhythm and intact distal pulses.    No murmur heard.  Pulmonary/Chest: Breath sounds normal. No respiratory distress. He has no wheezes. He has no rales.   Abdominal: Soft. Bowel sounds are normal. He exhibits no distension. There is no tenderness. There is no guarding.   Musculoskeletal: He exhibits no edema.   Thrombosed old shunt left upper extremity.  Active new fistula/sun right forearm with positive pulse and thrill, a bit dilated.   Neurological: He is alert and oriented to person, place, and time. No cranial nerve deficit. Coordination normal.   Skin: Skin is warm and dry. He is not diaphoretic.   Psychiatric: He has a normal mood and affect. His behavior is normal. Judgment and thought content normal.       Fluids  No intake or output data in the 24 hours ending 12/17/18 0634    Laboratory  Recent Results (from the past 48 hour(s))   CBC WITH DIFFERENTIAL    Collection Time: 12/17/18 12:33 AM   Result Value Ref Range    WBC 8.4 4.8 - 10.8 K/uL    RBC 3.36 (L) 4.70 - 6.10  M/uL    Hemoglobin 10.7 (L) 14.0 - 18.0 g/dL    Hematocrit 30.7 (L) 42.0 - 52.0 %    MCV 91.4 81.4 - 97.8 fL    MCH 31.8 27.0 - 33.0 pg    MCHC 34.9 33.7 - 35.3 g/dL    RDW 42.8 35.9 - 50.0 fL    Platelet Count 144 (L) 164 - 446 K/uL    MPV 9.5 9.0 - 12.9 fL    Neutrophils-Polys 74.50 (H) 44.00 - 72.00 %    Lymphocytes 13.90 (L) 22.00 - 41.00 %    Monocytes 9.00 0.00 - 13.40 %    Eosinophils 1.80 0.00 - 6.90 %    Basophils 0.40 0.00 - 1.80 %    Immature Granulocytes 0.40 0.00 - 0.90 %    Nucleated RBC 0.00 /100 WBC    Neutrophils (Absolute) 6.24 1.82 - 7.42 K/uL    Lymphs (Absolute) 1.16 1.00 - 4.80 K/uL    Monos (Absolute) 0.75 0.00 - 0.85 K/uL    Eos (Absolute) 0.15 0.00 - 0.51 K/uL    Baso (Absolute) 0.03 0.00 - 0.12 K/uL    Immature Granulocytes (abs) 0.03 0.00 - 0.11 K/uL    NRBC (Absolute) 0.00 K/uL   COMP METABOLIC PANEL    Collection Time: 12/17/18 12:33 AM   Result Value Ref Range    Sodium 135 135 - 145 mmol/L    Potassium 4.3 3.6 - 5.5 mmol/L    Chloride 98 96 - 112 mmol/L    Co2 28 20 - 33 mmol/L    Anion Gap 9.0 0.0 - 11.9    Glucose 94 65 - 99 mg/dL    Bun 26 (H) 8 - 22 mg/dL    Creatinine 7.09 (HH) 0.50 - 1.40 mg/dL    Calcium 9.5 8.4 - 10.2 mg/dL    AST(SGOT) 12 12 - 45 U/L    ALT(SGPT) 12 2 - 50 U/L    Alkaline Phosphatase 71 30 - 99 U/L    Total Bilirubin 1.2 0.1 - 1.5 mg/dL    Albumin 3.6 3.2 - 4.9 g/dL    Total Protein 7.2 6.0 - 8.2 g/dL    Globulin 3.6 (H) 1.9 - 3.5 g/dL    A-G Ratio 1.0 g/dL   LACTIC ACID    Collection Time: 12/17/18 12:33 AM   Result Value Ref Range    Lactic Acid 0.8 0.5 - 2.0 mmol/L   ESTIMATED GFR    Collection Time: 12/17/18 12:33 AM   Result Value Ref Range    GFR If  11 (A) >60 mL/min/1.73 m 2    GFR If Non African American 9 (A) >60 mL/min/1.73 m 2   URINALYSIS CULTURE, IF INDICATED    Collection Time: 12/17/18  1:22 AM   Result Value Ref Range    Color Yellow     Character Clear     Specific Gravity 1.015 <1.035    Ph >=9.0 (A) 5.0 - 8.0    Glucose 250  (A) Negative mg/dL    Ketones Trace (A) Negative mg/dL    Protein 100 (A) Negative mg/dL    Bilirubin Negative Negative    Nitrite Negative Negative    Leukocyte Esterase Negative Negative    Occult Blood Small (A) Negative    Micro Urine Req Microscopic    URINE MICROSCOPIC (W/UA)    Collection Time: 18  1:22 AM   Result Value Ref Range    WBC 2-5 (A) /hpf    RBC 2-5 (A) /hpf    Bacteria Rare (A) None /hpf    Epithelial Cells Rare Few /hpf    Mucous Threads Few /hpf   EKG (NOW)    Collection Time: 18  6:23 AM   Result Value Ref Range    Report       [facility] Emergency Dept.    Test Date:  2018  Pt Name:    DIAMANTE MILLS        Department: ER  MRN:        [id number]                      Room:        12  Gender:     Male                         Technician: RN  :        [id number]                   Requested By:ANDI VARMA  Order #:    [id number]                    Reading MD:    Measurements  Intervals                                Axis  Rate:       121                          P:          -2  OH:         128                          QRS:        32  QRSD:       90                           T:          65  QT:         324  QTc:        460    Interpretive Statements  SINUS TACHYCARDIA  Compared to ECG 2018 03:39:01  No significant changes         Imaging  MR-BRAIN-W/O    (Results Pending)       Assessment/Plan  * Hypertensive emergency   Assessment & Plan    Severe, requiring continuous infusion nicardipine  As needed hydralazine and labetalol IV push every 4 to 6 hours  Goal blood pressure less than 160 short-term long-term less than 130 systolic  Resume home hypertensive regimen this a.m.  Low threshold to place arterial line to manage titration of antihypertensive agents if clinically worsens  If compliance and issue, consider alternative therapies-clonidine and its issues with rebound hypertension  Complicated by neurological issues including headache and  radiographic evidence of PRES  Neurology consultation obtained, monitoring  No indication for lumbar puncture  Neurochecks every 2 hours while in ICU for now  No need for seizure prophylaxis  Clinically not in significant pulmonary edema or having coronary ischemia, monitor     PRES (posterior reversible encephalopathy syndrome)   Assessment & Plan    Radiographic evidence consistent with this condition, see MRI  Neurology consultation  Neurochecks every 2 hours in ICU  Monitor for seizures, seizure precautions, no seizure prophylaxis for now     Tachycardia   Assessment & Plan    Etiology not entirely clear  Evaluate thyroid function, monitor for infection, no evidence of bleeding, query related to PRES  Patient states he has been compliant with taking his clonidine and beta-blocker but missing a few doses could precipitate all the above, tachycardia/hypertension with complications, asked family when they are available about compliance     ESRD (end stage renal disease) on dialysis (HCC)- (present on admission)   Assessment & Plan    Dialysis Tuesday Thursday Sunday  No indication for emergent dialysis today  Nephrology consultation to manage dialysis while inpatient  Review antihypertensive regimen nephrology when they are available, they undoubtedly be following him long-term as an outpatient     Anemia- (present on admission)   Assessment & Plan    Chronic and secondary to end-stage renal disease  Dialysis team monitoring for need for iron replacement therapy and Procrit therapy     Secondary renal hyperparathyroidism (HCC)- (present on admission)   Assessment & Plan    Resume outpatient regimen as clinically appropriate         Discussed patient condition and risk of morbidity and/or mortality with RN, RT, Pharmacy, Charge nurse / hot rounds, Patient, hospitalist and neurology and ER Physician.      Patient is critically ill.  Patient requiring continuous infusion of intravenous nicardipine to control blood  pressure with this hypertensive crisis complicated with neurologic issues including headache and associated MRI changes suggesting PRES.  Monitor for complications including seizures.  Frequent neuro checks ongoing.  Patient be kept in ICU and neurology following with nephrology consult pending.    The patient remains critically ill.  Critical care time = 40 minutes in directly providing and coordinating critical care and extensive data review.  No time overlap and excludes procedures.

## 2018-12-17 NOTE — ASSESSMENT & PLAN NOTE
Patient has HTN on clonidine patch every Monday, metoprolol, hydralazine and amlodipine. Per pt he has been compliant with his medications.   His home blood pressure is usually around 150s.   States he is compliant with dietary recommendations, did not miss dialysis   Sent to [facility] ED due to headaches, found to have high BP in 230s, CT head possible mass, sent here for MRI   196/138 on arrival to [facility] ED   No focal neurological deficits ,no chest pain, EKG no ischemic changes   Neurology and nephrology consulted - appreciate recommendations   Continue nicardipine infusion, goal -160   Resumed home medications - metoprolol and hydralazine, amlodipine and clonidine patch, can increase metoprolol and hydralazine if needed, can add ACE/ARB also

## 2018-12-17 NOTE — ED TRIAGE NOTES
Patient was at [facility] and was experiencing HTN and headache.  Pt received tylenol and clonidine during his treatment.  Pt arrived here to get headache 10/10 evaluated.

## 2018-12-17 NOTE — ED NOTES
Pt rounded on, asleep in bed, respirations even and unlabored, repositioning self as needed, BP trending down, HR trending up, Dr. Severino called.

## 2018-12-17 NOTE — ED PROVIDER NOTES
ED Provider Note    CHIEF COMPLAINT  Chief Complaint   Patient presents with   • Headache     Pt went to [facility] with uncontrolled HA after dialysis. Pt found to be hypertensive. Upon further eval CT head showed mass. Pt sent for further eval.        HPI  Elier Bustillos is a 28 y.o. male who presents to the emergency department for chief complaint of a headache.  He was seen at [facility] earlier today for this similar complaint he states that it started around dialysis and is progressed throughout the day and is very severe at in the frontal region and is associated with some vision changes but no vomiting he does have some associated nausea.  He has end-stage renal disease and was at dialysis earlier and states they were able to complete it.  But he denies any fevers chills neck stiffness chest pain or shortness of breath.  He states he has had headaches on and off but never one this bad.  At the outside hospital he was given morphine given morphine by EMS as well as Reglan and Benadryl without relief of his discomfort.    He was transferred here for concerning signal on his head CT he needs an MRI to be evaluated    REVIEW OF SYSTEMS  Positives as above. Pertinent negatives include nausea vomiting weakness numbness tingling chest pain shortness of breath  All other review of systems are negative    PAST MEDICAL HISTORY   has a past medical history of Dialysis; Dialysis care; Hypertension; and Renal disorder.    SOCIAL HISTORY  Social History     Social History Main Topics   • Smoking status: Never Smoker   • Smokeless tobacco: Never Used      Comment: quit 1/2009   • Alcohol use No   • Drug use: No   • Sexual activity: Not on file       SURGICAL HISTORY   has a past surgical history that includes other orthopedic surgery; av fistula creation (12/29/2010); av fistula creation (6/7/2012); cath placement (6/7/2012); recovery (7/3/2012); recovery (7/20/2012); av fistula creation (8/21/2012); av  "fistula revision (Right, 11/28/2016); and exploratory laparotomy (11/11/2018).    CURRENT MEDICATIONS  Home Medications    **Home medications have not yet been reviewed for this encounter**         ALLERGIES  No Known Allergies    PHYSICAL EXAM  VITAL SIGNS: BP (!) 193/134   Pulse (!) 106   Temp 37.7 °C (99.9 °F) (Temporal)   Resp 20   Ht 1.753 m (5' 9\")   Wt 71 kg (156 lb 8.4 oz)   SpO2 98%   BMI 23.11 kg/m²     Pulse ox interpretation: I interpret this pulse ox as normal.  Constitutional: Alert in no apparent distress.  HENT: Normocephalic atraumatic, MMM  Eyes: PER, Conjunctiva normal, Non-icteric.   Neck: Normal range of motion, No tenderness, Supple, No stridor.   Cardiovascular: Regular rate and rhythm, no murmurs.  Fistula on the right forearm with a good thrill  Thorax & Lungs: Normal breath sounds, No respiratory distress, No wheezing, No chest tenderness.   Abdomen: Bowel sounds normal, Soft, No tenderness, No pulsatile masses. No peritoneal signs.  Skin: Warm, Dry, No erythema, No rash.   Back: No bony tenderness, No CVA tenderness.   Extremities: Intact distal pulses, No edema, No tenderness, No cyanosis  Musculoskeletal: Good range of motion in all major joints. No tenderness to palpation or major deformities noted.   Neurologic: Alert and oriented x3, No focal deficits noted.  No facial droop strength 5 out of 5 bilateral upper and lower extremities, sensation intact light touch      DIFFERENTIAL DIAGNOSIS AND WORK UP PLAN    This is a 28 y.o. male who presents with headache and a concerning area of low density in the occipital lobe he is hypertensive and states that he runs usually that high he has taken his medications throughout the day today.  Wife states is usually not this high but he it is usually when he gets his headaches.  He has a nonfocal neurologic examination will evaluate for the cause of this low-density area    DIAGNOSTIC STUDIES / PROCEDURES      LABS  Pertinent Lab " "Findings  Earlier laboratory analysis CBC with an anemia otherwise a mild left shift and CMP consistent with his chronic kidney disease without electrolyte abnormality  Labs Reviewed - No data to display    RADIOLOGY  MR-BRAIN-W/O    (Results Pending)     CT head without - 1.  Area of low-density in the right occipital lobe posteriorly, recommend follow-up MRI of the brain without and with contrast for further characterization.    The radiologist's interpretation of all radiological studies have been reviewed by me.      COURSE & MEDICAL DECISION MAKING  Pertinent Labs & Imaging studies reviewed. (See chart for details)    4:57 AM  Spoke w radiology -his MRI does have signal changes in both occipital lobes right greater than left, he states this is possibly consistent with PRES syndrome which is brought on by hypertension.  He recommends at the very least that we wait until her neurologic MRI radiologist come in today    5:00 AM  Will start the patient on nicardipine gtt as he continues to have a headache in the visual disturbances and concerning for PRES - Posterior reversible encephalopathy syndrome (PRES)     6:09 AM  Spoke w [facility] for admission for concern for PRES - they will consult neurology and I will be getting in contact w DAVID  The patient's BP has improved but he still endorses headache    6:20 AM  Patients HR Is now increasing, will perform EKG     12- Lead EKG; interpreted by myself - Mere  Normal sinus rhythm with tachycardiawith a rate of 121 bpm.   Normal axis.  Normal intervals.   No ST elevation or depression, or abnormal T wave inversion   No widening of QRS complex   Good R wave progression   No diagnostic Q waves.   Unchanged from prior EKG   Clinical Impression: sinus tach 121     6:31 AM  Spoke w Dr Ybarra regarding the patients condition and he will consult on the patient     /109   Pulse (!) 129   Temp 37.7 °C (99.9 °F) (Temporal)   Resp 18   Ht 1.753 m (5' 9\")   Wt 71 kg " (156 lb 8.4 oz)   SpO2 94%   BMI 23.11 kg/m²       DISPOSITION:  Patient will be admitted to [facility] in guarded condition.      FINAL IMPRESSION  1. Intractable headache  2, hypertensive emergency  3. Concern for PRES  4. ESRD on HD        Electronically signed by: Klaudia Severino, 12/17/2018 4:56 AM    This dictation has been created using voice recognition software and/or scribes. The accuracy of the dictation is limited by the abilities of the software and the expertise of the scribes. I expect there may be some errors of grammar and possibly content. I made every attempt to manually correct the errors within my dictation. However, errors related to voice recognition software and/or scribes may still exist and should be interpreted within the appropriate context.

## 2018-12-17 NOTE — H&P
Internal Medicine Admitting History and Physical    Note Author: Avelino Vargas M.D.       Name Elier Brian 1990   Age/Sex 28 y.o. male   MRN [id number]   Code Status Full code      After 5PM or if no immediate response to page, please call for cross-coverage  Attending/Team: CHA MARIA/ Dr Medina See Patient List for primary contact information  Call [phone] to page    1st Call - Day Intern (R1):   DULCE MARIA  2nd Call - Day Sr. Resident (R2/R3):   Dr Vargas       Chief Complaint:   Headache     HPI:  Patient speaks Slovenian, history obtained through . He was diagnosed with ESRD on HD Tuesday, Thursday, , was on HD last night when he started having severe headache which he never had before. He denies associated visual problems, limb weakness or numbness, no fever, cough, chest pain, SOB. He does have some discomfort around the surgical scar but denies abdominal pain,nausea, diarrhea. He is compliant with his home blood pressure medications, no increased salt intake. He did not miss dialysis.     He was sent to [facility] ER for high BP, CT scan done and showed Area of low-density in the right occipital lobe posteriorly , sent to [facility] ED for MRI which showed features of PRES syndrome. His BP was 198/134 on arrival. He was started on Nicardipin infusion.     Review of Systems   Constitutional: Negative for chills and fever.   HENT: Negative for congestion and sinus pain.    Eyes: Negative for blurred vision and double vision.   Respiratory: Negative for cough, hemoptysis and shortness of breath.    Cardiovascular: Negative for chest pain and palpitations.   Gastrointestinal: Negative for abdominal pain, constipation, diarrhea, nausea and vomiting.   Genitourinary: Negative for dysuria and urgency.   Musculoskeletal: Negative for back pain and neck pain.   Skin: Negative for itching and rash.   Neurological: Positive for headaches. Negative for dizziness,  focal weakness and seizures.   Psychiatric/Behavioral: Negative for depression. The patient is not nervous/anxious.              Past Medical History (Chronic medical problem, known complications and current treatment)    Past Medical History:   Diagnosis Date   • Dialysis    • Dialysis care    • Hypertension    • Renal disorder     dailysis pt. 3 times per week.           Past Surgical History:  Past Surgical History:   Procedure Laterality Date   • EXPLORATORY LAPAROTOMY  11/11/2018    Procedure: EXPLORATORY LAPAROTOMY;  Surgeon: Faith Martin M.D.;  Location: SURGERY [facility];  Service: General   • AV FISTULA REVISION Right 11/28/2016    Procedure: AV FISTULA REVISION UPPER EXTREMITY (ANEURYSM REDUCTION);  Surgeon: Slade Perez M.D.;  Location: SURGERY [facility];  Service:    • AV FISTULA CREATION  8/21/2012    Performed by ANAI TYSON at SURGERY [facility]   • RECOVERY  7/20/2012    Performed by SURGERY, IR-RECOVERY at SURGERY SAME DAY [facility]   • RECOVERY  7/3/2012    Performed by SURGERY, IR-RECOVERY at SURGERY SAME DAY [facility]   • AV FISTULA CREATION  6/7/2012    Performed by ANAI TYSON at SURGERY [facility]   • CATH PLACEMENT  6/7/2012    Performed by ANAI TYSON at SURGERY [facility]   • AV FISTULA CREATION  12/29/2010    Performed by ANT ALONZO at SURGERY [facility]   • OTHER ORTHOPEDIC SURGERY      r arm fx       Current Outpatient Medications:  Home Medications     Reviewed by Ana Combs R.N. (Registered Nurse) on 12/17/18 at 0927  Med List Status: Complete   Medication Last Dose Status   acetaminophen (TYLENOL) 325 MG Tab 12/16/2018 Active   albuterol 108 (90 Base) MCG/ACT Aero Soln inhalation aerosol 12/16/2018 Active   amlodipine (NORVASC) 10 MG TABS 12/16/2018 Active   cinacalcet (SENSIPAR) 30 MG TABS 12/16/2018 Active   cloNIDine (CATAPRES) 0.2 MG/24HR PATCH WEEKLY 12/10/2018 Active   hydrALAZINE (APRESOLINE) 25 MG Tab  "12/16/2018 Active   metoprolol SR (TOPROL XL) 100 MG TABLET SR 24 HR 12/16/2018 Active   raNITidine (ZANTAC) 150 MG Tab 12/16/2018 Active   sevelamer (RENAGEL) 800 MG TABS 12/16/2018 Active                Medication Allergy/Sensitivities:  No Known Allergies      Family History (mandatory)   Family History   Problem Relation Age of Onset   • Diabetes Mother        Social History (mandatory)   Social History     Social History   • Marital status:      Spouse name: N/A   • Number of children: N/A   • Years of education: N/A     Occupational History   • Not on file.     Social History Main Topics   • Smoking status: Never Smoker   • Smokeless tobacco: Never Used      Comment: quit 1/2009   • Alcohol use No   • Drug use: No   • Sexual activity: Not on file     Other Topics Concern   • Not on file     Social History Narrative   • No narrative on file     Living situation: lives in [place] with family   PCP : Mireya Ramsey M.D.    Physical Exam     Vitals:    12/17/18 0545 12/17/18 0600 12/17/18 0615 12/17/18 0630   BP:       Pulse: (!) 114  (!) 129 (!) 125   Resp: (!) 22 (!) 21 18 18   Temp:       TempSrc:       SpO2: 94% 93% 94% 95%   Weight:       Height:         Body mass index is 23.11 kg/m².  BP (!) 171/134   Pulse (!) 125   Temp 37.7 °C (99.9 °F) (Temporal)   Resp 18   Ht 1.753 m (5' 9\")   Wt 71 kg (156 lb 8.4 oz)   SpO2 95%   BMI 23.11 kg/m²   O2 therapy: Pulse Oximetry: 95 %, O2 (LPM): 0, O2 Delivery: None (Room Air)    Physical Exam   Constitutional: He is oriented to person, place, and time and well-developed, well-nourished, and in no distress.   HENT:   Head: Normocephalic and atraumatic.   Eyes: Pupils are equal, round, and reactive to light. Conjunctivae and EOM are normal.   Neck: Normal range of motion. Neck supple.   Cardiovascular: Normal heart sounds.    No murmur heard.  Sinus tachycardia    Pulmonary/Chest: Effort normal and breath sounds normal. No respiratory distress. He has no " wheezes.   Abdominal: Soft. Bowel sounds are normal. He exhibits no distension. There is no tenderness.   Musculoskeletal: He exhibits no edema.   Neurological: He is alert and oriented to person, place, and time. No cranial nerve deficit. Coordination normal. GCS score is 15.   Skin: Skin is warm.   Right forearm AV fistula, thrill and bruit present    Psychiatric: Affect normal.         Data Review       Old Records Request:   Completed  Current Records review/summary: Completed    Lab Data Review:  Recent Results (from the past 24 hour(s))   CBC WITH DIFFERENTIAL    Collection Time: 12/17/18 12:33 AM   Result Value Ref Range    WBC 8.4 4.8 - 10.8 K/uL    RBC 3.36 (L) 4.70 - 6.10 M/uL    Hemoglobin 10.7 (L) 14.0 - 18.0 g/dL    Hematocrit 30.7 (L) 42.0 - 52.0 %    MCV 91.4 81.4 - 97.8 fL    MCH 31.8 27.0 - 33.0 pg    MCHC 34.9 33.7 - 35.3 g/dL    RDW 42.8 35.9 - 50.0 fL    Platelet Count 144 (L) 164 - 446 K/uL    MPV 9.5 9.0 - 12.9 fL    Neutrophils-Polys 74.50 (H) 44.00 - 72.00 %    Lymphocytes 13.90 (L) 22.00 - 41.00 %    Monocytes 9.00 0.00 - 13.40 %    Eosinophils 1.80 0.00 - 6.90 %    Basophils 0.40 0.00 - 1.80 %    Immature Granulocytes 0.40 0.00 - 0.90 %    Nucleated RBC 0.00 /100 WBC    Neutrophils (Absolute) 6.24 1.82 - 7.42 K/uL    Lymphs (Absolute) 1.16 1.00 - 4.80 K/uL    Monos (Absolute) 0.75 0.00 - 0.85 K/uL    Eos (Absolute) 0.15 0.00 - 0.51 K/uL    Baso (Absolute) 0.03 0.00 - 0.12 K/uL    Immature Granulocytes (abs) 0.03 0.00 - 0.11 K/uL    NRBC (Absolute) 0.00 K/uL   COMP METABOLIC PANEL    Collection Time: 12/17/18 12:33 AM   Result Value Ref Range    Sodium 135 135 - 145 mmol/L    Potassium 4.3 3.6 - 5.5 mmol/L    Chloride 98 96 - 112 mmol/L    Co2 28 20 - 33 mmol/L    Anion Gap 9.0 0.0 - 11.9    Glucose 94 65 - 99 mg/dL    Bun 26 (H) 8 - 22 mg/dL    Creatinine 7.09 (HH) 0.50 - 1.40 mg/dL    Calcium 9.5 8.4 - 10.2 mg/dL    AST(SGOT) 12 12 - 45 U/L    ALT(SGPT) 12 2 - 50 U/L    Alkaline  Phosphatase 71 30 - 99 U/L    Total Bilirubin 1.2 0.1 - 1.5 mg/dL    Albumin 3.6 3.2 - 4.9 g/dL    Total Protein 7.2 6.0 - 8.2 g/dL    Globulin 3.6 (H) 1.9 - 3.5 g/dL    A-G Ratio 1.0 g/dL   LACTIC ACID    Collection Time: 18 12:33 AM   Result Value Ref Range    Lactic Acid 0.8 0.5 - 2.0 mmol/L   ESTIMATED GFR    Collection Time: 18 12:33 AM   Result Value Ref Range    GFR If  11 (A) >60 mL/min/1.73 m 2    GFR If Non African American 9 (A) >60 mL/min/1.73 m 2   URINALYSIS CULTURE, IF INDICATED    Collection Time: 18  1:22 AM   Result Value Ref Range    Color Yellow     Character Clear     Specific Gravity 1.015 <1.035    Ph >=9.0 (A) 5.0 - 8.0    Glucose 250 (A) Negative mg/dL    Ketones Trace (A) Negative mg/dL    Protein 100 (A) Negative mg/dL    Bilirubin Negative Negative    Nitrite Negative Negative    Leukocyte Esterase Negative Negative    Occult Blood Small (A) Negative    Micro Urine Req Microscopic    URINE MICROSCOPIC (W/UA)    Collection Time: 18  1:22 AM   Result Value Ref Range    WBC 2-5 (A) /hpf    RBC 2-5 (A) /hpf    Bacteria Rare (A) None /hpf    Epithelial Cells Rare Few /hpf    Mucous Threads Few /hpf   EKG (NOW)    Collection Time: 18  6:23 AM   Result Value Ref Range    Report       [facility] Emergency Dept.    Test Date:  2018  Pt Name:    DIAMANTE MILLS        Department: ER  MRN:        [id number]                      Room:        12  Gender:     Male                         Technician: RN  :        1990                   Requested By:ANDI VARMA  Order #:    [id number]                    Mehnaz MD:    Measurements  Intervals                                Axis  Rate:       121                          P:          -2  VA:         128                          QRS:        32  QRSD:       90                           T:          65  QT:         324  QTc:        460    Interpretive Statements  SINUS  TACHYCARDIA  Compared to ECG 2018 03:39:01  No significant changes     EKG    Collection Time: 18  6:48 AM   Result Value Ref Range    Report       [facility]    Test Date:  2018  Pt Name:    DIAMANTE MILLS        Department: ER  MRN:        [id number]                      Room:       T624  Gender:     Male                         Technician: CHRISTINE  :        1990                   Requested By:ANDI VARMA  Order #:    [id number]                    Reading MD:    Measurements  Intervals                                Axis  Rate:       119                          P:          7  KS:         125                          QRS:        42  QRSD:       90                           T:          64  QT:         324  QTc:        456    Interpretive Statements  SINUS TACHYCARDIA  Compared to ECG 2018 06:23:40  No significant changes         Imaging/Procedures Review:    Independant Imaging Review: Completed  MR-BRAIN-W/O   Final Result      1.  Small areas of signal abnormality in the bilateral occipital lobe subcortical white matter most consistent with posterior reversible encephalopathy syndrome (PRES).   2.  No evidence of acute territorial infarct, intracranial hemorrhage or mass lesion.   3.  Findings of sinusitis as described above.      Findings were conveyed to Dr. Varma on 18 @ 1592 by Dr. Leroy.               EKG:   EKG Independant Review: Completed  QTc:456, HR: 119 Normal Sinus Rhythm, no ST/T changes     Records reviewed and summarized in current documentation :  Yes       Assessment/Plan     ESRD (end stage renal disease) on dialysis (HCC)- (present on admission)   Assessment & Plan    On HD since    Initial presentation with HTN, no history of diabetes per pt  HD on Tuesday, Thursday,    Nephrology consulted      PRES (posterior reversible encephalopathy syndrome)   Assessment & Plan    MRI findings consistent with PRES   No focal neurological signs    Neurology following  Continue Nicardipine infusion      Hypertensive emergency   Assessment & Plan    Patient has HTN on clonidine patch every Monday, metoprolol, hydralazine and amlodipine. Per pt he has been compliant with his medications.   His home blood pressure is usually around 150s.   States he is compliant with dietary recommendations, did not miss dialysis   Sent to [facility] ED due to headaches, found to have high BP in 230s, CT head possible mass, sent here for MRI   196/138 on arrival to [facility] ED   No focal neurological deficits ,no chest pain, EKG no ischemic changes   Neurology and nephrology consulted - appreciate recommendations   Continue nicardipine infusion   Resumed home medications - metoprolol and hydralazine     Tachycardia   Assessment & Plan    HR in 120s  Denies history of thyroid disease, no drug use  No fever, cough, SOB, diarrhea   Compliant with medications  TSH fT4 pending   Resume home beta blockers and hydralazine      Anemia- (present on admission)   Assessment & Plan    Secondary to CKD     Secondary renal hyperparathyroidism (HCC)- (present on admission)   Assessment & Plan    Continue sevalamer         Anticipated Hospital stay:  >2 midnights        Quality Measures  Quality-Core Measures   Reviewed items::  EKG reviewed, Labs reviewed, Medications reviewed and Radiology images reviewed  Wing catheter::  No Wing  DVT prophylaxis pharmacological::  Heparin    PCP: Mireya Ramsey M.D.

## 2018-12-17 NOTE — ED TRIAGE NOTES
Elier Poseygo  28 y.o. male  Chief Complaint   Patient presents with   • Headache     Pt went to [facility] with uncontrolled HA after dialysis. Pt found to be hypertensive. Upon further eval CT head showed mass. Pt sent for further eval.        [facility] with above CC.  Pt is alert and oriented, speaking in full sentences, follows commands and responds appropriately to questions.

## 2018-12-17 NOTE — ASSESSMENT & PLAN NOTE
MRI findings consistent with PRES   No focal neurological signs   Neurology following  Continue Nicardipine infusion

## 2018-12-17 NOTE — ASSESSMENT & PLAN NOTE
HR in 120s  Denies history of thyroid disease, no drug use  No fever, cough, SOB, diarrhea   Compliant with medications  TSH fT4 normal   Resumed home beta blockers and hydralazine   Can increase beta blockers if needed

## 2018-12-17 NOTE — ED NOTES
Report called to Charan at [facility].  Pt transported via [facility].

## 2018-12-17 NOTE — ED NOTES
Completed MRI screening form, faxed to MRI. Pt denies metal implants aside from dental implant. ERP back to BS.

## 2018-12-17 NOTE — ED NOTES
EKG completed and shown to ERP, pt denies pain at this time, GCS 15, no acute changes noted to neuro status. Bedside report to Celestine DASILVA RN. Pt transported off the unit monitored.

## 2018-12-17 NOTE — PROGRESS NOTES
Pt arrived to unit with Ani Rn and julio RN on monitor. Pt was on 10 mg/hr of nicardipine. Pt still having SBP in the 150-160's and DBP >100's. Pt is A/O X4, VILLALTA and follows, VSS with the exeption of being in ST and HTN. Per MD aleman to titrate nicardipine to maintain DBP <100. 2 RN BS skin check complete with no area of skin breakdown.

## 2018-12-17 NOTE — CONSULTS
"Hospital Neurology Consult:    Referring Physician: Shree Medina M.D.    Reason for consultation: Headache    HPI: Elier Bustillos is a 28 y.o. male with history of end-stage renal disease on hemodialysis, second attention, history of headaches presenting to the hospital for headache and consulted for headache.  The patient states that he woke up yesterday and had a mild headache over the bilateral frontal head region.  He then went for his session of hemodialysis after which she stated that the headache increased in intensity to 10 out of 10.  Usually, he is able to take Tylenol and this will eliminate the headache however yesterday this did not work.  Due to his headache worsening, he came to the hospital for further evaluation during which time he was found to have a blood pressure of systolic.  He stated that he was having problems with his vision which she described as \"very blurry vision\" which would come and go throughout the day.  At the time of evaluation, the patient is back to his baseline and has no complaints.    The patient states that he gets headaches at least twice a month.  He describes these headaches as bifrontal, throbbing, with photophobia, some phonophobia and at times nausea he endorses a history of migraines on his mother however he has not been treated for migraines.  The patient denies any fevers, chills, chest pain, shortness of breath, abdominal pain, nausea vomiting diarrhea.    ROS:     As above. All other systems reviewed and are negative.    Past Medical History:    has a past medical history of Dialysis; Dialysis care; Hypertension; and Renal disorder.    FHx:  family history includes Diabetes in his mother.    SHx:   reports that he has never smoked. He has never used smokeless tobacco. He reports that he does not drink alcohol or use drugs.    Allergies:  No Known Allergies    Medications:    Current Facility-Administered Medications:   •  niCARdipine (CARDENE) " 25 mg in  mL Infusion, 0-15 mg/hr, Intravenous, Continuous, Bryson Ybarra M.D., Last Rate: 100 mL/hr at 12/17/18 0649, 10 mg/hr at 12/17/18 0649  •  senna-docusate (PERICOLACE or SENOKOT S) 8.6-50 MG per tablet 2 Tab, 2 Tab, Oral, BID, 2 Tab at 12/17/18 1019 **AND** polyethylene glycol/lytes (MIRALAX) PACKET 1 Packet, 1 Packet, Oral, QDAY PRN **AND** [DISCONTINUED] magnesium hydroxide (MILK OF MAGNESIA) suspension 30 mL, 30 mL, Oral, QDAY PRN **AND** bisacodyl (DULCOLAX) suppository 10 mg, 10 mg, Rectal, QDAY PRN, Avelino Vargas M.D.  •  Respiratory Care per Protocol, , Nebulization, Continuous RT, Avelino Vargas M.D.  •  acetaminophen (TYLENOL) tablet 650 mg, 650 mg, Oral, Q6HRS PRN, Avelino Vargas M.D.  •  cinacalcet (SENSIPAR) tablet 30 mg, 30 mg, Oral, DAILY, Avelino Vargas M.D., 30 mg at 12/17/18 1019  •  ondansetron (ZOFRAN ODT) dispertab 4 mg, 4 mg, Oral, Q4HRS PRN, Avelino Vargas M.D.  •  sevelamer carbonate (RENVELA) tablet 3,200 mg, 3,200 mg, Oral, TID WITH MEALS, Avelino Vargas M.D.  •  ipratropium-albuterol (DUONEB) nebulizer solution, 3 mL, Nebulization, Q4H PRN (RT), Avelino Vargas M.D.  •  metoprolol SR (TOPROL XL) tablet 100 mg, 100 mg, Oral, DAILY, Avelino Vargas M.D., 100 mg at 12/17/18 1018  •  hydrALAZINE (APRESOLINE) tablet 25 mg, 25 mg, Oral, TID, Avelino Vargas M.D.  •  heparin injection 5,000 Units, 5,000 Units, Subcutaneous, Q8HRS, Avelino Vargas M.D., 5,000 Units at 12/17/18 0900  •  famotidine (PEPCID) tablet 20 mg, 20 mg, Oral, DAILY, Avelino Vargas M.D., 20 mg at 12/17/18 1019    Vitals:   Vitals:    12/17/18 0545 12/17/18 0600 12/17/18 0615 12/17/18 0630   BP:       Pulse: (!) 114  (!) 129 (!) 125   Resp: (!) 22 (!) 21 18 18   Temp:       TempSrc:       SpO2: 94% 93% 94% 95%   Weight:       Height:           Labs:  Lab Results   Component Value Date/Time    PROTHROMBTM 14.3 07/03/2012 03:14 PM    INR  1.10 07/03/2012 03:14 PM      Lab Results   Component Value Date/Time    WBC 8.4 12/17/2018 12:33 AM    RBC 3.36 (L) 12/17/2018 12:33 AM    HEMOGLOBIN 10.7 (L) 12/17/2018 12:33 AM    HEMATOCRIT 30.7 (L) 12/17/2018 12:33 AM    MCV 91.4 12/17/2018 12:33 AM    MCH 31.8 12/17/2018 12:33 AM    MCHC 34.9 12/17/2018 12:33 AM    MPV 9.5 12/17/2018 12:33 AM    NEUTSPOLYS 74.50 (H) 12/17/2018 12:33 AM    LYMPHOCYTES 13.90 (L) 12/17/2018 12:33 AM    MONOCYTES 9.00 12/17/2018 12:33 AM    EOSINOPHILS 1.80 12/17/2018 12:33 AM    BASOPHILS 0.40 12/17/2018 12:33 AM      Lab Results   Component Value Date/Time    SODIUM 135 12/17/2018 12:33 AM    POTASSIUM 4.3 12/17/2018 12:33 AM    CHLORIDE 98 12/17/2018 12:33 AM    CO2 28 12/17/2018 12:33 AM    GLUCOSE 94 12/17/2018 12:33 AM    BUN 26 (H) 12/17/2018 12:33 AM    CREATININE 7.09 (HH) 12/17/2018 12:33 AM      Lab Results   Component Value Date/Time    CHOLSTRLTOT 191 03/16/2011 10:25 AM     03/16/2011 10:25 AM    HDL 36 (L) 03/16/2011 10:25 AM    TRIGLYCERIDE 149 03/16/2011 10:25 AM       Lab Results   Component Value Date/Time    ALKPHOSPHAT 71 12/17/2018 12:33 AM    ASTSGOT 12 12/17/2018 12:33 AM    ALTSGPT 12 12/17/2018 12:33 AM    TBILIRUBIN 1.2 12/17/2018 12:33 AM      Lab Results   Component Value Date/Time    FREET4 0.74 06/28/2011 04:05 PM    FREET4 0.91 03/16/2011 10:25 AM       Imaging/Testing:  MRI of the brain without contrast on 12/17/2018 personally reviewed which showed T2 hyperintensities over the white matter of the bilateral occipital regions which would be consistent with posterior reversible encephalopathy syndrome.    CT head obtained on 12/17/2018 reviewed in chart    Physical Exam:     General: Well-appearing 28-year-old male in no acute distress  Cardio: Normal S1/S2. No peripheral edema.   Pulm: CTAX2. No respiratory distress.   Skin: Warm, dry, no rashes or lesions   Psychiatric: Appropriate affect. No active psychosis.  HEENT: Atraumatic head, normal  sclera and conjunctiva, moist oral mucosa. No lid lag.  Abdomen: Soft, non tender. No masses or hepatosplenomegaly.    Neurologic:  Mental Status:  AAOx4. Able to follow commands/cross midline. Speech fluent/nondysarthric. Language functions intact. No neglect/apraxia.  Cranial Nerves:  PERRL. EOMi. Face symmetric, palate/tongue midline. Visual fields full to confrontation. Facial sensation intact.   Motor:  Normal muscle tone and bulk. Strength is 5/5 throughout. No abnormal movements.  Reflexes:  2/4 throughout. Flexor plantar responses bilaterally. Absent Feng's reflex.  Coordination:  Finger-nose/heel-shin without ataxia or dysmetria.   Sensation:  Normal to light touch throughout  Gait/Station: Deferred    Assessment/Plan:    Elier Bustillos is a 28 y.o. male with history of end-stage renal disease on hemodialysis, second attention, history of headaches presenting to the hospital for headache and consulted for headache.  MRI is consistent with posterior reversible encephalopathy syndrome which would be consistent with the patient's severe headache with vision changes however additionally the patient does appear to have migraines which occur at least twice a month.  This was not the case with this last episode however as his migraines usually claire with over-the-counter medications and the headache for which she was admitted for did not.  Etiology of posterior reversible encephalopathy syndrome is likely secondary to increased blood pressure.    Plan:  1.  Management of blood pressure per primary team.  Plan to slowly aim for normotension.  2.  The patient will need an MRI without contrast in approximately 6 weeks to monitor for resolution of white matter changes  3.  As of this time, etiology of the patient's high blood pressure and renal failure is in question.  Consider nephrology for assistance in blood pressure control as an outpatient.  4.  Continue close neurologic observation as  posterior reversible encephalopathy syndrome may also cause seizures  5.  We will continue to follow  7.  Plan discussed with consulting physician and patient's nurse.       Jason Calhoun M.D., Diplomat of the American Board of Psychiatry and Neurology  [facility]

## 2018-12-17 NOTE — CONSULTS
"BRIEF TELEPHONE NEUROLOGY CONSULTATION NOTE:     The following is a brief summary of the phone consultation. I could not evaluate the patient myself since I did not have any face-to-face access to the patient. All examination and evaluation of the patient and information gathering from the patient and/or the family is entirely done solely by the requesting physician, Dr. Garrett Santos M.D.  My recommendations were based on the information provided to me over the phone.     26 yr old man w/ PMH of chronic intractable HTN complicated by renal failure requiring HD x3 per week presenting w/ gradual onset \"worst headache\" w/ bilateral blurry visions.  Fundoscopic exam by Dr. Santos reported uncertain of papilledema.  No other neurological deficit reported by Dr. Santos.  No sign of meningitis. /130 which is said to be his baseline.  CT head reported a small area of hypodensity in the R occipital lobe and recommended MRI brain w/ and w/o contrast for better characterization. Dr. Santos paged me at 01:10 am. I called back at 01:14 am.     My review of CT head showed indeed a small area of hypodensity w/ vasogenic edema and not involving the gray matter. The first suspicion is PRES given the gradual onset symptoms in the setting of persistent HTN and ESRD.  Also possible is a mass or imaging artifact.  Acute stroke is less likely since gray matter is spared and the symptoms were gradual onset.  Idiopathic intracranial hypertension (IIH, previously called pseudotumor cerebri) would not cause a lesion on the brain imaging and less common to have blurry visions on the first day.      Recommendations:   - Careful fundoscopic exam to r/o papilledema.  If positive, suspect IIH and perform an LP measuring the opening pressure.  It should be elevated if truly IIH. However, since if there is a mass there it may increase the risk of herniation during an LP, it would be prudent to obtain MRI brain w/ and w/o contrast first " ASAP.    - If MRI cannot be done at the facility, he should be transferred to a nearest ED where it can be done STAT.   - Please call oncall neurologist back for further recommendations once MRI brain is done, without waiting for the radiologist to read it since typically MRIs are not read until 7-9 am here.     The above was discussed with Dr. Garrett Santos M.D.    Rebecca Anna M.D.  Neurology

## 2018-12-17 NOTE — ASSESSMENT & PLAN NOTE
On HD since 2011   Initial presentation with HTN, no history of diabetes per pt  HD on Tuesday, Thursday, Sunday

## 2018-12-17 NOTE — ED PROVIDER NOTES
ED Provider Note    CHIEF COMPLAINT  Chief Complaint   Patient presents with   • Headache       HPI  Elier Bustillos is a 28 y.o. male who presents for gradual onset severe headache earlier this morning.  Patient notes he occasionally gets headaches however this one is unrelenting.  He notes the onset was gradual and it is mostly in the frontal region of his head.  He has not had any vomiting but has noted nausea.  He went to dialysis earlier today where he received medication to help control his blood pressure however this did not alleviate the symptoms and he was sent here after dialysis.  He denies any fevers, chills, neck pain, significant visual symptoms, chest pain, abdominal pain, or diarrhea.  He has not had any recent trauma.    REVIEW OF SYSTEMS  Constitutional: No fevers, weakness, or chills  Skin: No rashes, abrasions, lacerations, or pruritus  HEENT: No ear pain, ringing in ears, or decreased hearing. No sore throat, runny nose, sores, trouble swallowing, trouble speaking.  Neck: No neck pain, stiffness, or masses.  Chest: No pain or rashes  Pulm: No shortness of breath, cough, wheezing, stridor, or pain with inspiration/expiration  Gastrointestinal: No nausea, vomiting, diarrhea, constipation  Musculoskeletal: No recent trauma, pain, swelling, weakness  Neurologic: No sensory or motor changes. No confusion or disorientation.  Heme: No bleeding or bruising problems.   Immuno: No hx of recurrent infections      PAST MEDICAL HISTORY   has a past medical history of Dialysis; Dialysis care; Hypertension; and Renal disorder.    SOCIAL HISTORY  Social History     Social History Main Topics   • Smoking status: Never Smoker   • Smokeless tobacco: Never Used      Comment: quit 1/2009   • Alcohol use No   • Drug use: No   • Sexual activity: Not on file       SURGICAL HISTORY   has a past surgical history that includes other orthopedic surgery; av fistula creation (12/29/2010); av fistula creation  (6/7/2012); cath placement (6/7/2012); recovery (7/3/2012); recovery (7/20/2012); av fistula creation (8/21/2012); av fistula revision (Right, 11/28/2016); and exploratory laparotomy (11/11/2018).    CURRENT MEDICATIONS  Home Medications    **Home medications have not yet been reviewed for this encounter**         ALLERGIES  No Known Allergies    PHYSICAL EXAM  VITAL SIGNS: BP (!) 197/126   Pulse (!) 104   Temp 37.6 °C (99.6 °F) (Temporal)   Resp 18   Wt 72.7 kg (160 lb 4.4 oz)   SpO2 97%   BMI 23.67 kg/m²    Gen: Alert in no apparent distress.  HEENT: No signs of trauma, Bilateral external ears normal, Nose normal. Conjunctiva normal, Non-icteric.  PERRLA, EOMI, red reflex normal bilaterally, no obvious retinal hemorrhages or papilledema  Neck:  No tenderness, Supple, No masses  Lymphatic: No cervical lymphadenopathy noted.   Cardiovascular: Regular rate and rhythm, no murmurs.   Thorax & Lungs: Normal breath sounds, No respiratory distress, No wheezing bilateral chest rise  Abdomen: Bowel sounds normal, Soft, No tenderness, No masses, No pulsatile masses. No Guarding or rebound  Skin: Warm, Dry, No erythema, No rash.   Extremities: Intact distal pulses, No edema.  Right upper extremity fistula with positive thrill  Neurologic: Alert , no facial droop, grossly normal coordination and strength  Psychiatric: Affect normal, Judgment normal, Mood normal.       INITIAL IMPRESSION  Patient arrives for evaluation of headache which does not appear to be sudden onset and is not associated with any significant infectious prodrome.  I very much doubt subarachnoid hemorrhage or meningitis/encephalitis however given his blood pressure I will CT his head as this is the worst headache he has ever had    LABS  Results for orders placed or performed during the hospital encounter of 12/16/18   CBC WITH DIFFERENTIAL   Result Value Ref Range    WBC 8.4 4.8 - 10.8 K/uL    RBC 3.36 (L) 4.70 - 6.10 M/uL    Hemoglobin 10.7 (L) 14.0  - 18.0 g/dL    Hematocrit 30.7 (L) 42.0 - 52.0 %    MCV 91.4 81.4 - 97.8 fL    MCH 31.8 27.0 - 33.0 pg    MCHC 34.9 33.7 - 35.3 g/dL    RDW 42.8 35.9 - 50.0 fL    Platelet Count 144 (L) 164 - 446 K/uL    MPV 9.5 9.0 - 12.9 fL    Neutrophils-Polys 74.50 (H) 44.00 - 72.00 %    Lymphocytes 13.90 (L) 22.00 - 41.00 %    Monocytes 9.00 0.00 - 13.40 %    Eosinophils 1.80 0.00 - 6.90 %    Basophils 0.40 0.00 - 1.80 %    Immature Granulocytes 0.40 0.00 - 0.90 %    Nucleated RBC 0.00 /100 WBC    Neutrophils (Absolute) 6.24 1.82 - 7.42 K/uL    Lymphs (Absolute) 1.16 1.00 - 4.80 K/uL    Monos (Absolute) 0.75 0.00 - 0.85 K/uL    Eos (Absolute) 0.15 0.00 - 0.51 K/uL    Baso (Absolute) 0.03 0.00 - 0.12 K/uL    Immature Granulocytes (abs) 0.03 0.00 - 0.11 K/uL    NRBC (Absolute) 0.00 K/uL   COMP METABOLIC PANEL   Result Value Ref Range    Sodium 135 135 - 145 mmol/L    Potassium 4.3 3.6 - 5.5 mmol/L    Chloride 98 96 - 112 mmol/L    Co2 28 20 - 33 mmol/L    Anion Gap 9.0 0.0 - 11.9    Glucose 94 65 - 99 mg/dL    Bun 26 (H) 8 - 22 mg/dL    Creatinine 7.09 (HH) 0.50 - 1.40 mg/dL    Calcium 9.5 8.4 - 10.2 mg/dL    AST(SGOT) 12 12 - 45 U/L    ALT(SGPT) 12 2 - 50 U/L    Alkaline Phosphatase 71 30 - 99 U/L    Total Bilirubin 1.2 0.1 - 1.5 mg/dL    Albumin 3.6 3.2 - 4.9 g/dL    Total Protein 7.2 6.0 - 8.2 g/dL    Globulin 3.6 (H) 1.9 - 3.5 g/dL    A-G Ratio 1.0 g/dL   LACTIC ACID   Result Value Ref Range    Lactic Acid 0.8 0.5 - 2.0 mmol/L   URINALYSIS CULTURE, IF INDICATED   Result Value Ref Range    Color Yellow     Character Clear     Specific Gravity 1.015 <1.035    Ph >=9.0 (A) 5.0 - 8.0    Glucose 250 (A) Negative mg/dL    Ketones Trace (A) Negative mg/dL    Protein 100 (A) Negative mg/dL    Bilirubin Negative Negative    Nitrite Negative Negative    Leukocyte Esterase Negative Negative    Occult Blood Small (A) Negative    Micro Urine Req Microscopic    ESTIMATED GFR   Result Value Ref Range    GFR If  11 (A) >60  mL/min/1.73 m 2    GFR If Non African American 9 (A) >60 mL/min/1.73 m 2   URINE MICROSCOPIC (W/UA)   Result Value Ref Range    WBC 2-5 (A) /hpf    RBC 2-5 (A) /hpf    Bacteria Rare (A) None /hpf    Epithelial Cells Rare Few /hpf    Mucous Threads Few /hpf       RADIOLOGY  CT-HEAD W/O   Final Result         1.  Area of low-density in the right occipital lobe posteriorly, recommend follow-up MRI of the brain without and with contrast for further characterization.          Reevaluation   Time:1:41 AM  Vital signs: Noted per nursing note, patient still hypertensive  Assessment:Discussed case with neurology and Dr. Tere Carty emergency room physician, patient will be transferred to [facility] for MRI with and without contrast.  Patient states understanding the findings and the need for transfer.  States his headache is improving, no new neurologic symptoms    Critical Care Note  Upon my evaluation, this patient had high probability of imminent and life-threatening deterioration due to markedly elevated blood pressure and neurologic symptoms, which required my direct attention, intervention, and personal management. I personally provided 35 minutes of critical care time exclusive of time spent on separately billable procedures. Time includes review of laboratory data, radiology results, discussion with consultants, and monitoring for potential decompensation.     COURSE & MEDICAL DECISION MAKING  Pertinent Labs & Imaging studies reviewed. (See chart for details)  Patient arrives for evaluation of gradual onset headache since this morning and blurry vision.  Patient has an abnormal CT which was discussed with the on-call neurologist who felt that several issues need to be resolved.  MRI with and without contrast will need to be obtained to determine if there is a mass in this area.  As this cannot be done at our facility, the patient will be transferred to [facility] for emergent MRI with and without contrast.   Should this be normal, and the patient's pain persisted, idiopathic intracranial hypertension may need to be ruled out with a high volume lumbar puncture.  Possibility exists that CT imaging abnormalities are artifact.  It is notable that the patient's blood pressure is always excessively high and likely his norm.  Should his pain be controlled after the MRI and there were no neurosurgical or emergent neurology issues, it may be that the patient can be discharged home without lumbar puncture as I feel idiopathic intracranial hypertension is far less likely regardless.    FINAL IMPRESSION  1. Intractable headache, unspecified chronicity pattern, unspecified headache type    2. Hypertensive urgency        Electronically signed by: Garrett Santos, 12/16/2018 11:00 PM

## 2018-12-17 NOTE — ED NOTES
Pt returned from imaging, resting in [facility], no behavioral indicators of pain, will continue to monitor.

## 2018-12-17 NOTE — PROGRESS NOTES
Assumed care of patient from NAUN Rajan. Patient resting in bed, updated on plan of care, all questions answered.

## 2018-12-17 NOTE — LETTER
December 18, 2018    To Whom It May Concern:         This is confirmation that Elier Bustillos was admitted to [facility] on 12/17/2018 and discharged on 12/18/2018.   He is ok to return to work on 12/24/2018     Sincerely,    Avelino Vargas M.D.

## 2018-12-17 NOTE — PROGRESS NOTES
2 RN skin check completed on arrival to the unit with Celestine MAGALLON. Skin assessed front and back, heels, elbows and sacral area inspect  All skin intact.      · 2 RN skin check complete.   · Devices in place BP cuff  · Skin assessed under devices YES  · Confirmed pressure ulcers found on N/A  · New potential pressure ulcers noted on N/A. Wound consult placed and wound reported.  · The following interventions in place Q 2 hrs turn, 2 rn skin assessment q shift.

## 2018-12-17 NOTE — CONSULTS
[facility] Nephrology Consult Note    Patient seen and examined, please see my dictated consult note for full details.   28yoM with PMH significant for ESRD on nocturnal HD Sun/Tu/Th, HTN, Anemia secondary to CKD, renal osteodystrophy, admitted with intractable headache and hypertensive emergency and MRI showing PRES.  Assessment/Plan:  1. ESRD--on nocturnal HD Sun/Tu/Th, had HD last night  --No HD today, will plan for HD tomorrow  --Dose adjust all meds for decreased GFR  2. HTN Emergency--diastolic 's on admission, severe headache and MRI shows PRES  --Nicardipine drip started  --Continue home BP meds  --Avoid rapid lowering of BP, goal BP ~160's systolic and ~100's diastolic  --Neurology evaluating PRES  3. Anemia--secondary to CKD  --No need for MARIANA at this time  4. Renal Osteodystrophy--continue phos binders  5. Secondary Hyperparathyroidism--continue cinacalcet  6. Mild Protein-Calorie Malnutrition--no dietary protein restrictions    **Discussed above with [employer] Resident    Report Confirmation #[id number]

## 2018-12-18 VITALS
HEIGHT: 69 IN | TEMPERATURE: 98.5 F | SYSTOLIC BLOOD PRESSURE: 171 MMHG | DIASTOLIC BLOOD PRESSURE: 134 MMHG | WEIGHT: 161.16 LBS | BODY MASS INDEX: 23.87 KG/M2 | OXYGEN SATURATION: 96 % | RESPIRATION RATE: 16 BRPM | HEART RATE: 95 BPM

## 2018-12-18 LAB
ANION GAP SERPL CALC-SCNC: 12 MMOL/L (ref 0–11.9)
BASOPHILS # BLD AUTO: 0.4 % (ref 0–1.8)
BASOPHILS # BLD: 0.03 K/UL (ref 0–0.12)
BUN SERPL-MCNC: 48 MG/DL (ref 8–22)
CALCIUM SERPL-MCNC: 9.9 MG/DL (ref 8.5–10.5)
CHLORIDE SERPL-SCNC: 101 MMOL/L (ref 96–112)
CO2 SERPL-SCNC: 25 MMOL/L (ref 20–33)
CREAT SERPL-MCNC: 10.03 MG/DL (ref 0.5–1.4)
EKG IMPRESSION: NORMAL
EOSINOPHIL # BLD AUTO: 0.14 K/UL (ref 0–0.51)
EOSINOPHIL NFR BLD: 1.9 % (ref 0–6.9)
ERYTHROCYTE [DISTWIDTH] IN BLOOD BY AUTOMATED COUNT: 43.8 FL (ref 35.9–50)
EST. AVERAGE GLUCOSE BLD GHB EST-MCNC: 97 MG/DL
GLUCOSE SERPL-MCNC: 94 MG/DL (ref 65–99)
HBA1C MFR BLD: 5 % (ref 0–5.6)
HCT VFR BLD AUTO: 31.5 % (ref 42–52)
HGB BLD-MCNC: 10.6 G/DL (ref 14–18)
IMM GRANULOCYTES # BLD AUTO: 0.02 K/UL (ref 0–0.11)
IMM GRANULOCYTES NFR BLD AUTO: 0.3 % (ref 0–0.9)
LYMPHOCYTES # BLD AUTO: 1.4 K/UL (ref 1–4.8)
LYMPHOCYTES NFR BLD: 18.8 % (ref 22–41)
MCH RBC QN AUTO: 31.8 PG (ref 27–33)
MCHC RBC AUTO-ENTMCNC: 33.7 G/DL (ref 33.7–35.3)
MCV RBC AUTO: 94.6 FL (ref 81.4–97.8)
MONOCYTES # BLD AUTO: 0.65 K/UL (ref 0–0.85)
MONOCYTES NFR BLD AUTO: 8.7 % (ref 0–13.4)
NEUTROPHILS # BLD AUTO: 5.21 K/UL (ref 1.82–7.42)
NEUTROPHILS NFR BLD: 69.9 % (ref 44–72)
NRBC # BLD AUTO: 0 K/UL
NRBC BLD-RTO: 0 /100 WBC
PLATELET # BLD AUTO: 130 K/UL (ref 164–446)
PMV BLD AUTO: 9.6 FL (ref 9–12.9)
POTASSIUM SERPL-SCNC: 4.6 MMOL/L (ref 3.6–5.5)
RBC # BLD AUTO: 3.33 M/UL (ref 4.7–6.1)
SODIUM SERPL-SCNC: 138 MMOL/L (ref 135–145)
WBC # BLD AUTO: 7.5 K/UL (ref 4.8–10.8)

## 2018-12-18 PROCEDURE — 80048 BASIC METABOLIC PNL TOTAL CA: CPT

## 2018-12-18 PROCEDURE — 85025 COMPLETE CBC W/AUTO DIFF WBC: CPT

## 2018-12-18 PROCEDURE — 700105 HCHG RX REV CODE 258: Performed by: INTERNAL MEDICINE

## 2018-12-18 PROCEDURE — 5A1D70Z PERFORMANCE OF URINARY FILTRATION, INTERMITTENT, LESS THAN 6 HOURS PER DAY: ICD-10-PCS | Performed by: INTERNAL MEDICINE

## 2018-12-18 PROCEDURE — 700102 HCHG RX REV CODE 250 W/ 637 OVERRIDE(OP): Performed by: INTERNAL MEDICINE

## 2018-12-18 PROCEDURE — A9270 NON-COVERED ITEM OR SERVICE: HCPCS | Performed by: INTERNAL MEDICINE

## 2018-12-18 PROCEDURE — 99291 CRITICAL CARE FIRST HOUR: CPT | Performed by: INTERNAL MEDICINE

## 2018-12-18 PROCEDURE — 700111 HCHG RX REV CODE 636 W/ 250 OVERRIDE (IP): Performed by: INTERNAL MEDICINE

## 2018-12-18 PROCEDURE — 700101 HCHG RX REV CODE 250: Performed by: INTERNAL MEDICINE

## 2018-12-18 PROCEDURE — 99231 SBSQ HOSP IP/OBS SF/LOW 25: CPT | Performed by: PSYCHIATRY & NEUROLOGY

## 2018-12-18 PROCEDURE — 90935 HEMODIALYSIS ONE EVALUATION: CPT

## 2018-12-18 RX ORDER — AMLODIPINE BESYLATE 10 MG/1
10 TABLET ORAL DAILY
Status: DISCONTINUED | OUTPATIENT
Start: 2018-12-18 | End: 2018-12-18 | Stop reason: HOSPADM

## 2018-12-18 RX ORDER — LISINOPRIL 10 MG/1
10 TABLET ORAL DAILY
Qty: 90 TAB | Refills: 0 | Status: SHIPPED | OUTPATIENT
Start: 2018-12-19 | End: 2018-12-18

## 2018-12-18 RX ORDER — HEPARIN SODIUM 1000 [USP'U]/ML
1500 INJECTION, SOLUTION INTRAVENOUS; SUBCUTANEOUS
Status: DISCONTINUED | OUTPATIENT
Start: 2018-12-18 | End: 2018-12-18 | Stop reason: HOSPADM

## 2018-12-18 RX ORDER — LISINOPRIL 10 MG/1
10 TABLET ORAL DAILY
Qty: 90 TAB | Refills: 0 | Status: SHIPPED | OUTPATIENT
Start: 2018-12-19 | End: 2021-09-29

## 2018-12-18 RX ORDER — LISINOPRIL 10 MG/1
10 TABLET ORAL
Status: DISCONTINUED | OUTPATIENT
Start: 2018-12-18 | End: 2018-12-18 | Stop reason: HOSPADM

## 2018-12-18 RX ADMIN — CLONIDINE 1 PATCH: 0.2 PATCH TRANSDERMAL at 09:43

## 2018-12-18 RX ADMIN — SEVELAMER CARBONATE 3200 MG: 800 TABLET, FILM COATED ORAL at 11:20

## 2018-12-18 RX ADMIN — LISINOPRIL 10 MG: 10 TABLET ORAL at 11:20

## 2018-12-18 RX ADMIN — AMLODIPINE BESYLATE 10 MG: 10 TABLET ORAL at 08:19

## 2018-12-18 RX ADMIN — HYDRALAZINE HYDROCHLORIDE 25 MG: 25 TABLET, FILM COATED ORAL at 05:03

## 2018-12-18 RX ADMIN — HEPARIN SODIUM 1500 UNITS: 1000 INJECTION, SOLUTION INTRAVENOUS; SUBCUTANEOUS at 10:27

## 2018-12-18 RX ADMIN — SEVELAMER CARBONATE 3200 MG: 800 TABLET, FILM COATED ORAL at 17:01

## 2018-12-18 RX ADMIN — SEVELAMER CARBONATE 3200 MG: 800 TABLET, FILM COATED ORAL at 08:19

## 2018-12-18 RX ADMIN — HEPARIN SODIUM 5000 UNITS: 5000 INJECTION, SOLUTION INTRAVENOUS; SUBCUTANEOUS at 14:00

## 2018-12-18 RX ADMIN — HYDRALAZINE HYDROCHLORIDE 25 MG: 25 TABLET, FILM COATED ORAL at 11:20

## 2018-12-18 RX ADMIN — STANDARDIZED SENNA CONCENTRATE AND DOCUSATE SODIUM 2 TABLET: 8.6; 5 TABLET, FILM COATED ORAL at 05:03

## 2018-12-18 RX ADMIN — HYDRALAZINE HYDROCHLORIDE 25 MG: 25 TABLET, FILM COATED ORAL at 17:01

## 2018-12-18 RX ADMIN — METOPROLOL SUCCINATE 100 MG: 25 TABLET, EXTENDED RELEASE ORAL at 05:03

## 2018-12-18 RX ADMIN — FAMOTIDINE 20 MG: 20 TABLET ORAL at 05:03

## 2018-12-18 RX ADMIN — HEPARIN SODIUM 5000 UNITS: 5000 INJECTION, SOLUTION INTRAVENOUS; SUBCUTANEOUS at 05:03

## 2018-12-18 RX ADMIN — SODIUM CHLORIDE 6 MG/HR: 9 INJECTION, SOLUTION INTRAVENOUS at 06:40

## 2018-12-18 RX ADMIN — CINACALCET HYDROCHLORIDE 30 MG: 30 TABLET, COATED ORAL at 05:03

## 2018-12-18 RX ADMIN — SODIUM CHLORIDE 6 MG/HR: 9 INJECTION, SOLUTION INTRAVENOUS at 00:58

## 2018-12-18 ASSESSMENT — ENCOUNTER SYMPTOMS
DIZZINESS: 0
HEADACHES: 1
FOCAL WEAKNESS: 0
WEAKNESS: 0
MUSCULOSKELETAL NEGATIVE: 1
CHILLS: 0
PSYCHIATRIC NEGATIVE: 1
RESPIRATORY NEGATIVE: 1
CARDIOVASCULAR NEGATIVE: 1
FEVER: 0
NEUROLOGICAL NEGATIVE: 1
GASTROINTESTINAL NEGATIVE: 1
CONSTITUTIONAL NEGATIVE: 1
LOSS OF CONSCIOUSNESS: 0
EYES NEGATIVE: 1

## 2018-12-18 ASSESSMENT — PAIN SCALES - GENERAL
PAINLEVEL_OUTOF10: 0

## 2018-12-18 NOTE — ASSESSMENT & PLAN NOTE
Dialysis Tuesday Thursday Sunday  No indication for emergent dialysis today  Nephrology consultation to manage dialysis while inpatient  Review antihypertensive regimen nephrology when they are available, they undoubtedly be following him long-term as an outpatient    Dialysis later today ,

## 2018-12-18 NOTE — ASSESSMENT & PLAN NOTE
Chronic and secondary to end-stage renal disease  Dialysis team monitoring for need for iron replacement therapy and Procrit therapy

## 2018-12-18 NOTE — PROGRESS NOTES
HD ordered by Dr. Beckman. Treatment started at 1032 and ended at 1332.     Total Net UF 2000 mL.    Pt tolerated treatment well. Pt hypertensive all throughout treatment, was hypertensive prior to start of tx. See paper flow sheet for details. Cannulation needles taken out, held pressure for 10 min and placed gauze dressing with no bleeding. JONATHAN AVF + for bruit/thrill. Report given to DAVID Pastor RN.

## 2018-12-18 NOTE — PROGRESS NOTES
[facility] Neurology Progress Note:     Interval History: No acute events overnight.  No complaints today.  States headache has resolved.  States no visual complaints.    Subjective: No new complaints.    Objective:   Vitals:    12/18/18 0300 12/18/18 0400 12/18/18 0500 12/18/18 0600   BP:       Pulse: (!) 124 (!) 116 (!) 119 (!) 109   Resp: 13 15 14 (!) 10   Temp:  36.9 °C (98.4 °F)  36.7 °C (98.1 °F)   TempSrc:  Temporal  Temporal   SpO2: 96% 96% 93% 95%   Weight:  73.1 kg (161 lb 2.5 oz)     Height:           Labs:     Lab Results   Component Value Date/Time    PROTHROMBTM 14.3 07/03/2012 03:14 PM    INR 1.10 07/03/2012 03:14 PM      Lab Results   Component Value Date/Time    WBC 7.5 12/18/2018 04:36 AM    RBC 3.33 (L) 12/18/2018 04:36 AM    HEMOGLOBIN 10.6 (L) 12/18/2018 04:36 AM    HEMATOCRIT 31.5 (L) 12/18/2018 04:36 AM    MCV 94.6 12/18/2018 04:36 AM    MCH 31.8 12/18/2018 04:36 AM    MCHC 33.7 12/18/2018 04:36 AM    MPV 9.6 12/18/2018 04:36 AM    NEUTSPOLYS 69.90 12/18/2018 04:36 AM    LYMPHOCYTES 18.80 (L) 12/18/2018 04:36 AM    MONOCYTES 8.70 12/18/2018 04:36 AM    EOSINOPHILS 1.90 12/18/2018 04:36 AM    BASOPHILS 0.40 12/18/2018 04:36 AM      Lab Results   Component Value Date/Time    SODIUM 138 12/18/2018 04:36 AM    POTASSIUM 4.6 12/18/2018 04:36 AM    CHLORIDE 101 12/18/2018 04:36 AM    CO2 25 12/18/2018 04:36 AM    GLUCOSE 94 12/18/2018 04:36 AM    BUN 48 (H) 12/18/2018 04:36 AM    CREATININE 10.03 (HH) 12/18/2018 04:36 AM      Lab Results   Component Value Date/Time    CHOLSTRLTOT 191 03/16/2011 10:25 AM     03/16/2011 10:25 AM    HDL 36 (L) 03/16/2011 10:25 AM    TRIGLYCERIDE 149 03/16/2011 10:25 AM       Lab Results   Component Value Date/Time    ALKPHOSPHAT 71 12/17/2018 12:33 AM    ASTSGOT 12 12/17/2018 12:33 AM    ALTSGPT 12 12/17/2018 12:33 AM    TBILIRUBIN 1.2 12/17/2018 12:33 AM        Imaging/Testing:   No new neuro imaging to review.    Physical Exam:      General: Well-appearing  28-year-old male in no acute distress  Cardio: Normal S1/S2. No peripheral edema.   Pulm: CTAX2. No respiratory distress.   Skin: Warm, dry, no rashes or lesions   Psychiatric: Appropriate affect. No active psychosis.  HEENT: Atraumatic head, normal sclera and conjunctiva, moist oral mucosa. No lid lag.  Abdomen: Soft, non tender. No masses or hepatosplenomegaly.     Neurologic:  Mental Status:  AAOx4. Able to follow commands/cross midline. Speech fluent/nondysarthric. Language functions intact. No neglect/apraxia.  Cranial Nerves:  PERRL. EOMi. Face symmetric, palate/tongue midline. Visual fields full to confrontation. Facial sensation intact.   Motor:  Normal muscle tone and bulk. Strength is 5/5 throughout. No abnormal movements.  Reflexes:  2/4 throughout. Flexor plantar responses bilaterally. Absent Feng's reflex.  Coordination:  Finger-nose/heel-shin without ataxia or dysmetria.   Sensation:  Normal to light touch throughout  Gait/Station: Deferred    Patient examined on 12/18/2018 compared to physical exam in 12/17/2018 no significant clinical change was seen.    Assessment/Plan:    Elier Bustillos is a 28 y.o. male with history of end-stage renal disease on hemodialysis, hypertension, history of headaches presenting to the hospital for headache and consulted for headache.  MRI is consistent with posterior reversible encephalopathy syndrome which would be consistent with the patient's severe headache with vision changes however additionally the patient does appear to have migraines which occur at least twice a month.  This was not the case with this last episode however as his migraines usually claire with over-the-counter medications and the headache for which she was admitted for did not.  Etiology of posterior reversible encephalopathy syndrome is likely secondary to increased blood pressure.     Plan:  1.  Management of blood pressure per primary team.  Plan to slowly aim for  normotension.  2.  The patient will need an MRI without contrast in approximately 6 weeks to monitor for resolution of white matter changes  3.  The patient will need follow-up with neurology as an outpatient  4.  Plan discussed with consulting physician and patient's nurse.  5.  Neurology signing off for now.  Please call with any further questions or concerns.    Jason Calhoun M.D., Diplomat of the American Board of Psychiatry and Neurology  [facility] Neurology

## 2018-12-18 NOTE — PROGRESS NOTES
[facility] Nephrology Daily Progress Note    Date of Service  12/18/2018    Chief Complaint  Follow up management of ESRD    Interval Problem Update  This is a 28-year-old  male with past medical history significant for end-stage renal disease, on chronic hemodialysis, nocturnal dialysis on Sunday, Tuesday, Thursdays via a right arm AV fistula under the care of [facility] Nephrology, hypertension, anemia secondary to chronic kidney disease, renal osteodystrophy, was admitted with intractable headache and hypertensive emergency with MRI showing PRES.  The patient reports that he has been compliant with his outpatient dialysis treatments and he completed his nocturnal hemodialysis last night on Sunday as regularly scheduled; however, he did notice a frontal headache in the morning prior to dialysis and this worsened to the point that after dialysis, he developed a 10/10 headache.  He initially presented to the [facility] Emergency Room where a CT scan of the head revealed an area of low density in the right occipital lobe posteriorly with recommendation for followup MRI.  The patient was transferred to [facility] for MRI to further evaluate this.  MRI without gadolinium on 12/17/2018 revealed small areas of signal abnormality in the bilateral occipital lobe subcortical white matter, most consistent with posterior reversible encephalopathy syndrome (PRES).  There is no evidence of acute territorial infarct, intracranial hemorrhage or mass lesion.  Patient was admitted to the intensive care unit   and was started on a nicardipine drip.  His blood pressure initially in the emergency room was in the 190s/130s.  Patient reports that his headache is till present, although it is a little bit better.  The patient was recently admitted to [facility] last month in 11/2018 with severe abdominal pain and concern for bowel ischemia.  He underwent an exploratory laparotomy   at that  time that was negative for ischemia, but did show bowel edema.  At that time, it was noted that his blood pressure was difficult to control as well.  Patient reports that he is compliant with his outpatient blood pressure medications.    DAILY NEPHROLOGY SUMMARY  12/18/18--No events, feels better, reports headache and vision changes resolved, BP improved but still requiring nicardipine drip, clonidine patch removed in ER on admission, still tachy, ambulating without issues, to have HD today    Review of Systems  Review of Systems   Constitutional: Negative for chills, fever and malaise/fatigue.   HENT: Negative.    Eyes: Negative.    Respiratory: Negative.    Cardiovascular: Negative.    Gastrointestinal: Negative.    Genitourinary: Negative.    Musculoskeletal: Negative.    Skin: Negative.    Neurological: Positive for headaches. Negative for dizziness, focal weakness, loss of consciousness and weakness.   Endo/Heme/Allergies: Negative.    Psychiatric/Behavioral: Negative.         Physical Exam  Temp:  [36.7 °C (98.1 °F)-37.2 °C (98.9 °F)] 36.9 °C (98.5 °F)  Pulse:  [] 100  Resp:  [10-21] 16    Physical Exam   Constitutional: He is oriented to person, place, and time. He appears well-developed and well-nourished. No distress.   HENT:   Head: Normocephalic and atraumatic.   Mouth/Throat: No oropharyngeal exudate.   Eyes: Pupils are equal, round, and reactive to light. Conjunctivae and EOM are normal. No scleral icterus.   Neck: Normal range of motion. Neck supple. No thyromegaly present.   Cardiovascular: Normal rate and regular rhythm.    No murmur heard.  Pulmonary/Chest: Effort normal and breath sounds normal. No respiratory distress. He has no wheezes.   Abdominal: Soft. Bowel sounds are normal. He exhibits no distension.   Musculoskeletal: Normal range of motion. He exhibits no edema, tenderness or deformity.   Neurological: He is alert and oriented to person, place, and time. He exhibits normal muscle  tone.   Skin: Skin is warm and dry. No erythema.   Psychiatric: He has a normal mood and affect. His behavior is normal.   Nursing note and vitals reviewed.      Fluids    Intake/Output Summary (Last 24 hours) at 12/18/18 1135  Last data filed at 12/18/18 1000   Gross per 24 hour   Intake           1981.5 ml   Output             1050 ml   Net            931.5 ml       Laboratory  Recent Labs      12/17/18   0033  12/18/18   0436   WBC  8.4  7.5   RBC  3.36*  3.33*   HEMOGLOBIN  10.7*  10.6*   HEMATOCRIT  30.7*  31.5*   MCV  91.4  94.6   MCH  31.8  31.8   MCHC  34.9  33.7   RDW  42.8  43.8   PLATELETCT  144*  130*   MPV  9.5  9.6     Recent Labs      12/17/18   0033  12/18/18   0436   SODIUM  135  138   POTASSIUM  4.3  4.6   CHLORIDE  98  101   CO2  28  25   GLUCOSE  94  94   BUN  26*  48*   CREATININE  7.09*  10.03*   CALCIUM  9.5  9.9                   Imaging    Assessment/Plan  No new Assessment & Plan notes have been filed under this hospital service since the last note was generated.  Service: Nephrology     Assessment/Plan:  1. ESRD--on nocturnal HD Sun/Tu/Th  --HD today  --Dose adjust all meds for decreased GFR  2. HTN Emergency--MRI shows PRES  --Still on nicardipine drip  --Recommend resuming clonidine patch and home BP meds  --Titrate down on nicardipine drip and increase B-Blocker if needed  --If BP remains elevated can also titrate up on hydralazine  --PRN IV labetalol and IV hydralazine  --Neurology evaluating PRES  3. Anemia--secondary to CKD  --No need for MARIANA at this time  4. Renal Osteodystrophy--continue phos binders  5. Secondary Hyperparathyroidism--continue cinacalcet  6. Mild Protein-Calorie Malnutrition--no dietary protein restrictions     **Discussed above with [facility] Resident and ICU RN

## 2018-12-18 NOTE — DISCHARGE SUMMARY
Internal Medicine Discharge Summary  Note Author: Avelino Vargas M.D.       Admit Date:  12/17/2018       Discharge Date:   12/18/2018     Service:   [facility] Internal Medicine [facility] Team  Attending Physician(s):   Dr Curt Medina        Senior Resident(s):   Dr Vargas  PCP: Mireya Ramsey M.D.      Primary Diagnosis:   Hypertensive emergency  PRES syndrome     Secondary Diagnoses:                Principal Problem:    Hypertensive emergency POA: Yes  Active Problems:    Tachycardia POA: Yes    PRES (posterior reversible encephalopathy syndrome) POA: Yes    ESRD (end stage renal disease) on dialysis (HCC) POA: Yes    Secondary renal hyperparathyroidism (HCC) POA: Yes      Overview: ICD-10 transition    Anemia POA: Yes  Resolved Problems:    * No resolved hospital problems. *      Hospital Summary (Brief Narrative):       Mr Rhodes is a 29 yo male with PMH of ESRD(likely secondary to uncontrolled HTN, being evaluated for renal transplant)  on nocturnal HD on  Tuesday, Thursday, Sunday, was on HD on Sunday night when he started having severe headache which he never had before. He denied associated visual problems, limb weakness or numbness, no fever, cough, chest pain, SOB.  He is compliant with his home blood pressure medications, no increased salt intake. He did not miss dialysis. Initially he denied alcohol use but his wife stated he had been drinking hard liquor 3-4 shots at a time about every week.   He was sent to [facility] ER for high BP, CT scan done and showed Area of low-density in the right occipital lobe posteriorly , sent to [facility] ED for MRI which showed features of PRES syndrome. His BP was 198/134 on arrival. He was started on Nicardipin infusion. He was admitted to ICU, neurochecks were done every 2 hours, nicardipine infusion was continued along with his home medications metoprolol 100 mg and hydralazine 50 mg Q8H. Following day he remained asymptomatic and received dialysis in  the morning. His other home medications were resumed, amlodipine 10 mg and clonidine patch. Also started on lisinopril 10 mg. Nicardipine infusion was turned off around 11 am and his blood pressure was under control around 130-140s. He was discharged in stable condition with instructions to follow up with nephrology, PCP and neurologist for repeat MRI in 6 weeks.        Patient /Hospital Summary (Details -- Problem Oriented) :          PRES (posterior reversible encephalopathy syndrome)   Assessment & Plan    MRI findings consistent with PRES   Did not have  focal neurological signs   Follow up with neurology - outpatient, will need repeat MRI in 6 weeks      Tachycardia   Assessment & Plan    HR in 120s  Denies history of thyroid disease, no drug use  No fever, cough, SOB, diarrhea   Compliant with medications  TSH fT4 normal   Resumed home beta blockers and hydralazine        * Hypertensive emergency   Assessment & Plan    Patient has HTN on clonidine patch every Monday, metoprolol, hydralazine and amlodipine. Per pt he has been compliant with his medications.   His home blood pressure is usually around 150s.   Was controlled with nicardipine infusion.    Resumed home medications - metoprolol and hydralazine, amlodipine and clonidine patch, added lisinopril 10 mg daily      ESRD (end stage renal disease) on dialysis (HCC)   Assessment & Plan    On HD since 2011   Initial presentation with HTN, no history of diabetes per pt  HD on Tuesday, Thursday, Sunday        Anemia   Assessment & Plan    Secondary to CKD     Secondary renal hyperparathyroidism (HCC)   Assessment & Plan    Continue annie         Consultants:     Dr Beckman - Nephrology   Dr Calhoun - Neurology     Procedures:        None     Imaging/ Testing:      MR-BRAIN-W/O   Final Result      1.  Small areas of signal abnormality in the bilateral occipital lobe subcortical white matter most consistent with posterior reversible encephalopathy syndrome  (PRES).   2.  No evidence of acute territorial infarct, intracranial hemorrhage or mass lesion.   3.  Findings of sinusitis as described above.      Findings were conveyed to Dr. Severino on 12/17/18 @ 2124 by Dr. Leroy.            Discharge Medications:         Medication Reconciliation: Completed       Medication List      START taking these medications      Instructions   lisinopril 10 MG Tabs  Start taking on:  12/19/2018  Commonly known as:  PRINIVIL   Take 1 Tab by mouth every day.  Dose:  10 mg        CONTINUE taking these medications      Instructions   acetaminophen 325 MG Tabs  Commonly known as:  TYLENOL   Take 2 Tabs by mouth every 6 hours as needed (Mild Pain; (Pain scale 1-3); Temp greater than 100.5 F).  Dose:  650 mg     albuterol 108 (90 Base) MCG/ACT Aers inhalation aerosol   Inhale 2 Puffs by mouth every four hours as needed.  Dose:  2 Puff     amLODIPine 10 MG Tabs  Commonly known as:  NORVASC   Take 1 Tab by mouth every day.  Dose:  10 mg     cinacalcet 30 MG Tabs  Commonly known as:  SENSIPAR   Take 1 Tab by mouth every day.  Dose:  30 mg     cloNIDine 0.2 MG/24HR Ptwk  Commonly known as:  CATAPRES   Apply 1 Patch to skin as directed every 7 days.  Dose:  1 Patch     hydrALAZINE 25 MG Tabs  Commonly known as:  APRESOLINE   Take 25 mg by mouth 3 times a day.  Dose:  25 mg     metoprolol  MG Tb24  Commonly known as:  TOPROL XL   Take 1 Tab by mouth every day.  Dose:  100 mg     raNITidine 150 MG Tabs  Commonly known as:  ZANTAC   Take 1 Tab by mouth 2 times a day.  Dose:  150 mg     sevelamer 800 MG Tabs  Commonly known as:  RENAGEL   Take 4 Tabs by mouth 3 times a day, with meals.  Dose:  3200 mg            Can use .DISCHARGEMEDSLIST if going to another facility         Disposition:   Discharge home     Diet:   Renal diet     Activity:   As tolerated     Instructions:      Follow up with neurology in 4 weeks, need repeat MRI in 6 weeks   Follow up with PCP / Nephrology      The patient was  instructed to return to the ER in the event of worsening symptoms. I have counseled the patient on the importance of compliance and the patient has agreed to proceed with all medical recommendations and follow up plan indicated above.   The patient understands that all medications come with benefits and risks. Risks may include permanent injury or death and these risks can be minimized with close reassessment and monitoring.        Primary Care Provider:    Mireya Ramsey M.D.    Discharge summary faxed to primary care provider:  Deferred  Copy of discharge summary given to the patient: Deferred      Follow up appointment details :      Follow up with Neurology in one month   Next dialysis Thursday     Pending Studies:        MRI brain in 6 weeks     Time spent on discharge day patient visit, preparing discharge paperwork and arranging for patient follow up.    Summary of follow up issues:       Discharge Time (Minutes) :    40 min  Hospital Course Type: Inpatient Stay < 2 midnights, patient recovered more rapidly than anticipated      Condition on Discharge  Stable   ______________________________________________________________________    Interval history/exam for day of discharge:    No new complaints, headaches improved, eager to go home.     Vitals:    12/18/18 1215 12/18/18 1300 12/18/18 1400 12/18/18 1505   BP:       Pulse: 95 99 (!) 103 95   Resp:       Temp:       TempSrc:       SpO2:       Weight:       Height:         Weight/BMI: Body mass index is 23.8 kg/m².  Pulse Oximetry: 96 %, O2 Delivery: None (Room Air)     Constitutional: He is well-developed, well-nourished, and in no distress.   HENT:   Head: Normocephalic and atraumatic.   Mouth/Throat: Oropharynx is clear and moist.   Neck: Normal range of motion. Neck supple.   Cardiovascular: Regular rhythm and normal heart sounds.    No murmur heard.  tachycardia   Pulmonary/Chest: Effort normal and breath sounds normal. No respiratory distress. He has  no wheezes.        Most Recent Labs:    Lab Results   Component Value Date/Time    WBC 7.5 12/18/2018 04:36 AM    RBC 3.33 (L) 12/18/2018 04:36 AM    HEMOGLOBIN 10.6 (L) 12/18/2018 04:36 AM    HEMATOCRIT 31.5 (L) 12/18/2018 04:36 AM    MCV 94.6 12/18/2018 04:36 AM    MCH 31.8 12/18/2018 04:36 AM    MCHC 33.7 12/18/2018 04:36 AM    MPV 9.6 12/18/2018 04:36 AM    NEUTSPOLYS 69.90 12/18/2018 04:36 AM    LYMPHOCYTES 18.80 (L) 12/18/2018 04:36 AM    MONOCYTES 8.70 12/18/2018 04:36 AM    EOSINOPHILS 1.90 12/18/2018 04:36 AM    BASOPHILS 0.40 12/18/2018 04:36 AM      Lab Results   Component Value Date/Time    SODIUM 138 12/18/2018 04:36 AM    POTASSIUM 4.6 12/18/2018 04:36 AM    CHLORIDE 101 12/18/2018 04:36 AM    CO2 25 12/18/2018 04:36 AM    GLUCOSE 94 12/18/2018 04:36 AM    BUN 48 (H) 12/18/2018 04:36 AM    CREATININE 10.03 (HH) 12/18/2018 04:36 AM      Lab Results   Component Value Date/Time    ALTSGPT 12 12/17/2018 12:33 AM    ASTSGOT 12 12/17/2018 12:33 AM    ALKPHOSPHAT 71 12/17/2018 12:33 AM    TBILIRUBIN 1.2 12/17/2018 12:33 AM    LIPASE 79 11/11/2018 03:45 PM    ALBUMIN 3.6 12/17/2018 12:33 AM    GLOBULIN 3.6 (H) 12/17/2018 12:33 AM    INR 1.10 07/03/2012 03:14 PM     Lab Results   Component Value Date/Time    PROTHROMBTM 14.3 07/03/2012 03:14 PM    INR 1.10 07/03/2012 03:14 PM

## 2018-12-18 NOTE — PROGRESS NOTES
12 hour chart check and monitor summary    Sinus Rhythm - Sinus Tachycardia    HR; 80s-120    .16/.06/.32

## 2018-12-18 NOTE — CARE PLAN
Problem: Communication  Goal: The ability to communicate needs accurately and effectively will improve  Outcome: PROGRESSING AS EXPECTED  Patient is mostly Irish speaking, understands some english. iPad and language line utilized for communication purposes.     Problem: Safety  Goal: Will remain free from falls    Intervention: Implement fall precautions  Patient verbalizes use of call light,  Patient is a low fall risk, treaded slipper socks on, call light within reach, urinal at bedside

## 2018-12-18 NOTE — PROGRESS NOTES
Critical Care Progress Note    Date of admission  12/17/2018    Chief Complaint  28 y.o. male admitted 12/17/2018 with hypertensive emergency    Hospital Course   MRI findings c/w PRES. Admitted to ICU and started on  Nicardiipine infusion     Interval Problem Update  Reviewed last 24 hour events:  Nicardipine drip at 5  SBPs< 150  No neurologic/eye findings or complaints  Oral anti-hypertension meds escalated  Nicardipine bein weaned to to off today  Target SBP approximately 120-160  Dialysis again today or am    Review of Systems  Review of Systems   Constitutional: Negative.    HENT: Negative.    Eyes: Negative.    Respiratory: Negative.    Cardiovascular: Negative.    Genitourinary: Negative.    Musculoskeletal: Negative.    Skin: Negative.    Neurological: Negative.    Endo/Heme/Allergies: Negative.    Psychiatric/Behavioral: Negative.         Vital Signs for last 24 hours   Temp:  [36.7 °C (98.1 °F)-37.2 °C (98.9 °F)] 36.9 °C (98.5 °F)  Pulse:  [100-124] 100  Resp:  [10-21] 16    Hemodynamic parameters for last 24 hours       Respiratory       Physical Exam   Physical Exam   Constitutional: He is oriented to person, place, and time. He appears well-developed and well-nourished.   HENT:   Head: Normocephalic and atraumatic.   Eyes: Pupils are equal, round, and reactive to light. Conjunctivae are normal.   Neck: Normal range of motion. Neck supple.   Cardiovascular: Normal rate and regular rhythm.    Pulmonary/Chest: Effort normal.   Abdominal: Soft. Bowel sounds are normal.   Musculoskeletal: Normal range of motion.   Neurological: He is alert and oriented to person, place, and time.   Skin: Skin is warm and dry.   Psychiatric: He has a normal mood and affect. His behavior is normal. Judgment and thought content normal.       Medications  Current Facility-Administered Medications   Medication Dose Route Frequency Provider Last Rate Last Dose   • amLODIPine (NORVASC) tablet 10 mg  10 mg Oral DAILY Avelino SLAUGHTER  TOMMY Vargas   10 mg at 12/18/18 0819   • cloNIDine (CATAPRES) 0.2 MG/24HR 1 Patch  1 Patch Transdermal Q7 DAYS Shree Medina M.D.   1 Patch at 12/18/18 0943   • heparin injection 1,500 Units  1,500 Units Intravenous DIALYSIS PRN Christie Beckman M.D.   1,500 Units at 12/18/18 1027   • lisinopril (PRINIVIL) 10 MG tablet 10 mg  10 mg Oral Q DAY Shree Medina M.D.   10 mg at 12/18/18 1120   • niCARdipine (CARDENE) 25 mg in  mL Infusion  0-15 mg/hr Intravenous Continuous Avelino Vargas M.D. 25 mL/hr at 12/18/18 1158 2.5 mg/hr at 12/18/18 1158   • senna-docusate (PERICOLACE or SENOKOT S) 8.6-50 MG per tablet 2 Tab  2 Tab Oral BID Avelino Vargas M.D.   2 Tab at 12/18/18 0503    And   • polyethylene glycol/lytes (MIRALAX) PACKET 1 Packet  1 Packet Oral QDAY PRN Avelino Vargas M.D.        And   • bisacodyl (DULCOLAX) suppository 10 mg  10 mg Rectal QDAY PRN Avelino Vargas M.D.       • Respiratory Care per Protocol   Nebulization Continuous RT Avelino Vargas M.D.       • acetaminophen (TYLENOL) tablet 650 mg  650 mg Oral Q6HRS PRN Avelino Vargas M.D.       • cinacalcet (SENSIPAR) tablet 30 mg  30 mg Oral DAILY Avelino Vargas M.D.   30 mg at 12/18/18 0503   • ondansetron (ZOFRAN ODT) dispertab 4 mg  4 mg Oral Q4HRS PRN Avelino Vargas M.D.       • sevelamer carbonate (RENVELA) tablet 3,200 mg  3,200 mg Oral TID WITH MEALS Avelino Vargas M.D.   3,200 mg at 12/18/18 1120   • ipratropium-albuterol (DUONEB) nebulizer solution  3 mL Nebulization Q4H PRN (RT) Avelino Vargas M.D.       • metoprolol SR (TOPROL XL) tablet 100 mg  100 mg Oral DAILY Avelino Vargas M.D.   100 mg at 12/18/18 0503   • hydrALAZINE (APRESOLINE) tablet 25 mg  25 mg Oral TID Avelino Vargas M.D.   25 mg at 12/18/18 1120   • heparin injection 5,000 Units  5,000 Units Subcutaneous Q8HRS Avelino Vargas M.D.   5,000 Units at 12/18/18 0503   • famotidine  (PEPCID) tablet 20 mg  20 mg Oral DAILY Avelino Vargas M.D.   20 mg at 12/18/18 0503       Fluids    Intake/Output Summary (Last 24 hours) at 12/18/18 1404  Last data filed at 12/18/18 1332   Gross per 24 hour   Intake             2068 ml   Output             3300 ml   Net            -1232 ml       Laboratory          Recent Labs      12/17/18   0033  12/18/18   0436   SODIUM  135  138   POTASSIUM  4.3  4.6   CHLORIDE  98  101   CO2  28  25   BUN  26*  48*   CREATININE  7.09*  10.03*   CALCIUM  9.5  9.9     Recent Labs      12/17/18   0033  12/18/18   0436   ALTSGPT  12   --    ASTSGOT  12   --    ALKPHOSPHAT  71   --    TBILIRUBIN  1.2   --    GLUCOSE  94  94     Recent Labs      12/17/18 0033  12/18/18   0436   WBC  8.4  7.5   NEUTSPOLYS  74.50*  69.90   LYMPHOCYTES  13.90*  18.80*   MONOCYTES  9.00  8.70   EOSINOPHILS  1.80  1.90   BASOPHILS  0.40  0.40   ASTSGOT  12   --    ALTSGPT  12   --    ALKPHOSPHAT  71   --    TBILIRUBIN  1.2   --      Recent Labs      12/17/18 0033  12/18/18   0436   RBC  3.36*  3.33*   HEMOGLOBIN  10.7*  10.6*   HEMATOCRIT  30.7*  31.5*   PLATELETCT  144*  130*       Imaging  X-Ray:  I have personally reviewed the images and compared with prior images.    Assessment/Plan  * Hypertensive emergency   Assessment & Plan    Severe, requiring continuous infusion nicardipine  As needed hydralazine and labetalol IV push every 4 to 6 hours  Goal blood pressure less than 160 short-term long-term less than 130 systolic  Resume home hypertensive regimen this a.m.  Low threshold to place arterial line to manage titration of antihypertensive agents if clinically worsens  If compliance and issue, consider alternative therapies-clonidine and its issues with rebound hypertension  Complicated by neurological issues including headache and radiographic evidence of PRES  Neurology consultation obtained, monitoring  No indication for lumbar puncture  Neurochecks every 2 hours while in ICU for  now  No need for seizure prophylaxis  Clinically not in significant pulmonary edema or having coronary ischemia, monitor    As above,t arget systolic pressure 120-19     PRES (posterior reversible encephalopathy syndrome)   Assessment & Plan    Radiographic evidence consistent with this condition, see MRI  Neurology consultation  Neurochecks every 2 hours in ICU  Monitor for seizures, seizure precautions, no seizure prophylaxis for now    BP target 120-160  Asymptomatic today     Tachycardia   Assessment & Plan    Etiology not entirely clear  Evaluate thyroid function, monitor for infection, no evidence of bleeding, query related to PRES  Patient states he has been compliant with taking his clonidine and beta-blocker but missing a few doses could precipitate all the above, tachycardia/hypertension with complications, asked family when they are available about compliance    Unchanged  Continue to monitor closely  Beta-blockers may help     ESRD (end stage renal disease) on dialysis (HCC)- (present on admission)   Assessment & Plan    Dialysis Tuesday Thursday Sunday  No indication for emergent dialysis today  Nephrology consultation to manage dialysis while inpatient  Review antihypertensive regimen nephrology when they are available, they undoubtedly be following him long-term as an outpatient    Dialysis later today ,     Anemia- (present on admission)   Assessment & Plan    Chronic and secondary to end-stage renal disease  Dialysis team monitoring for need for iron replacement therapy and Procrit therapy     Secondary renal hyperparathyroidism (HCC)- (present on admission)   Assessment & Plan    Resume outpatient regimen as clinically appropriate          VTE:  Heparin  Ulcer: Not Indicated  Lines: None    I have performed a physical exam and reviewed and updated ROS and Plan today (12/18/2018). In review of yesterday's note (12/17/2018), there are no changes except as documented above.     Discussed patient  condition and risk of morbidity and/or mortality with Hospitalist, RN, RT, Pharmacy and Patient  The patient remains critically ill.  Critical care time = 33 minutes in directly providing and coordinating critical care and extensive data review.  No time overlap and excludes procedures.

## 2018-12-18 NOTE — ASSESSMENT & PLAN NOTE
Severe, requiring continuous infusion nicardipine  As needed hydralazine and labetalol IV push every 4 to 6 hours  Goal blood pressure less than 160 short-term long-term less than 130 systolic  Resume home hypertensive regimen this a.m.  Low threshold to place arterial line to manage titration of antihypertensive agents if clinically worsens  If compliance and issue, consider alternative therapies-clonidine and its issues with rebound hypertension  Complicated by neurological issues including headache and radiographic evidence of PRES  Neurology consultation obtained, monitoring  No indication for lumbar puncture  Neurochecks every 2 hours while in ICU for now  No need for seizure prophylaxis  Clinically not in significant pulmonary edema or having coronary ischemia, monitor    As above,t arget systolic pressure 120-19

## 2018-12-18 NOTE — ASSESSMENT & PLAN NOTE
Etiology not entirely clear  Evaluate thyroid function, monitor for infection, no evidence of bleeding, query related to PRES  Patient states he has been compliant with taking his clonidine and beta-blocker but missing a few doses could precipitate all the above, tachycardia/hypertension with complications, asked family when they are available about compliance    Unchanged  Continue to monitor closely  Beta-blockers may help

## 2018-12-18 NOTE — ASSESSMENT & PLAN NOTE
Radiographic evidence consistent with this condition, see MRI  Neurology consultation  Neurochecks every 2 hours in ICU  Monitor for seizures, seizure precautions, no seizure prophylaxis for now    BP target 120-160  Asymptomatic today

## 2018-12-18 NOTE — CARE PLAN
Problem: Pain Management  Goal: Pain level will decrease to patient's comfort goal  Outcome: PROGRESSING AS EXPECTED  Patient required no pain medication today. Patient was previously taking oxycodone for sternal/chest pain related to CPR compressions.    Problem: Urinary Elimination:  Goal: Ability to reestablish a normal urinary elimination pattern will improve  Outcome: PROGRESSING AS EXPECTED  Patient received a second dose of lasix this shift as the first dose did not adequately diuresis patient. Patient has put out about 2L in urine. Patient states he feels that he can breathe better and has decreased O2 needs.

## 2018-12-18 NOTE — CARE PLAN
Problem: Hemodynamic Status  Goal: Vital Signs and Fluid Balance Management    Intervention: Hemodynamic Monitoring  Pt on a Nicardipine drip to maintain DBP <100      Problem: Elimination  Goal: Regular urinary elimination    Intervention: Monitor ability to void  Pt is oliguric, and has minimal U/O baseline per pt

## 2018-12-18 NOTE — CONSULTS
DATE OF SERVICE:  12/17/2018    REQUESTING PHYSICIAN:  [facility].    REASON FOR CONSULTATION:  Evaluation and management of end-stage renal   disease.    HISTORY OF PRESENT ILLNESS:  This is a 28-year-old  male with past   medical history significant for end-stage renal disease, on chronic   hemodialysis, nocturnal dialysis on Sunday, Tuesday, Thursdays via a right arm   AV fistula under the care of [facility], hypertension, anemia   secondary to chronic kidney disease, renal osteodystrophy, was admitted with   intractable headache and hypertensive emergency with MRI showing PRES.  The   patient reports that he has been compliant with his outpatient dialysis   treatments and he completed his nocturnal hemodialysis last night on Sunday as   regularly scheduled; however, he did notice a frontal headache in the morning   prior to dialysis and this worsened to the point that after dialysis, he   developed a 10/10 headache.  He initially presented to the [facility] Emergency Room where a CT scan of the head revealed an area of low   density in the right occipital lobe posteriorly with recommendation for   followup MRI.  The patient was transferred to [facility] for MRI to   further evaluate this.  MRI without gadolinium on 12/17/2018 revealed small   areas of signal abnormality in the bilateral occipital lobe subcortical white   matter, most consistent with posterior reversible encephalopathy syndrome   (PRES).  There is no evidence of acute territorial infarct, intracranial   hemorrhage or mass lesion.  Patient was admitted to the intensive care unit   and was started on a nicardipine drip.  His blood pressure initially in the   emergency room was in the 190s/130s.  Patient reports that his headache is   still present, although it is a little bit better.  The patient was recently   admitted to [facility] last month in 11/2018 with severe abdominal   pain  and concern for bowel ischemia.  He underwent an exploratory laparotomy   at that time that was negative for ischemia, but did show bowel edema.  At   that time, it was noted that his blood pressure was difficult to control as   well.  Patient reports that he is compliant with his outpatient blood pressure   medications.    REVIEW OF SYSTEMS:  Significant for frontal headache.  The patient also was   complaining of blurry vision.  He denies any nausea, vomiting, diarrhea or   constipation.  He denies any chest pain or shortness of breath.  No lower   extremity edema and the rest of the 12-point review of systems is negative.    PAST MEDICAL HISTORY:  1.  End-stage renal disease.  The patient is on nocturnal hemodialysis on   Sunday, Tuesdays, Thursdays via a right arm AV fistula.  2.  Hypertension noted to be difficult to control in the past.  3.  Anemia secondary to chronic kidney disease.  4.  Renal osteodystrophy.  5.  Secondary hyperparathyroidism.  6.  Recent admission for severe abdominal pain with concern for intestinal   ischemia.  Exploratory laparotomy was negative for ischemia, but did show   bowel edema.    PAST SURGICAL HISTORY:  1.  Left arm AV fistula creation and subsequent revisions.  Patient ultimately   underwent ligation of the fistula due to aneurysmal dilatation.  2.  Right arm AV fistula creation and revision.  3.  PermCath placement and removal.  4.  Exploratory laparotomy, 11/11/2018.    ALLERGIES:  No known medical allergies.    SOCIAL HISTORY:  The patient is .  He denies any smoking, alcohol or   illicit drug use.    FAMILY HISTORY:  There is no family history of kidney disease or relatives on   dialysis.    OUTPATIENT MEDICATIONS:  1.  Tylenol p.r.n.  2.  Albuterol inhaler p.r.n.  3.  Norvasc 10 mg daily.  4.  Cinacalcet 30 mg daily.  5.  Clonidine patch 0.2 mg weekly.  6.  Hydralazine 25 mg t.i.d.  7.  Toprol- mg every day.  8.  Zantac 150 mg twice daily.  9.  Renagel 3200  mg t.i.d. with meals.    PHYSICAL EXAMINATION:  VITAL SIGNS:  Temperature 37.3 degrees Celsius, pulse 110, respirations 15,   blood pressure 140/90, satting 93% on room air.  GENERAL:  The patient is awake and alert and oriented x3.  He appears   uncomfortable.  He is well developed and well nourished.  HEENT:  Pupils are equal, round and reactive to light.  Extraocular muscles   are intact.  Mucous membranes appear moist.  NECK:  Exam reveals no JVD.  Trachea is midline.  There is no thyromegaly.    CARDIOVASCULAR:  Heart is regular rate and rhythm.  No murmurs, rubs or   gallops.  RESPIRATORY:  Lungs are generally clear to auscultation bilaterally.  No   wheezes, rales or rhonchi.  There is no tachypnea and there is no increased   work of breathing.  GASTROINTESTINAL:  Abdomen is soft, nontender, nondistended.  Bowel sounds are   present.  EXTREMITIES:  Reveals no clubbing, cyanosis or edema.  There is a right arm AV   fistula in place with a palpable thrill and audible bruit.  SKIN:  Reveals no rashes or ulcerations.  There is normal skin turgor.  NEUROLOGIC:  Reveals no focal motor deficits.  Sensation is grossly intact to   light touch.  LYMPHATIC:  Exam reveals no cervical lymphadenopathy, no axillary   lymphadenopathy.  PSYCHIATRIC:  The patient is alert and oriented x3, has an appropriate mood   and affect.    LABORATORY DATA:  Labs reveal a white blood cell count of 8.4, hemoglobin   10.7, platelet count 144.  Differential reveals 74% neutrophils, 13%   lymphocytes.  Chemistries reveal a sodium of 135, potassium of 4.3, chloride   is 98, bicarbonate is 28, BUN is 26, creatinine is 7.09, calcium is 9.5, AST   is 12, ALT is 12, alkaline phosphatase is 71, albumin is 3.6, lactic acid   level is 0.8.  Urinalysis on 12/17/2018 reveals trace ketones, small occult   blood, 100 mg per deciliter protein.  Microscopic exam reveals rare bacteria,   2-5 white blood cells per high power field and 2-5 red blood cells per  high   power field.    IMAGING:  CT scan of the head without IV contrast on 12/17/2018 reveals area   of low density in the right occipital lobe posteriorly.  Recommend follow up   MRI of the brain without and with contrast for further characterization.  MRI   of the brain without IV contrast on 12/17/2018 reveals small areas of signal   abnormality in the bilateral occipital lobe subcortical white matter, most   consistent with posterior reversible encephalopathy syndrome (PRES).  There is   no evidence of acute territorial infarct, intracranial hemorrhage or mass   lesion.  There are findings of sinusitis as described above.    ASSESSMENT AND PLAN:  1.  End-stage renal disease.  The patient is on nocturnal hemodialysis   Sundays, Tuesdays, Thursdays.  He did undergo hemodialysis last night.  No   further dialysis today.  We will plan to continue his regular outpatient   dialysis schedule and will plan for hemodialysis tomorrow.  Recommend dose   adjusting all of his medications for his decreased glomerular filtration rate.  2.  Hypertensive emergency.  Patient presented with severe headache and visual   changes and MRI shows posterior reversible encephalopathy syndrome.  His   diastolic blood pressure on admission was in the 130s.  Nicardipine drip has   been started recommend a gradual lowering of his blood pressure.  Avoid any   rapid lowering of blood pressure.  Goal blood pressure should be in the 160s   systolic and about 100s diastolic.  Titrate nicardipine drip and avoid rapid   drop in blood pressure.  Neurology is evaluating posterior reversible   encephalopathy syndrome.  Continue home blood pressure medications and   consider increasing beta blocker or hydralazine if blood pressure remains   elevated.  Could also, start ARB if blood pressure remains elevated.  3.  Anemia secondary to chronic kidney disease.  There is no need for   erythrocyte stimulating agent at this time.  Continue to monitor.  4.   Renal osteodystrophy, continue home phosphorus binders.  5.  Secondary hyperparathyroidism.  Continue cinacalcet.  6.  Mild protein calorie malnutrition.  No dietary protein restrictions at   this time.    Thank you for this very interesting consult and thank you for involving me in   the care of this very pleasant patient.  If you have any questions or need any   further information, please do not hesitate to contact me.       ____________________________________     LEONARD GARCIA MD    [facility] / [facility]    DD:  12/17/2018 21:48:36  DT:  12/18/2018 03:23:34    D#:  [id number]  Job#:  [id number]

## 2018-12-18 NOTE — DISCHARGE PLANNING
Outpatient Dialysis Note    Confirmed patient is active at:       [facility]  [address]   [place] [address]        Schedule: Tuesday, Thursday, Sunday  Time: Nocturnal     Spoke with Cat at facility who confirmed    Forwarded records for review.    Dialysis Coordinator, [facility]  Bessie Mcgill [phone]

## 2018-12-19 NOTE — DISCHARGE INSTRUCTIONS
Discharge Instructions    Discharged to home by car with relative. Discharged via wheelchair, hospital escort: Refused.  Special equipment needed: Not Applicable    Be sure to schedule a follow-up appointment with your primary care doctor or any specialists as instructed.     Discharge Plan:   Diet Plan: Discussed  Activity Level: Discussed  Confirmed Follow up Appointment: Patient to Call and Schedule Appointment  Confirmed Symptoms Management: Discussed  Medication Reconciliation Updated: No (Comments)  Pneumococcal Vaccine Administered/Refused: Not given - Patient refused pneumococcal vaccine  Influenza Vaccine Indication: Not indicated: Previously immunized this influenza season and > 8 years of age    I understand that a diet low in cholesterol, fat, and sodium is recommended for good health. Unless I have been given specific instructions below for another diet, I accept this instruction as my diet prescription.   Other diet: Renal diet    Special Instructions: None    · Is patient discharged on Warfarin / Coumadin?   No     Depression / Suicide Risk    As [facility], it is important to learn how to keep safe from harming yourself.    Recognize the warning signs:  · Abrupt changes in personality, positive or negative- including increase in energy   · Giving away possessions  · Change in eating patterns- significant weight changes-  positive or negative  · Change in sleeping patterns- unable to sleep or sleeping all the time   · Unwillingness or inability to communicate  · Depression  · Unusual sadness, discouragement and loneliness  · Talk of wanting to die  · Neglect of personal appearance   · Rebelliousness- reckless behavior  · Withdrawal from people/activities they love  · Confusion- inability to concentrate     If you or a loved one observes any of these behaviors or has concerns about self-harm, here's what you can do:  · Talk about it- your feelings and reasons  for harming yourself  · Remove any means that you might use to hurt yourself (examples: pills, rope, extension cords, firearm)  · Get professional help from the community (Mental Health, Substance Abuse, psychological counseling)  · Do not be alone:Call your Safe Contact- someone whom you trust who will be there for you.  · Call your local CRISIS HOTLINE 935-8447 or 774-761-3417  · Call your local Children's Mobile Crisis Response Team [place] (180) 389-8356 or [employer]  · Call the toll free National Suicide Prevention Hotlines   · National Suicide Prevention Lifeline 338-648-LBRR (9788)  · National [employer] Line Network 800-SUICIDE (810-6645)      Hipertensión  (Hypertension)  El término hipertensión es otra forma de denominar a la presión arterial elevada. La presión arterial elevada fuerza al corazón a trabajar más para bombear la maxwell. [employer] lectura de la presión arterial consta de dos números: maxi más alto sobre maxi más bajo (por ejemplo, 110/72).  CUIDADOS EN EL HOGAR  · Moo que el médico le tome nuevamente la presión arterial.  · [place] los medicamentos solamente serene se lo haya indicado el médico. Siga cuidadosamente las indicaciones. Los medicamentos pierden eficacia si omite dosis. El hecho de omitir las dosis también aumenta el riesgo de otros problemas.  · No fume.  · Contrólese la presión arterial en chau casa serene se lo haya indicado el médico.  SOLICITE AYUDA SI:  · Piensa que tiene cisco reacción a los medicamentos que está tomando.  · Tiene mareos o helen de flavia reiterados.  · Se le inflaman (hinchan) los tobillos.  · Tiene problemas de visión.  SOLICITE AYUDA DE INMEDIATO SI:  · Tiene un dolor de flavia muy intenso y está confundido.  · Se siente débil, aturdido o se desmaya.  · Tiene dolor en el pecho o el estómago (abdominal).  · Tiene vómitos.  · No puede respirar muy bernardo.  ASEGÚRESE DE QUE:  · Comprende estas instrucciones.  · Controlará chau afección.  · Recibirá ayuda de  inmediato si no mejora o si empeora.  Esta información no tiene serene fin reemplazar el consejo del médico. Asegúrese de hacerle al médico cualquier pregunta que tenga.  Document Released: 06/07/2011 Document Revised: 12/23/2014 Document Reviewed: 10/10/2014  Elsevier Interactive Patient Education © 2017 [employer]

## 2018-12-22 LAB
BACTERIA BLD CULT: NORMAL
BACTERIA BLD CULT: NORMAL
SIGNIFICANT IND 70042: NORMAL
SIGNIFICANT IND 70042: NORMAL
SITE SITE: NORMAL
SITE SITE: NORMAL
SOURCE SOURCE: NORMAL
SOURCE SOURCE: NORMAL

## 2019-01-14 ENCOUNTER — OFFICE VISIT (OUTPATIENT)
Dept: INTERNAL MEDICINE | Facility: MEDICAL CENTER | Age: 29
End: 2019-01-14
Payer: COMMERCIAL

## 2019-01-14 VITALS
BODY MASS INDEX: 25.88 KG/M2 | OXYGEN SATURATION: 99 % | TEMPERATURE: 98.6 F | HEART RATE: 75 BPM | HEIGHT: 66 IN | WEIGHT: 161 LBS | DIASTOLIC BLOOD PRESSURE: 105 MMHG | SYSTOLIC BLOOD PRESSURE: 169 MMHG

## 2019-01-14 DIAGNOSIS — Z99.2 ESRD (END STAGE RENAL DISEASE) ON DIALYSIS (HCC): ICD-10-CM

## 2019-01-14 DIAGNOSIS — I10 HYPERTENSION, UNSPECIFIED TYPE: ICD-10-CM

## 2019-01-14 DIAGNOSIS — R19.7 DIARRHEA, UNSPECIFIED TYPE: ICD-10-CM

## 2019-01-14 DIAGNOSIS — N18.6 ESRD (END STAGE RENAL DISEASE) ON DIALYSIS (HCC): ICD-10-CM

## 2019-01-14 DIAGNOSIS — Z99.2 HEMODIALYSIS PATIENT (HCC): ICD-10-CM

## 2019-01-14 DIAGNOSIS — N48.30 PAINFUL ERECTION: ICD-10-CM

## 2019-01-14 DIAGNOSIS — K21.9 GASTROESOPHAGEAL REFLUX DISEASE, ESOPHAGITIS PRESENCE NOT SPECIFIED: ICD-10-CM

## 2019-01-14 PROCEDURE — 99204 OFFICE O/P NEW MOD 45 MIN: CPT | Mod: GC | Performed by: INTERNAL MEDICINE

## 2019-01-14 ASSESSMENT — PATIENT HEALTH QUESTIONNAIRE - PHQ9: CLINICAL INTERPRETATION OF PHQ2 SCORE: 0

## 2019-01-14 ASSESSMENT — PAIN SCALES - GENERAL: PAINLEVEL: NO PAIN

## 2019-01-14 NOTE — PROGRESS NOTES
New Patient to Establish    Reason to establish: Hospital follow-up    CC: Hospital follow-up, hypertension.    HPI: 28-year-old male with past medical history hypertension, ESRD (on nocturnal hemodialysis Tuesday, Thursday, Sunday) who is here to establish care and for hospital follow-up.  Interview done with patient and his wife.  Patient mainly Lithuanian-speaking, though understands moderate amount of English; wife translating per patient's request.  Patient recently hospitalized from 12/17/19 to 12/18/19 for hypertensive emergency with subsequent PRES syndrome.  Patient presented to the hospital with headache and was found to have an MRI with signal abnormality in the bilateral occipital lobe subcortical white matter, consistent with posterior reversible encephalopathy syndrome.  Did not have evidence of acute territorial infarct, intracranial hemorrhage, mass lesion.  Admitted to ICU and started on nicardipine drip.  Seen by both nephrology and neurology during admission.  Patient has since followed up with nephrology; patient reports they have told him to continue his antihypertensives as currently scheduled and to continue hemodialysis as currently scheduled.  Patient is also to follow-up with neurology approximately 6 weeks after hospital stay for follow-up MRI without contrast to monitor for resolution of white matter changes.    Hypertension: Patient reports he has not had workup for etiology of hypertension.  Reports she has consistently taking his medications as ordered.  Denies headache, using vision, hematuria, chest pain.  Denies family history of high blood pressure.  Currently takes amlodipine 10 mg daily, clonidine 0.2 mg over 24 hours q. 7 days, hydralazine 25 mg 3 times daily, lisinopril 10 mg daily, metoprolol 100 mg daily.    ESRD: Patient compliant with hemodialysis, nocturnal, Tuesday, Thursday, Sunday.  Follows with [facility].    GERD symptoms: Patient reports heartburn,  especially after he eats spicy foods.  He takes ranitidine 150 mg twice daily for this, which usually controls his symptoms.    Pain after ejaculation: Patient reports is been a problem over the past 2 months.  Also reports mild testicular pain at the same time.  Denies pain during erection, testicular pain at other times.  Denies urethral discharge, tenesmus, hematuria, hematochezia.    Diarrhea: Patient reports 3-week history of consistently watery diarrhea daily.  Denies recent travel, recent illnesses/sick contacts, recent changes in diet.  Has recently been hospitalized (2-day hospital admission in December 2018, 6-day hospital admission in November 2018).    Patient Active Problem List    Diagnosis Date Noted   • Tachycardia 12/17/2018     Priority: High   • Hypertensive emergency 12/17/2018     Priority: High   • PRES (posterior reversible encephalopathy syndrome) 12/17/2018     Priority: High   • Ischemic bowel disease (HCC) 11/11/2018     Priority: High   • ESRD (end stage renal disease) on dialysis ([facility]) 12/08/2011     Priority: Medium   • Anemia 12/08/2011     Priority: Low   • Secondary renal hyperparathyroidism ([facility]) 01/12/2011     Priority: Low   • Constipation 11/17/2018   • Chest pain 11/14/2018   • Sepsis ([facility]) 11/11/2018   • End stage renal disease ([facility]) 11/28/2016   • 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 3 times per week.        Current Outpatient Prescriptions   Medication Sig Dispense Refill   • lisinopril (PRINIVIL) 10 MG Tab Take 1 Tab by mouth every day. 90 Tab 0   • acetaminophen (TYLENOL) 325 MG Tab Take 2 Tabs by mouth every 6 hours as needed (Mild Pain; (Pain scale 1-3); Temp greater than 100.5 F). 100 Tab 0   • cloNIDine  (CATAPRES) 0.2 MG/24HR PATCH WEEKLY Apply 1 Patch to skin as directed every 7 days. 4 Patch 2   • metoprolol SR (TOPROL XL) 100 MG TABLET SR 24 HR Take 1 Tab by mouth every day. 30 Tab 2   • raNITidine (ZANTAC) 150 MG Tab Take 1 Tab by mouth 2 times a day. 30 Tab 0   • hydrALAZINE (APRESOLINE) 25 MG Tab Take 25 mg by mouth 3 times a day.     • amlodipine (NORVASC) 10 MG TABS Take 1 Tab by mouth every day. 30 Tab 3   • sevelamer (RENAGEL) 800 MG TABS Take 4 Tabs by mouth 3 times a day, with meals. 360 Tab 3   • cinacalcet (SENSIPAR) 30 MG TABS Take 1 Tab by mouth every day. 30 Tab 11   • albuterol 108 (90 Base) MCG/ACT Aero Soln inhalation aerosol Inhale 2 Puffs by mouth every four hours as needed. 8.5 g 2     No current facility-administered medications for this visit.        Allergies as of 01/14/2019   • (No Known Allergies)       Social History     Social History   • Marital status:      Spouse name: N/A   • Number of children: N/A   • Years of education: N/A     Occupational History   • Not on file.     Social History Main Topics   • Smoking status: Current Every Day Smoker     Types: Cigarettes   • Smokeless tobacco: Never Used   • Alcohol use No   • Drug use: No   • Sexual activity: Not on file     Other Topics Concern   • Not on file     Social History Narrative   • No narrative on file       Family History   Problem Relation Age of Onset   • Diabetes Mother    • Heart Attack Father        Past Surgical History:   Procedure Laterality Date   • EXPLORATORY LAPAROTOMY  11/11/2018    Procedure: EXPLORATORY LAPAROTOMY;  Surgeon: Faith Martin M.D.;  Location: SURGERY [facility];  Service: General   • AV FISTULA REVISION Right 11/28/2016    Procedure: AV FISTULA REVISION UPPER EXTREMITY (ANEURYSM REDUCTION);  Surgeon: Slade Perez M.D.;  Location: SURGERY [facility];  Service:    • AV FISTULA CREATION  8/21/2012    Performed by ANAI TYSON at [facility]   • RECOVERY   "7/20/2012    Performed by SURGERY, IR-RECOVERY at SURGERY SAME DAY [facility]   • RECOVERY  7/3/2012    Performed by SURGERY, IR-RECOVERY at SURGERY SAME DAY [facility]   • AV FISTULA CREATION  6/7/2012    Performed by ANAI TYSON at SURGERY [facility]   • CATH PLACEMENT  6/7/2012    Performed by ANAI TYSON at SURGERY [facility]   • AV FISTULA CREATION  12/29/2010    Performed by ANT ALONZO at SURGERY [facility]   • OTHER ORTHOPEDIC SURGERY      r arm fx       ROS: 14 point review of systems negative except as per HPI.      BP (!) 169/105 (BP Location: Left arm, Patient Position: Sitting)   Pulse 75   Temp 37 °C (98.6 °F) (Temporal)   Ht 1.676 m (5' 6\")   Wt 73 kg (161 lb)   SpO2 99%   BMI 25.99 kg/m²     Physical Exam  General:  Alert and oriented, No apparent distress.    Eyes: Pupils equal and reactive. No scleral icterus.    Throat: Clear no erythema or exudates noted.    Neck: Supple. No lymphadenopathy noted. Thyroid not enlarged.    Lungs: Clear to auscultation bilaterally.  No crackles/wheezing.    Cardiovascular: Regular rate and rhythm. No murmurs, rubs or gallops.    Abdomen:  Benign. No rebound or guarding noted.  Soft, nontender/nondistended.  Bowel sounds present.  Midline scar present.    Extremities: No clubbing, cyanosis, edema.    Skin: Clear. No rash or suspicious skin lesions noted.    Note: I have reviewed all pertinent labs and diagnostic tests associated with this visit with specific comments listed under the assessment and plan below    Assessment and Plan    1. Hypertension, unspecified type  2. History of PRES  Hospitalized 12/17/19 to 12/18/19 for hypertensive emergency with subsequent posterior reversible encephalopathy syndrome.  No evidence of acute territorial infarct, intracranial hemorrhage, mass lesion.   Had recent hospital follow up with Nephrology: continue antihypertensives as scheduled, continue hemodialysis as scheduled.    Has Neurology " hospital follow up in about two weeks: follow up MRI without contrast to monitor for resolution of white matter changes.  Consistently takes medications.  Denies headache, using vision, hematuria, chest pain.  Denies family history of high blood pressure.    Continue current home medications: amlodipine 10 mg daily, clonidine 0.2 mg over 24 hours q. 7 days, hydralazine 25 mg 3 times daily, lisinopril 10 mg daily, metoprolol 100 mg daily.  Work up etiology:   - BASIC METABOLIC PANEL; Future  - US-RENAL ARTERY DUPLEX COMP; Future  - CORTISOL; Future  - METANEPHRINES, URINE RANDOM OR 24 HR; Future  - URINE DRUG SCREEN; Future    3. ESRD (end stage renal disease) on dialysis (HCC)  Compliant with hemodialysis, nocturnal, Tuesday, Thursday, Sunday.  Follows with [facility].  - CBC WITH DIFFERENTIAL; Future  - BASIC METABOLIC PANEL; Future  - US-RENAL ARTERY DUPLEX COMP; Future  - CORTISOL; Future    4. Pain after ejaculation  Over the past 2 months with associated mild testicular pain at the same time.  Denies pain during erection, testicular pain at other times.  Denies urethral discharge, tenesmus, hematuria, hematochezia.  Concern for epididymitis, prostatitis.    - PROSTATE SPECIFIC AG SCREENING; Future  - URINALYSIS; Future    5. Gastroesophageal reflux disease, esophagitis presence not specified  Heartburn especially spicy food consumption.    Educated on diet that may limit GERD symptoms.    Continue home ranitidine 150 mg twice daily.     6. Diarrhea, unspecified type  Over past 3-week history - watery diarrhea daily.  Denies recent travel, recent illnesses/sick contacts, recent changes in diet.  Has recently been hospitalized (2-day hospital admission in December 2018, 6-day hospital admission in November 2018).  - CBC WITH DIFFERENTIAL; Future  - BASIC METABOLIC PANEL; Future  - Complete O&P; Future  - C Diff by PCR rflx Toxin; Future  - CELIAC DISEASE AB PANEL; Future  - STOOL WBC'S;  Future    Followup: Return in about 5 weeks (around 2/18/2019).    Signed by: Mireya Ramsey M.D.

## 2019-01-14 NOTE — PATIENT INSTRUCTIONS
Acidez estomacal  (Heartburn)  La acidez estomacal es un tipo de dolor o de molestia que se puede presentar en la garganta o en el pecho. A menudo se la describe serene cisco quemazón. También puede producir mal aliento. La sensación de acidez puede empeorar al acostarse o inclinarse. Puede deberse al retroceso (reflujo) de los contenidos estomacales hacia el tubo que conecta la boca con el estómago (esófago).  CUIDADOS EN EL HOGAR  [place] estas medidas para aliviar las molestias y evitar los problemas.  Dieta   · Siga la dieta serene se lo haya indicado el médico. Jordi vez deba evitar los siguientes alimentos y bebidas:  ¨ Café y té (con o sin cafeína).  ¨ Bebidas que contengan alcohol.  ¨ Bebidas energizantes y deportivas.  ¨ Gaseosas o refrescos.  ¨ Chocolate y cacao.  ¨ Menta y esencias de menta.  ¨ [place] y cebollas.  ¨ Rábano picante.  ¨ Alimentos muy condimentados y ácidos, serene pimientos, chile en polvo, mane en polvo, vinagre, salsas picantes y salsa barbacoa.  ¨ Frutas cítricas y tobias jugos, serene naranjas, sofiya y julia.  ¨ Alimentos a base de tomates, serene salsa geremias, chile, salsa y pizza con salsa geremias.  ¨ Alimentos fritos y grasos, serene rosquillas, alex fritas y aderezos con alto contenido de grasa.  ¨ Sully con alto contenido de grasa, serene hot dogs, filetes de entrecot, salchicha, jamón y tocino.  ¨ Productos lácteos con alto contenido de grasa, serene leche entera, mantequilla y queso crema.  · Consuma pequeñas porciones de comida con más frecuencia. Evite consumir porciones abundantes.  · Evite beber mucho líquido con las comidas.  · No coma hollie las 2 o 3 horas previas a la hora de acostarse.  · No se acueste inmediatamente después de comer.  · No indio actividad física enseguida después de comer.  Instrucciones generales   · Esté atento a cualquier cambio en los síntomas.  · [place] los medicamentos de venta angelika y los recetados solamente serene se lo haya indicado el médico. No tome aspirina, ibuprofeno  ni otros antiinflamatorios no esteroides (TRACY), a menos que el médico lo autorice.  · No consuma ningún producto que contenga tabaco, lo que incluye cigarrillos, tabaco de mascar y cigarrillos electrónicos. Si necesita ayuda para dejar de fumar, consulte al médico.  · Use ropa suelta. No use nada ajustado alrededor de la cintura.  · Levante (eleve) unas 6 pulgadas (15 centímetros) la cabecera de la cama.  · Intente bajar el nivel de estrés. Si necesita ayuda para hacerlo, consulte al médico.  · Si tiene sobrepeso, adelgace hasta alcanzar un peso saludable. Pregúntele a chau médico cómo puede perder peso de manera salinas.  · Concurra a todas las visitas de control serene se lo haya indicado el médico. [place] es importante.  SOLICITE AYUDA SI:  · Aparecen nuevos síntomas.  · Baja de peso y no sabe por qué.  · Tiene dificultad para tragar o siente dolor al hacerlo.  · Tiene sibilancias o tos que no desaparece.  · Los síntomas no mejoran con el tratamiento.  · Tiene acidez frecuentemente hollie más de dos semanas.  SOLICITE AYUDA DE INMEDIATO SI:  · Tiene dolor en los brazos, el cruz, los maxilares, la dentadura o la espalda.  · Transpira, se marea o tiene sensación de desvanecimiento.  · Siente falta de aire o dolor en el pecho.  · Vomita y el vómito es parecido a la maxwell o a los granos de café.  · Las heces son sanguinolentas o de color melida.  Esta información no tiene serene fin reemplazar el consejo del médico. Asegúrese de hacerle al médico cualquier pregunta que tenga.  Document Released: 08/29/2012 Document Revised: 09/07/2016 Document Reviewed: 04/13/2016  Elsevier Interactive Patient Education © 2017 Elsevier Inc.

## 2019-01-17 ENCOUNTER — HOSPITAL ENCOUNTER (OUTPATIENT)
Dept: RADIOLOGY | Facility: MEDICAL CENTER | Age: 29
End: 2019-01-17
Attending: SURGERY
Payer: COMMERCIAL

## 2019-01-17 DIAGNOSIS — R11.0 NAUSEA: ICD-10-CM

## 2019-01-17 PROCEDURE — A9541 TC99M SULFUR COLLOID: HCPCS

## 2019-01-29 ENCOUNTER — APPOINTMENT (OUTPATIENT)
Dept: RADIOLOGY | Facility: MEDICAL CENTER | Age: 29
End: 2019-01-29
Attending: STUDENT IN AN ORGANIZED HEALTH CARE EDUCATION/TRAINING PROGRAM
Payer: COMMERCIAL

## 2021-07-14 NOTE — DISCHARGE PLANNING
Medical Social Work    LSW confirmed with Bessie, dialysis coordinator, that chair is confirmed.     Anticipate pt will discharge tomorrow.     
Outpatient Dialysis Note    Confirmed patient is active at:    [facility]  [address]   [place] [address]      Schedule: Tuesday, Thursday, Sunday  Time: Nocturnal    Spoke with Haily at facility who confirmed.    Forwarded records for review.    Dialysis Coordinator, [facility]  Bessie Mcgill [phone]  
flank

## 2021-09-29 ENCOUNTER — HOSPITAL ENCOUNTER (OUTPATIENT)
Facility: MEDICAL CENTER | Age: 31
End: 2021-09-30
Attending: EMERGENCY MEDICINE | Admitting: FAMILY MEDICINE

## 2021-09-29 ENCOUNTER — APPOINTMENT (OUTPATIENT)
Dept: RADIOLOGY | Facility: MEDICAL CENTER | Age: 31
End: 2021-09-29
Attending: EMERGENCY MEDICINE

## 2021-09-29 DIAGNOSIS — N18.6 ESRD ON HEMODIALYSIS (HCC): ICD-10-CM

## 2021-09-29 DIAGNOSIS — Z99.2 ESRD ON HEMODIALYSIS (HCC): ICD-10-CM

## 2021-09-29 DIAGNOSIS — E87.5 HYPERKALEMIA: ICD-10-CM

## 2021-09-29 DIAGNOSIS — I16.1 HYPERTENSIVE EMERGENCY: ICD-10-CM

## 2021-09-29 DIAGNOSIS — N18.6 ESRD (END STAGE RENAL DISEASE) ON DIALYSIS (HCC): ICD-10-CM

## 2021-09-29 DIAGNOSIS — Z99.2 ESRD (END STAGE RENAL DISEASE) ON DIALYSIS (HCC): ICD-10-CM

## 2021-09-29 PROBLEM — R74.8 ELEVATED ALKALINE PHOSPHATASE LEVEL: Status: ACTIVE | Noted: 2021-09-29

## 2021-09-29 LAB
ALBUMIN SERPL BCP-MCNC: 3.6 G/DL (ref 3.2–4.9)
ALBUMIN/GLOB SERPL: 1.4 G/DL
ALP SERPL-CCNC: 416 U/L (ref 30–99)
ALT SERPL-CCNC: 19 U/L (ref 2–50)
ANION GAP SERPL CALC-SCNC: 21 MMOL/L (ref 7–16)
AST SERPL-CCNC: 11 U/L (ref 12–45)
BASOPHILS # BLD AUTO: 0.6 % (ref 0–1.8)
BASOPHILS # BLD: 0.03 K/UL (ref 0–0.12)
BILIRUB SERPL-MCNC: 0.3 MG/DL (ref 0.1–1.5)
BUN SERPL-MCNC: 101 MG/DL (ref 8–22)
CALCIUM SERPL-MCNC: 9.6 MG/DL (ref 8.4–10.2)
CHLORIDE SERPL-SCNC: 96 MMOL/L (ref 96–112)
CO2 SERPL-SCNC: 22 MMOL/L (ref 20–33)
CREAT SERPL-MCNC: 12.73 MG/DL (ref 0.5–1.4)
EKG IMPRESSION: NORMAL
EOSINOPHIL # BLD AUTO: 0.07 K/UL (ref 0–0.51)
EOSINOPHIL NFR BLD: 1.4 % (ref 0–6.9)
ERYTHROCYTE [DISTWIDTH] IN BLOOD BY AUTOMATED COUNT: 40.7 FL (ref 35.9–50)
FERRITIN SERPL-MCNC: 4117 NG/ML (ref 22–322)
GGT SERPL-CCNC: 12 U/L (ref 7–51)
GLOBULIN SER CALC-MCNC: 2.6 G/DL (ref 1.9–3.5)
GLUCOSE SERPL-MCNC: 88 MG/DL (ref 65–99)
HAV IGM SERPL QL IA: NORMAL
HBV CORE IGM SER QL: NORMAL
HBV SURFACE AB SERPL IA-ACNC: 382 MIU/ML (ref 0–10)
HBV SURFACE AG SER QL: NORMAL
HCT VFR BLD AUTO: 24.8 % (ref 42–52)
HCV AB SER QL: NORMAL
HGB BLD-MCNC: 8.5 G/DL (ref 14–18)
IMM GRANULOCYTES # BLD AUTO: 0.01 K/UL (ref 0–0.11)
IMM GRANULOCYTES NFR BLD AUTO: 0.2 % (ref 0–0.9)
IRON SATN MFR SERPL: 37 % (ref 15–55)
IRON SERPL-MCNC: 88 UG/DL (ref 50–180)
LYMPHOCYTES # BLD AUTO: 1.09 K/UL (ref 1–4.8)
LYMPHOCYTES NFR BLD: 22.5 % (ref 22–41)
MCH RBC QN AUTO: 31.8 PG (ref 27–33)
MCHC RBC AUTO-ENTMCNC: 34.3 G/DL (ref 33.7–35.3)
MCV RBC AUTO: 92.9 FL (ref 81.4–97.8)
MONOCYTES # BLD AUTO: 0.52 K/UL (ref 0–0.85)
MONOCYTES NFR BLD AUTO: 10.7 % (ref 0–13.4)
NEUTROPHILS # BLD AUTO: 3.12 K/UL (ref 1.82–7.42)
NEUTROPHILS NFR BLD: 64.6 % (ref 44–72)
NRBC # BLD AUTO: 0 K/UL
NRBC BLD-RTO: 0 /100 WBC
PLATELET # BLD AUTO: 142 K/UL (ref 164–446)
PMV BLD AUTO: 12.2 FL (ref 9–12.9)
POTASSIUM SERPL-SCNC: 5.8 MMOL/L (ref 3.6–5.5)
PROT SERPL-MCNC: 6.2 G/DL (ref 6–8.2)
RBC # BLD AUTO: 2.67 M/UL (ref 4.7–6.1)
SARS-COV+SARS-COV-2 AG RESP QL IA.RAPID: NOTDETECTED
SODIUM SERPL-SCNC: 139 MMOL/L (ref 135–145)
SPECIMEN SOURCE: NORMAL
TIBC SERPL-MCNC: 241 UG/DL (ref 250–450)
UIBC SERPL-MCNC: 153 UG/DL (ref 110–370)
VIT B12 SERPL-MCNC: 497 PG/ML (ref 211–911)
WBC # BLD AUTO: 4.8 K/UL (ref 4.8–10.8)

## 2021-09-29 PROCEDURE — 93005 ELECTROCARDIOGRAM TRACING: CPT | Performed by: EMERGENCY MEDICINE

## 2021-09-29 PROCEDURE — 96374 THER/PROPH/DIAG INJ IV PUSH: CPT

## 2021-09-29 PROCEDURE — 85025 COMPLETE CBC W/AUTO DIFF WBC: CPT

## 2021-09-29 PROCEDURE — 99220 PR INITIAL OBSERVATION CARE,LEVL III: CPT | Performed by: FAMILY MEDICINE

## 2021-09-29 PROCEDURE — 86706 HEP B SURFACE ANTIBODY: CPT

## 2021-09-29 PROCEDURE — 80074 ACUTE HEPATITIS PANEL: CPT

## 2021-09-29 PROCEDURE — 36415 COLL VENOUS BLD VENIPUNCTURE: CPT

## 2021-09-29 PROCEDURE — 71045 X-RAY EXAM CHEST 1 VIEW: CPT

## 2021-09-29 PROCEDURE — 700111 HCHG RX REV CODE 636 W/ 250 OVERRIDE (IP): Performed by: INTERNAL MEDICINE

## 2021-09-29 PROCEDURE — G0378 HOSPITAL OBSERVATION PER HR: HCPCS

## 2021-09-29 PROCEDURE — 83550 IRON BINDING TEST: CPT

## 2021-09-29 PROCEDURE — 82728 ASSAY OF FERRITIN: CPT

## 2021-09-29 PROCEDURE — 83540 ASSAY OF IRON: CPT

## 2021-09-29 PROCEDURE — 86480 TB TEST CELL IMMUN MEASURE: CPT

## 2021-09-29 PROCEDURE — 87426 SARSCOV CORONAVIRUS AG IA: CPT

## 2021-09-29 PROCEDURE — A9270 NON-COVERED ITEM OR SERVICE: HCPCS | Performed by: FAMILY MEDICINE

## 2021-09-29 PROCEDURE — 90935 HEMODIALYSIS ONE EVALUATION: CPT

## 2021-09-29 PROCEDURE — 82607 VITAMIN B-12: CPT

## 2021-09-29 PROCEDURE — 99285 EMERGENCY DEPT VISIT HI MDM: CPT

## 2021-09-29 PROCEDURE — 80053 COMPREHEN METABOLIC PANEL: CPT

## 2021-09-29 PROCEDURE — 700102 HCHG RX REV CODE 250 W/ 637 OVERRIDE(OP): Performed by: FAMILY MEDICINE

## 2021-09-29 PROCEDURE — 82977 ASSAY OF GGT: CPT

## 2021-09-29 RX ORDER — HYDRALAZINE HYDROCHLORIDE 10 MG/1
10 TABLET, FILM COATED ORAL EVERY EVENING
Status: ON HOLD | COMMUNITY
End: 2021-09-30 | Stop reason: SDUPTHER

## 2021-09-29 RX ORDER — FOLIC ACID 5 MG
5 CAPSULE ORAL DAILY
Status: SHIPPED | COMMUNITY
End: 2021-09-29

## 2021-09-29 RX ORDER — CLONIDINE 0.2 MG/24H
1 PATCH, EXTENDED RELEASE TRANSDERMAL
Status: DISCONTINUED | OUTPATIENT
Start: 2021-09-29 | End: 2021-09-29

## 2021-09-29 RX ORDER — PREGABALIN 75 MG/1
75 CAPSULE ORAL EVERY EVENING
Status: DISCONTINUED | OUTPATIENT
Start: 2021-09-29 | End: 2021-09-30 | Stop reason: HOSPADM

## 2021-09-29 RX ORDER — HYDRALAZINE HYDROCHLORIDE 10 MG/1
10 TABLET, FILM COATED ORAL 3 TIMES DAILY
Status: SHIPPED | COMMUNITY
End: 2021-09-29

## 2021-09-29 RX ORDER — ACETAMINOPHEN 325 MG/1
650 TABLET ORAL EVERY 6 HOURS PRN
Status: DISCONTINUED | OUTPATIENT
Start: 2021-09-29 | End: 2021-09-30 | Stop reason: HOSPADM

## 2021-09-29 RX ORDER — ENALAPRILAT 1.25 MG/ML
1.25 INJECTION INTRAVENOUS EVERY 6 HOURS PRN
Status: DISCONTINUED | OUTPATIENT
Start: 2021-09-29 | End: 2021-09-30 | Stop reason: HOSPADM

## 2021-09-29 RX ORDER — AMLODIPINE BESYLATE 5 MG/1
5 TABLET ORAL EVERY EVENING
Status: DISCONTINUED | OUTPATIENT
Start: 2021-09-29 | End: 2021-09-30 | Stop reason: HOSPADM

## 2021-09-29 RX ORDER — PROCHLORPERAZINE EDISYLATE 5 MG/ML
5-10 INJECTION INTRAMUSCULAR; INTRAVENOUS EVERY 4 HOURS PRN
Status: DISCONTINUED | OUTPATIENT
Start: 2021-09-29 | End: 2021-09-30 | Stop reason: HOSPADM

## 2021-09-29 RX ORDER — ONDANSETRON 2 MG/ML
4 INJECTION INTRAMUSCULAR; INTRAVENOUS EVERY 4 HOURS PRN
Status: DISCONTINUED | OUTPATIENT
Start: 2021-09-29 | End: 2021-09-30 | Stop reason: HOSPADM

## 2021-09-29 RX ORDER — LOSARTAN POTASSIUM 50 MG/1
50 TABLET ORAL EVERY EVENING
Status: ON HOLD | COMMUNITY
End: 2021-09-30 | Stop reason: SDUPTHER

## 2021-09-29 RX ORDER — PROMETHAZINE HYDROCHLORIDE 25 MG/1
12.5-25 SUPPOSITORY RECTAL EVERY 4 HOURS PRN
Status: DISCONTINUED | OUTPATIENT
Start: 2021-09-29 | End: 2021-09-30 | Stop reason: HOSPADM

## 2021-09-29 RX ORDER — AMOXICILLIN 250 MG
2 CAPSULE ORAL 2 TIMES DAILY
Status: DISCONTINUED | OUTPATIENT
Start: 2021-09-30 | End: 2021-09-30 | Stop reason: HOSPADM

## 2021-09-29 RX ORDER — BISACODYL 10 MG
10 SUPPOSITORY, RECTAL RECTAL
Status: DISCONTINUED | OUTPATIENT
Start: 2021-09-29 | End: 2021-09-30 | Stop reason: HOSPADM

## 2021-09-29 RX ORDER — RANITIDINE 150 MG/1
150 TABLET ORAL 2 TIMES DAILY
Status: SHIPPED | COMMUNITY
End: 2021-09-29

## 2021-09-29 RX ORDER — HEPARIN SODIUM 5000 [USP'U]/ML
5000 INJECTION, SOLUTION INTRAVENOUS; SUBCUTANEOUS EVERY 8 HOURS
Status: DISCONTINUED | OUTPATIENT
Start: 2021-09-30 | End: 2021-09-30 | Stop reason: HOSPADM

## 2021-09-29 RX ORDER — LABETALOL HYDROCHLORIDE 5 MG/ML
10 INJECTION, SOLUTION INTRAVENOUS EVERY 4 HOURS PRN
Status: DISCONTINUED | OUTPATIENT
Start: 2021-09-29 | End: 2021-09-30 | Stop reason: HOSPADM

## 2021-09-29 RX ORDER — POLYETHYLENE GLYCOL 3350 17 G/17G
1 POWDER, FOR SOLUTION ORAL
Status: DISCONTINUED | OUTPATIENT
Start: 2021-09-29 | End: 2021-09-30 | Stop reason: HOSPADM

## 2021-09-29 RX ORDER — AMLODIPINE BESYLATE 5 MG/1
5 TABLET ORAL EVERY EVENING
Status: ON HOLD | COMMUNITY
End: 2021-09-30 | Stop reason: SDUPTHER

## 2021-09-29 RX ORDER — GABAPENTIN 300 MG/1
300 CAPSULE ORAL 3 TIMES DAILY
Status: SHIPPED | COMMUNITY
End: 2021-09-29

## 2021-09-29 RX ORDER — CLONIDINE 0.2 MG/24H
1 PATCH, EXTENDED RELEASE TRANSDERMAL
Status: ON HOLD | COMMUNITY
End: 2021-09-30 | Stop reason: SDUPTHER

## 2021-09-29 RX ORDER — LOSARTAN POTASSIUM 25 MG/1
50 TABLET ORAL EVERY EVENING
Status: DISCONTINUED | OUTPATIENT
Start: 2021-09-29 | End: 2021-09-30 | Stop reason: HOSPADM

## 2021-09-29 RX ORDER — CLONIDINE HYDROCHLORIDE 0.1 MG/1
0.1 TABLET ORAL EVERY 6 HOURS PRN
Status: DISCONTINUED | OUTPATIENT
Start: 2021-09-29 | End: 2021-09-30 | Stop reason: HOSPADM

## 2021-09-29 RX ORDER — PROMETHAZINE HYDROCHLORIDE 25 MG/1
12.5-25 TABLET ORAL EVERY 4 HOURS PRN
Status: DISCONTINUED | OUTPATIENT
Start: 2021-09-29 | End: 2021-09-30 | Stop reason: HOSPADM

## 2021-09-29 RX ORDER — CALCITRIOL 0.25 UG/1
0.25 CAPSULE, LIQUID FILLED ORAL DAILY
Status: SHIPPED | COMMUNITY
End: 2021-09-29

## 2021-09-29 RX ORDER — PREGABALIN 75 MG/1
75 CAPSULE ORAL EVERY EVENING
Status: ON HOLD | COMMUNITY
End: 2021-09-30 | Stop reason: SDUPTHER

## 2021-09-29 RX ORDER — HEPARIN SODIUM 1000 [USP'U]/ML
1500 INJECTION, SOLUTION INTRAVENOUS; SUBCUTANEOUS
Status: DISCONTINUED | OUTPATIENT
Start: 2021-09-29 | End: 2021-09-30 | Stop reason: HOSPADM

## 2021-09-29 RX ORDER — HYDRALAZINE HYDROCHLORIDE 10 MG/1
10 TABLET, FILM COATED ORAL EVERY EVENING
Status: DISCONTINUED | OUTPATIENT
Start: 2021-09-29 | End: 2021-09-30 | Stop reason: HOSPADM

## 2021-09-29 RX ORDER — ONDANSETRON 4 MG/1
4 TABLET, ORALLY DISINTEGRATING ORAL EVERY 4 HOURS PRN
Status: DISCONTINUED | OUTPATIENT
Start: 2021-09-29 | End: 2021-09-30 | Stop reason: HOSPADM

## 2021-09-29 RX ADMIN — LOSARTAN POTASSIUM 50 MG: 25 TABLET, FILM COATED ORAL at 17:24

## 2021-09-29 RX ADMIN — HEPARIN SODIUM 1500 UNITS: 1000 INJECTION, SOLUTION INTRAVENOUS; SUBCUTANEOUS at 19:22

## 2021-09-29 RX ADMIN — PREGABALIN 75 MG: 75 CAPSULE ORAL at 17:24

## 2021-09-29 RX ADMIN — AMLODIPINE BESYLATE 5 MG: 5 TABLET ORAL at 17:24

## 2021-09-29 RX ADMIN — HYDRALAZINE HYDROCHLORIDE 10 MG: 10 TABLET, FILM COATED ORAL at 17:24

## 2021-09-29 ASSESSMENT — ENCOUNTER SYMPTOMS
SHORTNESS OF BREATH: 0
COUGH: 0
NAUSEA: 0
FEVER: 0
VOMITING: 0
PALPITATIONS: 0
HEARTBURN: 0
CHILLS: 0

## 2021-09-29 NOTE — ASSESSMENT & PLAN NOTE
- Just returned here from [place] and needs to initiate HD  - Dr Mayer consulted in the ER, to receive HD today and start the process of obtaining an HD bed  - Already has fistula access

## 2021-09-29 NOTE — PROGRESS NOTES
COVID-19 surge in effect  Received report from Josee MAGALLON at 1623. Pt brought to floor via wheelchair, pt ambulated to bed. Pt is awake and alert. No signs of distress.  used. Pt denies any nausea. Pt denies any numbness or tingling. Pt reports pain 0/10. Fistula present to rue.  fall precautions in place. Bed in lowest and locked position. Call light within reach.

## 2021-09-29 NOTE — H&P
Hospital Medicine History & Physical Note    Date of Service  9/29/2021    Primary Care Physician  No primary care provider on file.    Consultants  nephrology    Specialist Names: Leyla    Code Status  Prior    Chief Complaint  Chief Complaint   Patient presents with   • Vascular Access Problem     pt in need of dialysis tx and set up w/ scheduled dialysis, last dialysis was monday        History of Presenting Illness  Elier Bustillos is a 31 y.o. male who presented 9/29/2021 with request for initiation of dialysis. He has a history of ESRD on HD M,W,F and hypertension - he previously saw Dr Mayer when he lived here a few years ago, but then he returned to [place].  [place] and presented to the ER with requests to set up HD. His last dialysis was Monday and he has no other complaints.    I discussed the plan of care with patient, family and ERP.    Review of Systems  Review of Systems   Constitutional: Negative for chills and fever.   Respiratory: Negative for cough and shortness of breath.    Cardiovascular: Negative for chest pain and palpitations.   Gastrointestinal: Negative for heartburn, nausea and vomiting.   All other systems reviewed and are negative.      Past Medical History   has a past medical history of Dialysis, Dialysis care, Hypertension, and Renal disorder.    Surgical History   has a past surgical history that includes other orthopedic surgery; av fistula creation (12/29/2010); av fistula creation (6/7/2012); cath placement (6/7/2012); recovery (7/3/2012); recovery (7/20/2012); av fistula creation (8/21/2012); av fistula revision (Right, 11/28/2016); and exploratory laparotomy (11/11/2018).     Family History  family history includes Diabetes in his mother; Heart Attack in his father.   Family history reviewed with patient. There is no family history that is pertinent to the chief complaint.     Social History   reports that he has  been smoking cigarettes. He has never used smokeless tobacco. He reports that he does not drink alcohol and does not use drugs.    Allergies  No Known Allergies    Medications  Prior to Admission Medications   Prescriptions Last Dose Informant Patient Reported? Taking?   amLODIPine (NORVASC) 5 MG Tab   Yes Yes   Sig: Take 5 mg by mouth every day.   hydrALAZINE (APRESOLINE) 10 MG Tab   Yes Yes   Sig: Take 10 mg by mouth 3 times a day.   losartan (COZAAR) 50 MG Tab   Yes Yes   Sig: Take 50 mg by mouth every day.      Facility-Administered Medications: None       Physical Exam  Temp:  [36.9 °C (98.4 °F)] 36.9 °C (98.4 °F)  Pulse:  [90] 90  Resp:  [18] 18  BP: (165)/(86) 165/86  SpO2:  [98 %] 98 %  Blood Pressure: (!) 165/86   Temperature: 36.9 °C (98.4 °F)   Pulse: 90   Respiration: 18   Pulse Oximetry: 98 %       Physical Exam  Vitals and nursing note reviewed.   Constitutional:       Appearance: Normal appearance.   HENT:      Head: Normocephalic and atraumatic.   Cardiovascular:      Rate and Rhythm: Normal rate and regular rhythm.   Pulmonary:      Effort: Pulmonary effort is normal. No respiratory distress.      Breath sounds: Normal breath sounds. No wheezing, rhonchi or rales.   Abdominal:      General: Abdomen is flat. Bowel sounds are normal. There is no distension.      Palpations: Abdomen is soft.      Tenderness: There is no abdominal tenderness. There is no guarding or rebound.   Musculoskeletal:      Right lower leg: No edema.      Left lower leg: No edema.      Comments: RUE fistula     Skin:     General: Skin is warm and dry.   Neurological:      General: No focal deficit present.      Mental Status: He is alert and oriented to person, place, and time.   Psychiatric:         Mood and Affect: Mood normal.         Behavior: Behavior normal.         Laboratory:  Recent Labs     09/29/21  1347   WBC 4.8   RBC 2.67*   HEMOGLOBIN 8.5*   HEMATOCRIT 24.8*   MCV 92.9   MCH 31.8   MCHC 34.3   RDW 40.7    PLATELETCT 142*   MPV 12.2     Recent Labs     09/29/21  1347   SODIUM 139   POTASSIUM 5.8*   CHLORIDE 96   CO2 22   GLUCOSE 88   *   CREATININE 12.73*   CALCIUM 9.6     Recent Labs     09/29/21  1347   ALTSGPT 19   ASTSGOT 11*   ALKPHOSPHAT 416*   TBILIRUBIN 0.3   GLUCOSE 88         No results for input(s): NTPROBNP in the last 72 hours.      No results for input(s): TROPONINT in the last 72 hours.    Imaging:  DX-CHEST-PORTABLE (1 VIEW)   Final Result      Stable cardiac silhouette enlargement without edema confirmed          X-Ray:  I have personally reviewed the images and compared with prior images.    Assessment/Plan:  I anticipate this patient is appropriate for observation status at this time.    Elevated alkaline phosphatase level  Assessment & Plan  - Otherwise, LFTs normal  - No abd pain  - check GGT      Posterior reversible encephalopathy syndrome  Assessment & Plan  - History of PRES in the past - monitor Bps carefully while hospitalized    HTN (hypertension)- (present on admission)  Assessment & Plan  - Resume home medications - on somewhat of an odd regimen, but will resume home doses and adjust as needed   - Utilize PRNs as necessary      Anemia- (present on admission)  Assessment & Plan  - Likely secondary to renal disease  - Check indices      ESRD (end stage renal disease) on dialysis (HCC)- (present on admission)  Assessment & Plan  - Just returned here from [place] and needs to initiate HD  - Dr Mayer consulted in the ER, to receive HD today and start the process of obtaining an HD bed  - Already has fistula access    Hyperkalemia  Assessment & Plan  - To receive HD this afternoon per ERP      VTE prophylaxis: heparin ppx

## 2021-09-29 NOTE — ED TRIAGE NOTES
"Chief Complaint   Patient presents with   • Vascular Access Problem     pt in need of dialysis tx and set up w/ scheduled dialysis, last dialysis was monday      BP (!) 165/86   Pulse 90   Temp 36.9 °C (98.4 °F) (Temporal)   Resp 18   Ht 1.676 m (5' 6\")   Wt 75.6 kg (166 lb 10.7 oz)   SpO2 98%   BMI 26.90 kg/m²     Pt BIB family for above concern, pt was recently granted his VISA from [place], has been out of the country for 2 years, pt family states he used to be a pt of [facility], needs assistance setting it back up     Has this patient been vaccinated for COVID - NO  If not, would they like to be vaccinated while in the ER if eligible?  no  Would the patient like to speak with the ERP about the possibility of receiving the COVID vaccine today before making a decision? no      "

## 2021-09-29 NOTE — ED PROVIDER NOTES
ED Provider Note  CHIEF COMPLAINT  Chief Complaint   Patient presents with   • Vascular Access Problem     pt in need of dialysis tx and set up w/ scheduled dialysis, last dialysis was monday        HPI  Elier Bustillos is a 31 y.o. male who presents to the ER in need of dialysis. The patient normally dialyzes Monday, Wednesday and Friday. He was seeing [facility] for nephrology up until 2019 when his visa was denied and he had to stay in [place]. He finally got his visa reinstated and was able to come back to the [place]. [place]. He was supposed to be dialyzed yesterday. He went to [facility] today but they would not see him and told him to come to the emergency department. He is here today requesting dialysis. He has a functional dialysis catheter in the right upper extremity. He makes small amount of urine. He otherwise feels well. He does not feel sick or ill. No nausea or vomiting. No fevers or chills. No dizziness or lightheadedness. No shortness of breath. No pain or swelling in his legs.    REVIEW OF SYSTEMS  See HPI for further details. All other systems are negative.    PAST MEDICAL HISTORY  Past Medical History:   Diagnosis Date   • Dialysis    • Dialysis care    • Hypertension    • Renal disorder     dailysis pt. 3 times per week.        FAMILY HISTORY  Family History   Problem Relation Age of Onset   • Diabetes Mother    • Heart Attack Father        SOCIAL HISTORY  Social History     Socioeconomic History   • Marital status:      Spouse name: Not on file   • Number of children: Not on file   • Years of education: Not on file   • Highest education level: Not on file   Occupational History   • Not on file   Tobacco Use   • Smoking status: Current Every Day Smoker     Types: Cigarettes   • Smokeless tobacco: Never Used   • Tobacco comment: 1-2 cigarettes daily   Substance and Sexual Activity   • Alcohol use: No   • Drug use: No   • Sexual  activity: Not on file   Other Topics Concern   • Not on file   Social History Narrative   • Not on file     Social Determinants of Health     Financial Resource Strain:    • Difficulty of Paying Living Expenses:    Food Insecurity:    • Worried About Running Out of Food in the Last Year:    • Ran Out of Food in the Last Year:    Transportation Needs:    • Lack of Transportation (Medical):    • Lack of Transportation (Non-Medical):    Physical Activity:    • Days of Exercise per Week:    • Minutes of Exercise per Session:    Stress:    • Feeling of Stress :    Social Connections:    • Frequency of Communication with Friends and Family:    • Frequency of Social Gatherings with Friends and Family:    • Attends [employer] Services:    • Active Member of Clubs or Organizations:    • Attends Club or Organization Meetings:    • Marital Status:    Intimate Partner Violence:    • Fear of Current or Ex-Partner:    • Emotionally Abused:    • Physically Abused:    • Sexually Abused:        SURGICAL HISTORY  Past Surgical History:   Procedure Laterality Date   • EXPLORATORY LAPAROTOMY  11/11/2018    Procedure: EXPLORATORY LAPAROTOMY;  Surgeon: Faith Martin M.D.;  Location: SURGERY [facility];  Service: General   • AV FISTULA REVISION Right 11/28/2016    Procedure: AV FISTULA REVISION UPPER EXTREMITY (ANEURYSM REDUCTION);  Surgeon: Slade Perez M.D.;  Location: [facility];  Service:    • AV FISTULA CREATION  8/21/2012    Performed by ANAI TYSON at SURGERY [facility]   • RECOVERY  7/20/2012    Performed by SURGERY, IR-RECOVERY at SURGERY SAME DAY [facility] ORS   • RECOVERY  7/3/2012    Performed by SURGERY, IR-RECOVERY at SURGERY SAME DAY [facility] ORS   • AV FISTULA CREATION  6/7/2012    Performed by ANAI TYSON at SURGERY [facility]   • CATH PLACEMENT  6/7/2012    Performed by ANAI TYSON at SURGERY [facility]   • AV FISTULA CREATION  12/29/2010    Performed by ANT ALONZO  "at SURGERY [facility] ORS   • OTHER ORTHOPEDIC SURGERY      r arm fx       CURRENT MEDICATIONS  Home Medications     Reviewed by Mendy Jimenez R.N. (Registered Nurse) on 09/29/21 at 1237  Med List Status: Not Addressed   Medication Last Dose Status   acetaminophen (TYLENOL) 325 MG Tab  Active   albuterol 108 (90 Base) MCG/ACT Aero Soln inhalation aerosol  Active   amlodipine (NORVASC) 10 MG TABS  Active   cinacalcet (SENSIPAR) 30 MG TABS  Active   cloNIDine (CATAPRES) 0.2 MG/24HR PATCH WEEKLY  Active   hydrALAZINE (APRESOLINE) 25 MG Tab  Active   lisinopril (PRINIVIL) 10 MG Tab  Active   metoprolol SR (TOPROL XL) 100 MG TABLET SR 24 HR  Active   raNITidine (ZANTAC) 150 MG Tab  Active   sevelamer (RENAGEL) 800 MG TABS  Active                ALLERGIES  No Known Allergies    PHYSICAL EXAM  VITAL SIGNS: BP (!) 165/86   Pulse 90   Temp 36.9 °C (98.4 °F) (Temporal)   Resp 18   Ht 1.676 m (5' 6\")   Wt 75.6 kg (166 lb 10.7 oz)   SpO2 98%   BMI 26.90 kg/m²    Constitutional: Well developed, well nourished; No acute distress; Non-toxic appearance.   HENT: Normocephalic, atraumatic; Bilateral external ears normal; oropharyngeal examination deferred due to COVID-19 outbreak and lack of oropharyngeal complaint  Eyes: PERRL, EOMI, Conjunctiva normal. No discharge.   Neck:  Supple, nontender midline; No stridor; No nuchal rigidity.   Lymphatic: No cervical lymphadenopathy noted.   Cardiovascular: Regular rate and rhythm without murmurs, rubs, or gallop.   Thorax & Lungs: No respiratory distress, breath sounds clear to auscultation bilaterally without wheezing, rales or rhonchi. Nontender chest wall. No crepitus or subcutaneous air  Abdomen: Soft, nontender, bowel sounds normal. No obvious masses; No pulsatile masses; no rebound, guarding, or peritoneal signs.   Skin: Good color; warm and dry without rash or petechia.  Back: Nontender, No CVA tenderness. .   Extremities: Distal radial, dorsalis pedis, posterior tibial " pulses are equal bilaterally; No edema; Nontender calves or saphenous, No cyanosis, No clubbing. Large fistula in the right upper extremity with good thrill. 2+ radial pulse.  Neurologic: Alert & oriented x 4, clear speech,      EKG  Please see my EKG interpretation below    RADIOLOGY/PROCEDURES  No orders to display       COURSE & MEDICAL DECISION MAKING  Pertinent Labs & Imaging studies reviewed. (See chart for details)    Results for orders placed or performed during the hospital encounter of 09/29/21   CBC WITH DIFFERENTIAL   Result Value Ref Range    WBC 4.8 4.8 - 10.8 K/uL    RBC 2.67 (L) 4.70 - 6.10 M/uL    Hemoglobin 8.5 (L) 14.0 - 18.0 g/dL    Hematocrit 24.8 (L) 42.0 - 52.0 %    MCV 92.9 81.4 - 97.8 fL    MCH 31.8 27.0 - 33.0 pg    MCHC 34.3 33.7 - 35.3 g/dL    RDW 40.7 35.9 - 50.0 fL    Platelet Count 142 (L) 164 - 446 K/uL    MPV 12.2 9.0 - 12.9 fL    Neutrophils-Polys 64.60 44.00 - 72.00 %    Lymphocytes 22.50 22.00 - 41.00 %    Monocytes 10.70 0.00 - 13.40 %    Eosinophils 1.40 0.00 - 6.90 %    Basophils 0.60 0.00 - 1.80 %    Immature Granulocytes 0.20 0.00 - 0.90 %    Nucleated RBC 0.00 /100 WBC    Neutrophils (Absolute) 3.12 1.82 - 7.42 K/uL    Lymphs (Absolute) 1.09 1.00 - 4.80 K/uL    Monos (Absolute) 0.52 0.00 - 0.85 K/uL    Eos (Absolute) 0.07 0.00 - 0.51 K/uL    Baso (Absolute) 0.03 0.00 - 0.12 K/uL    Immature Granulocytes (abs) 0.01 0.00 - 0.11 K/uL    NRBC (Absolute) 0.00 K/uL   COMP METABOLIC PANEL   Result Value Ref Range    Sodium 139 135 - 145 mmol/L    Potassium 5.8 (H) 3.6 - 5.5 mmol/L    Chloride 96 96 - 112 mmol/L    Co2 22 20 - 33 mmol/L    Anion Gap 21.0 (H) 7.0 - 16.0    Glucose 88 65 - 99 mg/dL    Bun 101 (HH) 8 - 22 mg/dL    Creatinine 12.73 (HH) 0.50 - 1.40 mg/dL    Calcium 9.6 8.4 - 10.2 mg/dL    AST(SGOT) 11 (L) 12 - 45 U/L    ALT(SGPT) 19 2 - 50 U/L    Alkaline Phosphatase 416 (H) 30 - 99 U/L    Total Bilirubin 0.3 0.1 - 1.5 mg/dL    Albumin 3.6 3.2 - 4.9 g/dL    Total  Protein 6.2 6.0 - 8.2 g/dL    Globulin 2.6 1.9 - 3.5 g/dL    A-G Ratio 1.4 g/dL   ESTIMATED GFR   Result Value Ref Range    GFR If  6 (A) >60 mL/min/1.73 m 2    GFR If Non African American 5 (A) >60 mL/min/1.73 m 2         1424:  Paged nephrology    1430: I discussed the case with Dr. Mayer, nephrologist on-call.  He said patient will have to be admitted to the hospital and  will have to help get the patient set up for dialysis again at [facility].  He will see the patient in consultation to get the patient dialyzed when he is an inpatient.  He would like the patient to have a quantiferon gold test as well as a hepatitis panel in preparation for dialysis.      Hospitalist paged at 1440      Patient presents to the ER requesting dialysis.  He was in [place] for the last 2 years because his visa Had  and they would not let him back into the country.  Prior to that he was a patient of [facility] nephrology and was dialyzing at [facility] every Monday, Wednesday and Friday.  He got his visa reinstated and he came [place] the border couple days ago.  He is here to get set up for dialysis again.  He does not feel sick or ill.  Vital signs are stable although he is a little hypertensive.  He still makes a little bit a urine.  He is a large, functional fistula in the right upper extremity with a good thrill.  Potassium is little high at 5.8.  BUN is 101.  Creatinine is a little over 12.  I spoke with Dr. Mayer nephrologist on-call for [facility] nephrology.  He would like the patient admitted to the hospital so that they can get the patient set up with dialysis.  In the meantime, he will dialyze the patient here while he is an inpatient.  Plan is to speak with the hospitalist on-call for evaluation for hospitalization.    I verified that the patient was wearing a mask and I was wearing appropriate PPE every time I entered the room. The patient's mask was on the patient at all times during  my encounter    FINAL IMPRESSION  1. ESRD on hemodialysis (HCC) Acute    2. Hyperkalemia Acute         This dictation has been created using voice recognition software. The accuracy of the dictation is limited by the abilities of the software. I expect there may be some errors of grammar and possibly content. I made every attempt to manually correct the errors within my dictation. However, errors related to voice recognition software may still exist and should be interpreted within the appropriate context.    Electronically signed by: Mica Hernandez M.D., 9/29/2021 1:16 PM

## 2021-09-29 NOTE — ASSESSMENT & PLAN NOTE
- Resume home medications - on somewhat of an odd regimen, but will resume home doses and adjust as needed   - Utilize PRNs as necessary

## 2021-09-29 NOTE — ED NOTES
Med rec updated and complete  Allergies reviewed  Pt reports that last time he used his CLONIDINE 0.2MG patch was a month ago.  All of pts medications are from [place]   Pt reports no vitamins or OTC's.  Pt reports no antibiotics in the last 30 days.    No current facility-administered medications on file prior to encounter.     Current Outpatient Medications on File Prior to Encounter   Medication Sig Dispense Refill   • losartan (COZAAR) 50 MG Tab Take 50 mg by mouth every evening.     • amLODIPine (NORVASC) 5 MG Tab Take 5 mg by mouth every evening.     • pregabalin (LYRICA) 75 MG Cap Take 75 mg by mouth every evening.     • hydrALAZINE (APRESOLINE) 10 MG Tab Take 10 mg by mouth every evening. In [place] medications is named Dila-vania 10MG     • cloNIDine (CATAPRES) 0.2 MG/24HR PATCH WEEKLY patch Place 1 Patch on the skin every 7 days.

## 2021-09-30 ENCOUNTER — HOSPITAL ENCOUNTER (OUTPATIENT)
Facility: MEDICAL CENTER | Age: 31
End: 2021-10-01
Attending: STUDENT IN AN ORGANIZED HEALTH CARE EDUCATION/TRAINING PROGRAM | Admitting: STUDENT IN AN ORGANIZED HEALTH CARE EDUCATION/TRAINING PROGRAM

## 2021-09-30 VITALS
RESPIRATION RATE: 18 BRPM | BODY MASS INDEX: 26.79 KG/M2 | OXYGEN SATURATION: 100 % | TEMPERATURE: 98.5 F | SYSTOLIC BLOOD PRESSURE: 147 MMHG | HEART RATE: 83 BPM | WEIGHT: 166.67 LBS | HEIGHT: 66 IN | DIASTOLIC BLOOD PRESSURE: 100 MMHG

## 2021-09-30 LAB
25(OH)D3 SERPL-MCNC: 48 NG/ML (ref 30–100)
ANION GAP SERPL CALC-SCNC: 13 MMOL/L (ref 7–16)
BUN SERPL-MCNC: 55 MG/DL (ref 8–22)
CALCIUM SERPL-MCNC: 9.9 MG/DL (ref 8.4–10.2)
CHLORIDE SERPL-SCNC: 103 MMOL/L (ref 96–112)
CO2 SERPL-SCNC: 25 MMOL/L (ref 20–33)
CREAT SERPL-MCNC: 8.19 MG/DL (ref 0.5–1.4)
ERYTHROCYTE [DISTWIDTH] IN BLOOD BY AUTOMATED COUNT: 42.4 FL (ref 35.9–50)
GLUCOSE SERPL-MCNC: 111 MG/DL (ref 65–99)
HCT VFR BLD AUTO: 22.6 % (ref 42–52)
HGB BLD-MCNC: 7.4 G/DL (ref 14–18)
MCH RBC QN AUTO: 31.1 PG (ref 27–33)
MCHC RBC AUTO-ENTMCNC: 32.7 G/DL (ref 33.7–35.3)
MCV RBC AUTO: 95 FL (ref 81.4–97.8)
PLATELET # BLD AUTO: 124 K/UL (ref 164–446)
PMV BLD AUTO: 12.1 FL (ref 9–12.9)
POTASSIUM SERPL-SCNC: 4.8 MMOL/L (ref 3.6–5.5)
RBC # BLD AUTO: 2.38 M/UL (ref 4.7–6.1)
SODIUM SERPL-SCNC: 141 MMOL/L (ref 135–145)
WBC # BLD AUTO: 3.9 K/UL (ref 4.8–10.8)

## 2021-09-30 PROCEDURE — 99217 PR OBSERVATION CARE DISCHARGE: CPT | Performed by: INTERNAL MEDICINE

## 2021-09-30 PROCEDURE — G0378 HOSPITAL OBSERVATION PER HR: HCPCS

## 2021-09-30 PROCEDURE — 36415 COLL VENOUS BLD VENIPUNCTURE: CPT

## 2021-09-30 PROCEDURE — 80048 BASIC METABOLIC PNL TOTAL CA: CPT

## 2021-09-30 PROCEDURE — 85027 COMPLETE CBC AUTOMATED: CPT

## 2021-09-30 PROCEDURE — 96372 THER/PROPH/DIAG INJ SC/IM: CPT

## 2021-09-30 PROCEDURE — 700111 HCHG RX REV CODE 636 W/ 250 OVERRIDE (IP): Performed by: FAMILY MEDICINE

## 2021-09-30 PROCEDURE — 82306 VITAMIN D 25 HYDROXY: CPT

## 2021-09-30 RX ORDER — CLONIDINE 0.2 MG/24H
1 PATCH, EXTENDED RELEASE TRANSDERMAL
Qty: 4 PATCH | Refills: 0 | Status: SHIPPED | OUTPATIENT
Start: 2021-09-30 | End: 2021-10-30

## 2021-09-30 RX ORDER — AMLODIPINE BESYLATE 5 MG/1
5 TABLET ORAL EVERY EVENING
Qty: 30 TABLET | Refills: 0 | Status: SHIPPED | OUTPATIENT
Start: 2021-09-30 | End: 2021-10-30

## 2021-09-30 RX ORDER — LOSARTAN POTASSIUM 50 MG/1
50 TABLET ORAL EVERY EVENING
Qty: 30 TABLET | Refills: 0 | Status: SHIPPED | OUTPATIENT
Start: 2021-09-30 | End: 2021-10-30

## 2021-09-30 RX ORDER — HYDRALAZINE HYDROCHLORIDE 10 MG/1
10 TABLET, FILM COATED ORAL EVERY EVENING
Qty: 30 TABLET | Refills: 0 | Status: SHIPPED | OUTPATIENT
Start: 2021-09-30 | End: 2021-10-30

## 2021-09-30 RX ORDER — PREGABALIN 75 MG/1
75 CAPSULE ORAL EVERY EVENING
Qty: 30 CAPSULE | Refills: 0 | Status: SHIPPED | OUTPATIENT
Start: 2021-09-30 | End: 2021-10-30

## 2021-09-30 RX ADMIN — HEPARIN SODIUM 5000 UNITS: 5000 INJECTION, SOLUTION INTRAVENOUS; SUBCUTANEOUS at 05:07

## 2021-09-30 NOTE — CONSULTS
[facility] -  CONSULTATION NOTE               Author: Neil Mayer M.D. Date & Time: 9/29/2021  6:51 PM       REASON FOR CONSULTATION:   Inpatient hemodialysis management.    CHIEF COMPLAINT:   I need Dialysis    HISTORY OF PRESENT ILLNESS:    Elier Bustillos is a 31 y.o. male who presented 9/29/2021 with request for initiation of dialysis. He has a history of ESRD on HD M,W,F and hypertension. He previously followed up with us [facility] for many years before he returned to [place].  He received his visa and has [place] border again and presented to the ER with requests to set up HD. His last dialysis was Monday in [place] and he has no other complaints.     No F/C/N/V/CP/SOB.  No melena, hematochezia, hematemesis.  No HA, visual changes, or abdominal pain.    REVIEW OF SYSTEMS:    10 point ROS was performed and is as per HPI or otherwise negative.    PAST MEDICAL HISTORY:   Past Medical History:   Diagnosis Date   • Dialysis    • Dialysis care    • Hypertension    • Renal disorder     dailysis pt. 3 times per week.        PAST SURGICAL HISTORY:   Past Surgical History:   Procedure Laterality Date   • EXPLORATORY LAPAROTOMY  11/11/2018    Procedure: EXPLORATORY LAPAROTOMY;  Surgeon: Faith Martin M.D.;  Location: [facility];  Service: General   • AV FISTULA REVISION Right 11/28/2016    Procedure: AV FISTULA REVISION UPPER EXTREMITY (ANEURYSM REDUCTION);  Surgeon: Slade Perez M.D.;  Location: [facility];  Service:    • AV FISTULA CREATION  8/21/2012    Performed by ANAI TYSON at [facility]   • RECOVERY  7/20/2012    Performed by SURGERY, IR-RECOVERY at SURGERY SAME DAY [facility]   • RECOVERY  7/3/2012    Performed by SURGERY, IR-RECOVERY at SURGERY SAME DAY [facility]   • AV FISTULA CREATION  6/7/2012    Performed by ANAI TYSON at [facility]   • CATH PLACEMENT  6/7/2012    Performed by ANAI TYSON  "at SURGERY [facility]   • AV FISTULA CREATION  12/29/2010    Performed by ANT ALONZO at SURGERY [facility] ORS   • OTHER ORTHOPEDIC SURGERY      r arm fx       FAMILY HISTORY:   Family History   Problem Relation Age of Onset   • Diabetes Mother    • Heart Attack Father        SOCIAL HISTORY:   Social History     Tobacco Use   Smoking Status Current Every Day Smoker   • Types: Cigarettes   Smokeless Tobacco Never Used   Tobacco Comment    1-2 cigarettes daily     Social History     Substance and Sexual Activity   Alcohol Use No     Social History     Substance and Sexual Activity   Drug Use No       HOME MEDICATIONS:   Reviewed and documented in chart.    LABORATORY STUDIES:   Recent Labs     09/29/21  1347   SODIUM 139   POTASSIUM 5.8*   CHLORIDE 96   CO2 22   GLUCOSE 88   *   CREATININE 12.73*   CALCIUM 9.6       ALLERGIES:  Patient has no known allergies.    VS:  BP (!) 172/109 Comment: RN aware  Pulse 94   Temp 36.7 °C (98 °F) (Temporal)   Resp 17   Ht 1.676 m (5' 6\")   Wt 75.6 kg (166 lb 10.7 oz)   SpO2 100%   BMI 26.90 kg/m²     Physical Exam  Vitals and nursing note reviewed.   Constitutional:       Appearance: Normal appearance.   HENT:      Head: Normocephalic and atraumatic.      Nose: Nose normal.      Mouth/Throat:      Mouth: Mucous membranes are moist.      Pharynx: Oropharynx is clear.   Eyes:      Extraocular Movements: Extraocular movements intact.      Conjunctiva/sclera: Conjunctivae normal.      Pupils: Pupils are equal, round, and reactive to light.   Cardiovascular:      Rate and Rhythm: Normal rate and regular rhythm.   Pulmonary:      Effort: Pulmonary effort is normal.      Breath sounds: Normal breath sounds.   Abdominal:      General: Abdomen is flat. Bowel sounds are normal.   Musculoskeletal:         General: Normal range of motion.      Cervical back: Normal range of motion and neck supple.   Skin:     Capillary Refill: Capillary refill takes less than 2 " seconds.   Neurological:      General: No focal deficit present.      Mental Status: He is alert. Mental status is at baseline.   Psychiatric:         Mood and Affect: Mood normal.         Behavior: Behavior normal.         Thought Content: Thought content normal.         Judgment: Judgment normal.            FLUID BALANCE:  No intake/output data recorded.    IMAGING:  All imaging reviewed from admission to present day    IMPRESSION:  # ESRD  - Etiology likely 2/2 HTN  # HTN  - Goal BP < 140/90  # Anemia of CKD  - Goal Hgb 10-11  # CKD-MBD  - PTH pen  - Vit D pen  - Ca pen  - PO4 pen  # Hyperkalemia    PLAN:  - HD today and then MWF iHD during stay  - UF as tolerated  - Renal diet  - Dose all meds per ESRD  - Renal dose medications as necessary  - Social work to set him up for outpatient HD  - Quant Gold  - Hepatitis Panel      Thank you for the consultation!

## 2021-09-30 NOTE — DISCHARGE PLANNING
Anticipated Discharge Disposition: Home w/ Outpatient Dialysis    Action: Received confirmation that pt's dialysis time is scheduled for tomorrow 10/1 @ 0630 at [facility]U.     BSN updated.     Barriers to Discharge: None    Plan: D/C home

## 2021-09-30 NOTE — DISCHARGE INSTRUCTIONS
Discharge Instructions      Present directly to the CDU at [facility]( can ask the  where this is) [facility], starting tomorrow for dialysis. They will be expecting him.    Discharged to home by car with relative. Discharged via wheelchair, hospital escort: Yes.  Special equipment needed: Not Applicable    Be sure to schedule a follow-up appointment with your primary care doctor or any specialists as instructed.     Discharge Plan:   Diet Plan: Discussed  Activity Level: Discussed  Confirmed Follow up Appointment: Patient to Call and Schedule Appointment  Confirmed Symptoms Management: Discussed  Influenza Vaccine Indication: Patient Refuses    I understand that a diet low in cholesterol, fat, and sodium is recommended for good health. Unless I have been given specific instructions below for another diet, I accept this instruction as my diet prescription.   Other diet: resume home diet    Special Instructions: None    · Is patient discharged on Warfarin / Coumadin?   No     Depression / Suicide Risk    As you are discharged from this [facility], it is important to learn how to keep safe from harming yourself.    Recognize the warning signs:  · Abrupt changes in personality, positive or negative- including increase in energy   · Giving away possessions  · Change in eating patterns- significant weight changes-  positive or negative  · Change in sleeping patterns- unable to sleep or sleeping all the time   · Unwillingness or inability to communicate  · Depression  · Unusual sadness, discouragement and loneliness  · Talk of wanting to die  · Neglect of personal appearance   · Rebelliousness- reckless behavior  · Withdrawal from people/activities they love  · Confusion- inability to concentrate     If you or a loved one observes any of these behaviors or has concerns about self-harm, here's what you can do:  · Talk about it- your feelings and reasons for harming yourself  · Remove any means that  you might use to hurt yourself (examples: pills, rope, extension cords, firearm)  · Get professional help from the community (Mental Health, Substance Abuse, psychological counseling)  · Do not be alone:Call your Safe Contact- someone whom you trust who will be there for you.  · Call your local CRISIS HOTLINE 378-5804 or 424-189-6212  · Call your local Children's Mobile Crisis Response Team [place] (241) 315-4960 or www.depict  · Call the toll free National Suicide Prevention Hotlines   · National Suicide Prevention Lifeline 932-833-UFLJ (2118)  · National Hope Line Network 800-SUICIDE (159-3927)

## 2021-09-30 NOTE — PROGRESS NOTES
COVID-19 surge in effect.     Pt is awake in bed. VSS. Pt has no c/o pain or n/v at this time. RN discussed POC for the evening with pt. Pt verbalizes understanding. Fall precautions in place.

## 2021-09-30 NOTE — CARE PLAN
The patient is Stable - Low risk of patient condition declining or worsening    Shift Goals  Clinical Goals: safety    Progress made toward(s) clinical / shift goals:  patient remained free from falls throughout the shift    Patient is not progressing towards the following goals:

## 2021-09-30 NOTE — PROGRESS NOTES
[facility] Progress Note     Hemodialysis treatment ordered today per  x 3 hours.   Treatment initiated at 1904 ended at 2204.      Patient tolerated tx; see paper flow sheet for details.      Net UF 3000 mL.      Needles removed from access site. Dressings applied and sites held x 10 minutes; verified no bleeding. Positive bruit/thrill post tx.   Staff RN to monitor AVF/G for breakthrough bleeding. Should breakthrough bleeding occur, staff RN to apply pressure to access sites until bleeding resolved.   Notify Dialysis and Nephrologist for follow-up.     Report given to Primary NAUN Barragan.

## 2021-09-30 NOTE — PROGRESS NOTES
"[facility] -  PROGRESS NOTE               Author: ARTHUR Flowers Date & Time: 9/30/2021  8:09 AM     HPI:  Elier Bustillos is a 31 y.o. male who presented 9/29/2021 with request for initiation of dialysis. He has a history of ESRD on HD M,W,F and hypertension. He previously followed up with us [facility] for many years before he returned to [place].  He received his visa and [place] and presented to the ER with requests to set up HD. His last dialysis was Monday in [place] and he has no other complaints.      No F/C/N/V/CP/SOB.  No melena, hematochezia, hematemesis.  No HA, visual changes, or abdominal pain.    DAILY NEPHROLOGY SUMMARY:  9/30: 3L net UF. Resting in bed, no acute distress. Denies CP/SOB, no n/v/d.     REVIEW OF SYSTEMS:    10 point ROS reviewed and is as per HPI/daily summary or otherwise negative    PMH/PSH/SH/FH:   Reviewed and unchanged since admission note    CURRENT MEDICATIONS:   Reviewed from admission to present day    VS:  /90   Pulse 98   Temp 36.9 °C (98.5 °F) (Oral)   Resp 18   Ht 1.676 m (5' 6\")   Wt 75.6 kg (166 lb 10.7 oz)   SpO2 100%   BMI 26.90 kg/m²     Physical Exam  Constitutional:       Appearance: Normal appearance. He is normal weight.   HENT:      Head: Normocephalic and atraumatic.      Nose: Nose normal.      Mouth/Throat:      Mouth: Mucous membranes are dry.      Pharynx: Oropharynx is clear.   Eyes:      Extraocular Movements: Extraocular movements intact.      Conjunctiva/sclera: Conjunctivae normal.      Pupils: Pupils are equal, round, and reactive to light.   Cardiovascular:      Pulses: Normal pulses.      Heart sounds: Murmur heard.   No friction rub. No gallop.       Comments:  R AVF +Bruit/+Thrill, tortuous & aneurysmal  Pulmonary:      Effort: Pulmonary effort is normal. No respiratory distress.      Breath sounds: Normal breath sounds. No stridor. No wheezing.   Abdominal:      " General: Abdomen is flat. Bowel sounds are normal. There is no distension.      Palpations: Abdomen is soft. There is no mass.      Tenderness: There is no abdominal tenderness.   Skin:     General: Skin is warm and dry.   Neurological:      Mental Status: He is alert.         Fluids:  In: 740 [P.O.:240; Dialysis:500]  Out: 3500     LABS:  Recent Labs     09/29/21  1347 09/30/21  0421   SODIUM 139 141   POTASSIUM 5.8* 4.8   CHLORIDE 96 103   CO2 22 25   GLUCOSE 88 111*   * 55*   CREATININE 12.73* 8.19*   CALCIUM 9.6 9.9       IMAGING:   All imaging reviewed from admission to present day    IMPRESSION:  # ESRD  - Etiology likely 2/2 HTN  # HTN  - Goal BP < 140/90  # Anemia of CKD  - Below Goal Hgb 10-11  - % sat 37  - Ferritin 4117  # CKD-MBD  - PTH pen  - Vit D pen  - Ca pen  - PO4 pen  # Hyperkalemia, resolved     PLAN:  - iHD MWF and Prn. No iHD today ([facility])  - UF as tolerated  - Renal diet  - MARIANA with HD  - Restart Home antihtn  - Dose all meds per ESRD  - Renal dose medications as necessary  - Social work to set him up for outpatient HD  - Quant Gold  - Hepatitis Panel

## 2021-09-30 NOTE — DISCHARGE PLANNING
Anticipated Discharge Disposition: Home with Outpatient Dialysis     Action: Pt discussed during IDT rounds. Per Dr. Roy pt requiring outpatient dialysis chair. Pt's flowsheet not showing payor source. Per Dr. Roy pt can d/c when dialysis chair is setup. LSW informed that pt's spouse has gone to [facility] and talked to them directly. Spouse willing to pay for dialysis out of pocket.   Dialysis coordinator, Klaudia Nguyễn out of the office and email suggested to contact Kqyl-[phone] or Lidia-  [phone] for dialysis questions.     LSW called Bessie- and voicemail full. LSW unable to leave a voicemail.   LSW called Lidia and left a voicemail with name and number for call back.     Barriers to Discharge: outpatient dialysis chair, payor source     Plan: LSW to continue to work with dialysis coordinator, LSW to continue to follow for d/c needs     Addendum [id number]  Number on email from Klaudia for Lidia is incorrect. Number is [phone]. LSW called Lidia and staffed pt. Lidia stated she has never heard of a pt paying out of pocket. Lidia would look into it and get back to LSW.     Addendum [id number]  LSW received a call from manager, Klaudia Angel and informed LSW that Dr. Hirsch is working on setting up pt to go to the CDU at [facility] for dialysis. Pt will go to CDU for dialysis in the mean time that outpatient dialysis clinic is setup.     LSW met with pt and spouse at bedside to provide update. Both very excited. Spouse stated that she has called her employer and pt will be added to her insurance. LSW informed that insurance coverage effective on October 1st. LSW requested that pt and spouse provide insurance information to medical team when available when pt attends dialysis on CDU.       Care Transition Team Assessment    Information Source  Information Given By: Other (Comments)  Who is responsible for making decisions for patient? : Patient    Elopement Risk  Legal Hold: No  Ambulatory or Self Mobile  in Wheelchair: Yes  Disoriented: No  Psychiatric Symptoms: None  History of Wandering: No  Elopement this Admit: No  Vocalizing Wanting to Leave: No  Displays Behaviors, Body Language Wanting to Leave: No-Not at Risk for Elopement  Elopement Risk: Not at Risk for Elopement    Discharge Preparedness  What is your plan after discharge?: Home with help  What are your discharge supports?: Spouse  Prior Functional Level: Independent with Activities of Daily Living, Independent with Medication Management    Functional Assesment  Prior Functional Level: Independent with Activities of Daily Living, Independent with Medication Management    Finances  Financial Barriers to Discharge: Yes  Prescription Coverage: No    Domestic Abuse  Have you ever been the victim of abuse or violence?: No  Physical Abuse or Sexual Abuse: No  Verbal Abuse or Emotional Abuse: No  Possible Abuse/Neglect Reported to:: Not Applicable    Psychological Assessment  History of Substance Abuse: None  History of Psychiatric Problems: No    Discharge Risks or Barriers  Discharge risks or barriers?: No    Anticipated Discharge Information  Discharge Disposition: Discharged to home/self care (01)

## 2021-09-30 NOTE — PROGRESS NOTES
Received patient from night shift, patient is comfortably lying in bed, in used. Patient is  awake ,alert and oriented x 4. Denies any pain, nausea, numbness and tingling. Fistula noted at the RUE. Fall precaution in placed, kept bed in lowest position and call light within reach.

## 2021-09-30 NOTE — DISCHARGE SUMMARY
"Discharge Summary    CHIEF COMPLAINT ON ADMISSION  Chief Complaint   Patient presents with   • Vascular Access Problem     pt in need of dialysis tx and set up w/ scheduled dialysis, last dialysis was monday        Reason for Admission  Sent by MD     Admission Date  9/29/2021    CODE STATUS  Full Code    HPI & HOSPITAL COURSE  Per notes, \"31 y.o. male who presented 9/29/2021 with request for initiation of dialysis. He has a history of ESRD on HD M,W,F and hypertension - he previously saw Dr Mayer when he lived here a few years ago, but then he returned to [place].  He received his [place] and [place] and presented to the ER with requests to set up HD. His last dialysis was Monday and he has no other complaints.\"    Patient was admitted and underwent hemodialysis on 9/29/2021, 3L removed.  We worked with case management and dialysis coordinator to arrange for outpatient dialysis.  However, patient's currently undergoing the enrollment process for insurance and will take several days to set up a outpatient dialysis chair.  This was discussed with leadership, and we have arranged for patient to receive dialysis Monday, Wednesday, Friday in the CDU at [facility].  Patient will need to present to CDU directly (not through the ER) where he will receive hemodialysis and can be discharged the same day.  This was discussed with case management, dialysis coordinators, CDU team, and leadership all of who are in agreement with this plan.  Patient also need to follow-up with nephrology at regular scheduled appointment.    Therefore, he is discharged in good and stable condition to home with close outpatient follow-up.    The patient recovered much more quickly than anticipated on admission.    Discharge Date  09/30/2021    FOLLOW UP ITEMS POST DISCHARGE  Follow-up with nephrology    DISCHARGE DIAGNOSES  Active Problems:    Hyperkalemia POA: Unknown    ESRD (end stage renal disease) on " dialysis (HCC) POA: Yes    Anemia POA: Yes    HTN (hypertension) POA: Yes    Posterior reversible encephalopathy syndrome POA: Unknown    Elevated alkaline phosphatase level POA: Unknown  Resolved Problems:    * No resolved hospital problems. *      FOLLOW UP  No future appointments.  Hemodialysis at [facility] CDU  Please present directly to CDU at [facility] for hemodialysis Monday, Wednesday and Friday (starting 10/01/21)          MEDICATIONS ON DISCHARGE     Medication List      CONTINUE taking these medications      Instructions   amLODIPine 5 MG Tabs  Commonly known as: NORVASC   Take 1 Tablet by mouth every evening for 30 days.  Dose: 5 mg     cloNIDine 0.2 MG/24HR Ptwk patch  Commonly known as: CATAPRES   Place 1 Patch on the skin every 7 days for 30 days.  Dose: 1 Patch     hydrALAZINE 10 MG Tabs  Commonly known as: APRESOLINE   Take 1 Tablet by mouth every evening for 30 days. In [place] medications is named Dila-vania 10MG  Dose: 10 mg     losartan 50 MG Tabs  Commonly known as: COZAAR   Take 1 Tablet by mouth every evening for 30 days.  Dose: 50 mg     pregabalin 75 MG Caps  Commonly known as: LYRICA   Take 1 Capsule by mouth every evening for 30 days.  Dose: 75 mg            Allergies  No Known Allergies    DIET  Orders Placed This Encounter   Procedures   • Diet Order Diet: Renal; Second Modifier: (optional): Cardiac     Standing Status:   Standing     Number of Occurrences:   1     Order Specific Question:   Diet:     Answer:   Renal [8]     Order Specific Question:   Second Modifier: (optional)     Answer:   Cardiac [6]       ACTIVITY  As tolerated.  Weight bearing as tolerated    CONSULTATIONS  Nephrology     PROCEDURES  Hemodialysis     LABORATORY  Lab Results   Component Value Date    SODIUM 141 09/30/2021    POTASSIUM 4.8 09/30/2021    CHLORIDE 103 09/30/2021    CO2 25 09/30/2021    GLUCOSE 111 (H) 09/30/2021    BUN 55 (H) 09/30/2021    CREATININE 8.19 (HH)  09/30/2021        Lab Results   Component Value Date    WBC 3.9 (L) 09/30/2021    HEMOGLOBIN 7.4 (L) 09/30/2021    HEMATOCRIT 22.6 (L) 09/30/2021    PLATELETCT 124 (L) 09/30/2021        Total time of the discharge process exceeds 45 minutes.

## 2021-10-01 ENCOUNTER — HOSPITAL ENCOUNTER (OUTPATIENT)
Facility: MEDICAL CENTER | Age: 31
End: 2021-10-01
Attending: STUDENT IN AN ORGANIZED HEALTH CARE EDUCATION/TRAINING PROGRAM | Admitting: STUDENT IN AN ORGANIZED HEALTH CARE EDUCATION/TRAINING PROGRAM
Payer: COMMERCIAL

## 2021-10-01 VITALS
TEMPERATURE: 97.7 F | BODY MASS INDEX: 29.12 KG/M2 | HEIGHT: 66 IN | RESPIRATION RATE: 16 BRPM | HEART RATE: 84 BPM | SYSTOLIC BLOOD PRESSURE: 168 MMHG | OXYGEN SATURATION: 94 % | DIASTOLIC BLOOD PRESSURE: 103 MMHG | WEIGHT: 181.22 LBS

## 2021-10-01 LAB
GAMMA INTERFERON BACKGROUND BLD IA-ACNC: 0.07 IU/ML
M TB IFN-G BLD-IMP: NEGATIVE
M TB IFN-G CD4+ BCKGRND COR BLD-ACNC: -0.01 IU/ML
MITOGEN IGNF BCKGRD COR BLD-ACNC: >10 IU/ML
QFT TB2 - NIL TBQ2: -0.02 IU/ML

## 2021-10-01 PROCEDURE — 99220 PR INITIAL OBSERVATION CARE,LEVL III: CPT | Performed by: STUDENT IN AN ORGANIZED HEALTH CARE EDUCATION/TRAINING PROGRAM

## 2021-10-01 PROCEDURE — G0378 HOSPITAL OBSERVATION PER HR: HCPCS

## 2021-10-01 PROCEDURE — 700111 HCHG RX REV CODE 636 W/ 250 OVERRIDE (IP)

## 2021-10-01 PROCEDURE — 90935 HEMODIALYSIS ONE EVALUATION: CPT

## 2021-10-01 RX ORDER — ONDANSETRON 2 MG/ML
4 INJECTION INTRAMUSCULAR; INTRAVENOUS EVERY 4 HOURS PRN
Status: ACTIVE | OUTPATIENT
Start: 2021-10-01 | End: 2021-10-01

## 2021-10-01 RX ORDER — ACETAMINOPHEN 325 MG/1
650 TABLET ORAL EVERY 6 HOURS PRN
Status: DISCONTINUED | OUTPATIENT
Start: 2021-10-01 | End: 2021-10-01 | Stop reason: HOSPADM

## 2021-10-01 RX ORDER — HEPARIN SODIUM 1000 [USP'U]/ML
1500 INJECTION, SOLUTION INTRAVENOUS; SUBCUTANEOUS
Status: DISCONTINUED | OUTPATIENT
Start: 2021-10-01 | End: 2021-10-01 | Stop reason: HOSPADM

## 2021-10-01 RX ORDER — HEPARIN SODIUM 1000 [USP'U]/ML
INJECTION, SOLUTION INTRAVENOUS; SUBCUTANEOUS
Status: COMPLETED
Start: 2021-10-01 | End: 2021-10-01

## 2021-10-01 RX ORDER — LOSARTAN POTASSIUM 50 MG/1
50 TABLET ORAL NIGHTLY
Status: DISCONTINUED | OUTPATIENT
Start: 2021-10-01 | End: 2021-10-01 | Stop reason: HOSPADM

## 2021-10-01 RX ORDER — LABETALOL HYDROCHLORIDE 5 MG/ML
10 INJECTION, SOLUTION INTRAVENOUS EVERY 4 HOURS PRN
Status: ACTIVE | OUTPATIENT
Start: 2021-10-01 | End: 2021-10-01

## 2021-10-01 RX ORDER — AMLODIPINE BESYLATE 5 MG/1
5 TABLET ORAL
Status: DISCONTINUED | OUTPATIENT
Start: 2021-10-01 | End: 2021-10-01 | Stop reason: HOSPADM

## 2021-10-01 RX ORDER — ACETAMINOPHEN 325 MG/1
650 TABLET ORAL EVERY 6 HOURS PRN
Status: ACTIVE | OUTPATIENT
Start: 2021-10-01 | End: 2021-10-01

## 2021-10-01 RX ADMIN — HEPARIN SODIUM 1500 UNITS: 1000 INJECTION, SOLUTION INTRAVENOUS; SUBCUTANEOUS at 09:58

## 2021-10-01 ASSESSMENT — ENCOUNTER SYMPTOMS
DIZZINESS: 0
NAUSEA: 0
TINGLING: 0
BRUISES/BLEEDS EASILY: 0
FLANK PAIN: 0
EYE REDNESS: 0
DOUBLE VISION: 0
DIAPHORESIS: 0
INSOMNIA: 0
WEAKNESS: 0
WHEEZING: 0
EYE DISCHARGE: 0
MEMORY LOSS: 0
SINUS PAIN: 0
BACK PAIN: 0
CHILLS: 0
STRIDOR: 0
WEIGHT LOSS: 0
HEADACHES: 0
DIARRHEA: 0
SHORTNESS OF BREATH: 0
SPUTUM PRODUCTION: 0
FEVER: 0
BLOOD IN STOOL: 0
NERVOUS/ANXIOUS: 0
POLYDIPSIA: 0
NECK PAIN: 0
VOMITING: 0
MYALGIAS: 0
SEIZURES: 0
SORE THROAT: 0
EYE PAIN: 0
PALPITATIONS: 0
COUGH: 0

## 2021-10-01 ASSESSMENT — FIBROSIS 4 INDEX: FIB4 SCORE: 0.65

## 2021-10-01 NOTE — ASSESSMENT & PLAN NOTE
Continue with present medication management defer further management to nephrology with regards to initiation of EPO.  Otherwise patient not requiring transfusion at this time.

## 2021-10-01 NOTE — ASSESSMENT & PLAN NOTE
Patient presently undergoing process of receiving her being set up with new dialysis chair as outpatient.  Patient requires hemodialysis patient is been a direct admission through clinical decision unit for hemodialysis which he will undergo this morning.

## 2021-10-01 NOTE — PROGRESS NOTES
"[facility] -  PROGRESS NOTE               Author: ARTHUR Flowers Date & Time: 10/1/2021  9:28 AM     HPI:  Elier Bustillos is a 31 y.o. male who presented 9/29/2021 with request for initiation of dialysis. He has a history of ESRD on HD M,W,F and hypertension. He previously followed up with us [facility] for many years before he returned to [place].  He received his visa and has [place] border [place] and presented to the ER with requests to set up HD. His last dialysis was Monday in [place] and he has no other complaints.      No F/C/N/V/CP/SOB.  No melena, hematochezia, hematemesis.  No HA, visual changes, or abdominal pain.    DAILY NEPHROLOGY SUMMARY:  9/30: 3L net UF. Resting in bed, no acute distress. Denies CP/SOB, no n/v/d.   10/1:  Discharged home. Returns to [facility] for HD QMWF until outpt HD chair arranged. Bps elevated. Seen in HD room, no distress.     REVIEW OF SYSTEMS:    10 point ROS reviewed and is as per HPI/daily summary or otherwise negative    PMH/PSH/SH/FH:   Reviewed and unchanged since admission note    CURRENT MEDICATIONS:   Reviewed from admission to present day    VS:  BP (!) 168/103   Pulse 84   Temp 36.5 °C (97.7 °F) (Temporal)   Resp 16   Ht 1.676 m (5' 5.98\")   Wt 82.2 kg (181 lb 3.5 oz)   SpO2 94%   BMI 29.26 kg/m²     Physical Exam  Constitutional:       General: He is not in acute distress.     Appearance: Normal appearance. He is normal weight. He is not ill-appearing.   HENT:      Head: Normocephalic and atraumatic.      Nose: Nose normal.      Mouth/Throat:      Mouth: Mucous membranes are dry.      Pharynx: Oropharynx is clear.   Eyes:      Extraocular Movements: Extraocular movements intact.      Conjunctiva/sclera: Conjunctivae normal.      Pupils: Pupils are equal, round, and reactive to light.   Cardiovascular:      Rate and Rhythm: Normal rate and regular rhythm.      Pulses: Normal pulses.      Heart sounds: " Murmur heard.   No friction rub. No gallop.       Comments: R AVF +Bruit/+Thrill, tortuous/aneurysmal  Musculoskeletal:      Cervical back: Normal range of motion and neck supple.   Skin:     General: Skin is warm and dry.   Neurological:      Mental Status: He is alert.   Psychiatric:         Mood and Affect: Mood normal.         Behavior: Behavior normal.         Thought Content: Thought content normal.         Judgment: Judgment normal.         Fluids:  No intake/output data recorded.    LABS:  Recent Labs     09/29/21  1347 09/30/21  0421   SODIUM 139 141   POTASSIUM 5.8* 4.8   CHLORIDE 96 103   CO2 22 25   GLUCOSE 88 111*   * 55*   CREATININE 12.73* 8.19*   CALCIUM 9.6 9.9       IMAGING:   All imaging reviewed from admission to present day    IMPRESSION:  # ESRD  - Etiology likely 2/2 HTN  - Hep panel/Quant complete   # HTN  - Goal BP < 140/90  # Anemia of CKD  - Below Goal Hgb 10-11  - % sat 37  - Ferritin 4117  # CKD-MBD  - PTH pen  - Vit D pen  - Ca pen  - PO4 pen  # Hyperkalemia, resolved     PLAN:  - iHD MWF and Prn.  iHD today (FRI)  - UF as tolerated  - Renal diet  - MARIANA with HD  - Restart Home antihtn  - Dose all meds per ESRD  - Renal dose medications as necessary  - Social work to set him up for outpatient HD  - Tentative plan to discharge today after HD, Pt to [facility] for HD tx QMWF until outpt HD chair confirmed

## 2021-10-01 NOTE — H&P
Hospital Medicine History & Physical Note    Date of Service  10/1/2021    Primary Care Physician  No primary care provider on file.    Consultants  nephrology    Specialist Names: Dr. Mayer    Code Status  Full Code    Chief Complaint  No chief complaint on file.      History of Presenting Illness  Elier Bustillos is a 31 y.o. male who presented 10/1/2021 with end-stage renal disease requiring dialysis.  31 y.o. male who presented 9/29/2021 with request for initiation of dialysis. He has a history of ESRD on HD M,W,F and hypertension - he previously saw Dr Mayer when he lived here a few years ago, but then he returned to [place].  He received his visa and has come [place] the border again and presented to the ER with requests to set up HD. His last dialysis was Monday and he has no other complaints  Patient is in the process of being approved for dialysis chair is on outpatient basis.  Patient is normally with [facility] nephrology.  Patient has been preapproved to have dialysis as observation patient via direct admission to [facility]  Patient presented early this morning for hemodialysis tolerated procedure without issue.  Patient has no complaints at this time denies any subjective fevers or chills denies nausea vomiting diarrhea.  I discussed the plan of care with patient and bedside RN.    Review of Systems  Review of Systems   Constitutional: Negative for chills, diaphoresis, fever, malaise/fatigue and weight loss.   HENT: Negative for congestion, nosebleeds, sinus pain and sore throat.    Eyes: Negative for double vision, pain, discharge and redness.   Respiratory: Negative for cough, sputum production, shortness of breath, wheezing and stridor.    Cardiovascular: Negative for chest pain, palpitations and leg swelling.   Gastrointestinal: Negative for blood in stool, diarrhea, nausea and vomiting.   Genitourinary: Negative for flank pain, frequency and urgency.   Musculoskeletal:  Negative for back pain, joint pain, myalgias and neck pain.   Skin: Negative for itching and rash.   Neurological: Negative for dizziness, tingling, seizures, weakness and headaches.   Endo/Heme/Allergies: Negative for polydipsia. Does not bruise/bleed easily.   Psychiatric/Behavioral: Negative for memory loss. The patient is not nervous/anxious and does not have insomnia.        Past Medical History   has a past medical history of Dialysis, Dialysis care, Hypertension, and Renal disorder.    Surgical History   has a past surgical history that includes other orthopedic surgery; av fistula creation (12/29/2010); av fistula creation (6/7/2012); cath placement (6/7/2012); recovery (7/3/2012); recovery (7/20/2012); av fistula creation (8/21/2012); av fistula revision (Right, 11/28/2016); and exploratory laparotomy (11/11/2018).     Family History  family history includes Diabetes in his mother; Heart Attack in his father.   Family history reviewed with patient. There is family history that is pertinent to the chief complaint.     Social History   reports that he has been smoking cigarettes. He has never used smokeless tobacco. He reports that he does not drink alcohol and does not use drugs.    Allergies  No Known Allergies    Medications  Prior to Admission Medications   Prescriptions Last Dose Informant Patient Reported? Taking?   amLODIPine (NORVASC) 5 MG Tab 9/29/2021 at Unknown time Patient No No   Sig: Take 1 Tablet by mouth every evening for 30 days.   cloNIDine (CATAPRES) 0.2 MG/24HR PATCH WEEKLY patch unk at unk Patient No No   Sig: Place 1 Patch on the skin every 7 days for 30 days.   hydrALAZINE (APRESOLINE) 10 MG Tab 9/29/2021 Patient No No   Sig: Take 1 Tablet by mouth every evening for 30 days. In [place] medications is named Dila-vania 10MG   losartan (COZAAR) 50 MG Tab 9/29/2021 at Unknown time Patient No No   Sig: Take 1 Tablet by mouth every evening for 30 days.   pregabalin (LYRICA) 75 MG Cap 9/29/2021  Patient No No   Sig: Take 1 Capsule by mouth every evening for 30 days.      Facility-Administered Medications: None       Physical Exam  Temp:  [36.5 °C (97.7 °F)] 36.5 °C (97.7 °F)  Pulse:  [84] 84  Resp:  [16] 16  BP: (168-201)/(103-108) 168/103  SpO2:  [94 %] 94 %  Blood Pressure: (!) 168/103   Temperature: 36.5 °C (97.7 °F)   Pulse: 84   Respiration: 16   Pulse Oximetry: 94 %       Physical Exam  Vitals and nursing note reviewed.   Constitutional:       General: He is not in acute distress.     Appearance: Normal appearance. He is not ill-appearing.   HENT:      Head: Normocephalic and atraumatic.      Right Ear: External ear normal.      Left Ear: External ear normal.      Nose: No congestion or rhinorrhea.      Mouth/Throat:      Mouth: Mucous membranes are moist.      Pharynx: No oropharyngeal exudate or posterior oropharyngeal erythema.   Eyes:      General:         Right eye: No discharge.         Left eye: No discharge.      Pupils: Pupils are equal, round, and reactive to light.   Cardiovascular:      Rate and Rhythm: Tachycardia present.      Heart sounds: No murmur heard.   No friction rub.      Comments: Palpable thrill audible bruit right forearm fistula  Pulmonary:      Effort: No respiratory distress.      Breath sounds: No stridor. No wheezing.   Abdominal:      General: Abdomen is flat. There is no distension.      Palpations: Abdomen is soft. There is mass.      Tenderness: There is no abdominal tenderness. There is no guarding.   Musculoskeletal:         General: No swelling, tenderness or deformity. Normal range of motion.      Cervical back: No rigidity or tenderness.   Lymphadenopathy:      Cervical: No cervical adenopathy.   Skin:     General: Skin is warm.      Coloration: Skin is not jaundiced or pale.      Findings: No bruising or erythema.   Neurological:      General: No focal deficit present.      Mental Status: He is alert. Mental status is at baseline. He is disoriented.       Cranial Nerves: No cranial nerve deficit.      Sensory: No sensory deficit.   Psychiatric:         Mood and Affect: Mood normal.         Behavior: Behavior normal.         Thought Content: Thought content normal.         Laboratory:  Recent Labs     09/29/21 1347 09/30/21  0421   WBC 4.8 3.9*   RBC 2.67* 2.38*   HEMOGLOBIN 8.5* 7.4*   HEMATOCRIT 24.8* 22.6*   MCV 92.9 95.0   MCH 31.8 31.1   MCHC 34.3 32.7*   RDW 40.7 42.4   PLATELETCT 142* 124*   MPV 12.2 12.1     Recent Labs     09/29/21 1347 09/30/21  0421   SODIUM 139 141   POTASSIUM 5.8* 4.8   CHLORIDE 96 103   CO2 22 25   GLUCOSE 88 111*   * 55*   CREATININE 12.73* 8.19*   CALCIUM 9.6 9.9     Recent Labs     09/29/21 1347 09/30/21  0421   ALTSGPT 19  --    ASTSGOT 11*  --    ALKPHOSPHAT 416*  --    TBILIRUBIN 0.3  --    GAMMAGT 12  --    GLUCOSE 88 111*         No results for input(s): NTPROBNP in the last 72 hours.      No results for input(s): TROPONINT in the last 72 hours.    Imaging:  No orders to display       EKG:  I have personally reviewed the images and compared with prior images.    Assessment/Plan:  I anticipate this patient is appropriate for observation status at this time.    Encounter for hemodialysis for ESRD (HCC)  Assessment & Plan  Patient presently undergoing process of receiving her being set up with new dialysis chair as outpatient.  Patient requires hemodialysis patient is been a direct admission through clinical decision unit for hemodialysis which he will undergo this morning.    Anemia- (present on admission)  Assessment & Plan  Continue with present medication management defer further management to nephrology with regards to initiation of EPO.  Otherwise patient not requiring transfusion at this time.    Secondary renal hyperparathyroidism (HCC)- (present on admission)  Assessment & Plan  Secondary to end-stage renal disease continue present medications nephrology recommendations appreciated.      VTE prophylaxis: SCDs/TEDs  and heparin ppx

## 2021-10-01 NOTE — PROGRESS NOTES
PC from dialysis, pt scheduled for dialysis now. Pt resting in bed, no complaints of pain. AV fistula in RUE with +thrill/+bruit. Bed in low/locked position, call light within reach.

## 2021-10-01 NOTE — HOSPITAL COURSE
Elier Bustillos is a 31 y.o. male who presented 10/1/2021 with end-stage renal disease requiring dialysis.  31 y.o. male who presented 9/29/2021 with request for initiation of dialysis. He has a history of ESRD on HD M,W,F and hypertension - he previously saw Dr Mayer when he lived here a few years ago, but then he returned to [place].  He received his [place] and [place] and presented to the ER with requests to set up HD. His last dialysis was Monday and he has no other complaints  Patient is in the process of being approved for dialysis chair is on outpatient basis.  Patient is normally with [facility] nephrology.  Patient has been preapproved to have dialysis as observation patient via direct admission to [facility].  Patient presented early this morning for hemodialysis tolerated procedure without issue.  Patient has no complaints at this time denies any subjective fevers or chills denies nausea vomiting diarrhea.

## 2021-10-01 NOTE — PROGRESS NOTES
Discharge paperwork reviewed with patient and spouse at bedside.  in use. Copy given. Questions encouraged and answered. IV removed. Pt informed of check in time for dialysis to be at 6:30am at [facility] at [facility] dest on 10/1. Patient escorted to ED entrance. Patient discharged to home.

## 2021-10-01 NOTE — ASSESSMENT & PLAN NOTE
Secondary to end-stage renal disease continue present medications nephrology recommendations appreciated.

## 2021-10-01 NOTE — DISCHARGE SUMMARY
Discharge Summary    CHIEF COMPLAINT ON ADMISSION  No chief complaint on file.      Reason for Admission  Renal Failure     Admission Date  10/1/2021    CODE STATUS  Full Code    HPI & HOSPITAL COURSE  This is a 31 y.o. male here with end-stage renal disease requiring dialysis.  Elier Bustillos is a 31 y.o. male who presented 10/1/2021 with end-stage renal disease requiring dialysis.  31 y.o. male who presented 9/29/2021 with request for initiation of dialysis. He has a history of ESRD on HD M,W,F and hypertension - he previously saw Dr Mayer when he lived here a few years ago, but then he returned to [place].  He received his [place] and [place] and presented to the ER with requests to set up HD. His last dialysis was Monday and he has no other complaints  Patient is in the process of being approved for dialysis chair is on outpatient basis.  Patient is normally with [facility] nephrology.  Patient has been preapproved to have dialysis as observation patient via direct admission to [facility].  Patient presented early this morning for hemodialysis tolerated procedure without issue.  Patient has no complaints at this time denies any subjective fevers or chills denies nausea vomiting diarrhea.    Patient underwent hemodialysis without issue.  Tolerated procedure well.  Patient does not require medications.  Patient will follow up with scheduled dialysis appointments via direct admission through clinical decision unit as scheduled.  Patient is pending acceptance to outside hemodialysis chair.  Therefore, he is discharged in good and stable condition to home with close outpatient follow-up.    The patient recovered much more quickly than anticipated on admission.    Discharge Date  10/1/2021    FOLLOW UP ITEMS POST DISCHARGE  Take all medication as prescribed follow-up with schedule dialysis appointments    DISCHARGE DIAGNOSES  Active Problems:    Secondary renal  hyperparathyroidism (HCC) POA: Yes      Overview: ICD-10 transition    Anemia POA: Yes    Encounter for hemodialysis for ESRD (HCC) POA: Unknown  Resolved Problems:    * No resolved hospital problems. *      FOLLOW UP    No follow-up provider specified.    MEDICATIONS ON DISCHARGE     Medication List      CONTINUE taking these medications      Instructions   amLODIPine 5 MG Tabs  Commonly known as: NORVASC   Take 1 Tablet by mouth every evening for 30 days.  Dose: 5 mg     cloNIDine 0.2 MG/24HR Ptwk patch  Commonly known as: CATAPRES   Place 1 Patch on the skin every 7 days for 30 days.  Dose: 1 Patch     hydrALAZINE 10 MG Tabs  Commonly known as: APRESOLINE   Take 1 Tablet by mouth every evening for 30 days. In [place] medications is named Dila-vania 10MG  Dose: 10 mg     losartan 50 MG Tabs  Commonly known as: COZAAR   Take 1 Tablet by mouth every evening for 30 days.  Dose: 50 mg     pregabalin 75 MG Caps  Commonly known as: LYRICA   Take 1 Capsule by mouth every evening for 30 days.  Dose: 75 mg            Allergies  No Known Allergies    DIET  Orders Placed This Encounter   Procedures   • Diet Order Diet: Cardiac     Standing Status:   Standing     Number of Occurrences:   1     Order Specific Question:   Diet:     Answer:   Cardiac [6]   • Discontinue Diet Tray     Standing Status:   Standing     Number of Occurrences:   1       ACTIVITY  As tolerated.  Weight bearing as tolerated    CONSULTATIONS  Nephrology    PROCEDURES  Hemodialysis    LABORATORY  Lab Results   Component Value Date    SODIUM 141 09/30/2021    POTASSIUM 4.8 09/30/2021    CHLORIDE 103 09/30/2021    CO2 25 09/30/2021    GLUCOSE 111 (H) 09/30/2021    BUN 55 (H) 09/30/2021    CREATININE 8.19 (HH) 09/30/2021        Lab Results   Component Value Date    WBC 3.9 (L) 09/30/2021    HEMOGLOBIN 7.4 (L) 09/30/2021    HEMATOCRIT 22.6 (L) 09/30/2021    PLATELETCT 124 (L) 09/30/2021        Total time of the discharge process exceeds 35 minutes.

## 2021-10-01 NOTE — PROGRESS NOTES
3hr HD started @ 1001 and completed @ 1302,tx well tolerated,VSS,net UF =3000ml.R arm AVF + B/T,cannulation sites covered with DD,CDI,report given to Christine Cristina RN.

## 2021-10-01 NOTE — ED NOTES
Med rec completed per patient via  Vern (#[id number]).   Allergies reviewed.   Patient states no outpatient antibiotics in the last 30 days.     Patient was just prescribed outpatient prescriptions listed in med rec on 9/30 and states he hasn't taken anything since discharging from the hospital on 9/29. He stated the MD told him to take the meds two times a day when the prescriptions were written for once daily. Would recommend further education to ensure medication compliance.     Verified with inpatient MAR last doses were 9/29.

## 2021-10-01 NOTE — DISCHARGE INSTRUCTIONS
Discharge Instructions    Discharged to home by car with relative. Discharged via wheelchair, hospital escort: Yes.  Special equipment needed: Not Applicable    Be sure to schedule a follow-up appointment with your primary care doctor or any specialists as instructed.     Discharge Plan:   Diet Plan: Discussed  Activity Level: Discussed  Confirmed Follow up Appointment: Patient to Call and Schedule Appointment  Confirmed Symptoms Management: Discussed  Medication Reconciliation Updated: Yes  Influenza Vaccine Indication: Not indicated: Previously immunized this influenza season and > 8 years of age    I understand that a diet low in cholesterol, fat, and sodium is recommended for good health. Unless I have been given specific instructions below for another diet, I accept this instruction as my diet prescription.   Other diet: Regular    Special Instructions: None    · Is patient discharged on Warfarin / Coumadin?   No       Fistulografía de diálisis  Dialysis Fistulogram    Cisco fistulografía de diálisis es cisco radiografía para observar el interior del lugar en el que la maxwell se extrae y se devuelve al cuerpo hollie la diálisis.  Las fístulas y los injertos proporcionan un acceso fácil y eficaz para la diálisis. Sin embargo, a veces pueden ocurrir problemas. La fístula o el injerto se obstruyen o se estrechan. [place] puede hacer que la diálisis sea menos eficaz. Jordi vez deban realizarle cisco fistulografía para verificar estos problemas. Si se detecta un problema, chau médico puede realizar tratamientos hollie el procedimiento para eliminar la obstrucción o el estrechamiento de las zonas. Los tratamientos para abrir cisco fístula o un injerto de diálisis obstruido pueden incluir:  · Extraer el coágulo de la fístula o del injerto con un dispositivo (trombectomía).  · Realizar un procedimiento para abrir o ensanchar la ed obstruida con un balón inflable (angioplastia).  · Colocar de un pequeño tubo de lucrecia metálica  (stent) para mantener abierta la fístula o el injerto.  · Inyectar un medicamento para disolver el coágulo (trombólisis).  Informe al médico acerca de lo siguiente:  · Cualquier alergia que tenga, incluida cualquier reacción que haya tenido a un tinte de contraste.  · Todos los medicamentos que utiliza, incluidos vitaminas, hierbas, gotas oftálmicas, cremas y medicamentos de venta angelika.  · Cualquier problema que usted o tobias familiares hayan tenido con anestésicos.  · Cualquier enfermedad de la maxwell que tenga.  · Cirugías a las que se sometió.  · Cualquier afección médica que tenga.  · Si está embarazada o podría estarlo.  · Cualquier dolor, enrojecimiento o hinchazón que tenga en la extremidad que tiene la fístula o el injerto de diálisis.  · Cualquier sangrado proveniente de la fístula o el injerto de diálisis.  · Cualquiera de los siguientes síntomas en la parte del cuerpo hacia dónde va la fístula o el injerto de diálisis:  ? Entumecimiento.  ? Hormigueo.  ? Sensación de frío.  ? Zonas que están de color azulado o quiñones pálido.  ¿Cuáles son los riesgos?  En general, se trata de un procedimiento seguro. Sin embargo, pueden ocurrir complicaciones, por ejemplo:  · Infección.  · Hemorragia.  · Reacciones alérgicas a los medicamentos o a los tintes.  · Daño a otras estructuras, serene nervios o vasos sanguíneos cercanos.  · Un coágulo de maxwell en la fístula o el injerto de diálisis.  · Un coágulo de maxwell que se desplaza hasta los pulmones (embolia pulmonar).  ¿Qué ocurre antes del procedimiento?  Instrucciones generales  · Siga las indicaciones del médico respecto de las restricciones de comidas y bebidas.  · Consulte al médico sobre:  ? Cambiar o suspender los medicamentos que dominguez habitualmente. [place] es muy importante si dominguez medicamentos para la diabetes o diluyentes de la maxwell anticoagulantes.  ? Saira medicamentos serene aspirina e ibuprofeno. Estos medicamentos pueden tener un efecto anticoagulante en la  maxwell. No tome estos medicamentos a menos que el médico se lo indique.  ? Saira medicamentos de venta angelika, vitaminas, hierbas y suplementos.  · Moo que alguien lo lleve a chau casa desde el hospital o la clínica.  · Pídale a un adulto responsable que lo cuide hollie al menos 24 horas después de que le den el jesenia del hospital o de la clínica. [place] es importante.  · Pueden extraerle cisco muestra de maxwell o pedirle cisco muestra de orina. Estas muestras ayudarán al médico a saber cuán bernardo funcionan los riñones y el hígado, y qué hoffman bernardo coagula la maxwell.  ¿Qué ocurre hollie el procedimiento?  · Le colocarán cisco vía intravenosa en cisco de las venas.  · Le administrarán maxi o más de los siguientes medicamentos:  ? Un medicamento para ayudarlo a relajarse (sedante).  ? Un anestésico para adormecer la ed (anestesia local) de la extremidad con la fístula o el injerto.  · Le insertarán cisco aguja en la vena.  · Un pequeño tubo rockwell (catéter) se insertará en la aguja y luego se lo guiará hasta la ed de los posibles problemas.  · Se inyectará un tinte (sustancia de contraste) en el catéter. A medida que la sustancia de contraste pasa por el cuerpo, es posible que sienta calor.  · Se tomarán radiografías. La sustancia de contraste mostrará dónde se encuentran los estrechamientos o las obstrucciones.  · Si se observa un estrechamiento u obstrucción, el médico puede hacer maxi de los siguientes procedimientos para abrir la obstrucción:  ? Trombectomía. El médico usará un dispositivo mecánico en el extremo del catéter para disolver el coágulo.  ? Angioplastia. El médico introducirá un alambre guía y un catéter con un balón en la punta hasta el lugar de la obstrucción. Se inflará el balón hollie un breve período de tiempo. También puede colocarse un stent para mantener abierta la ed obstruida.  ? Trombólisis. El médico administrará un medicamento para disolver coágulos a través del catéter.  · Cuando termine el  procedimiento, se extraerá el catéter.  · En algunos casos, el lugar de inserción del catéter se cerrará con maxi o dos puntos (sutura).  · Se aplicará presión para detener el sangrado y se cubrirá la piel con cisco venda (vendaje).  Sarita procedimiento puede variar según el médico y el hospital.  ¿Qué ocurre después del procedimiento?  · Le controlarán la presión arterial, la frecuencia cardíaca, la frecuencia respiratoria y el nivel de oxígeno en la maxwell hasta que le den el jesenia del hospital o la clínica. Chau médico también controlará el lugar de acceso.  · Usted tendrá que permanecer en cama por varias horas.  · No conduzca hollie 24 horas si le administraron un sedante hollie el procedimiento.  Resumen  · Cisco fistulografía de diálisis es cisco radiografía para observar el interior del lugar en el que la maxwell se extrae y se devuelve al cuerpo hollie la diálisis.  · Si se detecta un problema, chau médico también puede realizar tratamientos hollie el procedimiento para eliminar la obstrucción o el estrechamiento de las zonas.  Esta información no tiene serene fin reemplazar el consejo del médico. Asegúrese de hacerle al médico cualquier pregunta que tenga.  Document Released: 05/04/2015 Document Revised: 02/20/2019 Document Reviewed: 02/20/2019  Elsevier Patient Education © 2020 Elsevier Inc.  Diálisis, Cuidados después de la diálisis  (Dialysis, Care After)  La diálisis es un tratamiento que elimina los desechos tóxicos del organismo cuando la función de los riñones fracasa. Hay dos tipos de diálisis:  · Hemodiálisis. En la hemodiálisis, la maxwell es bombeada desde el cuerpo y pasa a través de un filtro (dializador). Se eliminan los desechos de la maxwell y luego ésta retorna al cuerpo. La hemodiálisis se realiza en un centro especilizado hollie 3 a 4 horas, america veces a la semana. Se accede por vía arteriovenosa (AV), por donde se tiene acceso a los vasos sanguíneos. La ventaja principal de la hemodiálisis es que el  "paciente no necesita un entrenamiento especial. Las desventajas son el fracaso en el acceso arteriovenoso y la falta de mellissa, ya que debe permanecer relativamente cerca de un centro de diálisis.   · Diálisis peritoneal. Se utiliza la propia membrana del organismo serene filtro. Se introduce y luego se extrae un líquido del abdomen para eliminar los desechos tóxicos del organismo. Las ventajas son que puede enseñarse al paciente y puede realizarse en el hogar, poniendo cuidado en la técnica. Permite más mellissa y menos molestias. Las desventajas son la potencial inflamación de la ed interna del abdomen (peritonitis) y la luis miguel de la membrana.   CUIDADOS EN EL HOGAR   · Si tiene un acceso arteriovenoso:   · Controle todos los días las \"vibraciones\" en el sitio del acceso. Es cisco sensación de vibración que puede sentir al colocar los dedos sobre el acceso. [place] significa que el acceso funciona correctamente. Si no siente la \"vibración\", el acceso deberá repararse.   · No use ropa o bijouterie ajustadas alrededor del acceso.   · Evite dormir sobre el acceso. Podría disminuir la circulación y formarse un coágulo.   · Mantenga el vendaje hollie algunas horas luego del tratamiento, o según las indicaciones del médico. Evite que el vendaje se moje. Si el vendaje se sale del lugar, cubra el sitio del acceso con cisco gaza de 4x4 y sujétela con cinta adhesiva.   · Mantenga el sitio limpio para evitar infecciones.   · Tenga en chau casa algunos vendajes de 4x4 cm en anirudh de que el acceso comience a sangrar. Posiblemente le hayan indicado un tratamiento con heparina, que puede causar hemorragias.   · Controle chau presión arterial. La presión arterial jesenia (hipertensión) daña el corazón y los vasos sanguíneos. Es importante que moo tanto ejercicio serene le sea posible.   · Mantenga chau colesterol bajo control. Moo ejercicios regularmente o según las indicaciones.   SOLICITE ATENCIÓN MÉDICA SI:   · No siente la \"vibración\" en chau " acceso arteriovenoso.   · Tiene fiebre, siente escalofríos, transpira o se siente débil.   · Hay un pequeño sangrado en el sitio del acceso.   · Tiene la presión arterial elevada.   SOLICITE ATENCIÓN MÉDICA DE INMEDIATO SI:   · Siente un repentino dolor en el pecho o tiene dificultad para respirar.   · Tiene cisco hemorragia que no puede detener o controlar aplicando presión directa.   ASEGÚRESE DE QUE:   · Comprende estas instrucciones.   · Controlará chau enfermedad.   · Solicitará ayuda de inmediato si no mejora o empeora..   Document Released: 04/05/2010 Document Revised: 03/11/2013  [employer]® Patient Information ©2013 [email or web address].    Depression / Suicide Risk    As you are discharged from this [facility] facility, it is important to learn how to keep safe from harming yourself.    Recognize the warning signs:  · Abrupt changes in personality, positive or negative- including increase in energy   · Giving away possessions  · Change in eating patterns- significant weight changes-  positive or negative  · Change in sleeping patterns- unable to sleep or sleeping all the time   · Unwillingness or inability to communicate  · Depression  · Unusual sadness, discouragement and loneliness  · Talk of wanting to die  · Neglect of personal appearance   · Rebelliousness- reckless behavior  · Withdrawal from people/activities they love  · Confusion- inability to concentrate     If you or a loved one observes any of these behaviors or has concerns about self-harm, here's what you can do:  · Talk about it- your feelings and reasons for harming yourself  · Remove any means that you might use to hurt yourself (examples: pills, rope, extension cords, firearm)  · Get professional help from the community (Mental Health, Substance Abuse, psychological counseling)  · Do not be alone:Call your Safe Contact- someone whom you trust who will be there for you.  · Call your local CRISIS HOTLINE 153-5214 or 847-150-2769  · Call your local  Children's Mobile Crisis Response Team [place] (551) 787-9954 or www.[employer] Safety [employer]angement  · Call the toll free National Suicide Prevention Hotlines   · National Suicide Prevention Lifeline 464-100-MKWZ (5062)  · National Hope Line Network 800-SUICIDE (795-2096)

## 2021-10-04 ENCOUNTER — HOSPITAL ENCOUNTER (OUTPATIENT)
Facility: MEDICAL CENTER | Age: 31
End: 2021-10-04
Attending: STUDENT IN AN ORGANIZED HEALTH CARE EDUCATION/TRAINING PROGRAM | Admitting: STUDENT IN AN ORGANIZED HEALTH CARE EDUCATION/TRAINING PROGRAM
Payer: COMMERCIAL

## 2021-10-04 VITALS
TEMPERATURE: 97.6 F | OXYGEN SATURATION: 98 % | HEIGHT: 66 IN | RESPIRATION RATE: 18 BRPM | SYSTOLIC BLOOD PRESSURE: 169 MMHG | WEIGHT: 177.69 LBS | HEART RATE: 80 BPM | BODY MASS INDEX: 28.56 KG/M2 | DIASTOLIC BLOOD PRESSURE: 98 MMHG

## 2021-10-04 LAB
ANION GAP SERPL CALC-SCNC: 18 MMOL/L (ref 7–16)
BUN SERPL-MCNC: 104 MG/DL (ref 8–22)
CALCIUM SERPL-MCNC: 9.6 MG/DL (ref 8.5–10.5)
CHLORIDE SERPL-SCNC: 97 MMOL/L (ref 96–112)
CO2 SERPL-SCNC: 28 MMOL/L (ref 20–33)
CREAT SERPL-MCNC: 12.41 MG/DL (ref 0.5–1.4)
GLUCOSE SERPL-MCNC: 95 MG/DL (ref 65–99)
POTASSIUM SERPL-SCNC: 6 MMOL/L (ref 3.6–5.5)
SODIUM SERPL-SCNC: 143 MMOL/L (ref 135–145)

## 2021-10-04 PROCEDURE — 700111 HCHG RX REV CODE 636 W/ 250 OVERRIDE (IP)

## 2021-10-04 PROCEDURE — 80048 BASIC METABOLIC PNL TOTAL CA: CPT

## 2021-10-04 PROCEDURE — 99220 PR INITIAL OBSERVATION CARE,LEVL III: CPT | Performed by: STUDENT IN AN ORGANIZED HEALTH CARE EDUCATION/TRAINING PROGRAM

## 2021-10-04 PROCEDURE — 90935 HEMODIALYSIS ONE EVALUATION: CPT

## 2021-10-04 PROCEDURE — G0378 HOSPITAL OBSERVATION PER HR: HCPCS

## 2021-10-04 RX ORDER — ACETAMINOPHEN 325 MG/1
650 TABLET ORAL EVERY 6 HOURS PRN
Status: ACTIVE | OUTPATIENT
Start: 2021-10-04 | End: 2021-10-04

## 2021-10-04 RX ORDER — LABETALOL HYDROCHLORIDE 5 MG/ML
10 INJECTION, SOLUTION INTRAVENOUS EVERY 4 HOURS PRN
Status: ACTIVE | OUTPATIENT
Start: 2021-10-04 | End: 2021-10-04

## 2021-10-04 RX ADMIN — EPOETIN ALFA-EPBX 4000 UNITS: 4000 INJECTION, SOLUTION INTRAVENOUS; SUBCUTANEOUS at 12:02

## 2021-10-04 ASSESSMENT — LIFESTYLE VARIABLES
ON A TYPICAL DAY WHEN YOU DRINK ALCOHOL HOW MANY DRINKS DO YOU HAVE: 0
CONSUMPTION TOTAL: NEGATIVE
AVERAGE NUMBER OF DAYS PER WEEK YOU HAVE A DRINK CONTAINING ALCOHOL: 0
EVER FELT BAD OR GUILTY ABOUT YOUR DRINKING: NO
HOW MANY TIMES IN THE PAST YEAR HAVE YOU HAD 5 OR MORE DRINKS IN A DAY: 0
TOTAL SCORE: 0
HAVE PEOPLE ANNOYED YOU BY CRITICIZING YOUR DRINKING: NO
TOTAL SCORE: 0
TOTAL SCORE: 0
HAVE YOU EVER FELT YOU SHOULD CUT DOWN ON YOUR DRINKING: NO
EVER HAD A DRINK FIRST THING IN THE MORNING TO STEADY YOUR NERVES TO GET RID OF A HANGOVER: NO
ALCOHOL_USE: NO

## 2021-10-04 ASSESSMENT — ENCOUNTER SYMPTOMS
NECK PAIN: 0
WEAKNESS: 0
HEADACHES: 0
MYALGIAS: 0
COUGH: 0
DIZZINESS: 0
EYE DISCHARGE: 0
PALPITATIONS: 0
BACK PAIN: 0
EYE PAIN: 0
FEVER: 0
SEIZURES: 0
SPUTUM PRODUCTION: 0
FLANK PAIN: 0
SINUS PAIN: 0
NAUSEA: 0
BRUISES/BLEEDS EASILY: 0
BLOOD IN STOOL: 0
EYE REDNESS: 0
DIAPHORESIS: 0
TINGLING: 0
INSOMNIA: 0
DIARRHEA: 0
WEIGHT LOSS: 0
SORE THROAT: 0
NERVOUS/ANXIOUS: 0
SHORTNESS OF BREATH: 0
MEMORY LOSS: 0
WHEEZING: 0
DOUBLE VISION: 0
VOMITING: 0
STRIDOR: 0
CHILLS: 0
POLYDIPSIA: 0

## 2021-10-04 ASSESSMENT — FIBROSIS 4 INDEX: FIB4 SCORE: 0.65

## 2021-10-04 ASSESSMENT — PATIENT HEALTH QUESTIONNAIRE - PHQ9
2. FEELING DOWN, DEPRESSED, IRRITABLE, OR HOPELESS: NOT AT ALL
1. LITTLE INTEREST OR PLEASURE IN DOING THINGS: NOT AT ALL
SUM OF ALL RESPONSES TO PHQ9 QUESTIONS 1 AND 2: 0

## 2021-10-04 NOTE — HOSPITAL COURSE
Elier Bustillos is a 31 y.o. male who presented 10/1/2021 with end-stage renal disease requiring dialysis.  31 y.o. male who presented 9/29/2021 with request for initiation of dialysis. He has a history of ESRD on HD M,W,F and hypertension - he previously saw Dr Mayer when he lived here a few years ago, but then he returned to [place].  He received his visa and [place] and presented to the ER with requests to set up HD. His last dialysis was Monday and he has no other complaints  Patient is in the process of being approved for dialysis chair is on outpatient basis.  Patient is normally with [facility] nephrology.  Patient has been preapproved to have dialysis as observation patient via direct admission to [facility].  Patient presented early this morning for hemodialysis tolerated procedure without issue.  Patient has no complaints at this time denies any subjective fevers or chills denies nausea vomiting diarrhea.  Patient underwent hemodialysis without issue.  Tolerated procedure well.  Patient does not require medications.  Patient will follow up with scheduled dialysis appointments via direct admission through clinical decision unit as scheduled.  Patient is pending acceptance to outside hemodialysis chair.

## 2021-10-04 NOTE — DISCHARGE PLANNING
Outpatient Dialysis Placement Notification    Received notification for outpatient dialysis placement from Dr. Beckman.    Met/Spoke with patient and confirmed demographics and insurance information.  Provided patient with dialysis education materials and list of HD centers, referral initiated to:    [facility]  [address]  [place] [id number]    Pending medical and financial clearance.    Klaudia Nguyễn- Dialysis Coordinator # [phone]  [facility]

## 2021-10-04 NOTE — CONSULTS
"[facility] -  CONSULTATION NOTE               Author: Adria Duncan M.D. Date & Time: 10/4/2021  11:34 AM       REASON FOR CONSULTATION:   - Inpatient hemodialysis management.    CHIEF COMPLAINT:   -  \"Reinitiate Hemodialysis\"    HISTORY OF PRESENT ILLNESS:    32yo Male with PMH ESRD requiring MWF HD and hypertension, previously followed by [facility], Dr. Mayer, who presented last week for initiation of dialysis.  Several years ago patient had moved back to [place] where he was receiving hemodialysis; however, has now received his visa and is back in [place], attempting to get set back up with an outpatient hemodialysis chair.  He was first seen on 9/29, HD was restarted, and while he is awaiting approval for an outpatient chair, he will present to [facility] for outpatient direct admit to the CDU for hemodialysis.  Currently tolerating dialysis, reporting headache and fatigue from HD, but no other symptoms.    No F/C/N/V/CP/SOB.  No melena, hematochezia, hematemesis.  No visual changes, or abdominal pain.    REVIEW OF SYSTEMS:    10 point ROS was performed and is as per HPI or otherwise negative    PAST MEDICAL HISTORY:   - ESRD  - HTN  - Anemia of CKD  - CKD BMD  - Secondary hyperparathyroidism    PAST SURGICAL HISTORY:   -AV fistula creation x3, most recent revision in 2016  -Exploratory laparotomy in November 2018    FAMILY HISTORY:   - Reviewed and non contributory to current illness    SOCIAL HISTORY:   - Continues to smoke  - No EtOH  - No illicits    HOME MEDICATIONS:   - Reviewed and documented in chart    LABORATORY STUDIES:   - Reviewed and documented in chart    ALLERGIES:  Patient has no known allergies.    VS:  BP (!) 173/105   Pulse 85   Temp 36.6 °C (97.9 °F) (Temporal)   Resp 16   Ht 1.676 m (5' 5.98\")   Wt 80.6 kg (177 lb 11.1 oz)   SpO2 98%   BMI 28.69 kg/m²   Physical Exam  Constitutional:       General: He is not in acute distress.     Appearance: Normal appearance. He " is normal weight. He is not ill-appearing.      Comments: Fatigued, currently on dialysis, unable to provide history himself   HENT:      Head: Normocephalic and atraumatic.      Nose: Nose normal.      Mouth/Throat:      Mouth: Mucous membranes are moist.   Cardiovascular:      Rate and Rhythm: Normal rate.      Pulses: Normal pulses.      Comments: Large RUE AVF with thrill  Pulmonary:      Effort: Pulmonary effort is normal. No respiratory distress.   Abdominal:      General: Abdomen is flat. There is no distension.   Musculoskeletal:      Cervical back: Neck supple.   Skin:     General: Skin is warm and dry.   Neurological:      General: No focal deficit present.            FLUID BALANCE:  No intake/output data recorded.    LABS:  Results for orders placed or performed during the hospital encounter of 10/04/21   Basic Metabolic Panel   Result Value Ref Range    Sodium 143 135 - 145 mmol/L    Potassium 6.0 (H) 3.6 - 5.5 mmol/L    Chloride 97 96 - 112 mmol/L    Co2 28 20 - 33 mmol/L    Glucose 95 65 - 99 mg/dL    Bun 104 (HH) 8 - 22 mg/dL    Creatinine 12.41 (HH) 0.50 - 1.40 mg/dL    Calcium 9.6 8.5 - 10.5 mg/dL    Anion Gap 18.0 (H) 7.0 - 16.0   ESTIMATED GFR   Result Value Ref Range    GFR If  6 (A) >60 mL/min/1.73 m 2    GFR If Non African American 5 (A) >60 mL/min/1.73 m 2         IMAGING:  - All imaging reviewed from admission to present day    IMPRESSION:  # ESRD on MWF HD  - Etiology likely 2/2 chronic Htn  - Not set up with outpt bed yet    # HTN  - Goal BP < 140/90    # Anemia of CKD  - Below goal Hgb 10-11  - 7.4 as of last week  - % Sat 37  - Ferritin 4117    #CKD-BMD  - No recent Phos    # Secondary Hyperparathyroidism  - Last checked in 2011 - 481.6 at that time    PLAN:  - MWF iHD via direct admit to CDU on HD days until outpatient bed set up  - UF as tolerated  - Manage Htn with UF, after HD restart home medications and monitor BP on these  - Initiate MARIANA with HD, as Hb<10 and %Sat  >30%  - Working with SW to set up outpatient dialysis bed  - Need to recheck Phos and PTH tomorrow if he stays inpatient, if he discharges today, recheck at next admission for HD  - No dietary protein restrictions  - Dose all meds per ESRD  - Pt to follow with [facility] upon discharge    Thank you for the consultation!

## 2021-10-04 NOTE — DISCHARGE SUMMARY
Discharge Summary    CHIEF COMPLAINT ON ADMISSION  No chief complaint on file.      Reason for Admission  Renal Failure     Admission Date  10/4/2021    CODE STATUS  Full Code    HPI & HOSPITAL COURSE  This is a 31 y.o. male here with need for hemodialysis  Elier Bustillos is a 31 y.o. male who presented 10/1/2021 with end-stage renal disease requiring dialysis.  31 y.o. male who presented 9/29/2021 with request for initiation of dialysis. He has a history of ESRD on HD M,W,F and hypertension - he previously saw Dr Mayer when he lived here a few years ago, but then he returned to [place].  He received his [place] and [place] and presented to the ER with requests to set up HD. His last dialysis was Monday and he has no other complaints  Patient is in the process of being approved for dialysis chair is on outpatient basis.  Patient is normally with [facility] nephrology.  Patient has been preapproved to have dialysis as observation patient via direct admission to [facility].  Patient presented early this morning for hemodialysis tolerated procedure without issue.  Patient has no complaints at this time denies any subjective fevers or chills denies nausea vomiting diarrhea.  Patient underwent hemodialysis without issue.  Tolerated procedure well.  Patient does not require medications.  Patient will follow up with scheduled dialysis appointments via direct admission through clinical decision unit as scheduled.  Patient is pending acceptance to outside hemodialysis chair.    Patient received hemodialysis again today without issue patient will return for prescheduled appointment patient still pending hemodialysis chair acceptance.  Therefore, he is discharged in good and stable condition to home with close outpatient follow-up.    The patient recovered much more quickly than anticipated on admission.    Discharge Date  10/4/2021    FOLLOW UP ITEMS POST DISCHARGE  Continue to go  to all future dialysis appointments    DISCHARGE DIAGNOSES  Active Problems:    Hyperkalemia POA: Yes    ESRD (end stage renal disease) on dialysis (HCC) POA: Yes    HTN (hypertension) POA: Yes  Resolved Problems:    * No resolved hospital problems. *      FOLLOW UP  No future appointments.  [facility]  [address]  [facility] [id number] [phone]  Schedule an appointment as soon as possible for a visit  As needed      MEDICATIONS ON DISCHARGE     Medication List      CONTINUE taking these medications      Instructions   amLODIPine 5 MG Tabs  Commonly known as: NORVASC   Take 1 Tablet by mouth every evening for 30 days.  Dose: 5 mg     cloNIDine 0.2 MG/24HR Ptwk patch  Commonly known as: CATAPRES   Place 1 Patch on the skin every 7 days for 30 days.  Dose: 1 Patch     hydrALAZINE 10 MG Tabs  Commonly known as: APRESOLINE   Take 1 Tablet by mouth every evening for 30 days. In [place] medications is named Dila-vania 10MG  Dose: 10 mg     losartan 50 MG Tabs  Commonly known as: COZAAR   Take 1 Tablet by mouth every evening for 30 days.  Dose: 50 mg     pregabalin 75 MG Caps  Commonly known as: LYRICA   Take 1 Capsule by mouth every evening for 30 days.  Dose: 75 mg            Allergies  No Known Allergies    DIET  Orders Placed This Encounter   Procedures   • Diet Order Diet: Renal     Standing Status:   Standing     Number of Occurrences:   1     Order Specific Question:   Diet:     Answer:   Renal [8]       ACTIVITY  As tolerated.  Weight bearing as tolerated    CONSULTATIONS  Nephrology Dr. Bourne    PROCEDURES  Hemodialysis    LABORATORY  Lab Results   Component Value Date    SODIUM 143 10/04/2021    POTASSIUM 6.0 (H) 10/04/2021    CHLORIDE 97 10/04/2021    CO2 28 10/04/2021    GLUCOSE 95 10/04/2021     (HH) 10/04/2021    CREATININE 12.41 (HH) 10/04/2021        Lab Results   Component Value Date    WBC 3.9 (L) 09/30/2021    HEMOGLOBIN 7.4 (L) 09/30/2021    HEMATOCRIT 22.6 (L)  09/30/2021    PLATELETCT 124 (L) 09/30/2021        Total time of the discharge process exceeds 35 minutes.

## 2021-10-04 NOTE — H&P
Hospital Medicine History & Physical Note    Date of Service  10/4/2021    Primary Care Physician  Pcp Pt States None    Consultants  nephrology    Specialist Names: PEDRO Hamm MD    Code Status  Full Code    Chief Complaint  Requires hemodialysis    History of Presenting Illness  Elier Bustillos is a 31 y.o. male who presented 10/1/2021 with end-stage renal disease requiring dialysis.  31 y.o. male who presented 9/29/2021 with request for initiation of dialysis. He has a history of ESRD on HD M,W,F and hypertension - he previously saw Dr Mayer when he lived here a few years ago, but then he returned to [place].  He received his visa and has [place] border again and presented to the ER with requests to set up HD. His last dialysis was Monday and he has no other complaints  Patient is in the process of being approved for dialysis chair is on outpatient basis. Patient is normally with [facility] nephrology.  Patient has been preapproved to have dialysis as observation patient via direct admission to [facility]. Patient presented early this morning for hemodialysis tolerated procedure without issue.  Patient has no complaints at this time denies any subjective fevers or chills denies nausea vomiting diarrhea.  I discussed the plan of care with patient and bedside RN.    I discussed the plan of care with patient and bedside RN.    Review of Systems  Review of Systems   Constitutional: Negative for chills, diaphoresis, fever, malaise/fatigue and weight loss.   HENT: Negative for congestion, nosebleeds, sinus pain and sore throat.    Eyes: Negative for double vision, pain, discharge and redness.   Respiratory: Negative for cough, sputum production, shortness of breath, wheezing and stridor.    Cardiovascular: Negative for chest pain, palpitations and leg swelling.   Gastrointestinal: Negative for blood in stool, diarrhea, nausea and vomiting.   Genitourinary: Negative for flank pain,  frequency and urgency.   Musculoskeletal: Negative for back pain, joint pain, myalgias and neck pain.   Skin: Negative for itching and rash.   Neurological: Negative for dizziness, tingling, seizures, weakness and headaches.   Endo/Heme/Allergies: Negative for polydipsia. Does not bruise/bleed easily.   Psychiatric/Behavioral: Negative for memory loss. The patient is not nervous/anxious and does not have insomnia.        Past Medical History   has a past medical history of Dialysis, Dialysis care, Hypertension, and Renal disorder.    Surgical History   has a past surgical history that includes other orthopedic surgery; av fistula creation (12/29/2010); av fistula creation (6/7/2012); cath placement (6/7/2012); recovery (7/3/2012); recovery (7/20/2012); av fistula creation (8/21/2012); av fistula revision (Right, 11/28/2016); and exploratory laparotomy (11/11/2018).     Family History  family history includes Diabetes in his mother; Heart Attack in his father.   Family history reviewed with patient. There is no family history that is pertinent to the chief complaint.     Social History   reports that he has been smoking cigarettes. He has never used smokeless tobacco. He reports that he does not drink alcohol and does not use drugs.    Allergies  No Known Allergies    Medications  Prior to Admission Medications   Prescriptions Last Dose Informant Patient Reported? Taking?   amLODIPine (NORVASC) 5 MG Tab 10/3/2021 at 1900 Patient No No   Sig: Take 1 Tablet by mouth every evening for 30 days.   cloNIDine (CATAPRES) 0.2 MG/24HR PATCH WEEKLY patch 9/29/2021 at 1000 Patient No No   Sig: Place 1 Patch on the skin every 7 days for 30 days.   hydrALAZINE (APRESOLINE) 10 MG Tab 10/3/2021 at 1900 Patient No No   Sig: Take 1 Tablet by mouth every evening for 30 days. In [place] medications is named Dila-vania 10MG   losartan (COZAAR) 50 MG Tab 10/3/2021 at 1900 Patient No No   Sig: Take 1 Tablet by mouth every evening for 30 days.    pregabalin (LYRICA) 75 MG Cap 10/3/2021 at 1900 Patient No No   Sig: Take 1 Capsule by mouth every evening for 30 days.      Facility-Administered Medications: None       Physical Exam  Temp:  [36.6 °C (97.9 °F)] 36.6 °C (97.9 °F)  Pulse:  [85] 85  Resp:  [16] 16  BP: (173)/(105) 173/105  SpO2:  [98 %] 98 %  Blood Pressure: (!) 173/105   Temperature: 36.6 °C (97.9 °F)   Pulse: 85   Respiration: 16   Pulse Oximetry: 98 %       Physical Exam  Vitals and nursing note reviewed.   Constitutional:       General: He is not in acute distress.     Appearance: Normal appearance. He is not ill-appearing.   HENT:      Head: Normocephalic and atraumatic.      Right Ear: External ear normal.      Left Ear: External ear normal.      Nose: No congestion or rhinorrhea.      Mouth/Throat:      Mouth: Mucous membranes are moist.      Pharynx: No oropharyngeal exudate or posterior oropharyngeal erythema.   Eyes:      General:         Right eye: No discharge.         Left eye: No discharge.      Pupils: Pupils are equal, round, and reactive to light.   Cardiovascular:      Rate and Rhythm: Tachycardia present.      Heart sounds: No murmur heard.   No friction rub.      Comments: Palpable thrill audible bruit right forearm fistula  Pulmonary:      Effort: No respiratory distress.      Breath sounds: No stridor. No wheezing.   Abdominal:      General: Abdomen is flat. There is no distension.      Palpations: Abdomen is soft. There is mass.      Tenderness: There is no abdominal tenderness. There is no guarding.   Musculoskeletal:         General: No swelling, tenderness or deformity. Normal range of motion.      Cervical back: No rigidity or tenderness.   Lymphadenopathy:      Cervical: No cervical adenopathy.   Skin:     General: Skin is warm.      Coloration: Skin is not jaundiced or pale.      Findings: No bruising or erythema.   Neurological:      General: No focal deficit present.      Mental Status: He is alert. Mental status is  at baseline. He is disoriented.      Cranial Nerves: No cranial nerve deficit.      Sensory: No sensory deficit.   Psychiatric:         Mood and Affect: Mood normal.         Behavior: Behavior normal.         Thought Content: Thought content normal.         Laboratory:          No results for input(s): ALTSGPT, ASTSGOT, ALKPHOSPHAT, TBILIRUBIN, DBILIRUBIN, GAMMAGT, AMYLASE, LIPASE, ALB, PREALBUMIN, GLUCOSE in the last 72 hours.      No results for input(s): NTPROBNP in the last 72 hours.      No results for input(s): TROPONINT in the last 72 hours.    Imaging:  No orders to display       no X-Ray or EKG requiring interpretation    Assessment/Plan:  I anticipate this patient is appropriate for observation status at this time.    HTN (hypertension)- (present on admission)  Assessment & Plan  Continue present medical management    ESRD (end stage renal disease) on dialysis (HCC)- (present on admission)  Assessment & Plan  Patient requires hemodialysis is being set up pending outpatient acceptance for dialysis chair.  Has already been prescheduled for observation status subsequent hemodialysis and [facility].  Patient has no acute complaints at this time fistula has audible bruit palpable thrill    Hyperkalemia- (present on admission)  Assessment & Plan  -Resolved last set of laboratory values nonsuggestive of hyperkalemia with patient will go for hemodialysis today.      VTE prophylaxis: heparin ppx

## 2021-10-04 NOTE — DISCHARGE INSTRUCTIONS
Discharge Instructions    Discharged to home by car with friend. Discharged via walking, hospital escort: Yes.  Special equipment needed: Not Applicable    Be sure to schedule a follow-up appointment with your primary care doctor or any specialists as instructed.     Discharge Plan:   Diet Plan: Discussed  Activity Level: Discussed  Confirmed Follow up Appointment: Patient to Call and Schedule Appointment  Confirmed Symptoms Management: Discussed  Medication Reconciliation Updated: Yes  Influenza Vaccine Indication: Patient Refuses    I understand that a diet low in cholesterol, fat, and sodium is recommended for good health. Unless I have been given specific instructions below for another diet, I accept this instruction as my diet prescription.   Other diet: Renal Diet    Special Instructions: None    · Is patient discharged on Warfarin / Coumadin?   No     Depression / Suicide Risk    As [facility] are [facility], it is important to learn how to keep safe from harming yourself.    Recognize the warning signs:  · Abrupt changes in personality, positive or negative- including increase in energy   · Giving away possessions  · Change in eating patterns- significant weight changes-  positive or negative  · Change in sleeping patterns- unable to sleep or sleeping all the time   · Unwillingness or inability to communicate  · Depression  · Unusual sadness, discouragement and loneliness  · Talk of wanting to die  · Neglect of personal appearance   · Rebelliousness- reckless behavior  · Withdrawal from people/activities they love  · Confusion- inability to concentrate     If you or a loved one observes any of these behaviors or has concerns about self-harm, here's what you can do:  · Talk about it- your feelings and reasons for harming yourself  · Remove any means that you might use to hurt yourself (examples: pills, rope, extension cords, firearm)  · Get professional help from the community  (Mental Health, Substance Abuse, psychological counseling)  · Do not be alone:Call your Safe Contact- someone whom you trust who will be there for you.  · Call your local CRISIS HOTLINE 283-8663 or 878-118-4747  · Call your local Children's Mobile Crisis Response Team [place] (889) 332-7512 or www.[employer]  · Call the toll free National Suicide Prevention Hotlines   · National Suicide Prevention Lifeline 755-415-WDTP (2262)  · National Hope Line Network 800-SUICIDE (741-5547)

## 2021-10-04 NOTE — DISCHARGE SUMMARY
Discharge Summary    CHIEF COMPLAINT ON ADMISSION  No chief complaint on file.      Reason for Admission  Renal Failure     Admission Date  10/4/2021    CODE STATUS  Full Code    HPI & HOSPITAL COURSE  This is a 31 y.o. male here with ESRD requiring dialysis  Elier Bustillos is a 31 y.o. male who presented 10/1/2021 with end-stage renal disease requiring dialysis.  31 y.o. male who presented 9/29/2021 with request for initiation of dialysis. He has a history of ESRD on HD M,W,F and hypertension - he previously saw Dr Mayer when he lived here a few years ago, but then he returned to [place].  He received his visa and has [place] border [place] and presented to the ER with requests to set up HD. His last dialysis was Monday and he has no other complaints  Patient is in the process of being approved for dialysis chair is on outpatient basis.  Patient is normally with [facility] nephrology.  Patient has been preapproved to have dialysis as observation patient via direct admission to [facility].  Patient presented early this morning for hemodialysis tolerated procedure without issue.  Patient has no complaints at this time denies any subjective fevers or chills denies nausea vomiting diarrhea.  Patient underwent hemodialysis without issue.  Tolerated procedure well.  Patient does not require medications.  Patient will follow up with scheduled dialysis appointments via direct admission through clinical decision unit as scheduled.  Patient is pending acceptance to outside hemodialysis chair.  Therefore, he is discharged in good and stable condition to home with close outpatient follow-up.  Therefore, he is discharged in good and stable condition to home with close outpatient follow-up.    The patient recovered much more quickly than anticipated on admission.    Discharge Date  10/4/2021    FOLLOW UP ITEMS POST DISCHARGE  Go to all prescheduled dialysis appointments    DISCHARGE DIAGNOSES  Active  Problems:    Hyperkalemia POA: Yes    ESRD (end stage renal disease) on dialysis (HCC) POA: Yes    HTN (hypertension) POA: Yes  Resolved Problems:    * No resolved hospital problems. *      FOLLOW UP  No future appointments.  No follow-up provider specified.    MEDICATIONS ON DISCHARGE     Medication List      ASK your doctor about these medications      Instructions   amLODIPine 5 MG Tabs  Commonly known as: NORVASC   Take 1 Tablet by mouth every evening for 30 days.  Dose: 5 mg     cloNIDine 0.2 MG/24HR Ptwk patch  Commonly known as: CATAPRES   Place 1 Patch on the skin every 7 days for 30 days.  Dose: 1 Patch     hydrALAZINE 10 MG Tabs  Commonly known as: APRESOLINE   Take 1 Tablet by mouth every evening for 30 days. In [place] medications is named Dila-vania 10MG  Dose: 10 mg     losartan 50 MG Tabs  Commonly known as: COZAAR   Take 1 Tablet by mouth every evening for 30 days.  Dose: 50 mg     pregabalin 75 MG Caps  Commonly known as: LYRICA   Take 1 Capsule by mouth every evening for 30 days.  Dose: 75 mg            Allergies  No Known Allergies    DIET  Orders Placed This Encounter   Procedures   • Diet Order Diet: Renal     Standing Status:   Standing     Number of Occurrences:   1     Order Specific Question:   Diet:     Answer:   Renal [8]       ACTIVITY  As tolerated.  Weight bearing as tolerated    CONSULTATIONS  Nephrology-[place]    PROCEDURES  None    LABORATORY  Lab Results   Component Value Date    SODIUM 141 09/30/2021    POTASSIUM 4.8 09/30/2021    CHLORIDE 103 09/30/2021    CO2 25 09/30/2021    GLUCOSE 111 (H) 09/30/2021    BUN 55 (H) 09/30/2021    CREATININE 8.19 (HH) 09/30/2021        Lab Results   Component Value Date    WBC 3.9 (L) 09/30/2021    HEMOGLOBIN 7.4 (L) 09/30/2021    HEMATOCRIT 22.6 (L) 09/30/2021    PLATELETCT 124 (L) 09/30/2021        Total time of the discharge process exceeds 35 minutes.

## 2021-10-04 NOTE — ASSESSMENT & PLAN NOTE
-Resolved last set of laboratory values nonsuggestive of hyperkalemia with patient will go for hemodialysis today.

## 2021-10-04 NOTE — ASSESSMENT & PLAN NOTE
Patient requires hemodialysis is being set up pending outpatient acceptance for dialysis chair.  Has already been prescheduled for observation status subsequent hemodialysis and [facility].  Patient has no acute complaints at this time fistula has audible bruit palpable thrill

## 2021-10-04 NOTE — PROGRESS NOTES
Hemodialysis ordered by Dr. Beckman. Treatment started at 0831 and ended at 1201. Pt stable, vss, no c/o post tx. See flow sheets for details. Net UF 4.0 L. Reported to ADELINA Coreas RN. RT UA AVF dressing CDI.

## 2021-10-04 NOTE — PROGRESS NOTES
[facility] Progress Note    Patient seen and examined on hemodialysis  VSS--see dialysis treatment sheet for full details  Labs reviewed

## 2021-10-06 ENCOUNTER — HOSPITAL ENCOUNTER (OUTPATIENT)
Facility: MEDICAL CENTER | Age: 31
End: 2021-10-06
Attending: HOSPITALIST | Admitting: HOSPITALIST
Payer: COMMERCIAL

## 2021-10-06 VITALS
TEMPERATURE: 97.7 F | DIASTOLIC BLOOD PRESSURE: 92 MMHG | BODY MASS INDEX: 28.69 KG/M2 | SYSTOLIC BLOOD PRESSURE: 163 MMHG | HEART RATE: 96 BPM | RESPIRATION RATE: 18 BRPM | OXYGEN SATURATION: 100 % | WEIGHT: 177.69 LBS

## 2021-10-06 LAB
ANION GAP SERPL CALC-SCNC: 19 MMOL/L (ref 7–16)
BASOPHILS # BLD AUTO: 0.7 % (ref 0–1.8)
BASOPHILS # BLD: 0.03 K/UL (ref 0–0.12)
BUN SERPL-MCNC: 67 MG/DL (ref 8–22)
CALCIUM SERPL-MCNC: 9.7 MG/DL (ref 8.5–10.5)
CHLORIDE SERPL-SCNC: 95 MMOL/L (ref 96–112)
CO2 SERPL-SCNC: 32 MMOL/L (ref 20–33)
CREAT SERPL-MCNC: 9.42 MG/DL (ref 0.5–1.4)
EOSINOPHIL # BLD AUTO: 0.05 K/UL (ref 0–0.51)
EOSINOPHIL NFR BLD: 1.1 % (ref 0–6.9)
ERYTHROCYTE [DISTWIDTH] IN BLOOD BY AUTOMATED COUNT: 40.9 FL (ref 35.9–50)
GLUCOSE SERPL-MCNC: 96 MG/DL (ref 65–99)
HCT VFR BLD AUTO: 21.5 % (ref 42–52)
HGB BLD-MCNC: 7.2 G/DL (ref 14–18)
IMM GRANULOCYTES # BLD AUTO: 0.01 K/UL (ref 0–0.11)
IMM GRANULOCYTES NFR BLD AUTO: 0.2 % (ref 0–0.9)
LYMPHOCYTES # BLD AUTO: 0.83 K/UL (ref 1–4.8)
LYMPHOCYTES NFR BLD: 19 % (ref 22–41)
MCH RBC QN AUTO: 31.2 PG (ref 27–33)
MCHC RBC AUTO-ENTMCNC: 33.5 G/DL (ref 33.7–35.3)
MCV RBC AUTO: 93.1 FL (ref 81.4–97.8)
MONOCYTES # BLD AUTO: 0.58 K/UL (ref 0–0.85)
MONOCYTES NFR BLD AUTO: 13.3 % (ref 0–13.4)
NEUTROPHILS # BLD AUTO: 2.87 K/UL (ref 1.82–7.42)
NEUTROPHILS NFR BLD: 65.7 % (ref 44–72)
NRBC # BLD AUTO: 0 K/UL
NRBC BLD-RTO: 0 /100 WBC
PLATELET # BLD AUTO: 126 K/UL (ref 164–446)
PMV BLD AUTO: 11.3 FL (ref 9–12.9)
POTASSIUM SERPL-SCNC: 5.4 MMOL/L (ref 3.6–5.5)
RBC # BLD AUTO: 2.31 M/UL (ref 4.7–6.1)
SODIUM SERPL-SCNC: 146 MMOL/L (ref 135–145)
WBC # BLD AUTO: 4.4 K/UL (ref 4.8–10.8)

## 2021-10-06 PROCEDURE — A9270 NON-COVERED ITEM OR SERVICE: HCPCS | Performed by: HOSPITALIST

## 2021-10-06 PROCEDURE — 80048 BASIC METABOLIC PNL TOTAL CA: CPT

## 2021-10-06 PROCEDURE — 700111 HCHG RX REV CODE 636 W/ 250 OVERRIDE (IP): Performed by: HOSPITALIST

## 2021-10-06 PROCEDURE — 700102 HCHG RX REV CODE 250 W/ 637 OVERRIDE(OP): Performed by: HOSPITALIST

## 2021-10-06 PROCEDURE — G0378 HOSPITAL OBSERVATION PER HR: HCPCS

## 2021-10-06 PROCEDURE — 90935 HEMODIALYSIS ONE EVALUATION: CPT

## 2021-10-06 PROCEDURE — 96374 THER/PROPH/DIAG INJ IV PUSH: CPT

## 2021-10-06 PROCEDURE — 99217 PR OBSERVATION CARE DISCHARGE: CPT | Performed by: HOSPITALIST

## 2021-10-06 PROCEDURE — 700111 HCHG RX REV CODE 636 W/ 250 OVERRIDE (IP)

## 2021-10-06 PROCEDURE — 85025 COMPLETE CBC W/AUTO DIFF WBC: CPT

## 2021-10-06 RX ORDER — HYDRALAZINE HYDROCHLORIDE 20 MG/ML
20 INJECTION INTRAMUSCULAR; INTRAVENOUS ONCE
Status: COMPLETED | OUTPATIENT
Start: 2021-10-06 | End: 2021-10-06

## 2021-10-06 RX ORDER — LOSARTAN POTASSIUM 50 MG/1
50 TABLET ORAL ONCE
Status: COMPLETED | OUTPATIENT
Start: 2021-10-06 | End: 2021-10-06

## 2021-10-06 RX ORDER — HYDRALAZINE HYDROCHLORIDE 25 MG/1
25 TABLET, FILM COATED ORAL ONCE
Status: COMPLETED | OUTPATIENT
Start: 2021-10-06 | End: 2021-10-06

## 2021-10-06 RX ADMIN — HYDRALAZINE HYDROCHLORIDE 25 MG: 25 TABLET, FILM COATED ORAL at 14:16

## 2021-10-06 RX ADMIN — EPOETIN ALFA-EPBX 10000 UNITS: 10000 INJECTION, SOLUTION INTRAVENOUS; SUBCUTANEOUS at 10:32

## 2021-10-06 RX ADMIN — HYDRALAZINE HYDROCHLORIDE 20 MG: 20 INJECTION INTRAMUSCULAR; INTRAVENOUS at 15:55

## 2021-10-06 RX ADMIN — LOSARTAN POTASSIUM 50 MG: 50 TABLET, FILM COATED ORAL at 14:16

## 2021-10-06 ASSESSMENT — FIBROSIS 4 INDEX: FIB4 SCORE: 0.64

## 2021-10-06 NOTE — CONSULTS
[facility] -  CONSULTATION NOTE               Author: Christie Beckman M.D. Date & Time: 10/6/2021  12:12 PM       REASON FOR CONSULTATION:   Inpatient hemodialysis management.    CHIEF COMPLAINT:   Requires Dialysis    HISTORY OF PRESENT ILLNESS:    31yoM with PMH significant for ESRD, HTN, Anemia secondary to CKD, CKD Bone Mineral Disorder/Renal Osteodystrophy admitted for HD as he has no outpt accepting dialysis unit.  Pt recently moved from [place] and is awaiting outpt HD unit confirmation. He is directly admitted on F to receive HD inpatient while discharge planner is arranging for outpt dialysis. His last dialysis treatment was Monday 10/4/21. Pt reports that he has some chest tightness but no cough or SOB. He tries to limit his fluid intake but at his last HD requird 4L UF. He denies any SOB or LE edema or n/v. His BP is quite elevated this am.     REVIEW OF SYSTEMS:    10 point ROS was performed and is as per HPI or otherwise negative.    PAST MEDICAL HISTORY:   Past Medical History:   Diagnosis Date   • Dialysis    • Dialysis care    • Hypertension    • Renal disorder     dailysis pt. 3 times per week.    ESRD on HD via     PAST SURGICAL HISTORY:   Past Surgical History:   Procedure Laterality Date   • EXPLORATORY LAPAROTOMY  11/11/2018    Procedure: EXPLORATORY LAPAROTOMY;  Surgeon: Faith Martin M.D.;  Location: [facility];  Service: General   • AV FISTULA REVISION Right 11/28/2016    Procedure: AV FISTULA REVISION UPPER EXTREMITY (ANEURYSM REDUCTION);  Surgeon: Slade Perez M.D.;  Location: [facility];  Service:    • AV FISTULA CREATION  8/21/2012    Performed by ANAI TYSON at SURGERY [facility]   • RECOVERY  7/20/2012    Performed by SURGERY, IR-RECOVERY at [facility]   • RECOVERY  7/3/2012    Performed by SURGERY, IR-RECOVERY at SURGERY [facility] DAY [facility]   • AV FISTULA CREATION  6/7/2012    Performed by  ANAI TYSON at SURGERY [facility] ORS   • CATH PLACEMENT  6/7/2012    Performed by ANAI TYSON at SURGERY [facility] ORS   • AV FISTULA CREATION  12/29/2010    Performed by ANT ALONZO at SURGERY [facility] ORS   • OTHER ORTHOPEDIC SURGERY      r arm fx   Left arm AV fistula creation and subsequent revisions--ultimately underwent ligation of the fistula due to aneurysmal dilatation.  Right arm AV fistula creation and revision--currently being used  PermCath placement and removal.  Exploratory laparotomy, 11/11/2018.    FAMILY HISTORY:   Family History   Problem Relation Age of Onset   • Diabetes Mother    • Heart Attack Father        SOCIAL HISTORY:   Social History     Tobacco Use   Smoking Status Current Every Day Smoker   • Types: Cigarettes   Smokeless Tobacco Never Used   Tobacco Comment    1-2 cigarettes daily     Social History     Substance and Sexual Activity   Alcohol Use No     Social History     Substance and Sexual Activity   Drug Use No       HOME MEDICATIONS:   Reviewed and documented in chart.    LABORATORY STUDIES:   Recent Labs     10/04/21  0830 10/06/21  0850   SODIUM 143 146*   POTASSIUM 6.0* 5.4   CHLORIDE 97 95*   CO2 28 32   GLUCOSE 95 96   * 67*   CREATININE 12.41* 9.42*   CALCIUM 9.6 9.7       ALLERGIES:  Patient has no known allergies.    VS:  BP (!) 184/104   Pulse 96   Temp 36.5 °C (97.7 °F) (Temporal)   Resp 18   Wt 80.6 kg (177 lb 11.1 oz)   SpO2 100%   BMI 28.69 kg/m²     Physical Exam  Vitals and nursing note reviewed.   Constitutional:       Appearance: Normal appearance. He is not toxic-appearing.   Eyes:      General: No scleral icterus.  Cardiovascular:      Rate and Rhythm: Normal rate and regular rhythm.      Comments: No edema  Pulmonary:      Effort: Pulmonary effort is normal. No respiratory distress.      Breath sounds: Normal breath sounds.   Abdominal:      General: Bowel sounds are normal. There is no distension.      Palpations: Abdomen is  soft.   Musculoskeletal:         General: No deformity.      Right lower leg: No edema.      Left lower leg: No edema.      Comments: +right arm AVF with thrill/bruit   Skin:     General: Skin is warm and dry.      Findings: No rash.   Neurological:      General: No focal deficit present.      Mental Status: He is alert and oriented to person, place, and time. Mental status is at baseline.   Psychiatric:         Mood and Affect: Mood normal.         Behavior: Behavior normal.            FLUID BALANCE:  No intake/output data recorded.    IMAGING:  All imaging reviewed from admission to present day    IMPRESSION:  # ESRD  - Dialyzing MWF while awaiting outpt HD confirmation  # HTN  - Goal BP < 140/90  - Not at goal  # Anemia of CKD  - Goal Hgb 10-11  - Not at goal  # CKD-MBD  - Ca 9.7  - PO4 pen  # Hyperkalemia--mild, will treat with HD    PLAN:  - HD today with UF as tolerated  - Low K diet  - Continue home BP meds  - Reassess BP after fluid removal with HD  - No dietary protein restrictions  - Dose all meds per ESRD  - Epo with HD today  - Pt confirmed for outpt HD starting 10/8  - OK to discharge after dialysis today, pt to go to outpt HD starting 10/8/21    Thank you for the consultation!

## 2021-10-06 NOTE — DISCHARGE PLANNING
Outpatient Dialysis Placement Confirmation    Patient has been placed and confirmed at:    [facility]  [address]  [place] [address]    Ph# [phone]    Schedule: Monday, Wednesday, Friday   Time: 2:30pm    Patient can start on 10/08/2021 and needs to be there by 1:45pm on your first day.    Follow up call made to Abida MAGALLON CM and relayed outpatient dialysis placement confirmation. Follow up message to Dr. Beckman to notify of placement. Provided patient dialysis schedule welcome letter.    Klaudia Nguyễn- Dialysis Coordinator Ph# [phone]  [facility]

## 2021-10-06 NOTE — PROGRESS NOTES
Hemodialysis treatment ordered today per Dr Beckman x 3 hours. Treatment initiated at 0847, ended at 1147.     Patient tolerated tx; see paper flow sheet for details.     Net UF 3,500 mL.     Needles removed from access site. Dressings applied and sites held x 10 minutes; verified no bleeding. Positive bruit/thrill post tx. Staff RN to monitor AVF for breakthrough bleeding. Should breakthrough bleeding occur, staff RN to apply pressure to access sites until bleeding resolved. Notify Dialysis and Nephrologist for follow-up.    Report given to Primary RN.

## 2021-10-06 NOTE — DISCHARGE SUMMARY
Discharge Summary    CHIEF COMPLAINT ON ADMISSION  No chief complaint on file.      Reason for Admission  Renal failure CDU admit for dialys*     Admission Date  10/6/2021    CODE STATUS  Prior    HPI & HOSPITAL COURSE    Elier Bustillos is a 31 y.o. male who presented with end-stage renal disease requiring dialysis.      . He has a history of ESRD on HD M,W,F and hypertension - he previously saw Dr Mayer when he lived here a few years ago, but then he returned to [place].He has now has a visa to stay in [place] and needs to resume dialysis here.    .  Patient is normally with [facility] nephrology.    .  Patient received hemodialysis  today without issue and now has an outpatient dialysis chair.    He was discharged in good condition        Therefore, he is discharged in good and stable condition to home with close outpatient follow-up.    The patient met 2-midnight criteria for an inpatient stay at the time of discharge.    Discharge Date  10/6    FOLLOW UP ITEMS POST DISCHARGE      DISCHARGE DIAGNOSES  Active Problems:    ESRD (end stage renal disease) on dialysis ([facility]) POA: Yes  Resolved Problems:    * No resolved hospital problems. *      FOLLOW UP  No future appointments.  No follow-up provider specified.    MEDICATIONS ON DISCHARGE     Medication List      ASK your doctor about these medications      Instructions   amLODIPine 5 MG Tabs  Commonly known as: NORVASC   Take 1 Tablet by mouth every evening for 30 days.  Dose: 5 mg     cloNIDine 0.2 MG/24HR Ptwk patch  Commonly known as: CATAPRES   Place 1 Patch on the skin every 7 days for 30 days.  Dose: 1 Patch     hydrALAZINE 10 MG Tabs  Commonly known as: APRESOLINE   Take 1 Tablet by mouth every evening for 30 days. In [place] medications is named Dila-vania 10MG  Dose: 10 mg     losartan 50 MG Tabs  Commonly known as: COZAAR   Take 1 Tablet by mouth every evening for 30 days.  Dose: 50 mg     pregabalin 75 MG Caps  Commonly known as: LYRICA   Take  1 Capsule by mouth every evening for 30 days.  Dose: 75 mg            Allergies  No Known Allergies    DIET  No orders of the defined types were placed in this encounter.      ACTIVITY  As tolerated.  Weight bearing as tolerated    CONSULTATIONS  Dr rivera renal    PROCEDURES      LABORATORY  Lab Results   Component Value Date    SODIUM 146 (H) 10/06/2021    POTASSIUM 5.4 10/06/2021    CHLORIDE 95 (L) 10/06/2021    CO2 32 10/06/2021    GLUCOSE 96 10/06/2021    BUN 67 (H) 10/06/2021    CREATININE 9.42 (HH) 10/06/2021        Lab Results   Component Value Date    WBC 4.4 (L) 10/06/2021    HEMOGLOBIN 7.2 (L) 10/06/2021    HEMATOCRIT 21.5 (L) 10/06/2021    PLATELETCT 126 (L) 10/06/2021        Total time of the discharge process exceeds 38  minutes.

## 2021-10-07 NOTE — DISCHARGE INSTRUCTIONS
Discharge Instructions    Discharged to home by car with self. Discharged via walking, hospital escort: Yes.  Special equipment needed: Not Applicable    Be sure to schedule a follow-up appointment with your primary care doctor or any specialists as instructed.     Discharge Plan:   Diet Plan: Discussed  Activity Level: Discussed  Confirmed Follow up Appointment: Patient to Call and Schedule Appointment  Confirmed Symptoms Management: Discussed  Medication Reconciliation Updated: Yes  Influenza Vaccine Indication: Patient Refuses    I understand that a diet low in cholesterol, fat, and sodium is recommended for good health. Unless I have been given specific instructions below for another diet, I accept this instruction as my diet prescription.   Other diet: Heart Healthy,     Special Instructions: None    · Is patient discharged on Warfarin / Coumadin?   No     Depression / Suicide Risk    As you are discharged from this [facility] facility, it is important to learn how to keep safe from harming yourself.    Recognize the warning signs:  · Abrupt changes in personality, positive or negative- including increase in energy   · Giving away possessions  · Change in eating patterns- significant weight changes-  positive or negative  · Change in sleeping patterns- unable to sleep or sleeping all the time   · Unwillingness or inability to communicate  · Depression  · Unusual sadness, discouragement and loneliness  · Talk of wanting to die  · Neglect of personal appearance   · Rebelliousness- reckless behavior  · Withdrawal from people/activities they love  · Confusion- inability to concentrate     If you or a loved one observes any of these behaviors or has concerns about self-harm, here's what you can do:  · Talk about it- your feelings and reasons for harming yourself  · Remove any means that you might use to hurt yourself (examples: pills, rope, extension cords, firearm)  · Get professional help from the community  "(Mental Health, Substance Abuse, psychological counseling)  · Do not be alone:Call your Safe Contact- someone whom you trust who will be there for you.  · Call your local CRISIS HOTLINE 054-0793 or 020-572-3941  · Call your local Children's Mobile Crisis Response Team [place] (064) 546-7687 or www.[employer].com  Call the toll free National Suicide Prevention Hotlines Diálisis, Cuidados después de la diálisis  (Dialysis, Care After)  La diálisis es un tratamiento que elimina los desechos tóxicos del organismo cuando la función de los riñones fracasa. Hay dos tipos de diálisis:  Hemodiálisis. En la hemodiálisis, la maxwell es bombeada desde el cuerpo y pasa a través de un filtro (dializador). Se eliminan los desechos de la maxwell y luego ésta retorna al cuerpo. La hemodiálisis se realiza en un centro especilizado hollie 3 a 4 horas, america veces a la semana. Se accede por vía arteriovenosa (AV), por donde se tiene acceso a los vasos sanguíneos. La ventaja principal de la hemodiálisis es que el paciente no necesita un entrenamiento especial. Las desventajas son el fracaso en el acceso arteriovenoso y la falta de mellissa, ya que debe permanecer relativamente cerca de un centro de diálisis.   Diálisis peritoneal. Se utiliza la propia membrana del organismo serene filtro. Se introduce y luego se extrae un líquido del abdomen para eliminar los desechos tóxicos del organismo. Las ventajas son que puede enseñarse al paciente y puede realizarse en el hogar, poniendo cuidado en la técnica. Permite más mellissa y menos molestias. Las desventajas son la potencial inflamación de la ed interna del abdomen (peritonitis) y la luis miguel de la membrana.   CUIDADOS EN EL HOGAR   Si tiene un acceso arteriovenoso:   Controle todos los días las \"vibraciones\" en el sitio del acceso. Es cisco sensación de vibración que puede sentir al colocar los dedos sobre el acceso. [place] significa que el acceso funciona correctamente. Si no siente la " "\"vibración\", el acceso deberá repararse.   No use ropa o bijouterie ajustadas alrededor del acceso.   Evite dormir sobre el acceso. Podría disminuir la circulación y formarse un coágulo.   Mantenga el vendaje hollie algunas horas luego del tratamiento, o según las indicaciones del médico. Evite que el vendaje se moje. Si el vendaje se sale del lugar, cubra el sitio del acceso con cisco gaza de 4x4 y sujétela con cinta adhesiva.   Mantenga el sitio limpio para evitar infecciones.   Tenga en chau casa algunos vendajes de 4x4 cm en anirudh de que el acceso comience a sangrar. Posiblemente le hayan indicado un tratamiento con heparina, que puede causar hemorragias.   Controle chau presión arterial. La presión arterial jesenia (hipertensión) daña el corazón y los vasos sanguíneos. Es importante que moo tanto ejercicio serene le sea posible.   Mantenga chau colesterol bajo control. Moo ejercicios regularmente o según las indicaciones.   SOLICITE ATENCIÓN MÉDICA SI:   No siente la \"vibración\" en chau acceso arteriovenoso.   Tiene fiebre, siente escalofríos, transpira o se siente débil.   Hay un pequeño sangrado en el sitio del acceso.   Tiene la presión arterial elevada.   SOLICITE ATENCIÓN MÉDICA DE INMEDIATO SI:   Siente un repentino dolor en el pecho o tiene dificultad para respirar.   Tiene cisco hemorragia que no puede detener o controlar aplicando presión directa.   ASEGÚRESE DE QUE:   Comprende estas instrucciones.   Controlará chau enfermedad.   Solicitará ayuda de inmediato si no mejora o empeora..   Document Released: 04/05/2010 Document Revised: 03/11/2013  · ExitCare® Patient Information ©2013 [employer].  · National Suicide Prevention Lifeline 698-664-KEPL (2987)  · National Hope Line Network 800-SUICIDE (005-8100)      "

## 2021-10-10 ENCOUNTER — APPOINTMENT (OUTPATIENT)
Dept: RADIOLOGY | Facility: MEDICAL CENTER | Age: 31
End: 2021-10-10
Attending: EMERGENCY MEDICINE
Payer: COMMERCIAL

## 2021-10-10 ENCOUNTER — HOSPITAL ENCOUNTER (EMERGENCY)
Facility: MEDICAL CENTER | Age: 31
End: 2021-10-10
Attending: EMERGENCY MEDICINE
Payer: COMMERCIAL

## 2021-10-10 VITALS
TEMPERATURE: 98.2 F | OXYGEN SATURATION: 95 % | WEIGHT: 175.93 LBS | BODY MASS INDEX: 28.27 KG/M2 | RESPIRATION RATE: 20 BRPM | DIASTOLIC BLOOD PRESSURE: 103 MMHG | HEIGHT: 66 IN | HEART RATE: 97 BPM | SYSTOLIC BLOOD PRESSURE: 163 MMHG

## 2021-10-10 DIAGNOSIS — Z99.2 STAGE 5 CHRONIC KIDNEY DISEASE ON CHRONIC DIALYSIS (HCC): ICD-10-CM

## 2021-10-10 DIAGNOSIS — N18.6 STAGE 5 CHRONIC KIDNEY DISEASE ON CHRONIC DIALYSIS (HCC): ICD-10-CM

## 2021-10-10 LAB
ALBUMIN SERPL BCP-MCNC: 3.9 G/DL (ref 3.2–4.9)
ALBUMIN/GLOB SERPL: 1.4 G/DL
ALP SERPL-CCNC: 299 U/L (ref 30–99)
ALT SERPL-CCNC: 18 U/L (ref 2–50)
ANION GAP SERPL CALC-SCNC: 19 MMOL/L (ref 7–16)
AST SERPL-CCNC: 14 U/L (ref 12–45)
BASOPHILS # BLD AUTO: 0.5 % (ref 0–1.8)
BASOPHILS # BLD: 0.02 K/UL (ref 0–0.12)
BILIRUB SERPL-MCNC: 0.3 MG/DL (ref 0.1–1.5)
BUN SERPL-MCNC: 60 MG/DL (ref 8–22)
CALCIUM SERPL-MCNC: 9.4 MG/DL (ref 8.5–10.5)
CHLORIDE SERPL-SCNC: 94 MMOL/L (ref 96–112)
CO2 SERPL-SCNC: 33 MMOL/L (ref 20–33)
CREAT SERPL-MCNC: 10.29 MG/DL (ref 0.5–1.4)
EKG IMPRESSION: NORMAL
EOSINOPHIL # BLD AUTO: 0.12 K/UL (ref 0–0.51)
EOSINOPHIL NFR BLD: 3.2 % (ref 0–6.9)
ERYTHROCYTE [DISTWIDTH] IN BLOOD BY AUTOMATED COUNT: 41.3 FL (ref 35.9–50)
GLOBULIN SER CALC-MCNC: 2.8 G/DL (ref 1.9–3.5)
GLUCOSE SERPL-MCNC: 88 MG/DL (ref 65–99)
HCT VFR BLD AUTO: 22.7 % (ref 42–52)
HGB BLD-MCNC: 7.6 G/DL (ref 14–18)
IMM GRANULOCYTES # BLD AUTO: 0.02 K/UL (ref 0–0.11)
IMM GRANULOCYTES NFR BLD AUTO: 0.5 % (ref 0–0.9)
LYMPHOCYTES # BLD AUTO: 0.8 K/UL (ref 1–4.8)
LYMPHOCYTES NFR BLD: 21.3 % (ref 22–41)
MCH RBC QN AUTO: 32.1 PG (ref 27–33)
MCHC RBC AUTO-ENTMCNC: 33.5 G/DL (ref 33.7–35.3)
MCV RBC AUTO: 95.8 FL (ref 81.4–97.8)
MONOCYTES # BLD AUTO: 0.45 K/UL (ref 0–0.85)
MONOCYTES NFR BLD AUTO: 12 % (ref 0–13.4)
NEUTROPHILS # BLD AUTO: 2.35 K/UL (ref 1.82–7.42)
NEUTROPHILS NFR BLD: 62.5 % (ref 44–72)
NRBC # BLD AUTO: 0 K/UL
NRBC BLD-RTO: 0 /100 WBC
PLATELET # BLD AUTO: 145 K/UL (ref 164–446)
PMV BLD AUTO: 11.6 FL (ref 9–12.9)
POTASSIUM SERPL-SCNC: 5.4 MMOL/L (ref 3.6–5.5)
PROT SERPL-MCNC: 6.7 G/DL (ref 6–8.2)
RBC # BLD AUTO: 2.37 M/UL (ref 4.7–6.1)
SODIUM SERPL-SCNC: 146 MMOL/L (ref 135–145)
WBC # BLD AUTO: 3.8 K/UL (ref 4.8–10.8)

## 2021-10-10 PROCEDURE — 80053 COMPREHEN METABOLIC PANEL: CPT

## 2021-10-10 PROCEDURE — 99284 EMERGENCY DEPT VISIT MOD MDM: CPT

## 2021-10-10 PROCEDURE — 93005 ELECTROCARDIOGRAM TRACING: CPT

## 2021-10-10 PROCEDURE — 85025 COMPLETE CBC W/AUTO DIFF WBC: CPT

## 2021-10-10 PROCEDURE — 93005 ELECTROCARDIOGRAM TRACING: CPT | Performed by: EMERGENCY MEDICINE

## 2021-10-10 PROCEDURE — 71045 X-RAY EXAM CHEST 1 VIEW: CPT

## 2021-10-10 ASSESSMENT — FIBROSIS 4 INDEX: FIB4 SCORE: 0.64

## 2021-10-10 NOTE — ED NOTES
Patient to room from lobby with steady gait. Changed into gown, connected to monitor. Agree with triage note.  used. Charted for ERP. Call light within reach.

## 2021-10-10 NOTE — ED TRIAGE NOTES
Pt ambulated to triage with   Chief Complaint   Patient presents with   • Facial Swelling     dialysis M, W, F, pt hasn't missed any diaylsis but is having swelling in neck and face and want to see if he needs to get dialysis today.  [facility] from [place] [facility]   • Other     Pt reports dull chest pain d/t pain with breathing with possible fluid per pt wife.  Called for EKG.   Pt Informed regarding triage process and verbalized understanding to inform triage tech or RN for any changes in condition. Placed in lobby.

## 2021-10-10 NOTE — ED PROVIDER NOTES
"ED Provider Note    Scribed for Dr. Fernando Dixon M.D. by Chris Villavicencio. 10/10/2021  2:12 PM    Primary care provider: Pcp Pt States None  Means of arrival: Walk-in  History obtained from: Patient/Wife ()  History limited by: None    CHIEF COMPLAINT  Chief Complaint   Patient presents with    Facial Swelling     dialysis M, W, F, pt hasn't missed any diaylsis but is having swelling in neck and face and want to see if he needs to get dialysis today.  pt transitioned from [place] 2 wks ago for diaylsis here in [place], [facility] diaylsis    Other     HPI  Elier Bustillos is a 31 y.o. male with a history of anxiety and renal disorder who presents to the Emergency Department for acute facial swelling onset this morning. The patient recently traveled to [place] from [place] two weeks ago for dialysis. The patient's wife states that he has had dialysis on Monday, Wednesday, and Friday every week for 10 years and has not missed any. He states that it feels like he \"has too much water in him\". The patient reports associated intermittent shortness of breath, pain in the bottom of his feet bilaterally, and chest pain. No alleviating or exacerbating factors were identified.  He denies any associated vomiting or fever.    REVIEW OF SYSTEMS  Pertinent positives include facial swelling, shortness of breath, bilateral foot pain, and chest pain.   Pertinent negatives include no vomiting or fever.   As above, all other systems reviewed and are negative.   See HPI for further details.     PAST MEDICAL HISTORY   has a past medical history of Dialysis, Dialysis care, Hypertension, and Renal disorder.    SURGICAL HISTORY   has a past surgical history that includes other orthopedic surgery; av fistula creation (12/29/2010); av fistula creation (6/7/2012); cath placement (6/7/2012); recovery (7/3/2012); recovery (7/20/2012); av fistula creation (8/21/2012); av fistula revision (Right, 11/28/2016); and exploratory " "laparotomy (2018).    SOCIAL HISTORY  Social History     Tobacco Use    Smoking status: Former Smoker     Types: Cigarettes     Quit date: 9/10/2021     Years since quittin.0    Smokeless tobacco: Never Used   Substance Use Topics    Alcohol use: No    Drug use: No      Social History     Substance and Sexual Activity   Drug Use No     FAMILY HISTORY  Family History   Problem Relation Age of Onset    Diabetes Mother     Heart Attack Father      CURRENT MEDICATIONS  Home Medications       Reviewed by Karma Land R.N. (Registered Nurse) on 10/10/21 at 1229  Med List Status: Partial     Medication Last Dose Status   amLODIPine (NORVASC) 5 MG Tab  Active   cloNIDine (CATAPRES) 0.2 MG/24HR PATCH WEEKLY patch  Active   hydrALAZINE (APRESOLINE) 10 MG Tab  Active   losartan (COZAAR) 50 MG Tab  Active   pregabalin (LYRICA) 75 MG Cap  Active                  ALLERGIES  No Known Allergies    PHYSICAL EXAM  VITAL SIGNS: /102   Pulse (!) 105   Temp 36.8 °C (98.2 °F) (Temporal)   Resp 18   Ht 1.676 m (5' 6\")   Wt 79.8 kg (175 lb 14.8 oz)   SpO2 97%   BMI 28.40 kg/m²   Constitutional: Well developed, Well nourished, Mild distress, Non-toxic appearance.   HENT: Normocephalic, Atraumatic, Bilateral external ears normal, Oropharynx moist, No oral exudates.   Eyes: PERRLA, EOMI, Conjunctiva normal, No discharge.   Neck: No tenderness, Supple, No stridor.   Lymphatic: No lymphadenopathy noted.   Cardiovascular: Normal heart rate, Normal rhythm.   Thorax & Lungs: Clear to auscultation bilaterally, No respiratory distress, No wheezing, No crackles.   Abdomen: Soft, No tenderness, No masses, No pulsatile masses.   Skin: Warm, Dry, No erythema, No rash.   Extremities: Large AV fistula changes in right arm, No edema No cyanosis.   Musculoskeletal: No tenderness to palpation or major deformities noted.  Intact distal pulses  Neurologic: Awake, alert. Moves all extremities spontaneously.  Psychiatric: Affect " normal, Judgment normal, Mood normal.     LABS  Results for orders placed or performed during the hospital encounter of 10/10/21   CBC WITH DIFFERENTIAL   Result Value Ref Range    WBC 3.8 (L) 4.8 - 10.8 K/uL    RBC 2.37 (L) 4.70 - 6.10 M/uL    Hemoglobin 7.6 (L) 14.0 - 18.0 g/dL    Hematocrit 22.7 (L) 42.0 - 52.0 %    MCV 95.8 81.4 - 97.8 fL    MCH 32.1 27.0 - 33.0 pg    MCHC 33.5 (L) 33.7 - 35.3 g/dL    RDW 41.3 35.9 - 50.0 fL    Platelet Count 145 (L) 164 - 446 K/uL    MPV 11.6 9.0 - 12.9 fL    Neutrophils-Polys 62.50 44.00 - 72.00 %    Lymphocytes 21.30 (L) 22.00 - 41.00 %    Monocytes 12.00 0.00 - 13.40 %    Eosinophils 3.20 0.00 - 6.90 %    Basophils 0.50 0.00 - 1.80 %    Immature Granulocytes 0.50 0.00 - 0.90 %    Nucleated RBC 0.00 /100 WBC    Neutrophils (Absolute) 2.35 1.82 - 7.42 K/uL    Lymphs (Absolute) 0.80 (L) 1.00 - 4.80 K/uL    Monos (Absolute) 0.45 0.00 - 0.85 K/uL    Eos (Absolute) 0.12 0.00 - 0.51 K/uL    Baso (Absolute) 0.02 0.00 - 0.12 K/uL    Immature Granulocytes (abs) 0.02 0.00 - 0.11 K/uL    NRBC (Absolute) 0.00 K/uL   COMP METABOLIC PANEL   Result Value Ref Range    Sodium 146 (H) 135 - 145 mmol/L    Potassium 5.4 3.6 - 5.5 mmol/L    Chloride 94 (L) 96 - 112 mmol/L    Co2 33 20 - 33 mmol/L    Anion Gap 19.0 (H) 7.0 - 16.0    Glucose 88 65 - 99 mg/dL    Bun 60 (H) 8 - 22 mg/dL    Creatinine 10.29 (HH) 0.50 - 1.40 mg/dL    Calcium 9.4 8.5 - 10.5 mg/dL    AST(SGOT) 14 12 - 45 U/L    ALT(SGPT) 18 2 - 50 U/L    Alkaline Phosphatase 299 (H) 30 - 99 U/L    Total Bilirubin 0.3 0.1 - 1.5 mg/dL    Albumin 3.9 3.2 - 4.9 g/dL    Total Protein 6.7 6.0 - 8.2 g/dL    Globulin 2.8 1.9 - 3.5 g/dL    A-G Ratio 1.4 g/dL   ESTIMATED GFR   Result Value Ref Range    GFR If  7 (A) >60 mL/min/1.73 m 2    GFR If Non African American 6 (A) >60 mL/min/1.73 m 2   EKG   Result Value Ref Range    Report       [facility] Emergency Dept.    Test Date:  2021-10-10  Pt Name:    DIAMANTE  SHANNON LINA        Department: ER  MRN:        [id number]                      Room:  Gender:     Male                         Technician: [id number]  :        1990                   Requested By:ER TRIAGE PROTOCOL  Order #:    [id number]                    Reading MD:    Measurements  Intervals                                Axis  Rate:       95                           P:          57  MT:         155                          QRS:        52  QRSD:       91                           T:          90  QT:         357  QTc:        450    Interpretive Statements  Sinus rhythm  Nonspecific T abnormalities, lateral leads  Borderline ST elevation, anterior leads  Compared to ECG 2021 15:11:59  T-wave abnormality now present  ST (T wave) deviation now present     All labs reviewed by me.  EKG interpreted by me as above.    RADIOLOGY  DX-CHEST-PORTABLE (1 VIEW)   Final Result      Enlarged right axilla without evidence of acute disease.        The radiologist's interpretation of all radiological studies have been reviewed by me.    COURSE & MEDICAL DECISION MAKING  Pertinent Labs & Imaging studies reviewed. (See chart for details)    2:12 PM - Patient seen and examined at bedside. Ordered DX-chest, EKG, CBC w/ diff, and CMP to evaluate his symptoms. The differential diagnoses include but are not limited to: Fluid overload vs electrolyte abnormality vs hyperkalemia.    4:25 PM - I reevaluated the patient at bedside. I discussed plan for discharge and follow up as outlined below. The patient is stable for discharge at this time and will return for any new or worsening symptoms. Patient verbalizes understanding and support with my plan for discharge.     Decision Making:  Patient presenting here concerned that he might need dialysis today although scheduled for dialysis tomorrow.  Her work-up shows no evidence of fluid overload or electrolyte abnormality that would require emergent dialysis patient encouraged to keep  his follow-up appointment tomorrow    The patient is referred to a primary physician for blood pressure management, diabetic screening, and for all other preventative health concerns.    DISPOSITION:  Patient will be discharged home in stable condition.    FINAL IMPRESSION  1. Stage 5 chronic kidney disease on chronic dialysis (HCC)        Chris PANIAGUA (Alexanderibjena), am scribing for, and in the presence of, Fernando Dixon M.D..    Electronically signed by: Chris Villavicencio ([place]), 10/10/2021    Fernando PANIAGUA M.D. personally performed the services described in this documentation, as scribed by Chris Villavicencio in my presence, and it is both accurate and complete. C.    The note accurately reflects work and decisions made by me.  Fernando Dixon M.D.  10/10/2021  8:57 PM

## 2021-11-05 ENCOUNTER — OFFICE VISIT (OUTPATIENT)
Dept: MEDICAL GROUP | Facility: MEDICAL CENTER | Age: 31
End: 2021-11-05
Payer: COMMERCIAL

## 2021-11-05 ENCOUNTER — TELEPHONE (OUTPATIENT)
Dept: SCHEDULING | Facility: IMAGING CENTER | Age: 31
End: 2021-11-05

## 2021-11-05 VITALS
BODY MASS INDEX: 25.48 KG/M2 | TEMPERATURE: 99.3 F | RESPIRATION RATE: 20 BRPM | DIASTOLIC BLOOD PRESSURE: 92 MMHG | HEART RATE: 102 BPM | SYSTOLIC BLOOD PRESSURE: 198 MMHG | WEIGHT: 172 LBS | OXYGEN SATURATION: 95 % | HEIGHT: 69 IN

## 2021-11-05 DIAGNOSIS — N18.6 ESRD (END STAGE RENAL DISEASE) ON DIALYSIS (HCC): ICD-10-CM

## 2021-11-05 DIAGNOSIS — D63.1 ANEMIA DUE TO CHRONIC KIDNEY DISEASE, ON CHRONIC DIALYSIS (HCC): ICD-10-CM

## 2021-11-05 DIAGNOSIS — Z99.2 HEMODIALYSIS PATIENT (HCC): ICD-10-CM

## 2021-11-05 DIAGNOSIS — Z99.2 ESRD (END STAGE RENAL DISEASE) ON DIALYSIS (HCC): ICD-10-CM

## 2021-11-05 DIAGNOSIS — N18.6 ANEMIA DUE TO CHRONIC KIDNEY DISEASE, ON CHRONIC DIALYSIS (HCC): ICD-10-CM

## 2021-11-05 DIAGNOSIS — Z00.00 PREVENTATIVE HEALTH CARE: ICD-10-CM

## 2021-11-05 DIAGNOSIS — Z99.2 ANEMIA DUE TO CHRONIC KIDNEY DISEASE, ON CHRONIC DIALYSIS (HCC): ICD-10-CM

## 2021-11-05 DIAGNOSIS — G47.00 INSOMNIA, UNSPECIFIED TYPE: ICD-10-CM

## 2021-11-05 DIAGNOSIS — F41.9 ANXIETY: ICD-10-CM

## 2021-11-05 PROBLEM — A41.9 SEPSIS (HCC): Status: RESOLVED | Noted: 2018-11-11 | Resolved: 2021-11-05

## 2021-11-05 PROCEDURE — 99214 OFFICE O/P EST MOD 30 MIN: CPT | Performed by: PHYSICIAN ASSISTANT

## 2021-11-05 RX ORDER — AMLODIPINE BESYLATE 10 MG/1
10 TABLET ORAL DAILY
COMMUNITY
Start: 2021-10-16 | End: 2023-05-03 | Stop reason: SDUPTHER

## 2021-11-05 RX ORDER — HYDRALAZINE HYDROCHLORIDE 10 MG/1
100 TABLET, FILM COATED ORAL 3 TIMES DAILY
COMMUNITY
End: 2022-12-21

## 2021-11-05 RX ORDER — LOSARTAN POTASSIUM 50 MG/1
50 TABLET ORAL 2 TIMES DAILY
COMMUNITY
End: 2022-12-21

## 2021-11-05 RX ORDER — ALPRAZOLAM 0.5 MG/1
.25-.5 TABLET ORAL NIGHTLY PRN
Qty: 10 TABLET | Refills: 0 | Status: SHIPPED | OUTPATIENT
Start: 2021-11-05 | End: 2021-12-05

## 2021-11-05 RX ORDER — FLUOXETINE 10 MG/1
10 TABLET, FILM COATED ORAL DAILY
Qty: 30 TABLET | Refills: 1 | Status: SHIPPED | OUTPATIENT
Start: 2021-11-05 | End: 2022-07-12

## 2021-11-05 RX ORDER — SEVELAMER CARBONATE 800 MG/1
2400 TABLET, FILM COATED ORAL
Status: ON HOLD | COMMUNITY
Start: 2021-10-21 | End: 2023-03-22

## 2021-11-05 ASSESSMENT — FIBROSIS 4 INDEX: FIB4 SCORE: 0.73

## 2021-11-05 NOTE — PROGRESS NOTES
Subjective:   CC:   Chief Complaint   Patient presents with   • Establish Care   • Anxiety   • Insomnia   • Abdominal Pain     when he lays down       WileyJarrett Bustillos is a 31 y.o. male here today w wife who provides all the history and translates as patient does not speak English.    HPI:  Patient has history of hypertension and kidney failure, has been on dialysis for about 10 years.  States kidney failure has been due to uncontrolled blood pressure.  She is from [place] and had healthcare until 10 years ago when he was diagnosed with kidney failure. He is under care of nephrologist.  Patient is recently returned from [place] to rejoin his family after 2 yrs.      Patient has shortness of breath, fatigue tiredness, with anemia, patient is a scheduled for dialysis at [facility] today.  Warned patient that shortness of breath and fatigue tiredness could also be due to his anemia patient might require blood transfusion.  .   Component      Latest Ref Rng & Units 10/10/2021           3:13 PM   Hemoglobin      14.0 - 18.0 g/dL 7.6 (L)   Hematocrit      42.0 - 52.0 % 22.7 (L)       Patient has anxiety on a daily basis, cannot sleep more than 2 hours at night.    Current medicines (including changes today)  Current Outpatient Medications   Medication Sig Dispense Refill   • amLODIPine (NORVASC) 10 MG Tab      • sevelamer carbonate (RENVELA) 800 MG Tab tablet Two tabs by mouth three times daily     • losartan (COZAAR) 50 MG Tab Take 50 mg by mouth 2 times a day.     • hydrALAZINE (APRESOLINE) 10 MG Tab Take 10 mg by mouth 3 times a day.     • fluoxetine (PROZAC) 10 MG tablet Take 1 Tablet by mouth every day. Take half a pill for the first 8 days 30 Tablet 1   • ALPRAZolam (XANAX) 0.5 MG Tab Take 0.5-1 Tablets by mouth at bedtime as needed for Sleep for up to 30 days. 10 Tablet 0     No current facility-administered medications for this visit.         Past medical, surgical, family, and social history are  "reviewed in Epic chart by me today.   Medications and allergies reviewed in Epic chart by me today.         ROS   As documented in history of present illness above     Objective:     BP (!) 198/92 (BP Location: Left arm, Patient Position: Sitting, BP Cuff Size: Adult long)   Pulse (!) 102   Temp 37.4 °C (99.3 °F) (Temporal)   Resp 20   Ht 1.753 m (5' 9\")   Wt 78 kg (172 lb)   SpO2 95%  Body mass index is 25.4 kg/m².   Physical Exam:  Constitutional: Alert, oriented in no acute distress.  Psych: Eye contact is good, speech goal directed, affect calm    Lungs: Unlabored respiratory effort, clear to auscultation bilaterally with good excursion, no wheez or rhonci  CV: regular rate and rhythm. No lower extremity edema    Last lab results were reviewed by me today and discussed w patient.          Assessment and Plan:   The following treatment plan was discussed    1. Preventative health care    - TSH WITH REFLEX TO FT4; Future  - Lipid Profile; Future    2. Anxiety  Starting patient on fluoxetine which seems to be safe with kidney disease, advised to start with half a pill.  Takes 4-6 takes per SSRI to start working, treating with Xanax as needed until medicine kicks in    - fluoxetine (PROZAC) 10 MG tablet; Take 1 Tablet by mouth every day. Take half a pill for the first 8 days  Dispense: 30 Tablet; Refill: 1    3. Insomnia, unspecified type    - ALPRAZolam (XANAX) 0.5 MG Tab; Take 0.5-1 Tablets by mouth at bedtime as needed for Sleep for up to 30 days.  Dispense: 10 Tablet; Refill: 0    4. Anemia due to chronic kidney disease, on chronic dialysis (HCC)      5. Hemodialysis patient ([facility])      6. ESRD (end stage renal disease) on dialysis ([facility])      Patient has fatigue tiredness and shortness of breath, anemia with hemoglobin of 7.6 could be an underlying reason, patient does not stay more than 2 to 3 hours at night because of anxiety.          \"Most of the data on pharmacokinetics of antidepressant medications in " "CKD or ESRD comes from studies of selective serotonin reuptake inhibitors (SSRIs), which have shown that ESRD has no effect on the pharmacokinetics of fluoxetine, its active metabolite norfluoxetine, or citalopram.86 However, exposure to paroxetine is significantly prolonged when the creatinine  have suggested that it can be used at lower doses with careful titration.88 \"      Followup: 6 wks          Please note that this dictation was created using voice recognition software. I have made every reasonable attempt to correct obvious errors, but I expect that there are errors of grammar and possibly content that I did not discover before finalizing the note.    "

## 2021-11-05 NOTE — LETTER
[facility]  Esther Stewart P.A.-C.  [address]  [place] [address]  Fax: [phone]   Authorization for Release/Disclosure of   Protected Health Information   Name: DIAMANTE MILLS : 1990 SSN: [id number]   Address: [address] [place] [address] [address]  [place] [address] Phone:    [phone] (home)    I authorize the entity listed below to release/disclose the PHI below to:   [facility]/Esther Stewart P.A.-C. and Esther Stewart P.A.-C.   Provider or Entity Name:[facility]     [facility]   City, State, Zip  [address] , [facility] [address]  [place] [address] Phone:[phone]      Fax:[phone]     Reason for request: continuity of care   Information to be released:    [  ] LAST COLONOSCOPY,  including any PATH REPORT and follow-up  [  ] LAST FIT/COLOGUARD RESULT [  ] LAST DEXA  [  ] LAST MAMMOGRAM  [  ] LAST PAP  [  ] LAST LABS [  ] RETINA EXAM REPORT  [  ] IMMUNIZATION RECORDS  [ xxx ] last two visits      [  ] Check here and initial the line next to each item to release ALL health information INCLUDING  _____ Care and treatment for drug and / or alcohol abuse  _____ HIV testing, infection status, or AIDS  _____ Genetic Testing    DATES OF SERVICE OR TIME PERIOD TO BE DISCLOSED: _____________  I understand and acknowledge that:  * This Authorization may be revoked at any time by you in writing, except if your health information has already been used or disclosed.  * Your health information that will be used or disclosed as a result of you signing this authorization could be re-disclosed by the recipient. If this occurs, your re-disclosed health information may no longer be protected by State or Federal laws.  * You may refuse to sign this Authorization. Your refusal will not affect your ability to obtain treatment.  * This Authorization becomes effective upon signing and will  on (date) __________.      If no date is indicated, this Authorization will  one (1) year  from the signature date.    Name: Wileypauline Bustillos    Signature:Continuation of Care   Date:     11/5/2021       PLEASE FAX REQUESTED RECORDS BACK TO: [phone]

## 2021-11-08 ENCOUNTER — OFFICE VISIT (OUTPATIENT)
Dept: MEDICAL GROUP | Facility: CLINIC | Age: 31
End: 2021-11-08
Payer: COMMERCIAL

## 2021-11-08 VITALS
TEMPERATURE: 98 F | HEART RATE: 94 BPM | OXYGEN SATURATION: 96 % | SYSTOLIC BLOOD PRESSURE: 187 MMHG | WEIGHT: 173 LBS | BODY MASS INDEX: 25.62 KG/M2 | DIASTOLIC BLOOD PRESSURE: 100 MMHG | HEIGHT: 69 IN | RESPIRATION RATE: 12 BRPM

## 2021-11-08 DIAGNOSIS — I15.1 HYPERTENSION SECONDARY TO OTHER RENAL DISORDERS: ICD-10-CM

## 2021-11-08 DIAGNOSIS — F41.9 ANXIETY: ICD-10-CM

## 2021-11-08 DIAGNOSIS — R01.1 MURMUR, CARDIAC: ICD-10-CM

## 2021-11-08 DIAGNOSIS — A09 TRAVELER'S DIARRHEA: ICD-10-CM

## 2021-11-08 DIAGNOSIS — R07.89 OTHER CHEST PAIN: ICD-10-CM

## 2021-11-08 PROCEDURE — 99215 OFFICE O/P EST HI 40 MIN: CPT | Performed by: STUDENT IN AN ORGANIZED HEALTH CARE EDUCATION/TRAINING PROGRAM

## 2021-11-08 PROCEDURE — 93000 ELECTROCARDIOGRAM COMPLETE: CPT | Performed by: STUDENT IN AN ORGANIZED HEALTH CARE EDUCATION/TRAINING PROGRAM

## 2021-11-08 RX ORDER — METRONIDAZOLE 500 MG/1
500 TABLET ORAL 3 TIMES DAILY
Qty: 21 TABLET | Refills: 0 | Status: SHIPPED | OUTPATIENT
Start: 2021-11-08 | End: 2021-11-15

## 2021-11-08 RX ORDER — PREGABALIN 75 MG/1
75 CAPSULE ORAL 3 TIMES DAILY
COMMUNITY
End: 2022-07-12

## 2021-11-08 RX ORDER — HYDROXYZINE HYDROCHLORIDE 25 MG/1
25 TABLET, FILM COATED ORAL NIGHTLY PRN
Qty: 30 TABLET | Refills: 2 | Status: SHIPPED | OUTPATIENT
Start: 2021-11-08 | End: 2022-03-10

## 2021-11-08 ASSESSMENT — FIBROSIS 4 INDEX: FIB4 SCORE: 0.73

## 2021-11-08 NOTE — ASSESSMENT & PLAN NOTE
Patient does have numbness and tingling in the lower extremities as well as some pains there.  I suspect these are related to electrolyte abnormalities and extreme hypertension.  Coassociated with his hypertension is a cardiac murmur that is undiagnosed or worked up at this point.  I suspect his blood pressure will respond well to hemodialysis this afternoon however if he does is not improving then we would certainly need to increase the dosage of his medication.  He should continue to follow with nephrology as well as get established with cardiology which I am referring him to today.  Will need to be renally dose all his medications.  Follow-up in 3 months though ER precautions discussed given extreme elevations of blood pressure.

## 2021-11-08 NOTE — ASSESSMENT & PLAN NOTE
We will add hydroxyzine 25 mg nightly as needed for anxiety and itching.  I would recommend he follow-up with psychiatry and I will place referral for that.  This is probably not aided by his extremely high blood pressure though he is supposed to get dialysis today.

## 2021-11-08 NOTE — PROGRESS NOTES
Subjective:     CC: multiple concerns    HPI:   Elier presents today with     Problem   Traveler's Diarrhea    Patient with history of recent stay in [place].  [place] roughly 3 [place] ago.  He has had fairly consistent diarrhea with some associated abdominal pain since.  He has not noted any blood in the stool.  His diarrhea is multiple times a day.  He has not been treated with antibiotics to this point.  He does not have any rebound or tenderness in the abdomen.     Anxiety    Patient with fairly significant anxiety.  This is told current with insomnia.  He was recently started on Xanax which did improve somewhat.  He has not seen psychiatry for this in the past.  Seems to be worse at night.  He has some associated itching.  He was started on paroxetine by PA at last visit.  This was 3 days ago.     Chest Pain    Patient with chest pain mainly at night.  This is substernal.  He states it is if he cannot breathe.  Does not appear to be present during the daytime hours.  Is not associated with strenuous activity.  He does have a history of poorly controlled anxiety as well as on dialysis and with extremely high blood pressures typically.  He does have a murmur on exam and is not being seen by cardiology as of yet.     Htn (Hypertension)    Patient has been on hydralazine 3 times daily, losartan 50 mg 2 times daily, amlodipine 10 mg once daily.  He is getting dialysis Monday Wednesday Friday including today.  He does not have any headaches.  He does describe some peripheral numbness and tingling which is fairly consistent with electrolyte abnormalities and hypertension.  Given end-stage renal disease on hemodialysis his blood pressure is likely to right eye typically was recently 198/92 3 days ago primary visit with PA.         Current Outpatient Medications Ordered in Epic   Medication Sig Dispense Refill   • pregabalin (LYRICA) 75 MG Cap Take 75 mg by mouth 3 times a day.     • hydrOXYzine HCl (ATARAX)  "25 MG Tab Take 1 Tablet by mouth at bedtime as needed for Itching or Anxiety. 30 Tablet 2   • metroNIDAZOLE (FLAGYL) 500 MG Tab Take 1 Tablet by mouth 3 times a day for 7 days. 21 Tablet 0   • amLODIPine (NORVASC) 10 MG Tab      • sevelamer carbonate (RENVELA) 800 MG Tab tablet Two tabs by mouth three times daily     • losartan (COZAAR) 50 MG Tab Take 50 mg by mouth 2 times a day.     • hydrALAZINE (APRESOLINE) 10 MG Tab Take 10 mg by mouth 3 times a day.     • fluoxetine (PROZAC) 10 MG tablet Take 1 Tablet by mouth every day. Take half a pill for the first 8 days 30 Tablet 1   • ALPRAZolam (XANAX) 0.5 MG Tab Take 0.5-1 Tablets by mouth at bedtime as needed for Sleep for up to 30 days. 10 Tablet 0     No current Epic-ordered facility-administered medications on file.       ROS:  Gen: no fevers/chills, no changes in weight  Eyes: no changes in vision  ENT: no sore throat, no hearing loss, no bloody nose  Pulm: no SOB, no cough  CV: no chest pain, no palpitations  GI: no nausea/vomiting, no diarrhea  : no dysuria  MSk: no myalgias  Skin: no rash  Neuro: no headaches, no numbness/tingling  Heme/Lymph: no easy bruising      Objective:     Exam:  BP (!) 187/100 (BP Location: Right arm, Patient Position: Sitting, BP Cuff Size: Adult)   Pulse 94   Temp 36.7 °C (98 °F) (Temporal)   Resp 12   Ht 1.753 m (5' 9\")   Wt 78.5 kg (173 lb)   SpO2 96%   BMI 25.55 kg/m²  Body mass index is 25.55 kg/m².    Gen: Alert and oriented, No apparent distress.  HEENT: PERRL, EOMI, external ears normal bilat, nose roughly midline, atraumatic, normocephalic  Neck: Neck is supple without lymphadenopathy.  Lungs: CTA bilaterally, no wheezes, rhonchi, or rales, symmetric chest rise  CV: Regular rate and rhythm. Systolic murmur III/VI  GI:       No rebound, no guarding, normal bowel sounds  :      No CVA tenderness, bladder non-tender to palp  Ext: No clubbing, cyanosis, edema. R arm with dilated veins and palpable thrill, bounding pulse " in L arm  Neuro: Gait appropriate for age, no focal deficits  Psych: Affect and mood congruent without abnormality  Skin:    No rashes, no jaundice      Labs: reviewed labs from october    Assessment & Plan:     31 y.o. male with the following -     Problem List Items Addressed This Visit     HTN (hypertension)     Patient does have numbness and tingling in the lower extremities as well as some pains there.  I suspect these are related to electrolyte abnormalities and extreme hypertension.  Coassociated with his hypertension is a cardiac murmur that is undiagnosed or worked up at this point.  I suspect his blood pressure will respond well to hemodialysis this afternoon however if he does is not improving then we would certainly need to increase the dosage of his medication.  He should continue to follow with nephrology as well as get established with cardiology which I am referring him to today.  Will need to be renally dose all his medications.  Follow-up in 3 months though ER precautions discussed given extreme elevations of blood pressure.         Chest pain     Discussed that we will get an EKG here   Echo ordered  Cardiology referral given extremely high blood pressure, chest pain, dialysis, murmur  ER return precautions discussed  Follow-up in 2 months  EKG Interpretation   Ordered and interpreted by Rock Huffman MD  Rhythm: normal sinus   Rate: normal   Axis: normal   Ectopy: none   Conduction: normal   ST Segments: no acute change   T Waves: no acute change   Q Waves: none   Clinical Impression: no acute changes and normal EKG             Relevant Orders    proBrain Natriuretic Peptide, NT    REFERRAL TO CARDIOLOGY    Anxiety     We will add hydroxyzine 25 mg nightly as needed for anxiety and itching.  I would recommend he follow-up with psychiatry and I will place referral for that.  This is probably not aided by his extremely high blood pressure though he is supposed to get dialysis today.          Relevant Medications    hydrOXYzine HCl (ATARAX) 25 MG Tab    Other Relevant Orders    Referral to Psychiatry    Traveler's diarrhea     Discussed we will treat for traveler's diarrhea  If not improving follow-up  500 mg TID           Other Visit Diagnoses     Murmur, cardiac        Relevant Orders    EC-ECHOCARDIOGRAM COMPLETE W/O CONT          I spent a total of 40 minutes with record review, exam, communication with the patient, communication with other providers, and documentation of this encounter.      Return in about 3 months (around 2/8/2022).    Please note that this dictation was created using voice recognition software. I have made every reasonable attempt to correct obvious errors, but I expect that there are errors of grammar and possibly content that I did not discover before finalizing the note.

## 2021-11-08 NOTE — ASSESSMENT & PLAN NOTE
Discussed that we will get an EKG here   Echo ordered  Cardiology referral given extremely high blood pressure, chest pain, dialysis, murmur  ER return precautions discussed  Follow-up in 2 months  EKG Interpretation   Ordered and interpreted by Rock Huffman MD  Rhythm: normal sinus   Rate: normal   Axis: normal   Ectopy: none   Conduction: normal   ST Segments: no acute change   T Waves: no acute change   Q Waves: none   Clinical Impression: no acute changes and normal EKG

## 2022-03-08 NOTE — ED PROVIDER NOTES
ED Provider Note    CHIEF COMPLAINT  Chief Complaint   Patient presents with   • Sent by MD RICHARDSON  WileyJarrett Bustillos is a 28 y.o. male who presents for evaluation of abdominal pain.  Patient was evaluated in the emergency department on 11/9/2018 for abdominal pain.  No etiology was found and I believe he went to urgent care today who determined that he was hypertensive symptoms to the emergency department for evaluation.  He also continues to have abdominal pain.  It is described as epigastric he is a very poor historian.  He is a dialysis patient states he sees Dr.'s vázquez.  He has had problems with hypertension in the past.  Nothing makes his symptoms worse or better.  He denies any chest pain, shortness of breath.  States he has been vomiting    REVIEW OF SYSTEMS  See HPI for further details.  System review is limited due to patient's poor historical ability    PAST MEDICAL HISTORY  Past Medical History:   Diagnosis Date   • Dialysis    • Dialysis care    • Hypertension    • Renal disorder     dailysis pt. 3 times per week.        FAMILY HISTORY  No family history on file.    SOCIAL HISTORY  Social History     Social History   • Marital status:      Spouse name: N/A   • Number of children: N/A   • Years of education: N/A     Social History Main Topics   • Smoking status: Never Smoker   • Smokeless tobacco: Never Used      Comment: quit 1/2009   • Alcohol use No   • Drug use: No   • Sexual activity: Not on file     Other Topics Concern   • Not on file     Social History Narrative   • No narrative on file       SURGICAL HISTORY  Past Surgical History:   Procedure Laterality Date   • AV FISTULA REVISION Right 11/28/2016    Procedure: AV FISTULA REVISION UPPER EXTREMITY (ANEURYSM REDUCTION);  Surgeon: Slade Perez M.D.;  Location: [facility];  Service:    • AV FISTULA CREATION  8/21/2012    Performed by ANAI TYSON at SURGERY [facility]   • RECOVERY  7/20/2012     "Performed by SURGERY, IR-RECOVERY at SURGERY SAME DAY [facility] ORS   • RECOVERY  7/3/2012    Performed by SURGERY, IR-RECOVERY at SURGERY SAME DAY [facility] ORS   • AV FISTULA CREATION  6/7/2012    Performed by ANAI TYSON at SURGERY [facility] ORS   • CATH PLACEMENT  6/7/2012    Performed by ANAI TYSON at SURGERY [facility] ORS   • AV FISTULA CREATION  12/29/2010    Performed by ANT ALONZO at SURGERY [facility] ORS   • OTHER ORTHOPEDIC SURGERY      r arm fx       CURRENT MEDICATIONS  Home Medications     Reviewed by Serene Lau R.N. (Registered Nurse) on 11/11/18 at 1433  Med List Status: Partial   Medication Last Dose Status   amlodipine (NORVASC) 10 MG TABS Taking Active   cinacalcet (SENSIPAR) 30 MG TABS Taking Active   clonidine (CATAPRES) 0.1 MG/24HR PATCH WEEKLY Taking Active   hydrALAZINE (APRESOLINE) 25 MG Tab Taking Active   losartan (COZAAR) 100 MG Tab Taking Active   metoprolol SR (TOPROL XL) 25 MG TABLET SR 24 HR Taking Active   ondansetron (ZOFRAN ODT) 4 MG TABLET DISPERSIBLE  Active   raNITidine (ZANTAC) 150 MG Tab  Active   sevelamer (RENAGEL) 800 MG TABS 8/18/2017 Active                 ALLERGIES  No Known Allergies    PHYSICAL EXAM  VITAL SIGNS: BP (!) 187/120   Pulse (!) 109   Temp 37.2 °C (99 °F) (Temporal)   Resp 17   Ht 1.753 m (5' 9\")   Wt 72.3 kg (159 lb 6.3 oz)   SpO2 88%   BMI 23.54 kg/m²   Constitutional :  Well developed, Well nourished, appears chronically ill  HENT: Head is atraumatic appears atraumatic, slightly dry mucous membranes  Eyes: Normal-appearing nonicteric  Neck: Normal range of motion, No tenderness, Supple, No stridor.   Lymphatic: No cervical adenopathy is noted.   Cardiovascular: Normal heart rate, Normal rhythm, No murmurs, No rubs, No gallops.   Thorax & Lungs: Equal breath sounds bilaterally no rales rhonchi  Skin: Warm, Dry, No erythema, No rash.   Abdomen is tenderness to the upper epigastrium no peritoneal signs questionable " guarding  Extremities right forearm has AV fistula in place remainder of extremities show no evidence of cyanosis edema  Neurologic the patient appears awake he is somewhat drowsy cannot appreciate an acute focal neurological finding  Psychiatric no obvious judgment impairment he has a flat affect    Results for orders placed or performed during the hospital encounter of 11/11/18   CBC WITH DIFFERENTIAL   Result Value Ref Range    WBC 18.3 (H) 4.8 - 10.8 K/uL    RBC 3.99 (L) 4.70 - 6.10 M/uL    Hemoglobin 13.3 (L) 14.0 - 18.0 g/dL    Hematocrit 36.9 (L) 42.0 - 52.0 %    MCV 92.5 81.4 - 97.8 fL    MCH 33.3 (H) 27.0 - 33.0 pg    MCHC 36.0 (H) 33.7 - 35.3 g/dL    RDW 41.7 35.9 - 50.0 fL    Platelet Count 147 (L) 164 - 446 K/uL    MPV 10.6 9.0 - 12.9 fL    Neutrophils-Polys 84.90 (H) 44.00 - 72.00 %    Lymphocytes 7.30 (L) 22.00 - 41.00 %    Monocytes 7.00 0.00 - 13.40 %    Eosinophils 0.20 0.00 - 6.90 %    Basophils 0.30 0.00 - 1.80 %    Immature Granulocytes 0.30 0.00 - 0.90 %    Nucleated RBC 0.00 /100 WBC    Neutrophils (Absolute) 15.50 (H) 1.82 - 7.42 K/uL    Lymphs (Absolute) 1.34 1.00 - 4.80 K/uL    Monos (Absolute) 1.28 (H) 0.00 - 0.85 K/uL    Eos (Absolute) 0.03 0.00 - 0.51 K/uL    Baso (Absolute) 0.05 0.00 - 0.12 K/uL    Immature Granulocytes (abs) 0.06 0.00 - 0.11 K/uL    NRBC (Absolute) 0.00 K/uL   COMP METABOLIC PANEL   Result Value Ref Range    Sodium 133 (L) 135 - 145 mmol/L    Potassium 5.0 3.6 - 5.5 mmol/L    Chloride 88 (L) 96 - 112 mmol/L    Co2 26 20 - 33 mmol/L    Anion Gap 19.0 (H) 0.0 - 11.9    Glucose 113 (H) 65 - 99 mg/dL    Bun 89 (HH) 8 - 22 mg/dL    Creatinine 13.78 (HH) 0.50 - 1.40 mg/dL    Calcium 9.3 8.5 - 10.5 mg/dL    AST(SGOT) 7 (L) 12 - 45 U/L    ALT(SGPT) 14 2 - 50 U/L    Alkaline Phosphatase 57 30 - 99 U/L    Total Bilirubin 0.7 0.1 - 1.5 mg/dL    Albumin 3.7 3.2 - 4.9 g/dL    Total Protein 6.3 6.0 - 8.2 g/dL    Globulin 2.6 1.9 - 3.5 g/dL    A-G Ratio 1.4 g/dL   LIPASE   Result  Value Ref Range    Lipase 79 11 - 82 U/L   ESTIMATED GFR   Result Value Ref Range    GFR If African American 5 (A) >60 mL/min/1.73 m 2    GFR If Non African American 4 (A) >60 mL/min/1.73 m 2   LACTIC ACID   Result Value Ref Range    Lactic Acid 0.9 0.5 - 2.0 mmol/L         CT-RENAL COLIC EVALUATION(A/P W/O)   Final Result      1.  Multiple thick-walled loops of small intestine within the left upper quadrant with surrounding mesenteric inflammatory change. Given the patient's history of renal failure on dialysis, this would be most suspicious for ischemic bowel related to    dialysis. Crohn's disease or infectious enteritis would be less likely possibility.      2.  Small amount of free fluid dependently within the pelvis.      3.  Atrophic kidneys.      This was discussed with STANLEY MERIDA at 5:40 PM on 11/11/2018.            COURSE & MEDICAL DECISION MAKING  Pertinent Labs & Imaging studies reviewed. (See chart for details)  Patient is persisting with 4 days of abdominal pain.  He has elevated white count change previously from a normal value 2 days ago.  CT imaging was obtained without contrast to rule out ureterolithiasis possible obstruction or other abnormalities to account for his symptoms.  It is found that he has multiple thick-walled loops of small intestine with a concern raised for possible ischemia by radiology.  The patient was given a dose of GI cocktail no significant relief after obtaining the abnormal CT results surgery has been called regarding the findings of possible ischemia discussed the case with Dr. Collazo patient will be admitted under hospitalist care.  Condition is serious    FINAL IMPRESSION  1.  Abdominal pain  2.  Renal failure  3.      Electronically signed by: Stanley Merida, 11/11/2018     Detail Level: Generalized Otc Regimen: CeraVe cleanser and cream \\nEucerin cleanser and cream

## 2022-03-10 RX ORDER — HYDROXYZINE HYDROCHLORIDE 25 MG/1
TABLET, FILM COATED ORAL
Qty: 30 TABLET | Refills: 0 | Status: SHIPPED | OUTPATIENT
Start: 2022-03-10 | End: 2022-05-04

## 2022-04-20 ENCOUNTER — HOSPITAL ENCOUNTER (EMERGENCY)
Facility: MEDICAL CENTER | Age: 32
End: 2022-04-20
Attending: EMERGENCY MEDICINE
Payer: COMMERCIAL

## 2022-04-20 ENCOUNTER — APPOINTMENT (OUTPATIENT)
Dept: RADIOLOGY | Facility: MEDICAL CENTER | Age: 32
End: 2022-04-20
Attending: EMERGENCY MEDICINE
Payer: COMMERCIAL

## 2022-04-20 VITALS
TEMPERATURE: 96.8 F | HEART RATE: 91 BPM | SYSTOLIC BLOOD PRESSURE: 181 MMHG | DIASTOLIC BLOOD PRESSURE: 111 MMHG | OXYGEN SATURATION: 97 % | BODY MASS INDEX: 26.29 KG/M2 | HEIGHT: 69 IN | RESPIRATION RATE: 18 BRPM | WEIGHT: 177.47 LBS

## 2022-04-20 DIAGNOSIS — R07.89 OTHER CHEST PAIN: ICD-10-CM

## 2022-04-20 DIAGNOSIS — N18.6 ESRD (END STAGE RENAL DISEASE) (HCC): ICD-10-CM

## 2022-04-20 LAB
ALBUMIN SERPL BCP-MCNC: 4.5 G/DL (ref 3.2–4.9)
ALBUMIN/GLOB SERPL: 1.7 G/DL
ALP SERPL-CCNC: 385 U/L (ref 30–99)
ALT SERPL-CCNC: 7 U/L (ref 2–50)
ANION GAP SERPL CALC-SCNC: 18 MMOL/L (ref 7–16)
AST SERPL-CCNC: 7 U/L (ref 12–45)
BASOPHILS # BLD AUTO: 0.5 % (ref 0–1.8)
BASOPHILS # BLD: 0.02 K/UL (ref 0–0.12)
BILIRUB SERPL-MCNC: 0.4 MG/DL (ref 0.1–1.5)
BUN SERPL-MCNC: 65 MG/DL (ref 8–22)
CALCIUM SERPL-MCNC: 9.6 MG/DL (ref 8.5–10.5)
CHLORIDE SERPL-SCNC: 95 MMOL/L (ref 96–112)
CO2 SERPL-SCNC: 24 MMOL/L (ref 20–33)
CREAT SERPL-MCNC: 10.11 MG/DL (ref 0.5–1.4)
EKG IMPRESSION: NORMAL
EOSINOPHIL # BLD AUTO: 0.12 K/UL (ref 0–0.51)
EOSINOPHIL NFR BLD: 2.9 % (ref 0–6.9)
ERYTHROCYTE [DISTWIDTH] IN BLOOD BY AUTOMATED COUNT: 48.7 FL (ref 35.9–50)
GFR SERPLBLD CREATININE-BSD FMLA CKD-EPI: 6 ML/MIN/1.73 M 2
GLOBULIN SER CALC-MCNC: 2.7 G/DL (ref 1.9–3.5)
GLUCOSE SERPL-MCNC: 83 MG/DL (ref 65–99)
HCT VFR BLD AUTO: 32.4 % (ref 42–52)
HGB BLD-MCNC: 10.1 G/DL (ref 14–18)
IMM GRANULOCYTES # BLD AUTO: 0.01 K/UL (ref 0–0.11)
IMM GRANULOCYTES NFR BLD AUTO: 0.2 % (ref 0–0.9)
LYMPHOCYTES # BLD AUTO: 0.93 K/UL (ref 1–4.8)
LYMPHOCYTES NFR BLD: 22.3 % (ref 22–41)
MCH RBC QN AUTO: 29.3 PG (ref 27–33)
MCHC RBC AUTO-ENTMCNC: 31.2 G/DL (ref 33.7–35.3)
MCV RBC AUTO: 93.9 FL (ref 81.4–97.8)
MONOCYTES # BLD AUTO: 0.37 K/UL (ref 0–0.85)
MONOCYTES NFR BLD AUTO: 8.9 % (ref 0–13.4)
NEUTROPHILS # BLD AUTO: 2.72 K/UL (ref 1.82–7.42)
NEUTROPHILS NFR BLD: 65.2 % (ref 44–72)
NRBC # BLD AUTO: 0 K/UL
NRBC BLD-RTO: 0 /100 WBC
PLATELET # BLD AUTO: 182 K/UL (ref 164–446)
PMV BLD AUTO: 10.5 FL (ref 9–12.9)
POTASSIUM SERPL-SCNC: 6.5 MMOL/L (ref 3.6–5.5)
PROT SERPL-MCNC: 7.2 G/DL (ref 6–8.2)
RBC # BLD AUTO: 3.45 M/UL (ref 4.7–6.1)
SODIUM SERPL-SCNC: 137 MMOL/L (ref 135–145)
TROPONIN T SERPL-MCNC: 100 NG/L (ref 6–19)
TROPONIN T SERPL-MCNC: 96 NG/L (ref 6–19)
WBC # BLD AUTO: 4.2 K/UL (ref 4.8–10.8)

## 2022-04-20 PROCEDURE — 99283 EMERGENCY DEPT VISIT LOW MDM: CPT

## 2022-04-20 PROCEDURE — 80053 COMPREHEN METABOLIC PANEL: CPT

## 2022-04-20 PROCEDURE — 36415 COLL VENOUS BLD VENIPUNCTURE: CPT

## 2022-04-20 PROCEDURE — 93005 ELECTROCARDIOGRAM TRACING: CPT

## 2022-04-20 PROCEDURE — 71045 X-RAY EXAM CHEST 1 VIEW: CPT

## 2022-04-20 PROCEDURE — A9270 NON-COVERED ITEM OR SERVICE: HCPCS | Performed by: EMERGENCY MEDICINE

## 2022-04-20 PROCEDURE — 700102 HCHG RX REV CODE 250 W/ 637 OVERRIDE(OP): Performed by: EMERGENCY MEDICINE

## 2022-04-20 PROCEDURE — 84484 ASSAY OF TROPONIN QUANT: CPT

## 2022-04-20 PROCEDURE — 85025 COMPLETE CBC W/AUTO DIFF WBC: CPT

## 2022-04-20 PROCEDURE — 93005 ELECTROCARDIOGRAM TRACING: CPT | Performed by: EMERGENCY MEDICINE

## 2022-04-20 RX ORDER — ASPIRIN 325 MG
325 TABLET ORAL ONCE
Status: COMPLETED | OUTPATIENT
Start: 2022-04-20 | End: 2022-04-20

## 2022-04-20 RX ADMIN — ASPIRIN 325 MG: 325 TABLET ORAL at 12:35

## 2022-04-20 ASSESSMENT — FIBROSIS 4 INDEX: FIB4 SCORE: 0.73

## 2022-04-20 NOTE — ED NOTES
Discharged instructions discussed with pt. Pt verbalized understanding. Pt discharged ambulatory.

## 2022-04-20 NOTE — ED PROVIDER NOTES
ED Provider Note    Scribed for Jimenez Aceves M.D. by Lidia Diehl. 2022  10:48 AM    Primary care provider: Rock Huffman M.D.  Means of arrival: Walk-In  History obtained from: Patient  History limited by: None    CHIEF COMPLAINT  Chief Complaint   Patient presents with   • Chest Pain     Pt reports mid CP that started today when he was at dialysis. Pt explains palpitations, reports he got dialysis on Mon but not today.        HPI  Elier Bustillos is a 31 y.o. male with a history of hypertension who presents to the Emergency Department for acute onset of mid chest pain today at 5 AM. The patient was at dialysis when he began to feel these symptoms. He had left anterior chest pain under breast that lasted 2 hours. He had some shortness of breath but is not pleuritic. He had frequent pain similar to what he experienced today when he last fluid overloaded and thinks it is this. He denies methamphetamine use. He has no family history coronary artery disease or personal history of PE or DVT. He has associated trace edema but denies calf pain or fever. He does experience heart burn but does not take any acid blockers. He has been compliant with dialysis but denies diabetes.     REVIEW OF SYSTEMS  Pertinent positives include: chest pain and edema.  Pertinent negatives include: calf pain or fever.  10+ systems reviewed and negative.      PAST MEDICAL HISTORY  Past Medical History:   Diagnosis Date   • Dialysis    • Dialysis care    • Hypertension    • Renal disorder     dailysis pt. 3 times per week.        FAMILY HISTORY  Family History   Problem Relation Age of Onset   • Diabetes Mother    • Hypertension Mother    • Heart Attack Father    • Hypertension Father        SOCIAL HISTORY  Social History     Tobacco Use   • Smoking status: Former Smoker     Types: Cigarettes     Quit date: 9/10/2021     Years since quittin.6   • Smokeless tobacco: Never Used   Substance Use Topics   • Alcohol use:  "No   • Drug use: No     Social History     Substance and Sexual Activity   Drug Use No       SURGICAL HISTORY  Past Surgical History:   Procedure Laterality Date   • EXPLORATORY LAPAROTOMY  11/11/2018    Procedure: EXPLORATORY LAPAROTOMY;  Surgeon: Faith Martin M.D.;  Location: SURGERY [facility];  Service: General   • AV FISTULA REVISION Right 11/28/2016    Procedure: AV FISTULA REVISION UPPER EXTREMITY (ANEURYSM REDUCTION);  Surgeon: Slade Perez M.D.;  Location: [facility];  Service:    • AV FISTULA CREATION  8/21/2012    Performed by ANAI TYSON at [facility]   • RECOVERY  7/20/2012    Performed by SURGERY, IR-RECOVERY at SURGERY SAME DAY [facility]   • RECOVERY  7/3/2012    Performed by SURGERY, IR-RECOVERY at SURGERY [facility] DAY [facility]   • AV FISTULA CREATION  6/7/2012    Performed by ANAI TYSON at [facility]   • CATH PLACEMENT  6/7/2012    Performed by ANAI TYSON at [facility]   • AV FISTULA CREATION  12/29/2010    Performed by ANT ALONZO at [facility]   • OTHER ORTHOPEDIC SURGERY      r arm fx       CURRENT MEDICATIONS  Current Outpatient Medications   Medication Instructions   • amLODIPine (NORVASC) 10 MG Tab No dose, route, or frequency recorded.   • fluoxetine (PROZAC) 10 mg, Oral, DAILY, Take half a pill for the first 8 days   • hydrALAZINE (APRESOLINE) 10 mg, Oral, 3 TIMES DAILY   • hydrOXYzine HCl (ATARAX) 25 MG Tab TAKE 1 TABLET BY MOUTH AT BEDTIME AS NEEDED FOR ITCHING OR  AXIETY   • losartan (COZAAR) 50 mg, Oral, 2 TIMES DAILY   • pregabalin (LYRICA) 75 mg, Oral, 3 TIMES DAILY   • sevelamer carbonate (RENVELA) 800 MG Tab tablet Two tabs by mouth three times daily     ALLERGIES  No Known Allergies    PHYSICAL EXAM  VITAL SIGNS: BP (!) 172/116   Pulse (!) 102   Temp 36 °C (96.8 °F) (Temporal)   Resp 18   Ht 1.753 m (5' 9\")   Wt 80.5 kg (177 lb 7.5 oz)   SpO2 99%   BMI 26.21 kg/m²   Reviewed " and elevated blood pressure, tachycardic, afebrile, no hypoxia room air  Constitutional: Well developed, Well nourished.  HENT: Normocephalic, atraumatic, bilateral external ears normal, wearing a mask.   Eyes: PERRLA, conjunctiva pink, no scleral icterus.   Cardiovascular: Regular rate and rhythm. No murmurs, rubs or gallops.  No dependent edema or calf tenderness  Respiratory: Lungs clear to auscultation bilaterally. No wheezes, rales, or rhonchi.  Abdominal:  Abdomen soft, mild epigastric pain with minimal tenderness, non distended. No rebound, or guarding.    Skin: No erythema, no rash.   Genitourinary: No costovertebral angle tenderness.   Musculoskeletal: no deformities. Right forearm AV fistula with symmetric pulses.   Neurologic: Alert & oriented x 3, cranial nerves 2-12 intact by passive exam.  No focal deficit noted.  Psychiatric: Affect normal, Judgment normal, Mood normal.     DIFFERENTIAL DIAGNOSIS:  GERD, gastritis, peptic ulcer disease, fluid overload.  Doubt MI, CAD, pneumonia, aortic dissection    EKG Interpretation:  Interpreted by me    Rhythm:  Normal sinus rhythm   Rate: 93  Axis: normal  Ectopy: none  Conduction: normal  ST Segments: no acute change  T Waves: no acute change  Q Waves: none  Clinical Impression: Normal EKG without acute changes     RADIOLOGY/PROCEDURES  DX-CHEST-PORTABLE (1 VIEW)   Final Result      1.  Cardiac silhouette enlargement.   2.  Slight interstitial prominence. Vascular congestion is not excluded.        Radiologist interpretation have been reviewed by me.     LABORATORY:  Results for orders placed or performed during the hospital encounter of 04/20/22   CBC with Differential   Result Value Ref Range    WBC 4.2 (L) 4.8 - 10.8 K/uL    RBC 3.45 (L) 4.70 - 6.10 M/uL    Hemoglobin 10.1 (L) 14.0 - 18.0 g/dL    Hematocrit 32.4 (L) 42.0 - 52.0 %    MCV 93.9 81.4 - 97.8 fL    MCH 29.3 27.0 - 33.0 pg    MCHC 31.2 (L) 33.7 - 35.3 g/dL    RDW 48.7 35.9 - 50.0 fL    Platelet  Count 182 164 - 446 K/uL    MPV 10.5 9.0 - 12.9 fL    Neutrophils-Polys 65.20 44.00 - 72.00 %    Lymphocytes 22.30 22.00 - 41.00 %    Monocytes 8.90 0.00 - 13.40 %    Eosinophils 2.90 0.00 - 6.90 %    Basophils 0.50 0.00 - 1.80 %    Immature Granulocytes 0.20 0.00 - 0.90 %    Nucleated RBC 0.00 /100 WBC    Neutrophils (Absolute) 2.72 1.82 - 7.42 K/uL    Lymphs (Absolute) 0.93 (L) 1.00 - 4.80 K/uL    Monos (Absolute) 0.37 0.00 - 0.85 K/uL    Eos (Absolute) 0.12 0.00 - 0.51 K/uL    Baso (Absolute) 0.02 0.00 - 0.12 K/uL    Immature Granulocytes (abs) 0.01 0.00 - 0.11 K/uL    NRBC (Absolute) 0.00 K/uL   Complete Metabolic Panel (CMP)   Result Value Ref Range    Sodium 137 135 - 145 mmol/L    Potassium 6.5 (H) 3.6 - 5.5 mmol/L    Chloride 95 (L) 96 - 112 mmol/L    Co2 24 20 - 33 mmol/L    Anion Gap 18.0 (H) 7.0 - 16.0    Glucose 83 65 - 99 mg/dL    Bun 65 (H) 8 - 22 mg/dL    Creatinine 10.11 (HH) 0.50 - 1.40 mg/dL    Calcium 9.6 8.5 - 10.5 mg/dL    AST(SGOT) 7 (L) 12 - 45 U/L    ALT(SGPT) 7 2 - 50 U/L    Alkaline Phosphatase 385 (H) 30 - 99 U/L    Total Bilirubin 0.4 0.1 - 1.5 mg/dL    Albumin 4.5 3.2 - 4.9 g/dL    Total Protein 7.2 6.0 - 8.2 g/dL    Globulin 2.7 1.9 - 3.5 g/dL    A-G Ratio 1.7 g/dL   Troponin   Result Value Ref Range    Troponin T 100 (H) 6 - 19 ng/L   TROPONIN   Result Value Ref Range    Troponin T 96 (H) 6 - 19 ng/L      Lab results reviewed by me.     ED COURSE:  Nursing notes, VS, PMSFHx reviewed in chart.     Review of past medical records show the patient had a cardiac stress test done in 2017 that showed an EF of 66. He was negative for ischemia. He had an ECHO done the same year, which showed LVH.    10:48 AM - Patient seen and examined at bedside. Ordered DX-Chest, Troponin, CBC w/ diff, CMP, and EKG to evaluate.     1:25 PM - Patient was evaluated at bedside. Updated on findings, as shown above. I informed the patient for plans of discharge and return instructions. Patient verbalizes  understanding and agreement to this plan of care.     MEDICAL DECISION MAKING:  This patient presents with chest pain but has chronic chest pain that he feels is due to fluid overload.  He has elevated troponin today but it is declining slightly and likely due to his very elevated creatinine and poor renal function there is no evidence of myocardial infarction based on rising troponin or EKG.  There is no pneumonia or pneumothorax.  PE and aortic dissection are unlikely.  Heart score is 1 making him relatively low risk.  He had a normal stress test in 2017.  He would benefit from an outpatient discussion of repeat stress testing with his primary physician.    PLAN:    Follow-up with your doctor to discuss whether you need a repeat stress test.    Nonspecific chest pain handout given  Return to the ER for chest pain different from your chronic pain for shortness of breath or for painful one-sided leg swelling.    Rock Huffman M.D.  [address]  [place] [address]  [phone]    Schedule an appointment as soon as possible for a visit   1-2 weeks to discuss stress test      CONDITION: Good.     FINAL IMPRESSION  1. Other chest pain    2. ESRD (end stage renal disease) (Formerly [facility])          Lidia PANIAGUA ([facility]), am scribing for, and in the presence of, Jimenez Aceves M.D..    Electronically signed by: Lidia Page), 4/20/2022    Jimenez PANIAGUA M.D. personally performed the services described in this documentation, as scribed by Lidia Diehl in my presence, and it is both accurate and complete.    The note accurately reflects work and decisions made by me.  Jimenez Aceves M.D.  4/20/2022  3:27 PM

## 2022-04-20 NOTE — DISCHARGE INSTRUCTIONS
We could not find a dangerous cause of your pain.  It may be due to fluid overload.  There is no sign of heart attack pneumonia or pneumothorax.  Follow-up with your doctor to discuss whether you need a repeat stress test.  Return to the ER for chest pain different from your chronic pain for shortness of breath or for painful one-sided leg swelling.

## 2022-05-04 RX ORDER — HYDROXYZINE HYDROCHLORIDE 25 MG/1
TABLET, FILM COATED ORAL
Qty: 30 TABLET | Refills: 0 | Status: SHIPPED | OUTPATIENT
Start: 2022-05-04 | End: 2022-07-12

## 2022-06-27 ENCOUNTER — APPOINTMENT (OUTPATIENT)
Dept: RADIOLOGY | Facility: IMAGING CENTER | Age: 32
End: 2022-06-27
Attending: NURSE PRACTITIONER
Payer: COMMERCIAL

## 2022-06-27 ENCOUNTER — OCCUPATIONAL MEDICINE (OUTPATIENT)
Dept: URGENT CARE | Facility: CLINIC | Age: 32
End: 2022-06-27
Payer: COMMERCIAL

## 2022-06-27 VITALS
DIASTOLIC BLOOD PRESSURE: 68 MMHG | BODY MASS INDEX: 25.18 KG/M2 | RESPIRATION RATE: 20 BRPM | TEMPERATURE: 97.9 F | HEART RATE: 68 BPM | HEIGHT: 69 IN | SYSTOLIC BLOOD PRESSURE: 160 MMHG | WEIGHT: 170 LBS | OXYGEN SATURATION: 98 %

## 2022-06-27 DIAGNOSIS — M79.642 PAIN OF LEFT HAND: ICD-10-CM

## 2022-06-27 DIAGNOSIS — Z02.83 ENCOUNTER FOR DRUG SCREENING: ICD-10-CM
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
## 2022-06-27 DIAGNOSIS — M25.532 ACUTE PAIN OF LEFT WRIST: ICD-10-CM

## 2022-06-27 DIAGNOSIS — Z02.1 PRE-EMPLOYMENT DRUG SCREENING: ICD-10-CM

## 2022-06-27 DIAGNOSIS — S60.222A CONTUSION OF LEFT HAND, INITIAL ENCOUNTER: ICD-10-CM

## 2022-06-27 LAB
BREATH ALCOHOL COMMENT: NORMAL
POC BREATHALIZER: 0 PERCENT (ref 0–0.01)

## 2022-06-27 PROCEDURE — 99214 OFFICE O/P EST MOD 30 MIN: CPT | Performed by: NURSE PRACTITIONER

## 2022-06-27 PROCEDURE — 73130 X-RAY EXAM OF HAND: CPT | Mod: TC,LT | Performed by: NURSE PRACTITIONER

## 2022-06-27 PROCEDURE — 73110 X-RAY EXAM OF WRIST: CPT | Mod: TC,LT | Performed by: NURSE PRACTITIONER

## 2022-06-27 PROCEDURE — 80305 DRUG TEST PRSMV DIR OPT OBS: CPT | Performed by: NURSE PRACTITIONER

## 2022-06-27 PROCEDURE — 82075 ASSAY OF BREATH ETHANOL: CPT | Performed by: NURSE PRACTITIONER

## 2022-06-27 ASSESSMENT — FIBROSIS 4 INDEX: FIB4 SCORE: 0.47

## 2022-06-27 NOTE — LETTER
[facility] [place] [id number] [facility] [address] [place] [address]  Phone:  [phone] - Fax:  [phone]   [facility] Progress Report and Disability Certification  Date of Service: 6/27/2022   No Show:  No  Date / Time of Next Visit: 7/6/2022   Claim Information   Patient Name: Elier Bustillos  Claim Number:     Employer:    Date of Injury: 6/27/2022     Insurer / TPA: [email or web address]  ID / SSN:     Occupation:   Diagnosis: Diagnoses of Encounter for drug screening, Pre-employment drug screening, Fall from slip, trip, or stumble, initial encounter, Pain of left hand, Acute pain of left wrist, and Contusion of left hand, initial encounter were pertinent to this visit.    Medical Information   Related to Industrial Injury? Yes    Subjective Complaints:  DOI today at 0730 am  He was walking and tripped on a piece of wood which caused him to fall down. He broke his fall with his left hand. Pain and increased swelling all day today. Hurts with movement . Pain 9/10 constant. Getting harder to move hand. Treatment tried: none.   No other employers  Denies previous injuries to left arm.   Danish Language Line  : [place] # [id number]        Objective Findings: Physical Exam  Vitals and nursing note reviewed.   Constitutional:       Appearance: He is well-developed.   Cardiovascular:      Rate and Rhythm: Normal rate and regular rhythm.   Pulmonary:      Effort: Pulmonary effort is normal. No respiratory distress.      Breath sounds: Normal breath sounds.   Musculoskeletal:      Left forearm: Normal.      Left wrist: Swelling and tenderness (generalized) present. Decreased range of motion.      Left hand: Swelling and tenderness present. Decreased range of motion. Normal strength. Normal sensation. There is no disruption of two-point discrimination. Normal capillary refill.   Skin:     General: Skin is warm and dry.      Capillary Refill:  Capillary refill takes less than 2 seconds.   Neurological:      Mental Status: He is alert and oriented to person, place, and time.      Cranial Nerves: No cranial nerve deficit.      Coordination: Coordination normal.      Deep Tendon Reflexes: Reflexes are normal and symmetric.   Psychiatric:         Mood and Affect: Mood normal.         Speech: Speech normal.         Behavior: Behavior normal.         Thought Content: Thought content normal.        Pre-Existing Condition(s):     Assessment:   Initial Visit    Status: Additional Care Required  Permanent Disability:No    Plan: Medication  Comments:ice/ rest/ work restrictions    Diagnostics:      Comments:       Disability Information   Status: Released to Restricted Duty    From:  6/27/2022  Through: 7/6/2022 Restrictions are: Temporary   Physical Restrictions   Sitting:    Standing:    Stooping:    Bending:      Squatting:    Walking:    Climbing:    Pushing:  < or = to 2 hrs/day  Comments:left hand/ arm restriction   Pulling:  < or = to 2 hrs/day  Comments:Left hand/ arm restriction Other:    Reaching Above Shoulder (L):   Reaching Above Shoulder (R):       Reaching Below Shoulder (L):    Reaching Below Shoulder (R):      Not to exceed Weight Limits   Carrying(hrs):   Weight Limit(lb): < or = to 10 pounds  Comments:Left arm/ hand Lifting(hrs):   Weight  Limit(lb): < or = to 10 pounds  Comments:Left arm/ hand   Comments:      Repetitive Actions   Hands: i.e. Fine Manipulations from Grasping:     Feet: i.e. Operating Foot Controls:     Driving / Operate Machinery:     Health Care Provider’s Original or Electronic Signature  Laura Hawkins, A.P.N. Health Care Provider’s Original or Electronic Signature    Kemar Wood MD         Clinic Name / Location: [address] [address] Clinic Phone Number: Dept: [phone]   Appointment Time: 2:00 Pm Visit Start Time: 3:35 PM   Check-In Time:  2:50 Pm Visit  Discharge Time:  4:48PM   Original-Treating Physician or Chiropractor    Page 2-Insurer/TPA    Page 3-Employer    Page 4-Employee

## 2022-06-27 NOTE — LETTER
"EMPLOYEE’S CLAIM FOR COMPENSATION/ REPORT OF INITIAL TREATMENT  FORM C-4    EMPLOYEE’S CLAIM - PROVIDE ALL INFORMATION REQUESTED   First Name  Elier Last Name  Carmelo Bustillos Birthdate                    1990                Sex  male Claim Number (Insurer’s Use Only)    Home Address  [address]  [address] Age  31 y.o. Height  1.75 m (5' 8.9\") Weight  77.1 kg (170 lb) [facility]     [facility] Zip  [address] Telephone  [phone] (home)    Mailing Address  [address]  [address] [facility] Zip  [address] Primary Language Spoken  Irish    Insurer   Third-Party   [employer]   Employee's Occupation (Job Title) When Injury or Occupational Disease Occurred      Employer's Name/Company Name     Telephone  [phone]    Office Mail Address (Number and Street)   [address] [place]  Zip  [address]    Date of Injury  6/27/2022               Hours Injury  7:30 AM Date Employer Notified  6/27/2022 Last Day of Work after Injury     or Occupational Disease  6/27/2022 Supervisor to Whom Injury     Reported  Justo Romero   Address or Location of Accident (if applicable)  Work [1]   What were you doing at the time of accident? (if applicable)  trip    How did this injury or occupational disease occur? (Be specific an answer in detail. Use additional sheet if necessary)  trip on a piec of wood ant fell on top of hand trying to stop himsef from falling   If you believe that you have an occupational disease, when did you first have knowledge of the disability and it relationship to your employment?   Witnesses to the Accident        Nature of Injury or Occupational Disease  Contusion  Part(s) of Body Injured or Affected  Hand (R), ,     I certify that the above is true and correct to the best of my knowledge and that I have provided this information in order to obtain the " benefits of [place]  I hereby authorize any physician, chiropractor, surgeon, practitioner, or other person, any hospital, including [facility] or [facility], any medical service organization, any insurance company, or other institution or organization to release to each other, any medical or other information, including benefits paid or payable, pertinent to this injury or disease, except information relative to diagnosis, treatment and/or counseling for AIDS, psychological conditions, alcohol or controlled substances, for which I must give specific authorization.  A Photostat of this authorization shall be as valid as the original.     Date   Place Employee’s Original or  *Electronic Signature   THIS REPORT MUST BE COMPLETED AND MAILED WITHIN 3 WORKING DAYS OF TREATMENT   Place  [facility]  Name of [facility]   Date  6/27/2022 Diagnosis and Description of Injury or Occupational Disease  (Z02.83) Encounter for drug screening  (Z02.1) Pre-employment drug screening  (W01.0XXA) Fall from slip, trip, or stumble, initial encounter  (M79.642) Pain of left hand  (M25.532) Acute pain of left wrist  (S60.222A) Contusion of left hand, initial encounter Is there evidence the injured employee was under the influence of alcohol and/or another controlled substance at the time of accident?  ? No ? Yes (if yes, please explain)    Hour  3:35 PM   Diagnoses of Encounter for drug screening, Pre-employment drug screening, Fall from slip, trip, or stumble, initial encounter, Pain of left hand, Acute pain of left wrist, and Contusion of left hand, initial encounter were pertinent to this visit. No   Treatment  OTC analgesic, ice pack prn , rest, work restrictions   Have you advised the patient to remain off work five days or     more?    X-Ray Findings  Negative   ? Yes Indicate dates:    "From   To      From information given by the employee, together with medical evidence, can        you directly connect this injury or occupational disease as job incurred?  Yes ? No If no, is the injured employee capable of:  ? full duty  No ? modified duty  Yes   Is additional medical care by a physician indicated?  Yes If Modified Duty, Specify any Limitations / Restrictions      Do you know of any previous injury or disease contributing to this condition or occupational disease?  ? Yes ? No (Explain if yes)                          No   Date  6/27/2022 Print Health Care Provider's   AUBRIE Diaz I certify the employer’s copy of  this form was mailed on:   Address  [address] Insurer’s Use Only     [facility] [place] Zip  [address]    Provider’s Tax ID Number  [id number] Telephone  Dept: [phone]             Health Care Provider’s Original or Electronic Signature  y-[id number]-DCOZWCABEN GaitanPAMPARO Degree (MD,DO, DC,PA-C,APRN)   APRN      * Complete and attach Release of Information (Form C-4A) when injured employee signs C-4 Form electronically  ORIGINAL - TREATING HEALTHCARE PROVIDER PAGE 2 - INSURER/TPA PAGE 3 - EMPLOYER PAGE 4 - EMPLOYEE             Form C-4 (rev.08/21)           BRIEF DESCRIPTION OF RIGHTS AND BENEFITS  (Pursuant to [place] 616C.050)    Notice of Injury or Occupational Disease (Incident Report Form C-1): If an injury or occupational disease (OD) arises out of and in the course of employment, you must provide written notice to your employer as soon as practicable, but no later than 7 days after the accident or OD. Your employer shall maintain a sufficient supply of the required forms.    Claim for Compensation (Form C-4): If medical treatment is sought, the form C-4 is available at the place of initial treatment. A completed \"Claim for Compensation\" (Form C-4) must be filed within 90 days after an accident or OD. The treating physician or " chiropractor must, within 3 working days after treatment, complete and mail to the employer, the employer's insurer and third-party , the Claim for Compensation.    Medical Treatment: If you require medical treatment for your on-the-job injury or OD, you may be required to select a physician or chiropractor from a list provided by your workers’ compensation insurer, if it has contracted with an Organization for Managed Care (MCO) or Preferred Provider Organization (PPO) or providers of health care. If your employer has not entered into a contract with an MCO or PPO, you may select a physician or chiropractor from the Panel of Physicians and Chiropractors. Any medical costs related to your industrial injury or OD will be paid by your insurer.    Temporary Total Disability (TTD): If your doctor has certified that you are unable to work for a period of at least 5 consecutive days, or 5 cumulative days in a 20-day period, or places restrictions on you that your employer does not accommodate, you may be entitled to TTD compensation.    Temporary Partial Disability (TPD): If the wage you receive upon reemployment is less than the compensation for TTD to which you are entitled, the insurer may be required to pay you TPD compensation to make up the difference. TPD can only be paid for a maximum of 24 months.    Permanent Partial Disability (PPD): When your medical condition is stable and there is an indication of a PPD as a result of your injury or OD, within 30 days, your insurer must arrange for an evaluation by a rating physician or chiropractor to determine the degree of your PPD. The amount of your PPD award depends on the date of injury, the results of the PPD evaluation, your age and wage.    Permanent Total Disability (PTD): If you are medically certified by a treating physician or chiropractor as permanently and totally disabled and have been granted a PTD status by your insurer, you are entitled to  receive monthly benefits not to exceed 66 2/3% of your average monthly wage. The amount of your PTD payments is subject to reduction if you previously received a lump-sum PPD award.    Vocational Rehabilitation Services: You may be eligible for vocational rehabilitation services if you are unable to return to the job due to a permanent physical impairment or permanent restrictions as a result of your injury or occupational disease.    Transportation and Per Rubi Reimbursement: You may be eligible for travel expenses and per rubi associated with medical treatment.    Reopening: You may be able to reopen your claim if your condition worsens after claim closure.     Appeal Process: If you disagree with a written determination issued by the insurer or the insurer does not respond to your request, you may appeal to the Department of Administration, , by following the instructions contained in your determination letter. You must appeal the determination within 70 days from the date of the determination letter at [address], [address], [place], [place] [address], or [address], [address], [place] [address]. If you disagree with the  decision, you may appeal to the Department of Administration, . You must file your appeal within 30 days from the date of the  decision letter at [address], [address], [place], [place] [address], or [address], [address], [place] [address]. If you disagree with a decision of an , you may file a petition for judicial review with the District Court. You must do so within 30 days of the Appeal Officer’s decision. You may be represented by an  at your own expense or you may contact the [facility] for possible representation.    [place]  for Injured Workers (NAIW): If you disagree with a  decision, you may request that NAIW represent you without  charge at an  Hearing. For information regarding denial of benefits, you may contact the [facility] at: [address], [address], [place] [address], [phone], or [address], [address], [place] [address], [phone]    To File a Complaint with the Division: If you wish to file a complaint with the  of the Division of Industrial Relations (DIR),  please contact the Workers’ Compensation Section, [address], [address], [place], [place] [address], telephone [phone], or [address], [address], [place], [place] [address], telephone [phone].    For assistance with Workers’ Compensation Issues: You may contact the [facility] Office for Consumer Health Assistance, [address], [address], [place], [place] [address], Toll Free 1-140.133.4076, Web site: http://[facility].[place].gov/Programs/SLOANE E-mail: [email or web address].[email or web address].[email or web address]              __________________________________________________________________                                    _________________            Employee Name / Signature                                                                                                                            Date                                                                                                                                                                                                                              D-2 (rev. 10/20)

## 2022-06-27 NOTE — PROGRESS NOTES
"Subjective     Elier Bustillos is a 31 y.o. male who presents with Hand Injury (WC NEW, Fell on L hand DOI 6/27/2022)       Reviewed past medical, surgical and family history. Reviewed prescription and OTC medications with patient in electronic health record today  Allergies: Patient has no known allergies.            HPI DOI today at 0730 am  He was walking and tripped on a piece of wood which caused him to fall down. He broke his fall with his left hand. Pain and increased swelling all day today. Hurts with movement . Pain 9/10 constant. Getting harder to move hand. Treatment tried: none.   No other employers  Denies previous injuries to left arm.   Maced[place] Language Line  : [place] # [id number]       ROS           Objective     BP (!) 160/68 (BP Location: Left arm, Patient Position: Sitting, BP Cuff Size: Adult long)   Pulse 68   Temp 36.6 °C (97.9 °F) (Temporal)   Resp 20   Ht 1.75 m (5' 8.9\")   Wt 77.1 kg (170 lb)   SpO2 98%   BMI 25.18 kg/m²      Physical Exam  Vitals and nursing note reviewed.   Constitutional:       Appearance: He is well-developed.   Cardiovascular:      Rate and Rhythm: Normal rate and regular rhythm.   Pulmonary:      Effort: Pulmonary effort is normal. No respiratory distress.      Breath sounds: Normal breath sounds.   Musculoskeletal:      Left forearm: Normal.      Left wrist: Swelling and tenderness (generalized) present. Decreased range of motion.      Left hand: Swelling and tenderness present. Decreased range of motion. Normal strength. Normal sensation. There is no disruption of two-point discrimination. Normal capillary refill.   Skin:     General: Skin is warm and dry.      Capillary Refill: Capillary refill takes less than 2 seconds.   Neurological:      Mental Status: He is alert and oriented to person, place, and time.      Cranial Nerves: No cranial nerve deficit.      Coordination: Coordination normal.      Deep Tendon Reflexes: Reflexes are normal " and symmetric.   Psychiatric:         Mood and Affect: Mood normal.         Speech: Speech normal.         Behavior: Behavior normal.         Thought Content: Thought content normal.               RADIOLOGY RESULTS   DX-HAND 3+ LEFT    Result Date: 6/27/2022 6/27/2022 4:03 PM HISTORY/REASON FOR EXAM:  Pain/Deformity Following Trauma Fall. TECHNIQUE/EXAM DESCRIPTION AND NUMBER OF VIEWS:  3 views of the LEFT hand. COMPARISON: None FINDINGS: Resorptive change in the distal phalanges as well as periosteal reaction could be secondary to renal osteodystrophy. No acute fracture or dislocation. No joint osteoarthritis.     No acute osseous abnormality. Resorptive change in the distal phalanges as well as periosteal reaction could be secondary to renal osteodystrophy.    ___________________________________________    DX-WRIST-COMPLETE 3+ LEFT    Result Date: 6/27/2022 6/27/2022 4:03 PM HISTORY/REASON FOR EXAM:  Pain/Deformity Following Trauma TECHNIQUE/EXAM DESCRIPTION AND NUMBER OF VIEWS:  4 views of the LEFT wrist. COMPARISON: None FINDINGS: No acute fracture or dislocation. The carpal rows appear intact. No joint osteoarthritis. Vascular calcification.     No acute osseous abnormality.         Xray: Reviewed and interpreted independently by me. I agree with the radiologist's findings.               Assessment & Plan        1. Encounter for drug screening  POCT Breath Alcohol Test    DX-HAND 3+ LEFT    DX-WRIST-COMPLETE 3+ LEFT    CANCELED: POCT 11 Panel Urine Drug Screen   2. Pre-employment drug screening  DX-HAND 3+ LEFT    DX-WRIST-COMPLETE 3+ LEFT   3. Fall from slip, trip, or stumble, initial encounter  DX-HAND 3+ LEFT    DX-WRIST-COMPLETE 3+ LEFT   4. Pain of left hand  DX-HAND 3+ LEFT   5. Acute pain of left wrist  DX-WRIST-COMPLETE 3+ LEFT   6. Contusion of left hand, initial encounter               See [place] D39 and C4   Results of all diagnostic tests discussed with patient/ caregiver including any incidental  findings.   Reviewed work restrictions with pt.          I have spent  31 minutes on the care of this  Occupational health injured patient.  This includes preparing for visit which includes review of previous visits if available in EMR, obtaining HPI, exam and evaluation of patient, ordering and independent interpretation of labs, imaging, tests, medical management, counseling, education and documentation.

## 2022-06-28 ENCOUNTER — TELEPHONE (OUTPATIENT)
Dept: URGENT CARE | Facility: CLINIC | Age: 32
End: 2022-06-28

## 2022-06-28 NOTE — TELEPHONE ENCOUNTER
Pt. Walked in this am stating he was seen for WC yesterday and was supposed to be prescribed something but pharmacy does not have it.  Did you mean to prescribe a medication?

## 2022-07-12 ENCOUNTER — OFFICE VISIT (OUTPATIENT)
Dept: MEDICAL GROUP | Facility: CLINIC | Age: 32
End: 2022-07-12
Payer: COMMERCIAL

## 2022-07-12 VITALS
SYSTOLIC BLOOD PRESSURE: 93 MMHG | HEART RATE: 61 BPM | BODY MASS INDEX: 25.09 KG/M2 | DIASTOLIC BLOOD PRESSURE: 61 MMHG | HEIGHT: 69 IN | OXYGEN SATURATION: 98 % | WEIGHT: 169.4 LBS

## 2022-07-12 DIAGNOSIS — M25.572 CHRONIC PAIN OF BOTH ANKLES: ICD-10-CM

## 2022-07-12 DIAGNOSIS — G89.29 CHRONIC PAIN OF BOTH ANKLES: ICD-10-CM

## 2022-07-12 DIAGNOSIS — R42 DIZZINESS: ICD-10-CM

## 2022-07-12 DIAGNOSIS — M25.571 CHRONIC PAIN OF BOTH ANKLES: ICD-10-CM

## 2022-07-12 PROCEDURE — 99213 OFFICE O/P EST LOW 20 MIN: CPT | Mod: GC | Performed by: STUDENT IN AN ORGANIZED HEALTH CARE EDUCATION/TRAINING PROGRAM

## 2022-07-12 RX ORDER — CLONIDINE 0.2 MG/24H
PATCH, EXTENDED RELEASE TRANSDERMAL
COMMUNITY
Start: 2022-06-18 | End: 2023-12-07

## 2022-07-12 ASSESSMENT — FIBROSIS 4 INDEX: FIB4 SCORE: 0.47

## 2022-07-12 NOTE — PROGRESS NOTES
"Subjective:     CC: Dizziness and ankle pain    HPI:   Elier presents today with     Dizziness-  The patient reported a 1 month history of dizziness and is concerned that his blood pressure is too low.  He is currently on 4 antihypertensives.  His wife said that he is finally compliant with his antihypertensives and may be why he is feeling the symptoms as his blood pressure is simply too low for him and his body is having difficulty adjusting to it.  He reports increased dizziness upon standing.  The patient has a known history of hypertension which led to end-stage renal disease.  He has been on dialysis for 11 years in total.    Ankle pain-  Patient's wife is concerned as he might have bone disease related to his abnormal mineral levels.  The patient's wife reported that he has had significantly elevated phosphorus that was 3000 this week.  He gets his phosphorus measured every month.  He is currently taking sevelamer.  He reported that he has constant bilateral medial ankle pain that occurs while he is walking, standing, and laying in bed.  He was previously taking ketorolac from [place] or his pain.  He has some relief with Tylenol.      Current Outpatient Medications Ordered in Epic   Medication Sig Dispense Refill   • cloNIDine (CATAPRES) 0.2 MG/24HR PATCH WEEKLY patch      • amLODIPine (NORVASC) 10 MG Tab      • sevelamer carbonate (RENVELA) 800 MG Tab tablet Two tabs by mouth three times daily     • losartan (COZAAR) 50 MG Tab Take 50 mg by mouth 2 times a day.     • hydrALAZINE (APRESOLINE) 10 MG Tab Take 100 mg by mouth 3 times a day.       No current [facility]-ordered facility-administered medications on file.     ROS negative unless stated in HPI.    Objective:     Exam:  BP (!) 93/61 (BP Location: Left arm, Patient Position: Standing, BP Cuff Size: Adult)   Pulse 61   Ht 1.753 m (5' 9\")   Wt 76.8 kg (169 lb 6.4 oz)   SpO2 98%   BMI 25.02 kg/m²  Body mass index is 25.02 kg/m².    Physical " Exam  Constitutional:       Appearance: Normal appearance.   Cardiovascular:      Rate and Rhythm: Normal rate and regular rhythm.      Heart sounds: Normal heart sounds.   Pulmonary:      Effort: Pulmonary effort is normal.      Breath sounds: Normal breath sounds.   Abdominal:      General: Bowel sounds are normal.      Palpations: Abdomen is soft.   Musculoskeletal:      Comments: Tortuous, large superficial blood vessel present on right upper arm.   Skin:     General: Skin is warm and dry.      Capillary Refill: Capillary refill takes less than 2 seconds.   Neurological:      Mental Status: He is alert.       Labs: No pertinent labs available.    Assessment & Plan:     31 y.o. male with the following -     #Dizziness  - Seeing as the patient's blood pressures were previously 170-200 systolic, it is possible that his current dizziness symptoms are related to his now normotensive blood pressures.  As such, I recommended that the patient connect with his nephrologist to make an appointment as soon as possible in order to discuss the dosing for his 4 antihypertensives as he is now compliant with the regimen.  The patient and his wife expressed understanding.  - The patient already has a cardiology referral and echocardiogram order placed.  I encouraged the patient to move forward with those referrals and imaging.  I encouraged the patient to return following the cardiology appointment and echocardiogram to review the results together.  -Orthostatic vital signs completed today and positive in the office    #Ankle pain  - May be related to abnormalities in his phosphorus and calcium levels.  - Patient is encouraged to discuss his electrolytes and mineral levels with his nephrologist and to assess whether or not he would benefit from any changes to his current dosing of medications.  As an example, the patient's wife reported that his most recent phosphorus levels in 3000's.  I encourage the nephrologist to consider  whether or not he would benefit from an increase in his the sevelamer dosing. The patient agreed to reach out to his nephrologist to discuss this concern.  -Lidocaine patches provided today, in the event that he is unable to get the lidocaine patches, the patient was encouraged to get over-the-counter Salonpas for pain.  -Patient was recommended against ketorolac, ibuprofen, and all other NSAIDs    Return in about 3 months (around 10/12/2022).

## 2022-07-27 ENCOUNTER — OFFICE VISIT (OUTPATIENT)
Dept: URGENT CARE | Facility: CLINIC | Age: 32
End: 2022-07-27
Payer: COMMERCIAL

## 2022-07-27 ENCOUNTER — HOSPITAL ENCOUNTER (EMERGENCY)
Facility: MEDICAL CENTER | Age: 32
End: 2022-07-27
Attending: STUDENT IN AN ORGANIZED HEALTH CARE EDUCATION/TRAINING PROGRAM
Payer: COMMERCIAL

## 2022-07-27 ENCOUNTER — APPOINTMENT (OUTPATIENT)
Dept: RADIOLOGY | Facility: MEDICAL CENTER | Age: 32
End: 2022-07-27
Attending: STUDENT IN AN ORGANIZED HEALTH CARE EDUCATION/TRAINING PROGRAM
Payer: COMMERCIAL

## 2022-07-27 VITALS
HEIGHT: 69 IN | OXYGEN SATURATION: 93 % | HEART RATE: 74 BPM | BODY MASS INDEX: 24.78 KG/M2 | TEMPERATURE: 98.2 F | SYSTOLIC BLOOD PRESSURE: 160 MMHG | DIASTOLIC BLOOD PRESSURE: 81 MMHG | WEIGHT: 167.33 LBS | RESPIRATION RATE: 16 BRPM

## 2022-07-27 DIAGNOSIS — M25.522 LEFT ELBOW PAIN: ICD-10-CM

## 2022-07-27 DIAGNOSIS — M25.422 ELBOW EFFUSION, LEFT: ICD-10-CM

## 2022-07-27 PROCEDURE — 700102 HCHG RX REV CODE 250 W/ 637 OVERRIDE(OP): Performed by: STUDENT IN AN ORGANIZED HEALTH CARE EDUCATION/TRAINING PROGRAM

## 2022-07-27 PROCEDURE — 302874 HCHG BANDAGE ACE 2 OR 3""

## 2022-07-27 PROCEDURE — A9270 NON-COVERED ITEM OR SERVICE: HCPCS | Performed by: STUDENT IN AN ORGANIZED HEALTH CARE EDUCATION/TRAINING PROGRAM

## 2022-07-27 PROCEDURE — 73080 X-RAY EXAM OF ELBOW: CPT | Mod: LT

## 2022-07-27 PROCEDURE — 99284 EMERGENCY DEPT VISIT MOD MDM: CPT

## 2022-07-27 PROCEDURE — 29105 APPLICATION LONG ARM SPLINT: CPT

## 2022-07-27 RX ORDER — OXYCODONE HYDROCHLORIDE AND ACETAMINOPHEN 5; 325 MG/1; MG/1
1 TABLET ORAL ONCE
Status: COMPLETED | OUTPATIENT
Start: 2022-07-27 | End: 2022-07-27

## 2022-07-27 RX ORDER — OXYCODONE HYDROCHLORIDE 5 MG/1
5 TABLET ORAL EVERY 6 HOURS PRN
Qty: 10 TABLET | Refills: 0 | Status: SHIPPED | OUTPATIENT
Start: 2022-07-27 | End: 2022-07-30

## 2022-07-27 RX ADMIN — OXYCODONE HYDROCHLORIDE AND ACETAMINOPHEN 1 TABLET: 5; 325 TABLET ORAL at 20:36

## 2022-07-27 ASSESSMENT — FIBROSIS 4 INDEX: FIB4 SCORE: 0.47

## 2022-07-28 NOTE — ED NOTES
Pt aox4, skin pink warm and dry, airway patent, rr even and unlabored, nad noted. No new complaints. Pt vss. Pms intact to all extremities, cr<3. Ambulates upon discharge without new incident or distress.

## 2022-07-28 NOTE — ED TRIAGE NOTES
Elier Rhodes Apollo  31 y.o. male  Chief Complaint   Patient presents with   • Elbow Injury     Pt was doing house work when he tripped and fell onto his elbow. He went to urgent care but they were not able to do imaging. +swelling, sling applied in triage. CMS intact, denies numbness and tingling. Pt reports that he cannot fully extend his arm without severe pain.        Vitals:    07/27/22 1922   BP: (!) 200/124   Pulse: 83   Resp: 18   Temp: 36.6 °C (97.8 °F)   SpO2: 97%       Triage process explained to patient, apologized for wait time, and returned to lobby.  Pt informed to notify staff of any change in condition.

## 2022-07-28 NOTE — ED PROVIDER NOTES
ED Provider Note    CHIEF COMPLAINT  Chief Complaint   Patient presents with   • Elbow Injury     Pt was doing house work when he tripped and fell onto his elbow. He went to urgent care but they were not able to do imaging. +swelling, sling applied in triage. CMS intact, denies numbness and tingling. Pt reports that he cannot fully extend his arm without severe pain.        HPI  Elier Bustillos is a 31 y.o. male who presents with elbow pain and swelling.  Patient was doing yard work when he tripped and fell landing on his elbow.  He reports severe pain in the elbow and inability to extend it.  He denies any numbness or tingling.  He denies any head trauma, neck pain, loss of consciousness.  He initially went to urgent care and was sent here because they could not do imaging.  He has a history of hypertension on dialysis.  He got his dialysis this morning, states it went normally with no issues.  He is not on blood thinners.  He denies chest pain, shortness of breath.  He has taken all of his blood pressure medications today.    REVIEW OF SYSTEMS  See HPI for further details. All other systems are negative.     PAST MEDICAL HISTORY   has a past medical history of Dialysis, Dialysis care, Hypertension, and Renal disorder.    SOCIAL HISTORY  Social History     Tobacco Use   • Smoking status: Former Smoker     Types: Cigarettes     Quit date: 9/10/2021     Years since quittin.8   • Smokeless tobacco: Never Used   Vaping Use   • Vaping Use: Never used   Substance and Sexual Activity   • Alcohol use: No   • Drug use: No   • Sexual activity: Not on file       SURGICAL HISTORY   has a past surgical history that includes other orthopedic surgery; av fistula creation (2010); av fistula creation (2012); cath placement (2012); recovery (7/3/2012); recovery (2012); av fistula creation (2012); av fistula revision (Right, 2016); and exploratory laparotomy (2018).    CURRENT  "MEDICATIONS  Home Medications     Reviewed by Tana Vergara R.N. (Registered Nurse) on 07/27/22 at 1947  Med List Status: Not Addressed   Medication Last Dose Status   amLODIPine (NORVASC) 10 MG Tab  Active   cloNIDine (CATAPRES) 0.2 MG/24HR PATCH WEEKLY patch  Active   hydrALAZINE (APRESOLINE) 10 MG Tab  Active   Lidocaine 1.8 % Patch  Active   losartan (COZAAR) 50 MG Tab  Active   sevelamer carbonate (RENVELA) 800 MG Tab tablet  Active                ALLERGIES  No Known Allergies    PHYSICAL EXAM  VITAL SIGNS: BP (!) 160/81   Pulse 74   Temp 36.8 °C (98.2 °F)   Resp 16   Ht 1.753 m (5' 9\")   Wt 75.9 kg (167 lb 5.3 oz)   SpO2 93%   BMI 24.71 kg/m²     Pulse ox interpretation: I interpret this pulse ox as normal.  Constitutional: Alert in no apparent distress.  HENT: No signs of trauma, Bilateral external ears normal, Nose normal.   Eyes: Pupils are equal and reactive, Conjunctiva normal, Non-icteric.   Neck: Normal range of motion, No tenderness, Supple, No stridor.   Cardiovascular: Regular rate and rhythm, no murmurs.   Thorax & Lungs: Normal breath sounds, No respiratory distress, No wheezing, No chest tenderness.   Abdomen: Bowel sounds normal, Soft, No tenderness, No masses, No pulsatile masses. No peritoneal signs.  Skin: Warm, Dry, No erythema, No rash.   Extremities: 2+ left radial pulse, intact radian, median, ulnar AIN sensation and function on left, swelling and tenderness of left elbow.  Reduced range of motion.  No tenderness of left shoulder, clavicle, wrist.  Right upper extremity with fistula in place, thrill  Musculoskeletal: Good range of motion in all major joints. No major deformities noted.   Neurologic: Alert , Normal motor function, Normal sensory function, No focal deficits noted.   Psychiatric: Affect normal, Judgment normal, Mood normal.       DIAGNOSTIC STUDIES / PROCEDURES    RADIOLOGY  DX-ELBOW-COMPLETE 3+ LEFT   Final Result      1.  No acute fracture is identified. If pain " persists, recommend repeat imaging in 7-10 days.      2.  Small elbow effusion.      3.  Soft tissue edema overlying the olecranon.      4.  Faint calcification in the expected region of the triceps tendon may represent tendinopathy.      5.  Postoperative changes in the anterior soft tissues of the elbow.            COURSE & MEDICAL DECISION MAKING  Pertinent Labs & Imaging studies reviewed. (See chart for details)    31-year-old male on dialysis presenting with left elbow pain and swelling after mechanical fall at home onto his elbow.  No evidence of other injury on exam.  He has no open wounds to suggest open fracture/dislocation.  He is neurovascularly intact.  X-ray does not show any obvious fracture, however patient does have a an elbow effusion which is concerning for possible occult fracture.  He was placed in a posterior long-arm splint, sling, and given orthopedics referral.  He was hypertensive in the emergency department initially, but has a known history of hypertension on multiple medications and has no complaints of chest pain or shortness of breath.  After pain control his blood pressure improved, suspect likely it was higher due to acute pain.  Given that he is asymptomatic with his hypertension, no indication for further work-up at this time.  He has been completing his dialysis normally including today with no issues, no evidence of volume overload.  Discharged home with return precautions.    The patient will not drink alcohol nor drive with prescribed medications. The patient will return for worsening symptoms and is stable at the time of discharge. The patient verbalizes understanding and will comply.    FINAL IMPRESSION  1. Left elbow pain     2. Elbow effusion, left  oxyCODONE immediate-release (ROXICODONE) 5 MG Tab    Referral to Orthopedics         Electronically signed by: Abigail Fontenot M.D., 7/27/2022 8:25 PM

## 2022-07-28 NOTE — DISCHARGE INSTRUCTIONS
Take the following medications for pain/fever at home:  Acetaminophen (Tylenol): Take 650 mg (2 regular strength) every 6 hours. Do not take more than 3,000mg in a 24 hour period.     You may use the oxycodone we have provided for breakthrough pain.    Follow-up with the orthopedics clinic in 4 to 5 days for recheck and further evaluation to rule out fracture.

## 2022-11-28 DIAGNOSIS — N18.6 ESRD (END STAGE RENAL DISEASE) ON DIALYSIS (HCC): ICD-10-CM

## 2022-11-28 DIAGNOSIS — Z99.2 ESRD (END STAGE RENAL DISEASE) ON DIALYSIS (HCC): ICD-10-CM

## 2022-11-28 DIAGNOSIS — I15.1 HYPERTENSION SECONDARY TO OTHER RENAL DISORDERS: ICD-10-CM

## 2022-11-28 NOTE — PROGRESS NOTES
ESRD on HD Monday Wednesday Friday at [facility]. Seen by me, recently switched from SNN to my care.     Was told by transplant that he needs cardiology evaluation for transplant clearance. Will place referral.    Ervin Rosas MD  Nephrology  [facility]

## 2022-12-16 ENCOUNTER — TELEPHONE (OUTPATIENT)
Dept: CARDIOLOGY | Facility: MEDICAL CENTER | Age: 32
End: 2022-12-16
Payer: COMMERCIAL

## 2022-12-16 NOTE — TELEPHONE ENCOUNTER
Patient is a self referral and is coming to  office just for an annual check up.  Spoke to patient in regards to records for NP appointment with Dr. Duran. Patient has never been treated by a cardiologist, all recent records in epic including blood work, cardiac testing, and EKG. Confirmed appt date, time and location.

## 2022-12-20 NOTE — PROGRESS NOTES
Cardiology Initial Consultation Note    Date of note: 12/19/2022    Primary Care Provider: Abigail Irvin M.D.  Referring Provider: No ref. provider found    Patient Name: Elier Bustillos  YOB: 1990  MRN:              [id number]    Chief Complaint   Patient presents with    Establish Care   End-stage renal disease and hypertension being evaluated for renal transplant     Antonina ID#[id number]    History of Present Illness: Mr. Elier Bustillos is a 32 y.o. male whose current medical problems include hypertension, chronic renal failure on hemodialysis every Monday Wednesday Friday, who is here for cardiac consultation for prerenal transplant work-up.    Being evaluated renal transplant at [facility]. He is no sure what is required. Dr. Rosas is his nephrologist.  Has been HD for 13 years.  He has HTN and reports that is what caused his kidneys to fail.    Walks at work.  He works in a warehouse.  He denies any chest pain, no shortness of breath, no orthopnea, no PND, no lightheadedness, no dizziness, no syncope.    Cardiovascular Risk Factors:  1. Smoking status:   Tobacco Use: High Risk    Smoking Tobacco Use: Every Day    Smokeless Tobacco Use: Never    Passive Exposure: Not on file     2. Type II Diabetes Mellitus:   Lab Results   Component Value Date/Time    HBA1C 5.0 12/17/2018 12:40 AM    HBA1C 5.1 02/04/2013 12:28 PM     3. Hypertension: Yes on clonidine patch 0.2 mg per 24-hour, losartan 50 mg p.o. twice daily, hydralazine 100 mg p.o. 3 times daily, amlodipine 10 mg p.o. daily  4. Dyslipidemia: No no recent labs that I can find  Cholesterol,Tot   Date Value Ref Range Status   03/16/2011 191 100 - 199 mg/dL Final     LDL   Date Value Ref Range Status   03/16/2011 125 <100 mg/dL Final     Comment:     LDL cholesterol goals and cutpoints for therapeutic lifestyle  changes (TLC) and drug therapy in different risk  categories.  ______________________________________________________________________  Risk Category      LDL Goal      LDL Level to      LDL Level to  Initiate TLC      Consider Drug  Therapy  ______________________________________________________________________  CHD or CHD Risk    <100 mg/dL    >=100 mg/dL       >=130 mg/dL  Equivalents                                        (100-129 mg/dL  (10-year risk >20%)                                 drug optional)  2+ Risk Factors    <130 mg/dL    >=130 mg/dL       10-year risk 10-20%:  (10-year risk <=20%)                                 >=130 mg/dL  -------------------  10-year risk <10%:  >=160 mg/dL  0-1 Risk Factor    <160 mg/dL    >=160 mg/dL       >=190 mg/dL  (10-year risk <10%)                                (160-189 mg/dL:  LDL-lowering drug  optional)  Guidelines referenced from the National Cholesterol Education Program, ATP  III Guidelines, NIH Publication No. , Sept. 2002.     HDL   Date Value Ref Range Status   03/16/2011 36 (L) 40 - 59 mg/dL Final     Triglycerides   Date Value Ref Range Status   03/16/2011 149 0 - 149 mg/dL Final     5. Family history of early Coronary Artery Disease in a first degree relative (Male less than 55 years of age; Female less than 65 years of age): No    Past Medical History:   Diagnosis Date    Dialysis     Dialysis care     Hypertension     Renal disorder     dailysis pt. 3 times per week.        Past Surgical History:   Procedure Laterality Date    EXPLORATORY LAPAROTOMY  11/11/2018    Procedure: EXPLORATORY LAPAROTOMY;  Surgeon: Faith Martin M.D.;  Location: SURGERY [facility];  Service: General    AV FISTULA REVISION Right 11/28/2016    Procedure: AV FISTULA REVISION UPPER EXTREMITY (ANEURYSM REDUCTION);  Surgeon: Slade Perez M.D.;  Location: [facility];  Service:     AV FISTULA CREATION  8/21/2012    Performed by ANAI TYSON at [facility]    RECOVERY  7/20/2012     "Performed by SURGERY, IR-RECOVERY at SURGERY SAME DAY [facility] ORS    RECOVERY  7/3/2012    Performed by SURGERY, IR-RECOVERY at SURGERY SAME DAY [facility] ORS    AV FISTULA CREATION  6/7/2012    Performed by ANAI TYSON at SURGERY [facility]    CATH PLACEMENT  6/7/2012    Performed by ANAI TYSON at SURGERY [facility]    AV FISTULA CREATION  12/29/2010    Performed by ANT ALONZO at SURGERY [facility] ORS    OTHER ORTHOPEDIC SURGERY      r arm fx       Current Outpatient Medications   Medication Sig Dispense Refill    losartan (COZAAR) 100 MG Tab Take 100 mg by mouth every day.      hydrOXYzine HCl (ATARAX) 25 MG Tab Take 25 mg by mouth 3 times a day as needed.      hydrALAZINE (APRESOLINE) 100 MG tablet Take 100 mg by mouth 3 times a day.      chlorhexidine (PERIDEX) 0.12 % Solution Take  by mouth as needed.      cloNIDine (CATAPRES) 0.2 MG/24HR PATCH WEEKLY patch       amLODIPine (NORVASC) 10 MG Tab       sevelamer carbonate (RENVELA) 800 MG Tab tablet Two tabs by mouth three times daily       No current facility-administered medications for this visit.       No Known Allergies    Family History   Problem Relation Age of Onset    Diabetes Mother     Hypertension Mother     Heart Attack Father     Hypertension Father        Social History     Socioeconomic History    Marital status:    Tobacco Use    Smoking status: Every Day     Types: Cigarettes    Smokeless tobacco: Never   Vaping Use    Vaping Use: Never used   Substance and Sexual Activity    Alcohol use: No    Drug use: No        Review of Systems   Constitutional: Negative for diaphoresis and fever.   Respiratory:  Negative for cough, hemoptysis and wheezing.    Gastrointestinal:  Negative for hematochezia and melena.   Genitourinary:  Negative for hematuria.     Physical Exam:  Ambulatory Vitals  /72 (BP Location: Left arm, Patient Position: Sitting, BP Cuff Size: Adult)   Pulse 84   Resp 18   Ht 1.753 m (5' 9\")   Wt " 76.2 kg (168 lb)   SpO2 98%    Oxygen Therapy:  Pulse Oximetry: 98 %  BP Readings from Last 4 Encounters:   12/21/22 130/72   07/27/22 (!) 160/81   07/12/22 (!) 93/61   06/27/22 (!) 160/68       Weight/BMI:   Body mass index is 24.81 kg/m².  Wt Readings from Last 2 Encounters:   12/21/22 76.2 kg (168 lb)   07/27/22 75.9 kg (167 lb 5.3 oz)       Physical Exam  Constitutional:       Appearance: Normal appearance.   Eyes:      Extraocular Movements: Extraocular movements intact.      Conjunctiva/sclera: Conjunctivae normal.   Neck:      Vascular: No carotid bruit or JVD.   Cardiovascular:      Rate and Rhythm: Normal rate and regular rhythm.      Pulses:           Radial pulses are 1+ on the right side and 1+ on the left side.        Posterior tibial pulses are 1+ on the right side and 1+ on the left side.      Heart sounds: Normal heart sounds. No murmur heard.    No friction rub. No gallop.   Pulmonary:      Effort: Pulmonary effort is normal. No respiratory distress.      Breath sounds: Normal breath sounds. No wheezing or rales.   Abdominal:      General: Bowel sounds are normal.      Palpations: Abdomen is soft.   Musculoskeletal:      Cervical back: Neck supple.      Right lower leg: No edema.      Left lower leg: No edema.      Comments: Failed AV fistula left arm and working one with palpable thrill right arm.   Skin:     General: Skin is warm and dry.   Neurological:      Mental Status: He is alert and oriented to person, place, and time. Mental status is at baseline.   Psychiatric:         Mood and Affect: Mood normal.        Lab Data Review:  Lab Results   Component Value Date/Time    SODIUM 137 04/20/2022 10:48 AM    POTASSIUM 6.5 (H) 04/20/2022 10:48 AM    CHLORIDE 95 (L) 04/20/2022 10:48 AM    CO2 24 04/20/2022 10:48 AM    GLUCOSE 83 04/20/2022 10:48 AM    BUN 65 (H) 04/20/2022 10:48 AM    CREATININE 10.11 (HH) 04/20/2022 10:48 AM     Lab Results   Component Value Date/Time    ALKPHOSPHAT 385 (H)  04/20/2022 10:48 AM    ASTSGOT 7 (L) 04/20/2022 10:48 AM    ALTSGPT 7 04/20/2022 10:48 AM    TBILIRUBIN 0.4 04/20/2022 10:48 AM      Lab Results   Component Value Date/Time    WBC 4.2 (L) 04/20/2022 10:48 AM     Lab Results   Component Value Date/Time    TSHULTRASEN 1.470 12/17/2018 09:40 AM        Cardiac Imaging and Procedures Review:    EKG dated 4/20/22: My personal interpretation is sinus rhythm, 93 bpm, left atrial enlargement, RSR prime pattern in V1 and V2 probably normal variant similar in appearance to ECG of 10/10/21    Echo dated 5/4/2017: My personal interpretation is normal left ventricular systolic function, mild left ventricular hypertrophy, normal right ventricular systolic function.  Mild mitral and tricuspid regurgitation.  RV systolic pressure estimated at 25 mmHg.    Nuclear Perfusion Imaging dated 1/17/19 My personal interpretation is no evidence of ischemia or infarction, LVEF calculated at 66%:     Chest x-ray 4/20/due to: Slight interstitial prominence, reported cardiac silhouette enlargement    Assessment & Plan     1.  Prerenal transplant work-up.  I called [facility] transplant department at [phone] and spoke to Jenn to figure out further work-up that would be needed for prerenal transplant whether it was nuclear stress testing versus cardiac catheterization.  I was informed by Jenn that the patient was assessed for renal transplant in July 2, 2002 2 and has been denied transplant.  They require no further evaluation from the cardiology perspective.  - I will go ahead and cancel the echocardiogram and follow-up with me as currently there is no evaluation with [facility] as he has already been denied for renal transplant.  - We will message the result to Dr. Rosas.  The denial letter went to a Dr. Escobedo per [facility].  Patient can be referred back to cardiology if was going to be reconsidered for transplant again.    2.  Smoking cessation.  Discussed patient should quit smoking as it  is harmful to his health.    Shared Medical Decision Making:  All of patient's questions were answered to the best of my knowledge and to patient's satisfaction. It was a pleasure seeing . Elier Bustillos in my clinic today. Return in about 6 weeks (around 2/1/2023). Patient is aware to call the cardiology clinic with any questions or concerns.    Princess Duran MD  [facility]  [address] [facility] [address]  [place] [address]  Phone:  [phone]  Fax:  [phone]    Please note that this dictation was created using voice recognition software. I have made every reasonable attempt to correct obvious errors, but it is possible there are errors of grammar and possibly content that I did not discover before finalizing the note.

## 2022-12-21 ENCOUNTER — OFFICE VISIT (OUTPATIENT)
Dept: CARDIOLOGY | Facility: MEDICAL CENTER | Age: 32
End: 2022-12-21
Payer: COMMERCIAL

## 2022-12-21 ENCOUNTER — TELEPHONE (OUTPATIENT)
Dept: CARDIOLOGY | Facility: MEDICAL CENTER | Age: 32
End: 2022-12-21

## 2022-12-21 VITALS
HEIGHT: 69 IN | BODY MASS INDEX: 24.88 KG/M2 | SYSTOLIC BLOOD PRESSURE: 130 MMHG | WEIGHT: 168 LBS | DIASTOLIC BLOOD PRESSURE: 72 MMHG | HEART RATE: 84 BPM | RESPIRATION RATE: 18 BRPM | OXYGEN SATURATION: 98 %

## 2022-12-21 DIAGNOSIS — Z01.818 PRE-TRANSPLANT EVALUATION FOR CHRONIC KIDNEY DISEASE: ICD-10-CM

## 2022-12-21 PROCEDURE — 99204 OFFICE O/P NEW MOD 45 MIN: CPT | Performed by: INTERNAL MEDICINE

## 2022-12-21 RX ORDER — LOSARTAN POTASSIUM 100 MG/1
50 TABLET ORAL 2 TIMES DAILY
Status: ON HOLD | COMMUNITY
Start: 2022-11-15 | End: 2023-03-22

## 2022-12-21 RX ORDER — HYDRALAZINE HYDROCHLORIDE 100 MG/1
TABLET, FILM COATED ORAL
COMMUNITY
End: 2022-12-21

## 2022-12-21 RX ORDER — HYDROXYZINE HYDROCHLORIDE 25 MG/1
25 TABLET, FILM COATED ORAL 3 TIMES DAILY PRN
COMMUNITY
End: 2023-03-02

## 2022-12-21 RX ORDER — HYDRALAZINE HYDROCHLORIDE 100 MG/1
100 TABLET, FILM COATED ORAL 3 TIMES DAILY
COMMUNITY
Start: 2022-11-08 | End: 2023-03-02

## 2022-12-21 RX ORDER — CHLORHEXIDINE GLUCONATE ORAL RINSE 1.2 MG/ML
SOLUTION DENTAL PRN
Status: ON HOLD | COMMUNITY
Start: 2022-12-01 | End: 2023-03-22

## 2022-12-21 ASSESSMENT — ENCOUNTER SYMPTOMS
HEMOPTYSIS: 0
DIAPHORESIS: 0
FEVER: 0
WHEEZING: 0
COUGH: 0
HEMATOCHEZIA: 0

## 2022-12-21 ASSESSMENT — FIBROSIS 4 INDEX: FIB4 SCORE: 0.48

## 2022-12-21 NOTE — LETTER
[facility]   [address]   [address]  [place] [address]  Phone: [phone]  Fax: [phone]              Elier Bustillos  1990    Encounter Date: 12/21/2022    Princess Duran M.D.          PROGRESS NOTE:      Cardiology Initial Consultation Note    Date of note: 12/19/2022    Primary Care Provider: Abigail Irvin M.D.  Referring Provider: No ref. provider found    Patient Name: Elier Bustillos  YOB: 1990  MRN:              [id number]    Chief Complaint   Patient presents with   • Establish Care   End-stage renal disease and hypertension being evaluated for renal transplant     Antonina ID#[id number]    History of Present Illness: Mr. Elier Bustillos is a 32 y.o. male whose current medical problems include hypertension, chronic renal failure on hemodialysis every Monday Wednesday Friday, who is here for cardiac consultation for prerenal transplant work-up.    Being evaluated renal transplant at [facility]. He is no sure what is required. Dr. Rosas is his nephrologist.  Has been HD for 13 years.  He has HTN and reports that is what caused his kidneys to fail.    Walks at work.  He works in a warehouse.  He denies any chest pain, no shortness of breath, no orthopnea, no PND, no lightheadedness, no dizziness, no syncope.    Cardiovascular Risk Factors:  1. Smoking status:   Tobacco Use: High Risk   • Smoking Tobacco Use: Every Day   • Smokeless Tobacco Use: Never   • Passive Exposure: Not on file     2. Type II Diabetes Mellitus:   Lab Results   Component Value Date/Time    HBA1C 5.0 12/17/2018 12:40 AM    HBA1C 5.1 02/04/2013 12:28 PM     3. Hypertension: Yes on clonidine patch 0.2 mg per 24-hour, losartan 50 mg p.o. twice daily, hydralazine 100 mg p.o. 3 times daily, amlodipine 10 mg p.o. daily  4. Dyslipidemia: No no recent labs that I can find  Cholesterol,Tot   Date Value Ref  Range Status   03/16/2011 191 100 - 199 mg/dL Final     LDL   Date Value Ref Range Status   03/16/2011 125 <100 mg/dL Final     Comment:     LDL cholesterol goals and cutpoints for therapeutic lifestyle  changes (TLC) and drug therapy in different risk categories.  ______________________________________________________________________  Risk Category      LDL Goal      LDL Level to      LDL Level to  Initiate TLC      Consider Drug  Therapy  ______________________________________________________________________  CHD or CHD Risk    <100 mg/dL    >=100 mg/dL       >=130 mg/dL  Equivalents                                        (100-129 mg/dL  (10-year risk >20%)                                 drug optional)  2+ Risk Factors    <130 mg/dL    >=130 mg/dL       10-year risk 10-20%:  (10-year risk <=20%)                                 >=130 mg/dL  -------------------  10-year risk <10%:  >=160 mg/dL  0-1 Risk Factor    <160 mg/dL    >=160 mg/dL       >=190 mg/dL  (10-year risk <10%)                                (160-189 mg/dL:  LDL-lowering drug  optional)  Guidelines referenced from the National Cholesterol Education Program, ATP  III Guidelines, NIH Publication No. , Sept. 2002.     HDL   Date Value Ref Range Status   03/16/2011 36 (L) 40 - 59 mg/dL Final     Triglycerides   Date Value Ref Range Status   03/16/2011 149 0 - 149 mg/dL Final     5. Family history of early Coronary Artery Disease in a first degree relative (Male less than 55 years of age; Female less than 65 years of age): No    Past Medical History:   Diagnosis Date   • Dialysis    • Dialysis care    • Hypertension    • Renal disorder     dailysis pt. 3 times per week.        Past Surgical History:   Procedure Laterality Date   • EXPLORATORY LAPAROTOMY  11/11/2018    Procedure: EXPLORATORY LAPAROTOMY;  Surgeon: aFith Martin M.D.;  Location: SURGERY [facility];  Service: General   • AV FISTULA REVISION Right 11/28/2016    Procedure:  AV FISTULA REVISION UPPER EXTREMITY (ANEURYSM REDUCTION);  Surgeon: Slade Perez M.D.;  Location: SURGERY [facility];  Service:    • AV FISTULA CREATION  8/21/2012    Performed by ANAI TYSON at SURGERY [facility] ORS   • RECOVERY  7/20/2012    Performed by SURGERY, IR-RECOVERY at SURGERY SAME DAY [facility] ORS   • RECOVERY  7/3/2012    Performed by SURGERY, IR-RECOVERY at SURGERY SAME DAY [facility] ORS   • AV FISTULA CREATION  6/7/2012    Performed by ANAI TYSON at SURGERY [facility] ORS   • CATH PLACEMENT  6/7/2012    Performed by ANAI TYSON at SURGERY [facility] ORS   • AV FISTULA CREATION  12/29/2010    Performed by ANT ALONZO at SURGERY [facility] ORS   • OTHER ORTHOPEDIC SURGERY      r arm fx       Current Outpatient Medications   Medication Sig Dispense Refill   • losartan (COZAAR) 100 MG Tab Take 100 mg by mouth every day.     • hydrOXYzine HCl (ATARAX) 25 MG Tab Take 25 mg by mouth 3 times a day as needed.     • hydrALAZINE (APRESOLINE) 100 MG tablet Take 100 mg by mouth 3 times a day.     • chlorhexidine (PERIDEX) 0.12 % Solution Take  by mouth as needed.     • cloNIDine (CATAPRES) 0.2 MG/24HR PATCH WEEKLY patch      • amLODIPine (NORVASC) 10 MG Tab      • sevelamer carbonate (RENVELA) 800 MG Tab tablet Two tabs by mouth three times daily       No current facility-administered medications for this visit.       No Known Allergies    Family History   Problem Relation Age of Onset   • Diabetes Mother    • Hypertension Mother    • Heart Attack Father    • Hypertension Father        Social History     Socioeconomic History   • Marital status:    Tobacco Use   • Smoking status: Every Day     Types: Cigarettes   • Smokeless tobacco: Never   Vaping Use   • Vaping Use: Never used   Substance and Sexual Activity   • Alcohol use: No   • Drug use: No        Review of Systems   Constitutional: Negative for diaphoresis and fever.   Respiratory:  Negative for cough, hemoptysis and  "wheezing.    Gastrointestinal:  Negative for hematochezia and melena.   Genitourinary:  Negative for hematuria.     Physical Exam:  Ambulatory Vitals  /72 (BP Location: Left arm, Patient Position: Sitting, BP Cuff Size: Adult)   Pulse 84   Resp 18   Ht 1.753 m (5' 9\")   Wt 76.2 kg (168 lb)   SpO2 98%    Oxygen Therapy:  Pulse Oximetry: 98 %  BP Readings from Last 4 Encounters:   12/21/22 130/72   07/27/22 (!) 160/81   07/12/22 (!) 93/61   06/27/22 (!) 160/68       Weight/BMI:   Body mass index is 24.81 kg/m².  Wt Readings from Last 2 Encounters:   12/21/22 76.2 kg (168 lb)   07/27/22 75.9 kg (167 lb 5.3 oz)       Physical Exam  Constitutional:       Appearance: Normal appearance.   Eyes:      Extraocular Movements: Extraocular movements intact.      Conjunctiva/sclera: Conjunctivae normal.   Neck:      Vascular: No carotid bruit or JVD.   Cardiovascular:      Rate and Rhythm: Normal rate and regular rhythm.      Pulses:           Radial pulses are 1+ on the right side and 1+ on the left side.        Posterior tibial pulses are 1+ on the right side and 1+ on the left side.      Heart sounds: Normal heart sounds. No murmur heard.    No friction rub. No gallop.   Pulmonary:      Effort: Pulmonary effort is normal. No respiratory distress.      Breath sounds: Normal breath sounds. No wheezing or rales.   Abdominal:      General: Bowel sounds are normal.      Palpations: Abdomen is soft.   Musculoskeletal:      Cervical back: Neck supple.      Right lower leg: No edema.      Left lower leg: No edema.      Comments: Failed AV fistula left arm and working one with palpable thrill right arm.   Skin:     General: Skin is warm and dry.   Neurological:      Mental Status: He is alert and oriented to person, place, and time. Mental status is at baseline.   Psychiatric:         Mood and Affect: Mood normal.        Lab Data Review:  Lab Results   Component Value Date/Time    SODIUM 137 04/20/2022 10:48 AM    POTASSIUM " 6.5 (H) 04/20/2022 10:48 AM    CHLORIDE 95 (L) 04/20/2022 10:48 AM    CO2 24 04/20/2022 10:48 AM    GLUCOSE 83 04/20/2022 10:48 AM    BUN 65 (H) 04/20/2022 10:48 AM    CREATININE 10.11 (HH) 04/20/2022 10:48 AM     Lab Results   Component Value Date/Time    ALKPHOSPHAT 385 (H) 04/20/2022 10:48 AM    ASTSGOT 7 (L) 04/20/2022 10:48 AM    ALTSGPT 7 04/20/2022 10:48 AM    TBILIRUBIN 0.4 04/20/2022 10:48 AM      Lab Results   Component Value Date/Time    WBC 4.2 (L) 04/20/2022 10:48 AM     Lab Results   Component Value Date/Time    TSHULTRASEN 1.470 12/17/2018 09:40 AM        Cardiac Imaging and Procedures Review:    EKG dated 4/20/22: My personal interpretation is sinus rhythm, 93 bpm, left atrial enlargement, RSR prime pattern in V1 and V2 probably normal variant similar in appearance to ECG of 10/10/21    Echo dated 5/4/2017: My personal interpretation is normal left ventricular systolic function, mild left ventricular hypertrophy, normal right ventricular systolic function.  Mild mitral and tricuspid regurgitation.  RV systolic pressure estimated at 25 mmHg.    Nuclear Perfusion Imaging dated 1/17/19 My personal interpretation is no evidence of ischemia or infarction, LVEF calculated at 66%:     Chest x-ray 4/20/due to: Slight interstitial prominence, reported cardiac silhouette enlargement    Assessment & Plan    1.  Prerenal transplant work-up.  I called [facility] transplant department at [phone] and spoke to Jenn to figure out further work-up that would be needed for prerenal transplant whether it was nuclear stress testing versus cardiac catheterization.  I was informed by Jenn that the patient was assessed for renal transplant in July 2, 2002 2 and has been denied transplant.  They require no further evaluation from the cardiology perspective.  - I will go ahead and cancel the echocardiogram and follow-up with me as currently there is no evaluation with [facility] as he has already been denied for renal  transplant.  - We will message the result to Dr. Rosas.  The denial letter went to a Dr. Escobedo per [facility].  Patient can be referred back to cardiology if was going to be reconsidered for transplant again.    2.  Smoking cessation.  Discussed patient should quit smoking as it is harmful to his health.    Shared Medical Decision Making:  All of patient's questions were answered to the best of my knowledge and to patient's satisfaction. It was a pleasure seeing Mr. Elier Bustillos in my clinic today. Return in about 6 weeks (around 2/1/2023). Patient is aware to call the cardiology clinic with any questions or concerns.    Princess Duran MD  [facility]  [address]  [place] [address]  Phone:  [phone]  Fax:  [phone]    Please note that this dictation was created using voice recognition software. I have made every reasonable attempt to correct obvious errors, but it is possible there are errors of grammar and possibly content that I did not discover before finalizing the note.        Ervin Rosas M.D.  [address]  [address]  [place] [address]  Via In Basket

## 2022-12-21 NOTE — TELEPHONE ENCOUNTER
Called patient no answer. left vm for patient per  to follow up with ANASTACIO Turk and Echo is not need  it at this time. Appointments will be CXL per Renee Marquis . If he had any question to give us a call left phone number of our office.

## 2023-01-24 ENCOUNTER — ANCILLARY PROCEDURE (OUTPATIENT)
Dept: CARDIOLOGY | Facility: MEDICAL CENTER | Age: 33
End: 2023-01-24
Attending: INTERNAL MEDICINE
Payer: COMMERCIAL

## 2023-01-24 DIAGNOSIS — Z01.818 PRE-TRANSPLANT EVALUATION FOR CHRONIC KIDNEY DISEASE: ICD-10-CM

## 2023-01-24 LAB
LV EJECT FRACT  99904: 60
LV EJECT FRACT MOD 2C 99903: 79.1
LV EJECT FRACT MOD 4C 99902: 73.61
LV EJECT FRACT MOD BP 99901: 77.02

## 2023-01-24 PROCEDURE — 93306 TTE W/DOPPLER COMPLETE: CPT

## 2023-01-24 PROCEDURE — 93306 TTE W/DOPPLER COMPLETE: CPT | Mod: 26 | Performed by: INTERNAL MEDICINE

## 2023-01-27 DIAGNOSIS — N52.9 ERECTILE DYSFUNCTION, UNSPECIFIED ERECTILE DYSFUNCTION TYPE: ICD-10-CM

## 2023-01-27 DIAGNOSIS — N18.6 ESRD (END STAGE RENAL DISEASE) (HCC): ICD-10-CM

## 2023-01-27 RX ORDER — SILDENAFIL 25 MG/1
25 TABLET, FILM COATED ORAL
Qty: 10 TABLET | Refills: 3 | Status: SHIPPED | OUTPATIENT
Start: 2023-01-27 | End: 2023-03-16

## 2023-03-02 ENCOUNTER — PRE-ADMISSION TESTING (OUTPATIENT)
Dept: ADMISSIONS | Facility: MEDICAL CENTER | Age: 33
DRG: 674 | End: 2023-03-02
Attending: SURGERY
Payer: COMMERCIAL

## 2023-03-02 DIAGNOSIS — Z01.812 PRE-OPERATIVE LABORATORY EXAMINATION: ICD-10-CM

## 2023-03-02 DIAGNOSIS — Z01.810 PRE-OPERATIVE CARDIOVASCULAR EXAMINATION: ICD-10-CM

## 2023-03-02 LAB
ANION GAP SERPL CALC-SCNC: 14 MMOL/L (ref 7–16)
BUN SERPL-MCNC: 37 MG/DL (ref 8–22)
CALCIUM SERPL-MCNC: 8.6 MG/DL (ref 8.5–10.5)
CHLORIDE SERPL-SCNC: 94 MMOL/L (ref 96–112)
CO2 SERPL-SCNC: 28 MMOL/L (ref 20–33)
CREAT SERPL-MCNC: 6.43 MG/DL (ref 0.5–1.4)
EKG IMPRESSION: NORMAL
ERYTHROCYTE [DISTWIDTH] IN BLOOD BY AUTOMATED COUNT: 44.2 FL (ref 35.9–50)
GFR SERPLBLD CREATININE-BSD FMLA CKD-EPI: 11 ML/MIN/1.73 M 2
GLUCOSE SERPL-MCNC: 86 MG/DL (ref 65–99)
HCT VFR BLD AUTO: 30.6 % (ref 42–52)
HGB BLD-MCNC: 10.1 G/DL (ref 14–18)
MCH RBC QN AUTO: 32.2 PG (ref 27–33)
MCHC RBC AUTO-ENTMCNC: 33 G/DL (ref 33.7–35.3)
MCV RBC AUTO: 97.5 FL (ref 81.4–97.8)
PLATELET # BLD AUTO: 199 K/UL (ref 164–446)
PMV BLD AUTO: 10.7 FL (ref 9–12.9)
POTASSIUM SERPL-SCNC: 5.9 MMOL/L (ref 3.6–5.5)
RBC # BLD AUTO: 3.14 M/UL (ref 4.7–6.1)
SODIUM SERPL-SCNC: 136 MMOL/L (ref 135–145)
WBC # BLD AUTO: 5.5 K/UL (ref 4.8–10.8)

## 2023-03-02 PROCEDURE — 93005 ELECTROCARDIOGRAM TRACING: CPT

## 2023-03-02 PROCEDURE — 93010 ELECTROCARDIOGRAM REPORT: CPT | Performed by: INTERNAL MEDICINE

## 2023-03-02 PROCEDURE — 85027 COMPLETE CBC AUTOMATED: CPT

## 2023-03-02 PROCEDURE — 80048 BASIC METABOLIC PNL TOTAL CA: CPT

## 2023-03-02 PROCEDURE — 36415 COLL VENOUS BLD VENIPUNCTURE: CPT

## 2023-03-02 RX ORDER — HYDRALAZINE HYDROCHLORIDE 100 MG/1
100 TABLET, FILM COATED ORAL 2 TIMES DAILY
Status: ON HOLD | COMMUNITY
End: 2023-03-22

## 2023-03-02 ASSESSMENT — FIBROSIS 4 INDEX: FIB4 SCORE: 0.48

## 2023-03-16 DIAGNOSIS — N52.9 ERECTILE DYSFUNCTION, UNSPECIFIED ERECTILE DYSFUNCTION TYPE: ICD-10-CM

## 2023-03-16 DIAGNOSIS — N18.6 ESRD (END STAGE RENAL DISEASE) (HCC): ICD-10-CM

## 2023-03-16 RX ORDER — SILDENAFIL 50 MG/1
50 TABLET, FILM COATED ORAL
Qty: 10 TABLET | Refills: 3 | Status: ON HOLD | OUTPATIENT
Start: 2023-03-16 | End: 2023-03-22

## 2023-03-16 NOTE — PROGRESS NOTES
Seen in HD center this AM. Complains of ED, viagra 25mg not helping. Trial of 50mg dose q72h PRN.    Ervin Rosas MD  Nephrology  [facility]

## 2023-03-17 ENCOUNTER — HOSPITAL ENCOUNTER (INPATIENT)
Facility: MEDICAL CENTER | Age: 33
LOS: 5 days | DRG: 674 | End: 2023-03-23
Attending: SURGERY | Admitting: HOSPITALIST
Payer: COMMERCIAL

## 2023-03-17 ENCOUNTER — ANESTHESIA EVENT (OUTPATIENT)
Dept: SURGERY | Facility: MEDICAL CENTER | Age: 33
DRG: 674 | End: 2023-03-17
Payer: COMMERCIAL

## 2023-03-17 ENCOUNTER — ANESTHESIA (OUTPATIENT)
Dept: SURGERY | Facility: MEDICAL CENTER | Age: 33
DRG: 674 | End: 2023-03-17
Payer: COMMERCIAL

## 2023-03-17 DIAGNOSIS — E83.51 HYPOCALCEMIA: ICD-10-CM

## 2023-03-17 LAB
ALBUMIN SERPL BCP-MCNC: 3.9 G/DL (ref 3.2–4.9)
ALBUMIN SERPL BCP-MCNC: 4.3 G/DL (ref 3.2–4.9)
ALBUMIN SERPL BCP-MCNC: 4.3 G/DL (ref 3.2–4.9)
ALBUMIN/GLOB SERPL: 1.4 G/DL
ALP SERPL-CCNC: 595 U/L (ref 30–99)
ALT SERPL-CCNC: 9 U/L (ref 2–50)
ANION GAP SERPL CALC-SCNC: 15 MMOL/L (ref 7–16)
AST SERPL-CCNC: 9 U/L (ref 12–45)
BILIRUB SERPL-MCNC: 0.4 MG/DL (ref 0.1–1.5)
BUN SERPL-MCNC: 30 MG/DL (ref 8–22)
CALCIUM ALBUM COR SERPL-MCNC: 8.9 MG/DL (ref 8.5–10.5)
CALCIUM SERPL-MCNC: 7.4 MG/DL (ref 8.5–10.5)
CALCIUM SERPL-MCNC: 8.3 MG/DL (ref 8.5–10.5)
CALCIUM SERPL-MCNC: 9.1 MG/DL (ref 8.5–10.5)
CHLORIDE SERPL-SCNC: 97 MMOL/L (ref 96–112)
CO2 SERPL-SCNC: 25 MMOL/L (ref 20–33)
COLLECT TME BLD: 1004 HH:MM
COLLECT TME BLD: 1013 HH:MM
COLLECT TME BLD: 1018 HH:MM
COLLECT TME BLD: 927 HH:MM
COLLECT TME BLD: 953 HH:MM
CREAT SERPL-MCNC: 6.09 MG/DL (ref 0.5–1.4)
GFR SERPLBLD CREATININE-BSD FMLA CKD-EPI: 12 ML/MIN/1.73 M 2
GLOBULIN SER CALC-MCNC: 3 G/DL (ref 1.9–3.5)
GLUCOSE SERPL-MCNC: 97 MG/DL (ref 65–99)
PATHOLOGY CONSULT NOTE: NORMAL
POTASSIUM SERPL-SCNC: 5 MMOL/L (ref 3.6–5.5)
PROT SERPL-MCNC: 7.3 G/DL (ref 6–8.2)
PTH-INTACT IO % DIF SERPL: 72 %
PTH-INTACT IO % DIF SERPL: 76 %
PTH-INTACT IO % DIF SERPL: 79 %
PTH-INTACT SP1 SERPL-MCNC: 1872 PG/ML (ref 14–72)
PTH-INTACT SP2 SERPL-MCNC: 1308 PG/ML
PTH-INTACT SP3 SERPL-MCNC: 533 PG/ML
PTH-INTACT SP4 SERPL-MCNC: 449 PG/ML
PTH-INTACT SP5 SERPL-MCNC: 400 PG/ML
SODIUM SERPL-SCNC: 137 MMOL/L (ref 135–145)

## 2023-03-17 PROCEDURE — G0378 HOSPITAL OBSERVATION PER HR: HCPCS

## 2023-03-17 PROCEDURE — 700111 HCHG RX REV CODE 636 W/ 250 OVERRIDE (IP): Performed by: ANESTHESIOLOGY

## 2023-03-17 PROCEDURE — 0GJR0ZZ INSPECTION OF PARATHYROID GLAND, OPEN APPROACH: ICD-10-PCS | Performed by: SURGERY

## 2023-03-17 PROCEDURE — 160041 HCHG SURGERY MINUTES - EA ADDL 1 MIN LEVEL 4: Performed by: SURGERY

## 2023-03-17 PROCEDURE — 700101 HCHG RX REV CODE 250: Performed by: ANESTHESIOLOGY

## 2023-03-17 PROCEDURE — 07BM0ZZ EXCISION OF THYMUS, OPEN APPROACH: ICD-10-PCS | Performed by: SURGERY

## 2023-03-17 PROCEDURE — 82310 ASSAY OF CALCIUM: CPT

## 2023-03-17 PROCEDURE — 700111 HCHG RX REV CODE 636 W/ 250 OVERRIDE (IP): Performed by: NURSE PRACTITIONER

## 2023-03-17 PROCEDURE — 700105 HCHG RX REV CODE 258: Performed by: ANESTHESIOLOGY

## 2023-03-17 PROCEDURE — 99214 OFFICE O/P EST MOD 30 MIN: CPT | Performed by: STUDENT IN AN ORGANIZED HEALTH CARE EDUCATION/TRAINING PROGRAM

## 2023-03-17 PROCEDURE — A9270 NON-COVERED ITEM OR SERVICE: HCPCS | Performed by: ANESTHESIOLOGY

## 2023-03-17 PROCEDURE — 700102 HCHG RX REV CODE 250 W/ 637 OVERRIDE(OP): Performed by: ANESTHESIOLOGY

## 2023-03-17 PROCEDURE — 0GTL0ZZ RESECTION OF RIGHT SUPERIOR PARATHYROID GLAND, OPEN APPROACH: ICD-10-PCS | Performed by: SURGERY

## 2023-03-17 PROCEDURE — 0GTN0ZZ RESECTION OF RIGHT INFERIOR PARATHYROID GLAND, OPEN APPROACH: ICD-10-PCS | Performed by: SURGERY

## 2023-03-17 PROCEDURE — 82040 ASSAY OF SERUM ALBUMIN: CPT | Mod: 91

## 2023-03-17 PROCEDURE — 160009 HCHG ANES TIME/MIN: Performed by: SURGERY

## 2023-03-17 PROCEDURE — 96375 TX/PRO/DX INJ NEW DRUG ADDON: CPT

## 2023-03-17 PROCEDURE — 0GSM0ZZ REPOSITION LEFT SUPERIOR PARATHYROID GLAND, OPEN APPROACH: ICD-10-PCS | Performed by: SURGERY

## 2023-03-17 PROCEDURE — 96376 TX/PRO/DX INJ SAME DRUG ADON: CPT

## 2023-03-17 PROCEDURE — 96374 THER/PROPH/DIAG INJ IV PUSH: CPT

## 2023-03-17 PROCEDURE — 700111 HCHG RX REV CODE 636 W/ 250 OVERRIDE (IP): Performed by: SURGERY

## 2023-03-17 PROCEDURE — 0GBM0ZZ EXCISION OF LEFT SUPERIOR PARATHYROID GLAND, OPEN APPROACH: ICD-10-PCS | Performed by: SURGERY

## 2023-03-17 PROCEDURE — 88331 PATH CONSLTJ SURG 1 BLK 1SPC: CPT | Mod: 91

## 2023-03-17 PROCEDURE — 88305 TISSUE EXAM BY PATHOLOGIST: CPT | Mod: 59

## 2023-03-17 PROCEDURE — 36620 INSERTION CATHETER ARTERY: CPT | Performed by: ANESTHESIOLOGY

## 2023-03-17 PROCEDURE — A9270 NON-COVERED ITEM OR SERVICE: HCPCS | Performed by: SURGERY

## 2023-03-17 PROCEDURE — 160029 HCHG SURGERY MINUTES - 1ST 30 MINS LEVEL 4: Performed by: SURGERY

## 2023-03-17 PROCEDURE — 160002 HCHG RECOVERY MINUTES (STAT): Performed by: SURGERY

## 2023-03-17 PROCEDURE — 160035 HCHG PACU - 1ST 60 MINS PHASE I: Performed by: SURGERY

## 2023-03-17 PROCEDURE — 0GTP0ZZ RESECTION OF LEFT INFERIOR PARATHYROID GLAND, OPEN APPROACH: ICD-10-PCS | Performed by: SURGERY

## 2023-03-17 PROCEDURE — A9270 NON-COVERED ITEM OR SERVICE: HCPCS | Performed by: INTERNAL MEDICINE

## 2023-03-17 PROCEDURE — 4A10X4G MONITORING OF CENTRAL NERVOUS ELECTRICAL ACTIVITY, INTRAOPERATIVE, EXTERNAL APPROACH: ICD-10-PCS | Performed by: SURGERY

## 2023-03-17 PROCEDURE — 80053 COMPREHEN METABOLIC PANEL: CPT

## 2023-03-17 PROCEDURE — 83970 ASSAY OF PARATHORMONE: CPT | Mod: 91

## 2023-03-17 PROCEDURE — 160048 HCHG OR STATISTICAL LEVEL 1-5: Performed by: SURGERY

## 2023-03-17 PROCEDURE — 700105 HCHG RX REV CODE 258: Performed by: SURGERY

## 2023-03-17 PROCEDURE — 700102 HCHG RX REV CODE 250 W/ 637 OVERRIDE(OP): Performed by: SURGERY

## 2023-03-17 PROCEDURE — 00320 ANES ALL PX NECK NOS 1YR/>: CPT | Performed by: ANESTHESIOLOGY

## 2023-03-17 PROCEDURE — 700102 HCHG RX REV CODE 250 W/ 637 OVERRIDE(OP): Performed by: INTERNAL MEDICINE

## 2023-03-17 RX ORDER — DIPHENHYDRAMINE HYDROCHLORIDE 50 MG/ML
25 INJECTION INTRAMUSCULAR; INTRAVENOUS EVERY 6 HOURS PRN
Status: DISCONTINUED | OUTPATIENT
Start: 2023-03-17 | End: 2023-03-23 | Stop reason: HOSPADM

## 2023-03-17 RX ORDER — SEVELAMER CARBONATE 800 MG/1
2400 TABLET, FILM COATED ORAL
Status: DISCONTINUED | OUTPATIENT
Start: 2023-03-17 | End: 2023-03-18

## 2023-03-17 RX ORDER — AMOXICILLIN 250 MG
2 CAPSULE ORAL 2 TIMES DAILY
Status: DISCONTINUED | OUTPATIENT
Start: 2023-03-17 | End: 2023-03-23 | Stop reason: HOSPADM

## 2023-03-17 RX ORDER — CALCIUM CARBONATE 500 MG/1
3000 TABLET, CHEWABLE ORAL
Status: DISCONTINUED | OUTPATIENT
Start: 2023-03-17 | End: 2023-03-17

## 2023-03-17 RX ORDER — SCOLOPAMINE TRANSDERMAL SYSTEM 1 MG/1
1 PATCH, EXTENDED RELEASE TRANSDERMAL
Status: DISCONTINUED | OUTPATIENT
Start: 2023-03-17 | End: 2023-03-23 | Stop reason: HOSPADM

## 2023-03-17 RX ORDER — SODIUM CHLORIDE, SODIUM LACTATE, POTASSIUM CHLORIDE, CALCIUM CHLORIDE 600; 310; 30; 20 MG/100ML; MG/100ML; MG/100ML; MG/100ML
INJECTION, SOLUTION INTRAVENOUS CONTINUOUS
Status: DISCONTINUED | OUTPATIENT
Start: 2023-03-17 | End: 2023-03-17 | Stop reason: CLARIF

## 2023-03-17 RX ORDER — HYDRALAZINE HYDROCHLORIDE 20 MG/ML
5 INJECTION INTRAMUSCULAR; INTRAVENOUS
Status: DISCONTINUED | OUTPATIENT
Start: 2023-03-17 | End: 2023-03-17 | Stop reason: HOSPADM

## 2023-03-17 RX ORDER — BISACODYL 10 MG
10 SUPPOSITORY, RECTAL RECTAL
Status: DISCONTINUED | OUTPATIENT
Start: 2023-03-17 | End: 2023-03-23 | Stop reason: HOSPADM

## 2023-03-17 RX ORDER — HYDROCODONE BITARTRATE AND ACETAMINOPHEN 5; 325 MG/1; MG/1
1 TABLET ORAL EVERY 4 HOURS PRN
Status: DISCONTINUED | OUTPATIENT
Start: 2023-03-17 | End: 2023-03-23 | Stop reason: HOSPADM

## 2023-03-17 RX ORDER — HALOPERIDOL 5 MG/ML
1 INJECTION INTRAMUSCULAR EVERY 6 HOURS PRN
Status: DISCONTINUED | OUTPATIENT
Start: 2023-03-17 | End: 2023-03-23 | Stop reason: HOSPADM

## 2023-03-17 RX ORDER — ROCURONIUM BROMIDE 10 MG/ML
INJECTION, SOLUTION INTRAVENOUS PRN
Status: DISCONTINUED | OUTPATIENT
Start: 2023-03-17 | End: 2023-03-17 | Stop reason: SURG

## 2023-03-17 RX ORDER — ALBUTEROL SULFATE 2.5 MG/3ML
2.5 SOLUTION RESPIRATORY (INHALATION)
Status: DISCONTINUED | OUTPATIENT
Start: 2023-03-17 | End: 2023-03-17 | Stop reason: HOSPADM

## 2023-03-17 RX ORDER — HYDROMORPHONE HYDROCHLORIDE 1 MG/ML
0.2 INJECTION, SOLUTION INTRAMUSCULAR; INTRAVENOUS; SUBCUTANEOUS
Status: DISCONTINUED | OUTPATIENT
Start: 2023-03-17 | End: 2023-03-17 | Stop reason: HOSPADM

## 2023-03-17 RX ORDER — PHENYLEPHRINE HCL IN 0.9% NACL 0.5 MG/5ML
SYRINGE (ML) INTRAVENOUS PRN
Status: DISCONTINUED | OUTPATIENT
Start: 2023-03-17 | End: 2023-03-17 | Stop reason: SURG

## 2023-03-17 RX ORDER — DEXAMETHASONE SODIUM PHOSPHATE 4 MG/ML
4 INJECTION, SOLUTION INTRA-ARTICULAR; INTRALESIONAL; INTRAMUSCULAR; INTRAVENOUS; SOFT TISSUE
Status: DISCONTINUED | OUTPATIENT
Start: 2023-03-17 | End: 2023-03-23 | Stop reason: HOSPADM

## 2023-03-17 RX ORDER — ONDANSETRON 2 MG/ML
4 INJECTION INTRAMUSCULAR; INTRAVENOUS
Status: COMPLETED | OUTPATIENT
Start: 2023-03-17 | End: 2023-03-17

## 2023-03-17 RX ORDER — HEPARIN SODIUM 5000 [USP'U]/ML
5000 INJECTION, SOLUTION INTRAVENOUS; SUBCUTANEOUS EVERY 8 HOURS
Status: DISCONTINUED | OUTPATIENT
Start: 2023-03-17 | End: 2023-03-17

## 2023-03-17 RX ORDER — HYDRALAZINE HYDROCHLORIDE 50 MG/1
100 TABLET, FILM COATED ORAL 2 TIMES DAILY
Status: DISCONTINUED | OUTPATIENT
Start: 2023-03-17 | End: 2023-03-19

## 2023-03-17 RX ORDER — OXYCODONE HCL 5 MG/5 ML
5 SOLUTION, ORAL ORAL
Status: COMPLETED | OUTPATIENT
Start: 2023-03-17 | End: 2023-03-17

## 2023-03-17 RX ORDER — CEFAZOLIN SODIUM 1 G/3ML
INJECTION, POWDER, FOR SOLUTION INTRAMUSCULAR; INTRAVENOUS PRN
Status: DISCONTINUED | OUTPATIENT
Start: 2023-03-17 | End: 2023-03-17 | Stop reason: SURG

## 2023-03-17 RX ORDER — ONDANSETRON 2 MG/ML
4 INJECTION INTRAMUSCULAR; INTRAVENOUS EVERY 4 HOURS PRN
Status: DISCONTINUED | OUTPATIENT
Start: 2023-03-17 | End: 2023-03-23 | Stop reason: HOSPADM

## 2023-03-17 RX ORDER — HYDROMORPHONE HYDROCHLORIDE 1 MG/ML
0.1 INJECTION, SOLUTION INTRAMUSCULAR; INTRAVENOUS; SUBCUTANEOUS
Status: DISCONTINUED | OUTPATIENT
Start: 2023-03-17 | End: 2023-03-17 | Stop reason: HOSPADM

## 2023-03-17 RX ORDER — HALOPERIDOL 5 MG/ML
1 INJECTION INTRAMUSCULAR
Status: DISCONTINUED | OUTPATIENT
Start: 2023-03-17 | End: 2023-03-17 | Stop reason: HOSPADM

## 2023-03-17 RX ORDER — HYDROMORPHONE HYDROCHLORIDE 1 MG/ML
0.4 INJECTION, SOLUTION INTRAMUSCULAR; INTRAVENOUS; SUBCUTANEOUS
Status: DISCONTINUED | OUTPATIENT
Start: 2023-03-17 | End: 2023-03-17 | Stop reason: HOSPADM

## 2023-03-17 RX ORDER — LOSARTAN POTASSIUM 50 MG/1
50 TABLET ORAL 2 TIMES DAILY
Status: DISCONTINUED | OUTPATIENT
Start: 2023-03-17 | End: 2023-03-19

## 2023-03-17 RX ORDER — ONDANSETRON 2 MG/ML
INJECTION INTRAMUSCULAR; INTRAVENOUS PRN
Status: DISCONTINUED | OUTPATIENT
Start: 2023-03-17 | End: 2023-03-17 | Stop reason: SURG

## 2023-03-17 RX ORDER — MORPHINE SULFATE 4 MG/ML
2 INJECTION INTRAVENOUS
Status: DISCONTINUED | OUTPATIENT
Start: 2023-03-17 | End: 2023-03-23 | Stop reason: HOSPADM

## 2023-03-17 RX ORDER — DEXAMETHASONE SODIUM PHOSPHATE 4 MG/ML
INJECTION, SOLUTION INTRA-ARTICULAR; INTRALESIONAL; INTRAMUSCULAR; INTRAVENOUS; SOFT TISSUE PRN
Status: DISCONTINUED | OUTPATIENT
Start: 2023-03-17 | End: 2023-03-17 | Stop reason: SURG

## 2023-03-17 RX ORDER — HYDROCODONE BITARTRATE AND ACETAMINOPHEN 5; 325 MG/1; MG/1
1 TABLET ORAL EVERY 6 HOURS PRN
Status: DISCONTINUED | OUTPATIENT
Start: 2023-03-17 | End: 2023-03-17

## 2023-03-17 RX ORDER — HYDRALAZINE HYDROCHLORIDE 20 MG/ML
10 INJECTION INTRAMUSCULAR; INTRAVENOUS EVERY 4 HOURS PRN
Status: DISCONTINUED | OUTPATIENT
Start: 2023-03-17 | End: 2023-03-23 | Stop reason: HOSPADM

## 2023-03-17 RX ORDER — POLYETHYLENE GLYCOL 3350 17 G/17G
1 POWDER, FOR SOLUTION ORAL
Status: DISCONTINUED | OUTPATIENT
Start: 2023-03-17 | End: 2023-03-23 | Stop reason: HOSPADM

## 2023-03-17 RX ORDER — SODIUM CHLORIDE 9 MG/ML
INJECTION, SOLUTION INTRAVENOUS ONCE
Status: COMPLETED | OUTPATIENT
Start: 2023-03-17 | End: 2023-03-17

## 2023-03-17 RX ORDER — AMLODIPINE BESYLATE 10 MG/1
10 TABLET ORAL EVERY EVENING
Status: DISCONTINUED | OUTPATIENT
Start: 2023-03-17 | End: 2023-03-23 | Stop reason: HOSPADM

## 2023-03-17 RX ORDER — LABETALOL HYDROCHLORIDE 5 MG/ML
5 INJECTION, SOLUTION INTRAVENOUS
Status: DISCONTINUED | OUTPATIENT
Start: 2023-03-17 | End: 2023-03-17 | Stop reason: HOSPADM

## 2023-03-17 RX ORDER — CALCIUM GLUCONATE 20 MG/ML
1 INJECTION, SOLUTION INTRAVENOUS
Status: DISCONTINUED | OUTPATIENT
Start: 2023-03-17 | End: 2023-03-21

## 2023-03-17 RX ORDER — MIDAZOLAM HYDROCHLORIDE 1 MG/ML
INJECTION INTRAMUSCULAR; INTRAVENOUS PRN
Status: DISCONTINUED | OUTPATIENT
Start: 2023-03-17 | End: 2023-03-17 | Stop reason: SURG

## 2023-03-17 RX ORDER — CALCITRIOL 0.25 UG/1
0.25 CAPSULE, LIQUID FILLED ORAL DAILY
Status: DISCONTINUED | OUTPATIENT
Start: 2023-03-17 | End: 2023-03-19

## 2023-03-17 RX ORDER — CLONIDINE 0.2 MG/24H
1 PATCH, EXTENDED RELEASE TRANSDERMAL
Status: DISCONTINUED | OUTPATIENT
Start: 2023-03-23 | End: 2023-03-23 | Stop reason: HOSPADM

## 2023-03-17 RX ORDER — DIPHENHYDRAMINE HYDROCHLORIDE 50 MG/ML
12.5 INJECTION INTRAMUSCULAR; INTRAVENOUS
Status: DISCONTINUED | OUTPATIENT
Start: 2023-03-17 | End: 2023-03-17 | Stop reason: HOSPADM

## 2023-03-17 RX ORDER — OXYCODONE HCL 5 MG/5 ML
10 SOLUTION, ORAL ORAL
Status: COMPLETED | OUTPATIENT
Start: 2023-03-17 | End: 2023-03-17

## 2023-03-17 RX ORDER — SODIUM CHLORIDE, SODIUM LACTATE, POTASSIUM CHLORIDE, CALCIUM CHLORIDE 600; 310; 30; 20 MG/100ML; MG/100ML; MG/100ML; MG/100ML
INJECTION, SOLUTION INTRAVENOUS CONTINUOUS
Status: DISCONTINUED | OUTPATIENT
Start: 2023-03-17 | End: 2023-03-17 | Stop reason: HOSPADM

## 2023-03-17 RX ORDER — LIDOCAINE HYDROCHLORIDE 20 MG/ML
INJECTION, SOLUTION EPIDURAL; INFILTRATION; INTRACAUDAL; PERINEURAL PRN
Status: DISCONTINUED | OUTPATIENT
Start: 2023-03-17 | End: 2023-03-17 | Stop reason: SURG

## 2023-03-17 RX ORDER — CALCIUM CARBONATE 500 MG/1
3000 TABLET, CHEWABLE ORAL
Status: DISCONTINUED | OUTPATIENT
Start: 2023-03-17 | End: 2023-03-18

## 2023-03-17 RX ORDER — EPHEDRINE SULFATE 50 MG/ML
5 INJECTION, SOLUTION INTRAVENOUS
Status: DISCONTINUED | OUTPATIENT
Start: 2023-03-17 | End: 2023-03-17 | Stop reason: HOSPADM

## 2023-03-17 RX ADMIN — FENTANYL CITRATE 100 MCG: 50 INJECTION, SOLUTION INTRAMUSCULAR; INTRAVENOUS at 10:21

## 2023-03-17 RX ADMIN — CEFAZOLIN 2 G: 330 INJECTION, POWDER, FOR SOLUTION INTRAMUSCULAR; INTRAVENOUS at 09:27

## 2023-03-17 RX ADMIN — Medication 100 MCG: at 09:29

## 2023-03-17 RX ADMIN — OXYCODONE HYDROCHLORIDE 10 MG: 5 SOLUTION ORAL at 11:06

## 2023-03-17 RX ADMIN — CALCIUM GLUCONATE 1 G: 20 INJECTION, SOLUTION INTRAVENOUS at 20:35

## 2023-03-17 RX ADMIN — DEXAMETHASONE SODIUM PHOSPHATE 8 MG: 4 INJECTION, SOLUTION INTRA-ARTICULAR; INTRALESIONAL; INTRAMUSCULAR; INTRAVENOUS; SOFT TISSUE at 09:27

## 2023-03-17 RX ADMIN — ONDANSETRON 4 MG: 2 INJECTION INTRAMUSCULAR; INTRAVENOUS at 20:28

## 2023-03-17 RX ADMIN — MORPHINE SULFATE 2 MG: 4 INJECTION INTRAVENOUS at 15:56

## 2023-03-17 RX ADMIN — FENTANYL CITRATE 50 MCG: 50 INJECTION, SOLUTION INTRAMUSCULAR; INTRAVENOUS at 09:56

## 2023-03-17 RX ADMIN — HYDRALAZINE HYDROCHLORIDE 100 MG: 50 TABLET, FILM COATED ORAL at 18:09

## 2023-03-17 RX ADMIN — LOSARTAN POTASSIUM 50 MG: 50 TABLET, FILM COATED ORAL at 18:09

## 2023-03-17 RX ADMIN — ONDANSETRON 4 MG: 2 INJECTION INTRAMUSCULAR; INTRAVENOUS at 09:27

## 2023-03-17 RX ADMIN — FENTANYL CITRATE 50 MCG: 50 INJECTION, SOLUTION INTRAMUSCULAR; INTRAVENOUS at 09:37

## 2023-03-17 RX ADMIN — MORPHINE SULFATE 2 MG: 4 INJECTION INTRAVENOUS at 20:28

## 2023-03-17 RX ADMIN — FENTANYL CITRATE 50 MCG: 50 INJECTION, SOLUTION INTRAMUSCULAR; INTRAVENOUS at 11:16

## 2023-03-17 RX ADMIN — SUGAMMADEX 200 MG: 100 INJECTION, SOLUTION INTRAVENOUS at 09:29

## 2023-03-17 RX ADMIN — LIDOCAINE HYDROCHLORIDE 50 MG: 20 INJECTION, SOLUTION EPIDURAL; INFILTRATION; INTRACAUDAL at 09:18

## 2023-03-17 RX ADMIN — SODIUM CHLORIDE, POTASSIUM CHLORIDE, SODIUM LACTATE AND CALCIUM CHLORIDE: 600; 310; 30; 20 INJECTION, SOLUTION INTRAVENOUS at 15:00

## 2023-03-17 RX ADMIN — FENTANYL CITRATE 25 MCG: 50 INJECTION, SOLUTION INTRAMUSCULAR; INTRAVENOUS at 11:07

## 2023-03-17 RX ADMIN — SEVELAMER CARBONATE 2400 MG: 800 TABLET, FILM COATED ORAL at 18:10

## 2023-03-17 RX ADMIN — FENTANYL CITRATE 25 MCG: 50 INJECTION, SOLUTION INTRAMUSCULAR; INTRAVENOUS at 11:10

## 2023-03-17 RX ADMIN — HYDROMORPHONE HYDROCHLORIDE 0.4 MG: 1 INJECTION, SOLUTION INTRAMUSCULAR; INTRAVENOUS; SUBCUTANEOUS at 11:20

## 2023-03-17 RX ADMIN — AMLODIPINE BESYLATE 10 MG: 10 TABLET ORAL at 18:07

## 2023-03-17 RX ADMIN — PROPOFOL 150 MG: 10 INJECTION, EMULSION INTRAVENOUS at 09:18

## 2023-03-17 RX ADMIN — HYDROMORPHONE HYDROCHLORIDE 0.2 MG: 1 INJECTION, SOLUTION INTRAMUSCULAR; INTRAVENOUS; SUBCUTANEOUS at 12:26

## 2023-03-17 RX ADMIN — HYDROMORPHONE HYDROCHLORIDE 0.4 MG: 1 INJECTION, SOLUTION INTRAMUSCULAR; INTRAVENOUS; SUBCUTANEOUS at 12:15

## 2023-03-17 RX ADMIN — SODIUM CHLORIDE: 9 INJECTION, SOLUTION INTRAVENOUS at 09:12

## 2023-03-17 RX ADMIN — ONDANSETRON 4 MG: 2 INJECTION INTRAMUSCULAR; INTRAVENOUS at 14:47

## 2023-03-17 RX ADMIN — ONDANSETRON HYDROCHLORIDE 4 MG: 2 SOLUTION INTRAMUSCULAR; INTRAVENOUS at 11:07

## 2023-03-17 RX ADMIN — MIDAZOLAM HYDROCHLORIDE 2 MG: 1 INJECTION, SOLUTION INTRAMUSCULAR; INTRAVENOUS at 09:12

## 2023-03-17 RX ADMIN — ROCURONIUM BROMIDE 50 MG: 10 INJECTION, SOLUTION INTRAVENOUS at 09:18

## 2023-03-17 RX ADMIN — CALCIUM CARBONATE 3000 MG: 500 TABLET, CHEWABLE ORAL at 18:08

## 2023-03-17 RX ADMIN — FENTANYL CITRATE 100 MCG: 50 INJECTION, SOLUTION INTRAMUSCULAR; INTRAVENOUS at 09:18

## 2023-03-17 RX ADMIN — CALCITRIOL CAPSULES 0.25 MCG 0.25 MCG: 0.25 CAPSULE ORAL at 18:08

## 2023-03-17 ASSESSMENT — LIFESTYLE VARIABLES
ON A TYPICAL DAY WHEN YOU DRINK ALCOHOL HOW MANY DRINKS DO YOU HAVE: 0
EVER FELT BAD OR GUILTY ABOUT YOUR DRINKING: NO
DOES PATIENT WANT TO STOP DRINKING: NO
TOTAL SCORE: 0
EVER HAD A DRINK FIRST THING IN THE MORNING TO STEADY YOUR NERVES TO GET RID OF A HANGOVER: NO
HAVE PEOPLE ANNOYED YOU BY CRITICIZING YOUR DRINKING: NO
ALCOHOL_USE: NO
CONSUMPTION TOTAL: NEGATIVE
TOTAL SCORE: 0
AVERAGE NUMBER OF DAYS PER WEEK YOU HAVE A DRINK CONTAINING ALCOHOL: 0
HAVE YOU EVER FELT YOU SHOULD CUT DOWN ON YOUR DRINKING: NO
HOW MANY TIMES IN THE PAST YEAR HAVE YOU HAD 5 OR MORE DRINKS IN A DAY: 0
TOTAL SCORE: 0

## 2023-03-17 ASSESSMENT — ENCOUNTER SYMPTOMS
BLURRED VISION: 0
FEVER: 0
NAUSEA: 0
ABDOMINAL PAIN: 0
SORE THROAT: 0
SHORTNESS OF BREATH: 0
VOMITING: 0

## 2023-03-17 ASSESSMENT — PAIN DESCRIPTION - PAIN TYPE
TYPE: SURGICAL PAIN
TYPE: ACUTE PAIN;SURGICAL PAIN

## 2023-03-17 ASSESSMENT — FIBROSIS 4 INDEX
FIB4 SCORE: 0.5
FIB4 SCORE: 0.44

## 2023-03-17 ASSESSMENT — PAIN SCALES - GENERAL: PAIN_LEVEL: 6

## 2023-03-17 NOTE — ANESTHESIA PREPROCEDURE EVALUATION
Case: [id number] Date/Time: 03/17/23 0915    Procedure: PARATHYROID EXPLORATION WITH PARATHYROID AUTOTRANSPLANTATION AND PARATHYROID HORMONE MONITORING.    Pre-op diagnosis: SECONDARY HYPERPARATHYROIDISM    Location: [facility] ROOM 23 / SURGERY SAME DAY [facility]    Surgeons: Candy Godoy M.D.          Relevant Problems   CARDIAC   (positive) A-V fistula aneurysm causing pain   (positive) Essential hypertension   (positive) Hypertensive emergency   (positive) Ischemic bowel disease (HCC)         (positive) Dependence on renal dialysis (HCC)   (positive) ESRD (end stage renal disease) on dialysis (HCC)      Other   (positive) Secondary hyperparathyroidism, renal (HCC)   EKG with old inf infarct, echo normal EF    Physical Exam    Airway   Mallampati: II  TM distance: >3 FB  Neck ROM: full       Cardiovascular - normal exam  Rhythm: regular  Rate: normal  (-) murmur     Dental - normal exam           Pulmonary - normal exam  Breath sounds clear to auscultation     Abdominal    Neurological - normal exam                 Anesthesia Plan    ASA 3   ASA physical status 3 criteria: ESRD undergoing regularly scheduled dialysis and MI or angina - history (> 3 months)    Plan - general       Airway plan will be ETT          Induction: intravenous    Postoperative Plan: Postoperative administration of opioids is intended.    Pertinent diagnostic labs and testing reviewed    Informed Consent:    Anesthetic plan and risks discussed with patient.    Use of blood products discussed with: patient whom consented to blood products.

## 2023-03-17 NOTE — PROGRESS NOTES
Received to unit. Able to walk with stand by assist to bed. C/O pain to neck and left forearm. Pain 8/10. A/O, primarily Lithuanian Speaking. Skin PWD. Mild nausea when standing. /94. Sat's 99% on 10L/mask. Dressing to neck CDI. Dressing to LFA CDI. SCDs placed bilat.

## 2023-03-17 NOTE — OR NURSING
1031 from OR to PACU 9. Connected to monitor. Report received from anesthesia & RN. VSS. 02 10L via mask with oral airway in place. Breaths calm, even, unlabored.       Gauze and tegaderm dressings to L forearm and anterior neck, clean dry and intact.     1041 oral airway dc'd and 10L oxymask applied. Attempted to call spouse for status update; left voicemail.     1112 Medicated for pain, see MAR. Pt tolerating po sips.     1118 Spouse Courtney given status update and room info. Will call again when pt transfers to room.     1132 Handoff report to Ayaz MAGALLON

## 2023-03-17 NOTE — PROGRESS NOTES
4 Eyes Skin Assessment Completed by Fabien, RN and Nargis RN.    Head WDL  Ears WDL  Nose WDL  Mouth WDL  Neck Incision  Breast/Chest WDL  Shoulder Blades WDL  Spine WDL  (R) Arm/Elbow/Hand Scar, dialysis shunt both upper and lower arm  (L) Arm/Elbow/Hand Redness, dressing over injection site from procedure. Dressing CDI  Abdomen WDL  Groin WDL  Scrotum/Coccyx/Buttocks WDL  (R) Leg WDL  (L) Leg WDL  (R) Heel/Foot/Toe WDL  (L) Heel/Foot/Toe WDL    Devices In Places Blood Pressure Cuff, Pulse Ox, SCD's, and Oxy Mask      Interventions In Place Pillows    Possible Skin Injury No    Pictures Uploaded Into Epic N/A  Wound Consult Placed N/A  RN Wound Prevention Protocol Ordered No

## 2023-03-17 NOTE — OR NURSING
1132 Report received from NAUN Kendrick    1150 I woke Elier up and asked about his pain level, he reports it as 10/10 and fell asleep immediately after responding.     1205 Hand off given to NAUN Kendrick.

## 2023-03-17 NOTE — ANESTHESIA POSTPROCEDURE EVALUATION
Patient: Elier Bustillos    Procedure Summary     Date: 03/17/23 Room / Location: [facility] ROOM 23 / SURGERY SAME DAY [facility]    Anesthesia Start: 0912 Anesthesia Stop: 1032    Procedure: PARATHYROID EXPLORATION WITH PARATHYROID AUTOTRANSPLANTATION AND PARATHYROID HORMONE MONITORING. (Neck) Diagnosis: (SECONDARY HYPERPARATHYROIDISM)    Surgeons: Candy Godoy M.D. Responsible Provider: Koko Rodriguez M.D.    Anesthesia Type: general ASA Status: 3          Final Anesthesia Type: general  Last vitals  BP   Blood Pressure: (!) 151/83    Temp   36.3 °C (97.3 °F)    Pulse   81   Resp   19    SpO2   100 %      Anesthesia Post Evaluation    Patient location during evaluation: PACU  Patient participation: complete - patient participated  Level of consciousness: awake and alert  Pain score: 6    Airway patency: patent  Anesthetic complications: no  Cardiovascular status: hemodynamically stable  Respiratory status: acceptable  Hydration status: euvolemic    PONV: none          No notable events documented.

## 2023-03-17 NOTE — DISCHARGE INSTRUCTIONS
Instrucciones Para [place]  (Home Care Instructions)    ACTIVIDAD: Descanse y tome todo con mucha calma las primeras 24 horas después de chau cirugía.  Cisco persona adulta responsable debe permanecer con usted hollie daren periodo de tiempo.  Es normal sentirse sonoliento o sonolienta hollie esas primeras horas.  Le recomendamos que no zhang nada que requiera equilibrio, saira decisiones a mucha coordinación de chau parte.    NO ZHANG ESTO PURANTE LAS PRIMERAS 24 HORAS:   Manejar o conducir algún vehiculo, operar maquinarias o utilizar electrodomesticos.   Beber cerveza o algún otro tipo de bebida alcohólica.   Saira decisiones importantes o firmar documentos legales.    INSTRUCCIONES ESPECIALES:      DIETA: Para evitar las nauseas, prosiga despacito con chau dieta a medida que pueda ir tolerándola mejor, evite comidas muy condimentadas o grasosas hollie daren primer día.  Vaya agregando comidas más substanciadas a chau dieta a medida que asi lo indique chau médica.  Los bebés pueden beber leche preparada o formula, ásl ra también leche del seno de la madre a medida que vayan teniendo hambre.  SIGA AGREGANDO LIQUIDOS Y COMIDAS CON FIBRA PARA EVITAR ESTREÑIMIENTO.    RA BAÑARSE Y CAMBIAR LOS VENDAJES DE LA CIRUGIA:       MEDICAMENTOS/MEDICINAS:  Vuelva a saira tobias medicamentos diarios.  [place] los medicamentos que se le prescribe con un poco de comida.  Si no le prescribe ningún tipo de medicamento, entonces puede saira medicinas para el dolor que no contienen aspirina, si las necesita.  LAS MEDICINAS PARA EL DOLOR PUEDEN ESTREÑIRLE MUCHO.  [place] un suavizante para el excremento o materia fecal (stool softener) o un laxativo ra por ejemplo: senokot, pericolase, o leche de magnesia, si lo necesita.    La prescripción la administro   .  La ultima sosis de medicina para el dolor fue administrada   .     Se debe hacer cisco consulta medica con el doctor en   , Líame para hacer la iván.    Usted debe LIAMAR A CHAU MEDICO si tiene los  siguientes síntomas:   -   Cisco fiebre más jesenia de 101 grados Fahrenheit.   -   Un dolor incesante aún con los medicamentos, o nauseas y vómito persistente.   -   Un sangrado excesivo (maxwell que traspasa los vendajes o gasas) o algúln tipo de drenaje inesperado que proviene de la henda.     -   Un color mi exagerado o hinchazón alrededor del área en donde se le hizo incisión o ifeanyi, o un drenaje de pus o con olor kaitlynn proveniente de la henda.   -    La inhabilidad de orinar o vaciar chau vejiga en 8 horas.   -    Problemas con a respiración o helen en el pecho.    Usted debe llamar al 911 si se presentan problemas con el dolor al respirar o el pecho.  Si no se puede ponnoer en comunicación con un medica o con el centro de cirugía, usted debe ir a la estación de emergencia (emergency room) más cercana o a un centro de atención de urgencia (urgent care center).  El teléfono del medico es:       LOS SÍNTOMAS DE UN LEVE RESFRIO SON MUY NORMALES.  ADEMÁS USTED PUEDE LLEGAR A SENTIR HELEN GENERALES DE MÚSCULOS, IRRITACIÓN EN LA GARGANTA, HELEN DE ADELIA Y/O UN POCO DE NAUSEAS.    Sie tiene alguna pregunta, llame a chau médico.  Si chau médico no se encuentra disponible, por favor llame al Centro de Cirugía at [phone].  el Centro está abierto de Lunes a Viernes desde las 7:00 de la manana hasta las 5:00 de la noche.      Mi firma a continuación indica que he recibido y entiendco estas instrucciones acera de los cuidados en la casa (Home Care Instructions)    Aissatou recibirá cisco encuesta en la correspondencia en las siguientes semanas y le pedimos que por favor tome un momento para completar christy encuesta y regresaría a hosotros.  Nuestro objetivó es brindarle un cuidado muy lawrence y par lo tanto apreciamos tobias coméntanos.  Muchas catie por jeff escogido el Centro de Cirugía de [facility].

## 2023-03-17 NOTE — CONSULTS
Hospital Medicine Consultation    Date of Service  3/17/2023    Referring Physician  Candy Godoy M.D.    Consulting Physician  Bessie bAad M.D.    Reason for Consultation  Monitoring of calcium status post parathyroidectomy    History of Presenting Illness  32 y.o. male with past medical history of hypertension, end-stage renal disease on hemodialysis and secondary hyperparathyroidism Who presented 3/17/2023 for elective parathyroid exploration with intraoperative PTH monitoring, subtotal parathyroidectomy with autotransplantation into left forearm.  Medicine was consulted for help with monitoring of calcium postoperatively.  Patient seen at bedside, he is sleeping comfortably postsurgery, no acute issues.  Pain currently controlled.    Vitals are stable he is afebrile heart rate 72, /63 he saturating well 100% on oxymask post op.   Labs are stable his electrolytes are stable, creatinine 6.09.  Calcium 8.3 with albumin 4.3.  Nephrology is aware of patient and following along.    Resume home blood pressure medications.  Continue calcium carbonate 3000 mg 3 times daily continue calcium every 6 hours and replace as needed    Review of Systems  Review of Systems   Constitutional:  Negative for fever.   HENT:  Negative for sore throat.    Eyes:  Negative for blurred vision.   Respiratory:  Negative for shortness of breath.    Cardiovascular:  Negative for chest pain.   Gastrointestinal:  Negative for abdominal pain, nausea and vomiting.   Musculoskeletal:         Postsurgical anterior neck discomfort     Past Medical History   has a past medical history of Dialysis, Dialysis care, Hypertension, and Renal disorder.    Surgical History   has a past surgical history that includes other orthopedic surgery; av fistula creation (12/29/2010); av fistula creation (6/7/2012); cath placement (6/7/2012); recovery (7/3/2012); recovery (7/20/2012); av fistula creation (8/21/2012); av fistula revision (Right,  2016); exploratory laparotomy (2018); and pr explore parathyroid glands (N/A, 3/17/2023).    Family History  family history includes Diabetes in his mother; Heart Attack in his father; Hypertension in his father and mother.    Social History   reports that he has been smoking cigarettes. He has never used smokeless tobacco. He reports that he does not drink alcohol and does not use drugs.    Medications  Prior to Admission Medications   Prescriptions Last Dose Informant Patient Reported? Taking?   amLODIPine (NORVASC) 10 MG Tab 3/16/2023 at 2200  Yes No   Sig: Take 10 mg by mouth 2 times a day.   chlorhexidine (PERIDEX) 0.12 % Solution   Yes No   Sig: Take  by mouth as needed.   Patient not taking: Reported on 3/2/2023   cloNIDine (CATAPRES) 0.2 MG/24HR PATCH WEEKLY patch 3/16/2023 at 2200  Yes No   hydrALAZINE (APRESOLINE) 100 MG tablet 3/16/2023 at 2200  Yes No   Sig: Take 100 mg by mouth 2 times a day.   losartan (COZAAR) 100 MG Tab 3/16/2023 at 2200  Yes No   Sig: Take 50 mg by mouth 2 times a day.   sevelamer carbonate (RENVELA) 800 MG Tab tablet 3/16/2023  Yes No   Si,400 mg 3 times a day with meals. Two tabs by mouth three times daily   sildenafil citrate (VIAGRA) 50 MG tablet   No No   Sig: Take 1 Tablet by mouth every 72 hours as needed for Erectile Dysfunction.      Facility-Administered Medications: None       Allergies  No Known Allergies    Physical Exam  Temp:  [36.3 °C (97.3 °F)-37.1 °C (98.7 °F)] 36.3 °C (97.4 °F)  Pulse:  [72-89] 80  Resp:  [10-19] 12  BP: (103-160)/(56-94) 103/56  SpO2:  [98 %-100 %] 99 %    Physical Exam  Constitutional:       Appearance: He is not ill-appearing or toxic-appearing.   HENT:      Head: Normocephalic.      Mouth/Throat:      Mouth: Mucous membranes are dry.   Eyes:      Conjunctiva/sclera: Conjunctivae normal.   Neck:      Comments: Anterior surgical site with postop dressing no significant swelling  Cardiovascular:      Rate and Rhythm: Normal rate.       Heart sounds: Normal heart sounds.   Pulmonary:      Breath sounds: Normal breath sounds.   Abdominal:      Palpations: Abdomen is soft.   Musculoskeletal:         General: Normal range of motion.   Skin:     General: Skin is warm.   Neurological:      General: No focal deficit present.      Mental Status: He is alert and oriented to person, place, and time.   Psychiatric:         Mood and Affect: Mood normal.         Behavior: Behavior normal.       Fluids      Laboratory      Recent Labs     03/17/23  0751 03/17/23  1147   SODIUM 137  --    POTASSIUM 5.0  --    CHLORIDE 97  --    CO2 25  --    GLUCOSE 97  --    BUN 30*  --    CREATININE 6.09*  --    CALCIUM 9.1 8.3*                     Imaging  No orders to display       Assessment/Plan  * Secondary hyperparathyroidism, renal (HCC)- (present on admission)  Assessment & Plan  Secondary hyperparathyroidism status post parathyroidectomy ablation and parathyroidectomy with explantation  -Close monitoring of postoperative calcium levels  -Continue scheduled calcium carbonate  -Monitor on telemetry  -Every 6 hours calcium    Essential hypertension- (present on admission)  Assessment & Plan  Chronic hypertension, currently controlled  -Continue all home antihypertensives  -As needed hydralazine    ESRD (end stage renal disease) on dialysis (HCC)- (present on admission)  Assessment & Plan  ESRD on chronic HD   - nephrology is following, HD schedule per neph  -Continues Levemir and calcitriol, no diet restriction  -Renally dose adjust meds as needed

## 2023-03-17 NOTE — ANESTHESIA PROCEDURE NOTES
Airway    Date/Time: 3/17/2023 9:19 AM  Performed by: Koko Rodriguez M.D.  Authorized by: Koko Rodriguez M.D.     Location:  OR  Urgency:  Elective  Indications for Airway Management:  Anesthesia      Spontaneous Ventilation: absent    Sedation Level:  Deep  Preoxygenated: Yes    Patient Position:  Sniffing  Mask Difficulty Assessment:  0 - not attempted  Final Airway Type:  Endotracheal airway  Final Endotracheal Airway:  ETT  Cuffed: Yes    Technique Used for Successful ETT Placement:  Direct laryngoscopy    Insertion Site:  Oral  Blade Type:  Jennifer  Laryngoscope Blade/Videolaryngoscope Blade Size:  3  ETT Size (mm):  7.0  Measured from:  Teeth  ETT to Teeth (cm):  22  Placement Verified by: auscultation and capnometry    Cormack-Lehane Classification:  Grade I - full view of glottis  Number of Attempts at Approach:  1

## 2023-03-17 NOTE — ANESTHESIA PROCEDURE NOTES
Arterial Line  Performed by: Koko Rodriguez M.D.  Authorized by: Koko Rodriguez M.D.     Start Time:  3/17/2023 9:23 AM  End Time:  3/17/2023 9:26 AM  Localization: ultrasound guidance and surface landmarks    Patient Location:  OR  Indication: continuous blood pressure monitoring        Catheter Size:  20 G  Seldinger Technique?: Yes    Laterality:  Left  Site:  Radial artery  Line Secured:  Antimicrobial disc, tape and transparent dressing  Events: patient tolerated procedure well with no complications

## 2023-03-17 NOTE — OR NURSING
1235 Report given to NAUN Barillas, all questions answered. Pt transport request placed at this time.     1311 Pt transported to room via gurney with portable 02 and all personal belongings. Spouse notified via phone call.

## 2023-03-17 NOTE — OP REPORT
Operative Report     Date: 3/17/2023    Surgeon: Candy Godoy M.D.     Assistant: SPEEDY Carmichael    My assistant, SPEEDY Carmichael, was medically necessary for this procedure. He placed the precordial electrodes were placed by the surgical assistant, who then monitored the recurrent laryngeal nerve throughout the procedure, as well as retracted the tissues to aid in my visualization of the parathyroid tissue, and assisted with wound closure.    Anesthesiologist: Jennifer SANDERS    Pre-operative Diagnosis: N25.81 secondary hyperparathyroidism of renal origin    Post-operative Diagnosis: N25.81 secondary hyperparathyroidism of renal origin    Procedure:   1. parathyroid exploration with intraoperative PTH monitoring (31866 Parathyroidectomy or exploration of parathyroid) (subtotal parathyroidectomy with autotransplantation into left forearm  2. bilateral recurrent laryngeal nerve monitoring   3. Right cervical thymectomy (45815 thymectomy, partial or total, transcervical approach)    Findings: Four enlarged hypercellular parathyroid glands, a portion of the left superior parathyroid was autotransplanted into the left forearm.  Intraoperative PTH monitoring was performed with levels drawn at appropriate intervals. There was a 79% drop from the highest level, with the final level being 400 picograms/deciliter.     Procedure in detail: The patient was identified in the pre-operative holding area and brought to the operating room. Correct side and site were identified. GETA was smoothly induced. The patient was prepped and draped in the usual sterile fashion.     With the patient in the supine position, the head of the bed slightly elevated, the neck slightly extended, and the patient intubated with a NIM endotracheal tube, the precordial electrodes were placed by the surgical assistant, who then monitored the recurrent laryngeal nerve throughout the procedure.     The neck was prepared with betadiene and draped in the  usual fashion. The neck was entered through a short lower transverse cervical incision and carried down through the platysma. Subplatysmal flaps were dissected superiorly and inferiorly down to the sternal notch. The midline cervical fascia was incised down to the capsule of the thyroid.     The strap muscles were mobilized off the left thyroid lobe, and with careful dissection we identified an enlarged appearing left inferior parathyroid gland. The vascular pedicles were divided with the Ligasure device. The gland was sent for immediate pathologic exam, which revealed a hypercellular enlarged parathyroid gland.     Dissecting more superiorly, we identified an enlarged appearing left superior parathyroid gland. The vascular pedicles were divided with the Ligasure device. A portion of the gland was cut into very small pieces and autotransplanted into the left forearm through a small incision that was made into the subcutaneous tissues. The remainder of the gland was sent for immediate pathologic exam, which revealed a hypercellular enlarged parathyroid gland.     We turned our attention to the right side. The strap muscles were mobilized off the right thyroid lobe, and with careful dissection we identified an enlarged appearing right inferior parathyroid gland. The vascular pedicles were divided with the Ligasure device. The gland was sent for immediate pathologic exam, which revealed a hypercellular enlarged parathyroid gland.     Dissecting more superiorly, we identified an enlarged appearing right superior parathyroid gland. The vascular pedicles were divided with the Ligasure device. The gland was sent for immediate pathologic exam, which revealed a hypercellular enlarged parathyroid gland.     We identified some enlarged tissue inferiorly, which with careful dissection was not a parathyroid but the right cervical thymus. It was carefully elevated and excised, no supernumerary parathyroid tissue was wound. It was  carefully excised and sent for permanent pathologic exam.    Intraoperative PTH monitoring was performed with levels drawn at appropriate intervals. There was a 79% drop from the highest level, with the final level being 400 picograms/deciliter.     The integrity of the bilateral laryngeal nerves was documented. Hemoblast was placed in the neck. The midline cervical fascia was reapproximated with running 3-0 Vicryl. Platysma was reapproximated with interrupted 3-0 Vicryl. The skin of both incisions was closed with running monocryl.     The patient was awakened from general anesthetic, and was taken to the recovery room in stable condition.     Sponge and needle counts were correct at the end of the case.     Specimen:   Left inferior parathyroid  Portion left superior parathyroid  Right inferior parathyroid  Right superior parathyroid  Right cervical thymus    EBL: minimal     Dispo: stable, extubated, to PACU     Candy Godoy M.D.   [facility]   [phone]

## 2023-03-17 NOTE — DISCHARGE PLANNING
Outpatient Dialysis Note     Confirmed patient is active at:    [facility] [place]  [address]  [place] [address]    Schedule: Mon, Wed, Fri  Time: 2:30 PM     Patient is seen by Dr. Rosas in HD clinic.     Spoke with Ayaz at facility who confirmed patient information.   Forwarded records for review.     Xitlaly Reyes   Dialysis Coordinator / [facility]  Ph: [phone]

## 2023-03-17 NOTE — ANESTHESIA TIME REPORT
Anesthesia Start and Stop Event Times     Date Time Event    3/17/2023 0901 Ready for Procedure     0912 Anesthesia Start     1032 Anesthesia Stop        Responsible Staff  03/17/23    Name Role Begin End    Koko Rodriguez M.D. Anesth 0912 1032        Overtime Reason:  no overtime (within assigned shift)    Comments:

## 2023-03-18 PROBLEM — E83.51 HYPOCALCEMIA: Status: ACTIVE | Noted: 2023-03-18

## 2023-03-18 LAB
ALBUMIN SERPL BCP-MCNC: 3.5 G/DL (ref 3.2–4.9)
ALBUMIN SERPL BCP-MCNC: 3.5 G/DL (ref 3.2–4.9)
ALBUMIN SERPL BCP-MCNC: 3.7 G/DL (ref 3.2–4.9)
ALBUMIN SERPL BCP-MCNC: 3.9 G/DL (ref 3.2–4.9)
ANION GAP SERPL CALC-SCNC: 15 MMOL/L (ref 7–16)
BASOPHILS # BLD AUTO: 0.1 % (ref 0–1.8)
BASOPHILS # BLD: 0.01 K/UL (ref 0–0.12)
BUN SERPL-MCNC: 46 MG/DL (ref 8–22)
CALCIUM SERPL-MCNC: 6.8 MG/DL (ref 8.5–10.5)
CALCIUM SERPL-MCNC: 6.9 MG/DL (ref 8.5–10.5)
CALCIUM SERPL-MCNC: 6.9 MG/DL (ref 8.5–10.5)
CALCIUM SERPL-MCNC: 7.1 MG/DL (ref 8.5–10.5)
CHLORIDE SERPL-SCNC: 94 MMOL/L (ref 96–112)
CO2 SERPL-SCNC: 23 MMOL/L (ref 20–33)
CREAT SERPL-MCNC: 8.11 MG/DL (ref 0.5–1.4)
EOSINOPHIL # BLD AUTO: 0 K/UL (ref 0–0.51)
EOSINOPHIL NFR BLD: 0 % (ref 0–6.9)
ERYTHROCYTE [DISTWIDTH] IN BLOOD BY AUTOMATED COUNT: 42.7 FL (ref 35.9–50)
GFR SERPLBLD CREATININE-BSD FMLA CKD-EPI: 8 ML/MIN/1.73 M 2
GLUCOSE SERPL-MCNC: 135 MG/DL (ref 65–99)
HAV IGM SERPL QL IA: NORMAL
HBV CORE IGM SER QL: NORMAL
HBV SURFACE AB SERPL IA-ACNC: 228 MIU/ML (ref 0–10)
HBV SURFACE AG SER QL: NORMAL
HCT VFR BLD AUTO: 32.9 % (ref 42–52)
HCV AB SER QL: NORMAL
HGB BLD-MCNC: 11 G/DL (ref 14–18)
IMM GRANULOCYTES # BLD AUTO: 0.04 K/UL (ref 0–0.11)
IMM GRANULOCYTES NFR BLD AUTO: 0.4 % (ref 0–0.9)
LYMPHOCYTES # BLD AUTO: 0.63 K/UL (ref 1–4.8)
LYMPHOCYTES NFR BLD: 6.4 % (ref 22–41)
MCH RBC QN AUTO: 32.4 PG (ref 27–33)
MCHC RBC AUTO-ENTMCNC: 33.4 G/DL (ref 33.7–35.3)
MCV RBC AUTO: 97.1 FL (ref 81.4–97.8)
MONOCYTES # BLD AUTO: 0.41 K/UL (ref 0–0.85)
MONOCYTES NFR BLD AUTO: 4.2 % (ref 0–13.4)
NEUTROPHILS # BLD AUTO: 8.76 K/UL (ref 1.82–7.42)
NEUTROPHILS NFR BLD: 88.9 % (ref 44–72)
NRBC # BLD AUTO: 0 K/UL
NRBC BLD-RTO: 0 /100 WBC
PHOSPHATE SERPL-MCNC: 3.6 MG/DL (ref 2.5–4.5)
PLATELET # BLD AUTO: 200 K/UL (ref 164–446)
PMV BLD AUTO: 10.7 FL (ref 9–12.9)
POTASSIUM SERPL-SCNC: 6.3 MMOL/L (ref 3.6–5.5)
RBC # BLD AUTO: 3.39 M/UL (ref 4.7–6.1)
SODIUM SERPL-SCNC: 132 MMOL/L (ref 135–145)
WBC # BLD AUTO: 9.9 K/UL (ref 4.8–10.8)

## 2023-03-18 PROCEDURE — 700102 HCHG RX REV CODE 250 W/ 637 OVERRIDE(OP): Performed by: SURGERY

## 2023-03-18 PROCEDURE — 770020 HCHG ROOM/CARE - TELE (206)

## 2023-03-18 PROCEDURE — 5A1D70Z PERFORMANCE OF URINARY FILTRATION, INTERMITTENT, LESS THAN 6 HOURS PER DAY: ICD-10-PCS | Performed by: INTERNAL MEDICINE

## 2023-03-18 PROCEDURE — 96376 TX/PRO/DX INJ SAME DRUG ADON: CPT

## 2023-03-18 PROCEDURE — 85025 COMPLETE CBC W/AUTO DIFF WBC: CPT

## 2023-03-18 PROCEDURE — 700111 HCHG RX REV CODE 636 W/ 250 OVERRIDE (IP): Performed by: SURGERY

## 2023-03-18 PROCEDURE — 99233 SBSQ HOSP IP/OBS HIGH 50: CPT | Performed by: HOSPITALIST

## 2023-03-18 PROCEDURE — 36415 COLL VENOUS BLD VENIPUNCTURE: CPT

## 2023-03-18 PROCEDURE — 84100 ASSAY OF PHOSPHORUS: CPT

## 2023-03-18 PROCEDURE — A9270 NON-COVERED ITEM OR SERVICE: HCPCS | Performed by: SURGERY

## 2023-03-18 PROCEDURE — A9270 NON-COVERED ITEM OR SERVICE: HCPCS | Performed by: NURSE PRACTITIONER

## 2023-03-18 PROCEDURE — A9270 NON-COVERED ITEM OR SERVICE: HCPCS | Performed by: INTERNAL MEDICINE

## 2023-03-18 PROCEDURE — 82040 ASSAY OF SERUM ALBUMIN: CPT

## 2023-03-18 PROCEDURE — 700102 HCHG RX REV CODE 250 W/ 637 OVERRIDE(OP): Performed by: NURSE PRACTITIONER

## 2023-03-18 PROCEDURE — 700111 HCHG RX REV CODE 636 W/ 250 OVERRIDE (IP): Performed by: NURSE PRACTITIONER

## 2023-03-18 PROCEDURE — 90935 HEMODIALYSIS ONE EVALUATION: CPT | Performed by: INTERNAL MEDICINE

## 2023-03-18 PROCEDURE — 86706 HEP B SURFACE ANTIBODY: CPT

## 2023-03-18 PROCEDURE — 82310 ASSAY OF CALCIUM: CPT | Mod: 91

## 2023-03-18 PROCEDURE — 90935 HEMODIALYSIS ONE EVALUATION: CPT

## 2023-03-18 PROCEDURE — 80048 BASIC METABOLIC PNL TOTAL CA: CPT

## 2023-03-18 PROCEDURE — 700102 HCHG RX REV CODE 250 W/ 637 OVERRIDE(OP): Performed by: INTERNAL MEDICINE

## 2023-03-18 PROCEDURE — 80074 ACUTE HEPATITIS PANEL: CPT

## 2023-03-18 RX ORDER — CALCIUM ACETATE 667 MG/1
667 TABLET ORAL
Status: DISCONTINUED | OUTPATIENT
Start: 2023-03-18 | End: 2023-03-21

## 2023-03-18 RX ORDER — CALCIUM CARBONATE 500 MG/1
5000 TABLET, CHEWABLE ORAL EVERY 6 HOURS
Status: DISCONTINUED | OUTPATIENT
Start: 2023-03-18 | End: 2023-03-22

## 2023-03-18 RX ADMIN — LOSARTAN POTASSIUM 50 MG: 50 TABLET, FILM COATED ORAL at 17:43

## 2023-03-18 RX ADMIN — SEVELAMER CARBONATE 2400 MG: 800 TABLET, FILM COATED ORAL at 09:12

## 2023-03-18 RX ADMIN — HYDROCODONE BITARTRATE AND ACETAMINOPHEN 1 TABLET: 5; 325 TABLET ORAL at 20:24

## 2023-03-18 RX ADMIN — CALCIUM GLUCONATE 1 G: 20 INJECTION, SOLUTION INTRAVENOUS at 20:25

## 2023-03-18 RX ADMIN — HYDROCODONE BITARTRATE AND ACETAMINOPHEN 1 TABLET: 5; 325 TABLET ORAL at 13:27

## 2023-03-18 RX ADMIN — LOSARTAN POTASSIUM 50 MG: 50 TABLET, FILM COATED ORAL at 06:08

## 2023-03-18 RX ADMIN — Medication 667 MG: at 17:49

## 2023-03-18 RX ADMIN — CALCIUM GLUCONATE 1 G: 20 INJECTION, SOLUTION INTRAVENOUS at 13:52

## 2023-03-18 RX ADMIN — CALCIUM GLUCONATE 1 G: 20 INJECTION, SOLUTION INTRAVENOUS at 07:47

## 2023-03-18 RX ADMIN — CALCIUM GLUCONATE 1 G: 20 INJECTION, SOLUTION INTRAVENOUS at 01:23

## 2023-03-18 RX ADMIN — CALCITRIOL CAPSULES 0.25 MCG 0.25 MCG: 0.25 CAPSULE ORAL at 06:10

## 2023-03-18 RX ADMIN — HYDRALAZINE HYDROCHLORIDE 100 MG: 50 TABLET, FILM COATED ORAL at 17:43

## 2023-03-18 RX ADMIN — AMLODIPINE BESYLATE 10 MG: 10 TABLET ORAL at 17:46

## 2023-03-18 RX ADMIN — CALCIUM CARBONATE 5000 MG: 500 TABLET, CHEWABLE ORAL at 09:13

## 2023-03-18 RX ADMIN — CALCIUM CARBONATE 5000 MG: 500 TABLET, CHEWABLE ORAL at 14:19

## 2023-03-18 RX ADMIN — CALCIUM CARBONATE 5000 MG: 500 TABLET, CHEWABLE ORAL at 17:43

## 2023-03-18 RX ADMIN — MORPHINE SULFATE 2 MG: 4 INJECTION INTRAVENOUS at 04:07

## 2023-03-18 RX ADMIN — HYDRALAZINE HYDROCHLORIDE 100 MG: 50 TABLET, FILM COATED ORAL at 06:10

## 2023-03-18 RX ADMIN — CALCIUM GLUCONATE 1 G: 20 INJECTION, SOLUTION INTRAVENOUS at 02:30

## 2023-03-18 ASSESSMENT — COGNITIVE AND FUNCTIONAL STATUS - GENERAL
DAILY ACTIVITIY SCORE: 24
SUGGESTED CMS G CODE MODIFIER MOBILITY: CI
MOBILITY SCORE: 23
SUGGESTED CMS G CODE MODIFIER DAILY ACTIVITY: CH
CLIMB 3 TO 5 STEPS WITH RAILING: A LITTLE

## 2023-03-18 ASSESSMENT — PAIN DESCRIPTION - PAIN TYPE: TYPE: ACUTE PAIN

## 2023-03-18 ASSESSMENT — ENCOUNTER SYMPTOMS
ROS GI COMMENTS: NO TROUBLES SWALLOWING
FEVER: 0
SHORTNESS OF BREATH: 0
CHILLS: 0

## 2023-03-18 ASSESSMENT — FIBROSIS 4 INDEX: FIB4 SCORE: .495

## 2023-03-18 NOTE — CARE PLAN
Problem: Pain - Standard  Goal: Alleviation of pain or a reduction in pain to the patient’s comfort goal  Outcome: Progressing     Problem: Knowledge Deficit - Standard  Goal: Patient and family/care givers will demonstrate understanding of plan of care, disease process/condition, diagnostic tests and medications  Outcome: Progressing     The patient is Stable - Low risk of patient condition declining or worsening    Shift Goals  Clinical Goals: pain control, advance diet, monitor calcium  Patient Goals: rest  Family Goals: no family at bedside    Progress made toward(s) clinical / shift goals:  Patient understands the importance of advancing his diet. Patient understands the importance of voicing his feelings and needs especially around pain.     Patient is not progressing towards the following goals:

## 2023-03-18 NOTE — PROGRESS NOTES
Report given to Edna T8 RN. No questions or concerns at this time. Patient transported to T804 with this RN and ACLS RN. All belongings taken with patient. Chart and medications given to T8 RN.

## 2023-03-18 NOTE — ASSESSMENT & PLAN NOTE
Secondary hyperparathyroidism status post parathyroidectomy ablation and parathyroidectomy  During dialysis he had an increased calcium bath.   Increased calcitriol to 1.5mcg daily, PhosLo 1,334mg TID, calcium carbonate 5000 mg QID  Serial calcium labs  Monitor on telemetry for any arrhythmia

## 2023-03-18 NOTE — CONSULTS
DATE OF SERVICE:  03/17/2023     NEPHROLOGY CONSULTATION     REQUESTING PHYSICIAN:  Candy Godoy MD.     REASON FOR CONSULTATION:  To evaluate and provide dialysis for patient with   end-stage renal disease, admitted for elective subtotal parathyroidectomy with   autotransplantation due to secondary hyperparathyroidism.     HISTORY OF PRESENT ILLNESS:  The patient is a 32-year-old male with end-stage   renal disease, on hemodialysis, who has been receiving his dialysis treatments   in [place] in [facility] on Monday, Wednesday and   Friday.  Prior to procedure, received additional dialysis yesterday.    Procedure was parathyroidectomy, doing well, alert, oriented, complaining of   mild neck pain in the incision site, otherwise no shortness of breath.  No   vomiting.  Mild nausea.     REVIEW OF SYSTEMS:  GENERAL:  Positive for fatigue.  No fever or chills.  HEENT:  No sinus pain, no sore throat, no nosebleeds.  NECK:  Mild pain in the surgical site.  No lymphadenopathy.  EYES:  No double or blurry vision, no eye pain.  RESPIRATORY:  No cough, no hemoptysis, no shortness of breath.  CARDIOVASCULAR:  No edema, no chest pain, no palpitation.  GASTROINTESTINAL:  Positive for mild nausea, no vomiting, no diarrhea, no   abdominal pain.  All other systems reviewed, all negative.      PAST MEDICAL HISTORY:  End-stage renal disease, on hemodialysis; hypertension;   hyperparathyroidism secondary to renal failure.     PAST SURGICAL HISTORY:  Orthopedic surgery, fistula creation, exploratory   laparotomy and recent surgery for parathyroid.     ALLERGIES:  No known drug allergies.     OUTPATIENT MEDICATIONS:  Reviewed.     PHYSICAL EXAMINATION:  VITAL SIGNS:  Blood pressure 158/94, heart rate 75, temperature 36.3 Celsius.  GENERAL:  Well-developed, well-nourished male in no acute distress.  HEENT:  Normocephalic, atraumatic.  Pupils equal, round, reactive to light.    Extraocular movement intact.  Nares  patent.  Oropharynx clear, moist mucosa,   no erythema or exudate.  NECK:  Supple.  Incision site covered with dressing.  No bleeding.  No   lymphadenopathy.  LUNGS:  Clear to auscultation bilaterally.  No rales, wheezes, no rhonchi.  HEART:  Regular rhythm, no rub or gallop.  ABDOMEN:  Soft, nontender, nondistended.  Bowel sounds present.  No palpable   mass.  EXTREMITIES:  No cyanosis, clubbing, no edema.  NEUROLOGIC:  Alert, oriented x3, no focal deficit.  Cranial nerves II through   XII grossly intact.  SKIN:  Warm, dry.  No erythema or rash.     LABORATORY DATA:  Laboratory results reviewed, revealed sodium 137, potassium   5.0, creatinine 6.0, calcium 9.1.     ASSESSMENT AND PLAN:  The patient is a 32-year-old male with end-stage renal   disease, on hemodialysis, admitted for elective surgery for subtotal   parathyroidectomy with autotransplantation.  1.  End-stage renal disease.  We will continue dialysis tomorrow, then on   Monday, Wednesday and Friday.  2.  Electrolytes remain well controlled, to monitor calcium closely.  Replace   as needed.  3.  Hypertension.  Blood pressure remains well controlled.  4.  Anemia.  To monitor hemoglobin level.  5.  Volume well controlled.  6.  Status post parathyroidectomy.  To monitor calcium.  Added calcitriol 1   mcg daily.     RECOMMENDATIONS:  To schedule dialysis for tomorrow.  Continue Monday,   Wednesday and Friday.  Provide renal diet.  Adjust all medications to renal   doses.  Monitor calcium level closely.  We will follow the patient closely.     Thank you for the consult.  Above plan was discussed with Dr. Candy Godoy   and Dr. Abad.        ______________________________  MD ADE RIZO/FIORELLA/ARLEN    DD:  03/17/2023 17:00  DT:  03/17/2023 17:43    Job#:  [id number]

## 2023-03-18 NOTE — PROGRESS NOTES
Hospital Medicine Daily Progress Note    Date of Service  3/18/2023    Chief Complaint  Elier Bustillos is a 32 y.o. male admitted 3/17/2023 with elective surgery.     Hospital Course  Mr. Carmelo Bustillos is a 32 y.o. male with past medical history of hypertension, end-stage renal disease on hemodialysis and secondary hyperparathyroidism Who presented 3/17/2023 for elective parathyroid exploration with intraoperative PTH monitoring, subtotal parathyroidectomy with autotransplantation into left forearm.  Medicine was consulted for help with monitoring of calcium postoperatively.  Patient seen at bedside, he is sleeping comfortably postsurgery, no acute issues.  Pain currently controlled.   Vitals are stable he is afebrile heart rate 72, /63 he saturating well 100% on oxymask post op.   Labs are stable his electrolytes are stable, creatinine 6.09.  Calcium 8.3 with albumin 4.3.  Nephrology is aware of patient and following along.   Resume home blood pressure medications.  Continue calcium carbonate 3000 mg 3 times daily continue calcium every 6 hours and replace as needed    Interval Problem Update  3/18: Mr. Carmelo Bustillos was evaluated and examined in the CDU. His calcium this morning was 6.8 therefore he was given one dose of IV calcium gluconate and had dialysis with increased calcium bath. Afterwards his calcium remains low at 7.1 and another dose of IV calcium ordered. Q6 hour checks. The dose of TUMS has been increased to 5,000 mg TID. I discussed with Dr. Jay nephrology in person. His blood pressure is low and no fluid was pulled during dialysis.     I have discussed this patient's plan of care and discharge plan at IDT rounds today with Case Management, Nursing, Nursing leadership, and other members of the IDT team.    Consultants/Specialty  nephrology    Code Status  Full Code    Disposition  Patient is not medically cleared for discharge.   Anticipate discharge to to home with close  outpatient follow-up.  I have placed the appropriate orders for post-discharge needs.    Review of Systems  Review of Systems   Constitutional:  Negative for chills and fever.   Respiratory:  Negative for shortness of breath.    Cardiovascular:  Negative for chest pain.   Gastrointestinal:         No troubles swallowing   All other systems reviewed and are negative.     Physical Exam  Temp:  [36.3 °C (97.4 °F)-36.9 °C (98.5 °F)] 36.9 °C (98.5 °F)  Pulse:  [70-80] 70  Resp:  [12-20] 16  BP: ()/(55-75) 93/55  SpO2:  [92 %-100 %] 93 %    Physical Exam  Vitals and nursing note reviewed.   Constitutional:       General: He is not in acute distress.     Appearance: He is not toxic-appearing.   HENT:      Head: Normocephalic and atraumatic.      Mouth/Throat:      Mouth: Mucous membranes are dry.      Pharynx: Oropharynx is clear.   Eyes:      General: No scleral icterus.     Conjunctiva/sclera: Conjunctivae normal.   Neck:      Comments: Anterior neck surgical site covered.  Cardiovascular:      Rate and Rhythm: Normal rate and regular rhythm.   Pulmonary:      Effort: Pulmonary effort is normal.      Breath sounds: Normal breath sounds.   Abdominal:      General: There is no distension.      Tenderness: There is no abdominal tenderness.   Musculoskeletal:      Cervical back: No rigidity.      Right lower leg: No edema.      Left lower leg: No edema.      Comments: Left forearm surgical site covered  Fistula with a thrill   Neurological:      General: No focal deficit present.      Mental Status: He is alert and oriented to person, place, and time.   Psychiatric:         Mood and Affect: Mood normal.         Behavior: Behavior normal.       Fluids  No intake or output data in the 24 hours ending 03/18/23 1404    Laboratory  Recent Labs     03/18/23  0006   WBC 9.9   RBC 3.39*   HEMOGLOBIN 11.0*   HEMATOCRIT 32.9*   MCV 97.1   MCH 32.4   MCHC 33.4*   RDW 42.7   PLATELETCT 200   MPV 10.7     Recent Labs      03/17/23  0751 03/17/23  1147 03/18/23  0006 03/18/23  0619 03/18/23  1256   SODIUM 137  --  132*  --   --    POTASSIUM 5.0  --  6.3*  --   --    CHLORIDE 97  --  94*  --   --    CO2 25  --  23  --   --    GLUCOSE 97  --  135*  --   --    BUN 30*  --  46*  --   --    CREATININE 6.09*  --  8.11*  --   --    CALCIUM 9.1   < > 6.9* 6.8* 7.1*    < > = values in this interval not displayed.                   Imaging  No orders to display        Assessment/Plan  * Secondary hyperparathyroidism, renal (HCC)- (present on admission)  Assessment & Plan  Secondary hyperparathyroidism status post parathyroidectomy ablation and parathyroidectomy with explantation  -Close monitoring of postoperative calcium levels q6 hour with standing orders for IV calcium for Ca less than 7.5. The last two levels were 6.8 and 7.1 thus IV calcium gluconate 1 gram q 6 hours has been required.  During dialysis he had an increased calcium bath.   Increase scheduled calcium carbonate 5,000 mg TID  -Monitor on telemetry       Hypocalcemia- (present on admission)  Assessment & Plan  Severe IV and very high dose calcium     Essential hypertension- (present on admission)  Assessment & Plan  Chronic hypertension, currently controlled  -Continue all home antihypertensives  -As needed hydralazine    ESRD (end stage renal disease) on dialysis (HCC)- (present on admission)  Assessment & Plan  ESRD on chronic HD left arm fistula  - nephrology is following for hemodialysis which was done 3/18         VTE prophylaxis: SCDs/TEDs    I have performed a physical exam and reviewed and updated ROS and Plan today (3/18/2023). In review of yesterday's note (3/17/2023), there are no changes except as documented above.

## 2023-03-18 NOTE — CARE PLAN
The patient is Stable - Low risk of patient condition declining or worsening    Shift Goals  Clinical Goals: monitor calcium, pain control  Patient Goals: rest  Family Goals: no family at bedside    Progress made toward(s) clinical / shift goals:  give PRN calcium gluconate as ordered, Q6H calcium draws; assess pain Q2-4H, administer PRNs as indicated, encourage patient to voice pain to staff    Patient is not progressing towards the following goals:

## 2023-03-18 NOTE — PROGRESS NOTES
Lab called with critical result of Calcium at 6.8. Critical lab result read back to lab. Dr. Godoy notified of critical lab result at 0801. Critical lab result read back by Dr. Godoy. Orders received to increase tums to 5000 mg Q6H.    Patient also scheduled for dialysis. This RN called pharmacy to clarify if IV calcium gluconate would be dialyzed out with dialysis. Per pharmacy, administration prior to dialysis should be okay, but advised to coordinate with dialysis RN. Per dialysis RN, okay to give dose of Calcium gluconate now. 1 g of Calcium gluconate currently infusing per standing order.

## 2023-03-18 NOTE — ASSESSMENT & PLAN NOTE
Chronic hypertension, currently controlled  -Continue all home antihypertensives  -As needed hydralazine

## 2023-03-18 NOTE — PROGRESS NOTES
Assumed care from Nargis RN.  Assessment complete. Plan of care gone over with patient and all concerns. Patient was resting in bed comfortably. Patient was A&O x 4. Safety precautions in place. Patient had no concerns at this time and educated on how to use the call light.

## 2023-03-18 NOTE — PROGRESS NOTES
"Surgical Progress Note:    POD# 1  S/P parathyroidectomy and autotransplantation     Hypocalcemic O/N as expected, asx.  Pain well controlled with PO meds.  Receiving HD currently    PE:  BP 93/55   Pulse 70   Temp 36.9 °C (98.5 °F) (Temporal)   Resp 16   Ht 1.727 m (5' 8\")   Wt 74.9 kg (165 lb 2 oz)   SpO2 93%   BMI 25.11 kg/m²     I/O:   Intake/Output Summary (Last 24 hours) at 3/18/2023 0844  Last data filed at 3/17/2023 1032  Gross per 24 hour   Intake 300 ml   Output 10 ml   Net 290 ml       NAD  Breathing comfortably  Voice strong  Incision c/d/i with sutures, no hematoma    Labs:  Calcium:   6p: 7.4   12a: 6.9   6a:6.8      A/P: POD# 1  S/P parathyroidectomy and autotransplantation   - doing well  - continue PO and IV ca supplementation 0 increased to 5000 mg q6h  - will plan d/c when ca levels stable with oral and HD supplementation so he is safe to go home  - will d/w hospitalist and nephrologist      Candy Godoy M.D.  [facility]  [phone]          "

## 2023-03-18 NOTE — PROGRESS NOTES
Pt a/o x4. Vss. Incision sites CDI. Fistula site CDI. Pt reports 4/10 pain. RN gave ice pack and educated on recent norco and letting it have time to work. Pt in agreement. No skin issues. Bed locked and low. Call bell within reach. Medications brought from cdu put in pt's cart.

## 2023-03-18 NOTE — PROGRESS NOTES
Hemodialysis ordered by Dr. Jay. Treatment started at 0818 and ended at 1118. Pt stable, vss, no c/o post tx. Positive 90 ml this tx d/t hypotension during tx. NS bolus 700 ml. And no UF per Dr. Jay. Report to LUCRETIA Lara RN. Rt fa avf dsg cdi.

## 2023-03-18 NOTE — PROGRESS NOTES
Bedside report received.  Assessment complete.  A&O x 4. Patient calls appropriately.  Patient ambulates with no assist.   Patient has 0/10 pain. Pain managed with prescribed medications.  Denies N&V. Tolerating regular diet.  Dressing to anterior neck, CDI. Incision to left forearm with dressing, CDI  - void, - flatus, - BM.  Patient denies SOB.    Review plan with of care with patient. Call light and personal belongings within reach. Hourly rounding in place. All needs met at this time.

## 2023-03-18 NOTE — ASSESSMENT & PLAN NOTE
Severe hypocalcemia  Increased calcitriol to 1.5mcg daily, PhosLo 1,334mg TID, calcium carbonate 5000 mg QID  Patient received 1000 mg IV calcium chloride 3/22  Monitor on telemetry for any arrhythmias  Slowly improving  Latest calcium is 8.8  Continue to trend and monitor

## 2023-03-18 NOTE — PROGRESS NOTES
4 Eyes Skin Assessment Completed by NAUN Bishop and STEFANIE Miller.    Head WDL  Ears WDL  Nose WDL  Mouth WDL  Neck Incision  Breast/Chest WDL  Shoulder Blades WDL  Spine WDL  (R) Arm/Elbow/Hand WDL  (L) Arm/Elbow/Hand WDL  Abdomen WDL  Groin WDL  Scrotum/Coccyx/Buttocks WDL  (R) Leg WDL  (L) Leg WDL  (R) Heel/Foot/Toe WDL  (L) Heel/Foot/Toe WDL          Devices In Places Tele Box and Pulse Ox      Interventions In Place N/A    Possible Skin Injury No    Pictures Uploaded Into Epic N/A  Wound Consult Placed N/A  RN Wound Prevention Protocol Ordered No

## 2023-03-18 NOTE — PROGRESS NOTES
Critical lab called by Lab of a calcium of 6.9. called on call service to inform of the critical level. Administered calcium gluconate per MAR and nursing communications.   Spoke with Dr. Candy Godoy. Told to give 2 grams of Calcium Gluconate. Read back orders.

## 2023-03-18 NOTE — CARE PLAN
The patient is Watcher - Medium risk of patient condition declining or worsening    Shift Goals  Clinical Goals: pain mgmt, airway protection  Patient Goals: Rest, pain control    Progress made toward(s) clinical / shift goals:  Risk for bleeding post-op observed frequently. Pain controlled. Nausea controlled. Post op teaching begun.    Patient is not progressing towards the following goals:

## 2023-03-18 NOTE — PROCEDURES
Nephrology/Hemodialysis note    Patient with ESRD/HD, s/p parathyroidectomy and autotransplantation   Seen and examined during dialysis  Low BP -off UF, given NS bolus  Lab results reviewed  Hypocalcemia -correcting with 3.5 Ca bath  Please see dialysis flow sheet for details

## 2023-03-19 LAB
ALBUMIN SERPL BCP-MCNC: 3.3 G/DL (ref 3.2–4.9)
ALBUMIN SERPL BCP-MCNC: 3.4 G/DL (ref 3.2–4.9)
ALBUMIN SERPL BCP-MCNC: 3.4 G/DL (ref 3.2–4.9)
ALBUMIN SERPL BCP-MCNC: 3.5 G/DL (ref 3.2–4.9)
ANION GAP SERPL CALC-SCNC: 14 MMOL/L (ref 7–16)
BUN SERPL-MCNC: 41 MG/DL (ref 8–22)
CALCIUM SERPL-MCNC: 6.5 MG/DL (ref 8.5–10.5)
CALCIUM SERPL-MCNC: 6.7 MG/DL (ref 8.5–10.5)
CALCIUM SERPL-MCNC: 6.9 MG/DL (ref 8.5–10.5)
CALCIUM SERPL-MCNC: 7 MG/DL (ref 8.5–10.5)
CALCIUM SERPL-MCNC: 7.1 MG/DL (ref 8.5–10.5)
CHLORIDE SERPL-SCNC: 98 MMOL/L (ref 96–112)
CO2 SERPL-SCNC: 26 MMOL/L (ref 20–33)
CREAT SERPL-MCNC: 8.69 MG/DL (ref 0.5–1.4)
GFR SERPLBLD CREATININE-BSD FMLA CKD-EPI: 8 ML/MIN/1.73 M 2
GLUCOSE SERPL-MCNC: 99 MG/DL (ref 65–99)
POTASSIUM SERPL-SCNC: 4.9 MMOL/L (ref 3.6–5.5)
SODIUM SERPL-SCNC: 138 MMOL/L (ref 135–145)

## 2023-03-19 PROCEDURE — 700102 HCHG RX REV CODE 250 W/ 637 OVERRIDE(OP): Performed by: STUDENT IN AN ORGANIZED HEALTH CARE EDUCATION/TRAINING PROGRAM

## 2023-03-19 PROCEDURE — 82310 ASSAY OF CALCIUM: CPT | Mod: 91

## 2023-03-19 PROCEDURE — 700102 HCHG RX REV CODE 250 W/ 637 OVERRIDE(OP): Performed by: INTERNAL MEDICINE

## 2023-03-19 PROCEDURE — A9270 NON-COVERED ITEM OR SERVICE: HCPCS | Performed by: NURSE PRACTITIONER

## 2023-03-19 PROCEDURE — 770020 HCHG ROOM/CARE - TELE (206)

## 2023-03-19 PROCEDURE — 80048 BASIC METABOLIC PNL TOTAL CA: CPT

## 2023-03-19 PROCEDURE — 85027 COMPLETE CBC AUTOMATED: CPT

## 2023-03-19 PROCEDURE — 99232 SBSQ HOSP IP/OBS MODERATE 35: CPT | Performed by: INTERNAL MEDICINE

## 2023-03-19 PROCEDURE — A9270 NON-COVERED ITEM OR SERVICE: HCPCS | Performed by: INTERNAL MEDICINE

## 2023-03-19 PROCEDURE — 99232 SBSQ HOSP IP/OBS MODERATE 35: CPT | Performed by: HOSPITALIST

## 2023-03-19 PROCEDURE — A9270 NON-COVERED ITEM OR SERVICE: HCPCS | Performed by: SURGERY

## 2023-03-19 PROCEDURE — 700102 HCHG RX REV CODE 250 W/ 637 OVERRIDE(OP): Performed by: SURGERY

## 2023-03-19 PROCEDURE — A9270 NON-COVERED ITEM OR SERVICE: HCPCS | Performed by: STUDENT IN AN ORGANIZED HEALTH CARE EDUCATION/TRAINING PROGRAM

## 2023-03-19 PROCEDURE — 700111 HCHG RX REV CODE 636 W/ 250 OVERRIDE (IP): Performed by: SURGERY

## 2023-03-19 PROCEDURE — 36415 COLL VENOUS BLD VENIPUNCTURE: CPT

## 2023-03-19 PROCEDURE — 80069 RENAL FUNCTION PANEL: CPT

## 2023-03-19 PROCEDURE — 700102 HCHG RX REV CODE 250 W/ 637 OVERRIDE(OP): Performed by: NURSE PRACTITIONER

## 2023-03-19 PROCEDURE — 82040 ASSAY OF SERUM ALBUMIN: CPT | Mod: 91

## 2023-03-19 RX ORDER — CALCITRIOL 0.25 UG/1
0.5 CAPSULE, LIQUID FILLED ORAL 2 TIMES DAILY
Status: DISCONTINUED | OUTPATIENT
Start: 2023-03-19 | End: 2023-03-19

## 2023-03-19 RX ADMIN — CALCIUM GLUCONATE 1 G: 20 INJECTION, SOLUTION INTRAVENOUS at 02:45

## 2023-03-19 RX ADMIN — AMLODIPINE BESYLATE 10 MG: 10 TABLET ORAL at 16:17

## 2023-03-19 RX ADMIN — CALCITRIOL CAPSULES 0.25 MCG 0.25 MCG: 0.25 CAPSULE ORAL at 04:38

## 2023-03-19 RX ADMIN — Medication 667 MG: at 04:38

## 2023-03-19 RX ADMIN — DOCUSATE SODIUM 50 MG AND SENNOSIDES 8.6 MG 2 TABLET: 8.6; 5 TABLET, FILM COATED ORAL at 04:38

## 2023-03-19 RX ADMIN — Medication 667 MG: at 16:17

## 2023-03-19 RX ADMIN — CALCIUM CARBONATE 5000 MG: 500 TABLET, CHEWABLE ORAL at 04:39

## 2023-03-19 RX ADMIN — ONDANSETRON 4 MG: 2 INJECTION INTRAMUSCULAR; INTRAVENOUS at 11:09

## 2023-03-19 RX ADMIN — CALCIUM CARBONATE 5000 MG: 500 TABLET, CHEWABLE ORAL at 00:25

## 2023-03-19 RX ADMIN — CALCIUM GLUCONATE 1 G: 20 INJECTION, SOLUTION INTRAVENOUS at 19:40

## 2023-03-19 RX ADMIN — LOSARTAN POTASSIUM 50 MG: 50 TABLET, FILM COATED ORAL at 04:38

## 2023-03-19 RX ADMIN — CALCIUM CARBONATE 5000 MG: 500 TABLET, CHEWABLE ORAL at 16:17

## 2023-03-19 RX ADMIN — HYDROCODONE BITARTRATE AND ACETAMINOPHEN 1 TABLET: 5; 325 TABLET ORAL at 00:25

## 2023-03-19 RX ADMIN — CALCIUM GLUCONATE 1 G: 20 INJECTION, SOLUTION INTRAVENOUS at 08:13

## 2023-03-19 RX ADMIN — HYDROCODONE BITARTRATE AND ACETAMINOPHEN 1 TABLET: 5; 325 TABLET ORAL at 04:38

## 2023-03-19 RX ADMIN — CALCIUM CARBONATE 5000 MG: 500 TABLET, CHEWABLE ORAL at 11:09

## 2023-03-19 RX ADMIN — Medication 667 MG: at 11:08

## 2023-03-19 RX ADMIN — CALCITRIOL 1.25 MCG: 0.5 CAPSULE, LIQUID FILLED ORAL at 14:08

## 2023-03-19 RX ADMIN — HYDRALAZINE HYDROCHLORIDE 100 MG: 50 TABLET, FILM COATED ORAL at 04:38

## 2023-03-19 RX ADMIN — HYDROCODONE BITARTRATE AND ACETAMINOPHEN 1 TABLET: 5; 325 TABLET ORAL at 11:08

## 2023-03-19 ASSESSMENT — ENCOUNTER SYMPTOMS
HEMOPTYSIS: 0
NECK PAIN: 1
SHORTNESS OF BREATH: 0
CHILLS: 0
WEIGHT LOSS: 0
WHEEZING: 0
FEVER: 0
SINUS PAIN: 0
EYES NEGATIVE: 1
VOMITING: 0
COUGH: 0
PALPITATIONS: 0
ORTHOPNEA: 0
ABDOMINAL PAIN: 0
ROS GI COMMENTS: NO TROUBLES SWALLOWING
NAUSEA: 0
COUGH: 1

## 2023-03-19 ASSESSMENT — PAIN DESCRIPTION - PAIN TYPE: TYPE: ACUTE PAIN

## 2023-03-19 ASSESSMENT — FIBROSIS 4 INDEX: FIB4 SCORE: .495

## 2023-03-19 NOTE — CARE PLAN
The patient is Stable - Low risk of patient condition declining or worsening    Shift Goals  Clinical Goals: monitor/replace calcium, pain control  Patient Goals: rest, pain control  Family Goals: n/a    Progress made toward(s) clinical / shift goals:      Problem: Pain - Standard  Goal: Alleviation of pain or a reduction in pain to the patient’s comfort goal  Outcome: Progressing     Problem: Knowledge Deficit - Standard  Goal: Patient and family/care givers will demonstrate understanding of plan of care, disease process/condition, diagnostic tests and medications  Outcome: Progressing

## 2023-03-19 NOTE — PROGRESS NOTES
"Surgical Progress Note:    POD# 1  S/P parathyroidectomy and autotransplantation     Still hypocalcemic. No other complaints.     PE:  BP 92/55   Pulse 86   Temp 37.4 °C (99.3 °F) (Temporal)   Resp 16   Ht 1.727 m (5' 8\")   Wt 76.4 kg (168 lb 6.9 oz)   SpO2 88%   BMI 25.61 kg/m²     I/O:   Intake/Output Summary (Last 24 hours) at 3/19/2023 0758  Last data filed at 3/19/2023 0441  Gross per 24 hour   Intake 1540 ml   Output 1110 ml   Net 430 ml       NAD  Breathing comfortably  Voice strong  Incision c/d/i with sutures, no hematoma    Labs:  Calcium:   6p: 6.9   12a: 6.7   6a:6.9      A/P: POD# 1  S/P parathyroidectomy and autotransplantation   - doing well  - continue ca replacement  - increase calcitriol  - plan d/c home when ca levels stable, will d/w hospitalist      Candy Godoy M.D.  [facility]  [phone]          "

## 2023-03-19 NOTE — PROGRESS NOTES
Nephrology Daily Progress Note    Date of Service  3/19/2023    Chief Complaint  32 y.o. male with ESRD/HD, admitted 3/17/2023 for parathyroidectomy     Interval Problem Update  3/19 - s/p parathyroidectomy and autotransplantation   Doing well  Hypocalcemia -continue orpal, iv supplementation , increased Calcitriol dose  HD MWF    Review of Systems  Review of Systems   Constitutional:  Negative for chills, fever, malaise/fatigue and weight loss.   HENT:  Negative for congestion, hearing loss and sinus pain.    Eyes: Negative.    Respiratory:  Negative for cough, hemoptysis, shortness of breath and wheezing.    Cardiovascular:  Negative for chest pain, palpitations, orthopnea and leg swelling.   Gastrointestinal:  Negative for abdominal pain, nausea and vomiting.   Skin: Negative.       Physical Exam  Temp:  [36.5 °C (97.7 °F)-37.4 °C (99.3 °F)] 37.4 °C (99.3 °F)  Pulse:  [77-88] 86  Resp:  [16] 16  BP: ()/(50-57) 92/55  SpO2:  [88 %-93 %] 88 %    Physical Exam  Vitals reviewed.   Constitutional:       General: He is not in acute distress.     Appearance: Normal appearance. He is well-developed. He is not diaphoretic.   HENT:      Head: Normocephalic and atraumatic.      Nose: Nose normal.      Mouth/Throat:      Mouth: Mucous membranes are moist.      Pharynx: Oropharynx is clear.   Eyes:      Extraocular Movements: Extraocular movements intact.      Conjunctiva/sclera: Conjunctivae normal.      Pupils: Pupils are equal, round, and reactive to light.   Neck:      Comments: Incision site covered with dressing  Cardiovascular:      Rate and Rhythm: Normal rate and regular rhythm.      Pulses: Normal pulses.      Heart sounds: Normal heart sounds.   Pulmonary:      Effort: Pulmonary effort is normal. No respiratory distress.      Breath sounds: Normal breath sounds. No wheezing or rales.   Abdominal:      General: Bowel sounds are normal. There is no distension.      Palpations: Abdomen is soft. There is no mass.       Tenderness: There is no abdominal tenderness. There is no right CVA tenderness or left CVA tenderness.   Musculoskeletal:      Right lower leg: No edema.      Left lower leg: No edema.   Skin:     General: Skin is warm.      Coloration: Skin is not pale.      Findings: No erythema or rash.   Neurological:      General: No focal deficit present.      Mental Status: He is alert and oriented to person, place, and time.      Cranial Nerves: No cranial nerve deficit.      Coordination: Coordination normal.   Psychiatric:         Mood and Affect: Mood normal.         Behavior: Behavior normal.         Thought Content: Thought content normal.         Judgment: Judgment normal.       Fluids    Intake/Output Summary (Last 24 hours) at 3/19/2023 1321  Last data filed at 3/19/2023 0441  Gross per 24 hour   Intake 340 ml   Output --   Net 340 ml       Laboratory  Recent Labs     03/18/23  0006   WBC 9.9   RBC 3.39*   HEMOGLOBIN 11.0*   HEMATOCRIT 32.9*   MCV 97.1   MCH 32.4   MCHC 33.4*   RDW 42.7   PLATELETCT 200   MPV 10.7     Recent Labs     03/17/23  0751 03/17/23  1147 03/18/23  0006 03/18/23  0619 03/19/23  0552 03/19/23  0914 03/19/23  1202   SODIUM 137  --  132*  --   --  138  --    POTASSIUM 5.0  --  6.3*  --   --  4.9  --    CHLORIDE 97  --  94*  --   --  98  --    CO2 25  --  23  --   --  26  --    GLUCOSE 97  --  135*  --   --  99  --    BUN 30*  --  46*  --   --  41*  --    CREATININE 6.09*  --  8.11*  --   --  8.69*  --    CALCIUM 9.1   < > 6.9*   < > 6.9* 7.1* 7.0*    < > = values in this interval not displayed.         No results for input(s): NTPROBNP in the last 72 hours.          Assessment/Plan    The patient is a 32-year-old male with end-stage renal   disease, on hemodialysis, admitted for elective surgery for subtotal   parathyroidectomy with autotransplantation.  1.  End-stage renal disease: on Monday, Wednesday and Friday.       Schedule -will dialyze tomorrow    2.  Electrolytes: to monitor  calcium closely: replace po and iv       Phos binders calcium based       Calcitriol 1.25 mcg daily    3.  Hypertension; low BP -hold BP medications for now    4.  Anemia.  To monitor hemoglobin level.  5.  Volume well controlled.  6.  Status post parathyroidectomy.  To monitor calcium.        Increased calcitriol  to 1.25 mcg daily.     RECOMMENDATIONS:  HD MWF  Provide renal diet.    Adjust all medications to renal doses.    Monitor calcium level closely.    We will follow the patient closely.

## 2023-03-19 NOTE — PROGRESS NOTES
Hospital Medicine Daily Progress Note    Date of Service  3/19/2023    Chief Complaint  Elier Bustillos is a 32 y.o. male admitted 3/17/2023 with elective surgery.     Hospital Course  Mr. Carmelo Bustillos is a 32 y.o. male with past medical history of hypertension, end-stage renal disease on hemodialysis and secondary hyperparathyroidism Who presented 3/17/2023 for elective parathyroid exploration with intraoperative PTH monitoring, subtotal parathyroidectomy with autotransplantation into left forearm.  Medicine was consulted for help with monitoring of calcium postoperatively.  Patient seen at bedside, he is sleeping comfortably postsurgery, no acute issues.  Pain currently controlled.   Vitals are stable he is afebrile heart rate 72, /63 he saturating well 100% on oxymask post op.   Labs are stable his electrolytes are stable, creatinine 6.09.  Calcium 8.3 with albumin 4.3.  Nephrology is aware of patient and following along.   Resume home blood pressure medications.  Continue calcium carbonate 3000 mg 3 times daily continue calcium every 6 hours and replace as needed    Interval Problem Update  3/18: Mr. Carmelo Bustillos was evaluated and examined in the CDU. His calcium this morning was 6.8 therefore he was given one dose of IV calcium gluconate and had dialysis with increased calcium bath. Afterwards his calcium remains low at 7.1 and another dose of IV calcium ordered. Q6 hour checks. The dose of TUMS has been increased to 5,000 mg TID. I discussed with Dr. Jay nephrology in person. His blood pressure is low and no fluid was pulled during dialysis.   3/19: Calcium continues to be low.  Continuing IV calcium.  Patient also having cough but declines cough drops or cough medication.    I have discussed this patient's plan of care and discharge plan at IDT rounds today with Case Management, Nursing, Nursing leadership, and other members of the IDT  team.    Consultants/Specialty  nephrology    Code Status  Full Code    Disposition  Patient is not medically cleared for discharge.   Anticipate discharge to to home with close outpatient follow-up.  I have placed the appropriate orders for post-discharge needs.    Review of Systems  Review of Systems   Constitutional:  Negative for fever.   Respiratory:  Positive for cough. Negative for shortness of breath.    Cardiovascular:  Negative for chest pain.   Gastrointestinal:         No troubles swallowing   Musculoskeletal:  Positive for neck pain.      Physical Exam  Temp:  [36.5 °C (97.7 °F)-37.4 °C (99.3 °F)] 37.4 °C (99.3 °F)  Pulse:  [77-88] 86  Resp:  [16] 16  BP: ()/(50-57) 92/55  SpO2:  [88 %-93 %] 88 %    Physical Exam  Vitals and nursing note reviewed.   Constitutional:       General: He is not in acute distress.     Appearance: He is not toxic-appearing.   HENT:      Head: Normocephalic and atraumatic.      Mouth/Throat:      Mouth: Mucous membranes are dry.      Pharynx: Oropharynx is clear.   Neck:      Comments: Anterior neck surgical site covered.  Cardiovascular:      Rate and Rhythm: Normal rate and regular rhythm.   Pulmonary:      Effort: Pulmonary effort is normal. No respiratory distress.   Abdominal:      General: There is no distension.      Tenderness: There is no abdominal tenderness.   Musculoskeletal:      Cervical back: No rigidity.      Right lower leg: No edema.      Left lower leg: No edema.      Comments: Left forearm surgical site covered  Fistula with a thrill   Neurological:      General: No focal deficit present.      Mental Status: He is alert and oriented to person, place, and time.   Psychiatric:         Mood and Affect: Mood normal.         Behavior: Behavior normal.       Fluids    Intake/Output Summary (Last 24 hours) at 3/19/2023 1332  Last data filed at 3/19/2023 0441  Gross per 24 hour   Intake 340 ml   Output --   Net 340 ml       Laboratory  Recent Labs      03/18/23  0006   WBC 9.9   RBC 3.39*   HEMOGLOBIN 11.0*   HEMATOCRIT 32.9*   MCV 97.1   MCH 32.4   MCHC 33.4*   RDW 42.7   PLATELETCT 200   MPV 10.7       Recent Labs     03/17/23  0751 03/17/23  1147 03/18/23  0006 03/18/23  0619 03/19/23  0552 03/19/23  0914 03/19/23  1202   SODIUM 137  --  132*  --   --  138  --    POTASSIUM 5.0  --  6.3*  --   --  4.9  --    CHLORIDE 97  --  94*  --   --  98  --    CO2 25  --  23  --   --  26  --    GLUCOSE 97  --  135*  --   --  99  --    BUN 30*  --  46*  --   --  41*  --    CREATININE 6.09*  --  8.11*  --   --  8.69*  --    CALCIUM 9.1   < > 6.9*   < > 6.9* 7.1* 7.0*    < > = values in this interval not displayed.                     Imaging  No orders to display        Assessment/Plan  * Secondary hyperparathyroidism, renal (HCC)- (present on admission)  Assessment & Plan  Secondary hyperparathyroidism status post parathyroidectomy ablation and parathyroidectomy   During dialysis he had an increased calcium bath.   Increase scheduled calcium carbonate 5,000 mg TID    -Monitor on telemetry       Hypocalcemia- (present on admission)  Assessment & Plan  Severe hypocalcemia  IV and oral replacement as needed    Essential hypertension- (present on admission)  Assessment & Plan  Chronic hypertension, currently controlled  -Continue all home antihypertensives  -As needed hydralazine    ESRD (end stage renal disease) on dialysis (HCC)- (present on admission)  Assessment & Plan  ESRD on chronic HD left arm fistula  Nephro consulted  Dialysis per nephro         VTE prophylaxis: SCDs/TEDs    I have performed a physical exam and reviewed and updated ROS and Plan today (3/19/2023). In review of yesterday's note (3/18/2023), there are no changes except as documented above.

## 2023-03-20 ENCOUNTER — RESEARCH ENCOUNTER (OUTPATIENT)
Dept: CARDIOLOGY | Facility: MEDICAL CENTER | Age: 33
End: 2023-03-20
Payer: COMMERCIAL

## 2023-03-20 LAB
ALBUMIN SERPL BCP-MCNC: 3.4 G/DL (ref 3.2–4.9)
ALBUMIN SERPL BCP-MCNC: 3.5 G/DL (ref 3.2–4.9)
BUN SERPL-MCNC: 57 MG/DL (ref 8–22)
CALCIUM ALBUM COR SERPL-MCNC: 7.1 MG/DL (ref 8.5–10.5)
CALCIUM SERPL-MCNC: 6.6 MG/DL (ref 8.5–10.5)
CALCIUM SERPL-MCNC: 7 MG/DL (ref 8.5–10.5)
CALCIUM SERPL-MCNC: 7.7 MG/DL (ref 8.5–10.5)
CALCIUM SERPL-MCNC: 8.4 MG/DL (ref 8.5–10.5)
CHLORIDE SERPL-SCNC: 95 MMOL/L (ref 96–112)
CO2 SERPL-SCNC: 22 MMOL/L (ref 20–33)
CREAT SERPL-MCNC: 10 MG/DL (ref 0.5–1.4)
ERYTHROCYTE [DISTWIDTH] IN BLOOD BY AUTOMATED COUNT: 43.9 FL (ref 35.9–50)
GFR SERPLBLD CREATININE-BSD FMLA CKD-EPI: 6 ML/MIN/1.73 M 2
GLUCOSE SERPL-MCNC: 99 MG/DL (ref 65–99)
HCT VFR BLD AUTO: 27.9 % (ref 42–52)
HGB BLD-MCNC: 9.1 G/DL (ref 14–18)
MCH RBC QN AUTO: 32.2 PG (ref 27–33)
MCHC RBC AUTO-ENTMCNC: 32.6 G/DL (ref 33.7–35.3)
MCV RBC AUTO: 98.6 FL (ref 81.4–97.8)
PHOSPHATE SERPL-MCNC: 4.2 MG/DL (ref 2.5–4.5)
PLATELET # BLD AUTO: 169 K/UL (ref 164–446)
PMV BLD AUTO: 10.4 FL (ref 9–12.9)
POTASSIUM SERPL-SCNC: 5.6 MMOL/L (ref 3.6–5.5)
RBC # BLD AUTO: 2.83 M/UL (ref 4.7–6.1)
SODIUM SERPL-SCNC: 134 MMOL/L (ref 135–145)
WBC # BLD AUTO: 7.6 K/UL (ref 4.8–10.8)

## 2023-03-20 PROCEDURE — 700111 HCHG RX REV CODE 636 W/ 250 OVERRIDE (IP): Performed by: SURGERY

## 2023-03-20 PROCEDURE — A9270 NON-COVERED ITEM OR SERVICE: HCPCS | Performed by: STUDENT IN AN ORGANIZED HEALTH CARE EDUCATION/TRAINING PROGRAM

## 2023-03-20 PROCEDURE — 90935 HEMODIALYSIS ONE EVALUATION: CPT | Performed by: INTERNAL MEDICINE

## 2023-03-20 PROCEDURE — 700102 HCHG RX REV CODE 250 W/ 637 OVERRIDE(OP): Performed by: INTERNAL MEDICINE

## 2023-03-20 PROCEDURE — 82040 ASSAY OF SERUM ALBUMIN: CPT

## 2023-03-20 PROCEDURE — A9270 NON-COVERED ITEM OR SERVICE: HCPCS | Performed by: SURGERY

## 2023-03-20 PROCEDURE — 700102 HCHG RX REV CODE 250 W/ 637 OVERRIDE(OP): Performed by: STUDENT IN AN ORGANIZED HEALTH CARE EDUCATION/TRAINING PROGRAM

## 2023-03-20 PROCEDURE — A9270 NON-COVERED ITEM OR SERVICE: HCPCS | Performed by: INTERNAL MEDICINE

## 2023-03-20 PROCEDURE — 700102 HCHG RX REV CODE 250 W/ 637 OVERRIDE(OP): Performed by: SURGERY

## 2023-03-20 PROCEDURE — 700111 HCHG RX REV CODE 636 W/ 250 OVERRIDE (IP): Performed by: HOSPITALIST

## 2023-03-20 PROCEDURE — 700105 HCHG RX REV CODE 258: Performed by: HOSPITALIST

## 2023-03-20 PROCEDURE — 770020 HCHG ROOM/CARE - TELE (206)

## 2023-03-20 PROCEDURE — 36415 COLL VENOUS BLD VENIPUNCTURE: CPT

## 2023-03-20 PROCEDURE — 99232 SBSQ HOSP IP/OBS MODERATE 35: CPT | Performed by: HOSPITALIST

## 2023-03-20 PROCEDURE — 82310 ASSAY OF CALCIUM: CPT

## 2023-03-20 PROCEDURE — 90935 HEMODIALYSIS ONE EVALUATION: CPT

## 2023-03-20 RX ADMIN — CALCITRIOL 1.25 MCG: 0.5 CAPSULE, LIQUID FILLED ORAL at 05:04

## 2023-03-20 RX ADMIN — CALCIUM CARBONATE 5000 MG: 500 TABLET, CHEWABLE ORAL at 05:02

## 2023-03-20 RX ADMIN — Medication 667 MG: at 17:10

## 2023-03-20 RX ADMIN — CALCIUM CARBONATE 5000 MG: 500 TABLET, CHEWABLE ORAL at 23:23

## 2023-03-20 RX ADMIN — CALCIUM CARBONATE 5000 MG: 500 TABLET, CHEWABLE ORAL at 01:21

## 2023-03-20 RX ADMIN — AMLODIPINE BESYLATE 10 MG: 10 TABLET ORAL at 17:16

## 2023-03-20 RX ADMIN — Medication 667 MG: at 12:18

## 2023-03-20 RX ADMIN — CALCIUM CHLORIDE 1000 MG: 100 INJECTION, SOLUTION INTRAVENOUS at 12:29

## 2023-03-20 RX ADMIN — DOCUSATE SODIUM 50 MG AND SENNOSIDES 8.6 MG 2 TABLET: 8.6; 5 TABLET, FILM COATED ORAL at 05:04

## 2023-03-20 RX ADMIN — CALCIUM CARBONATE 5000 MG: 500 TABLET, CHEWABLE ORAL at 17:10

## 2023-03-20 RX ADMIN — CALCIUM GLUCONATE 1 G: 20 INJECTION, SOLUTION INTRAVENOUS at 01:35

## 2023-03-20 RX ADMIN — Medication 667 MG: at 09:16

## 2023-03-20 RX ADMIN — CALCIUM CARBONATE 5000 MG: 500 TABLET, CHEWABLE ORAL at 12:18

## 2023-03-20 ASSESSMENT — ENCOUNTER SYMPTOMS
CHILLS: 0
COUGH: 1
FEVER: 0
SHORTNESS OF BREATH: 0
ROS GI COMMENTS: NO TROUBLES SWALLOWING
NECK PAIN: 1

## 2023-03-20 ASSESSMENT — FIBROSIS 4 INDEX: FIB4 SCORE: 0.59

## 2023-03-20 ASSESSMENT — PAIN DESCRIPTION - PAIN TYPE: TYPE: ACUTE PAIN

## 2023-03-20 NOTE — PROGRESS NOTES
Assumed care of pt. Bedside report received from RN. Pt was updated on plan of care. AOx4. Pt pain level 0/10. Call light, phone and personal belongings in reach. Bed alarm on and working properly, bed in lowest position, and locked.

## 2023-03-20 NOTE — RESEARCH NOTE
Name: Elier Bustillos   MRN: [id number]  Participant ID:  [id number]  : 1990  Visit Date/Time: 3/20/2023 9:34 AM  Who is present: Jayna Zaman    Study:    VITROS RDI INTACT PTH II [id number] - VITROS Intact PTH II [id number]   Status Consented/Enrolled (3/17/2023)   Active Start Date 3/17/23   Participant ID [id number]   Coordinator Jayna Zaman; Vidhi Mccarty    Candy Godoy M.D.       Screening/Consent note:    Participation in the Vitros PTH study clinical trial was discussed with Elier gonzalez( Via ). All aspects of the study purpose and procedures were explained.  He was given ample time to review the consent and all questions were answered to his satisfaction. Patient aware that the clinical trial is voluntary and he may withdraw consent at any time without affecting the level of care they receive.  Subject signed consent without coercion and undue influence and was given a copy of the signed consent. No study-related procedures took place prior to consenting and all assessments were conducted per protocol.     Per sponsor Malagasy short form consent was given to the patient to sign as we do not have an IRB approved consent yet. I have let the sponsor know patient will follow up in 2 weeks, if IRB approved consent is ready at that time we will re-consent.     Per sponsors request study stipend (Physical check) was given to the patient by research analysis Jasmeet at time of consent, prior to surgery.       Follow up 2 wks from surgery

## 2023-03-20 NOTE — CARE PLAN
The patient is Stable - Low risk of patient condition declining or worsening    Shift Goals  Clinical Goals: monitor labs, monitor VS  Patient Goals: sleep  Family Goals: n/a    Progress made toward(s) clinical / shift goals:    Problem: Pain - Standard  Goal: Alleviation of pain or a reduction in pain to the patient’s comfort goal  Outcome: Progressing     Problem: Knowledge Deficit - Standard  Goal: Patient and family/care givers will demonstrate understanding of plan of care, disease process/condition, diagnostic tests and medications  Outcome: Progressing       Patient is not progressing towards the following goals:

## 2023-03-20 NOTE — PROGRESS NOTES
[facility] Services Progress Note    Hemodialysis treatment ordered today per Dr. Jay x 3.5 hours. Treatment initiated at 0747, ended at 1117.     Patient tolerated tx; see paper flow sheet for details.     Net UF 3000  mL.     Needles removed from access site. Dressings applied and sites held x 10 minutes; verified no bleeding. Positive bruit/thrill post tx. Staff RN instructed to monitor AVF for breakthrough bleeding. Should breakthrough bleeding occur staff RN instructed to apply pressure to access sites until bleeding resolved. Notify Dialysis and Nephrologist for follow-up.    Report given to Primary RN.      Lip Wedge Excision Repair Text: Given the location of the defect and the proximity to free margins a full thickness wedge repair was deemed most appropriate.  Using a sterile surgical marker, the appropriate repair was drawn incorporating the defect and placing the expected incisions perpendicular to the vermilion border.  The vermilion border was also meticulously outlined to ensure appropriate reapproximation during the repair.  The area thus outlined was incised through and through with a #15 scalpel blade.  The muscularis and dermis were reaproximated with deep sutures following hemostasis. Care was taken to realign the vermilion border before proceeding with the superficial closure.  Once the vermilion was realigned the superfical and mucosal closure was finished. Validate That Anesthesia Volume Is Not Zero (If You Leave At 0 It Will Not Render In Note): No Eliptical Excision Additional Text (Leave Blank If You Do Not Want): The margin was drawn around the clinically apparent lesion.  An elliptical shape was then drawn on the skin incorporating the lesion and margins.  Incisions were then made along these lines to the appropriate tissue plane and the lesion was extirpated. Show Referring Physician Variable: Yes Island Pedicle Flap With Canthal Suspension Text: The defect edges were debeveled with a #15 scalpel blade.  Given the location of the defect, shape of the defect and the proximity to free margins an island pedicle advancement flap was deemed most appropriate.  Using a sterile surgical marker, an appropriate advancement flap was drawn incorporating the defect, outlining the appropriate donor tissue and placing the expected incisions within the relaxed skin tension lines where possible. The area thus outlined was incised deep to adipose tissue with a #15 scalpel blade.  The skin margins were undermined to an appropriate distance in all directions around the primary defect and laterally outward around the island pedicle utilizing iris scissors.  There was minimal undermining beneath the pedicle flap. A suspension suture was placed in the canthal tendon to prevent tension and prevent ectropion. Complex Repair And Xenograft Text: The defect edges were debeveled with a #15 scalpel blade.  The primary defect was closed partially with a complex linear closure.  Given the location of the defect, shape of the defect and the proximity to free margins a xenograft was deemed most appropriate to repair the remaining defect.  The graft was trimmed to fit the size of the remaining defect.  The graft was then placed in the primary defect, oriented appropriately, and sutured into place. Repair Type: Intermediate Hatchet Flap Text: The defect edges were debeveled with a #15 scalpel blade.  Given the location of the defect, shape of the defect and the proximity to free margins a hatchet flap was deemed most appropriate.  Using a sterile surgical marker, an appropriate hatchet flap was drawn incorporating the defect and placing the expected incisions within the relaxed skin tension lines where possible.    The area thus outlined was incised deep to adipose tissue with a #15 scalpel blade.  The skin margins were undermined to an appropriate distance in all directions utilizing iris scissors. Complex Repair And Bilobe Flap Text: The defect edges were debeveled with a #15 scalpel blade.  The primary defect was closed partially with a complex linear closure.  Given the location of the remaining defect, shape of the defect and the proximity to free margins a bilobe flap was deemed most appropriate for complete closure of the defect.  Using a sterile surgical marker, an appropriate advancement flap was drawn incorporating the defect and placing the expected incisions within the relaxed skin tension lines where possible.    The area thus outlined was incised deep to adipose tissue with a #15 scalpel blade.  The skin margins were undermined to an appropriate distance in all directions utilizing iris scissors. Medical Necessity Clause: This procedure was medically necessary because the lesion that was treated was: Paramedian Forehead Flap Text: A decision was made to reconstruct the defect utilizing an interpolation axial flap and a staged reconstruction.  A telfa template was made of the defect.  This telfa template was then used to outline the paramedian forehead pedicle flap.  The donor area for the pedicle flap was then injected with anesthesia.  The flap was excised through the skin and subcutaneous tissue down to the layer of the underlying musculature.  The pedicle flap was carefully excised within this deep plane to maintain its blood supply.  The edges of the donor site were undermined.   The donor site was closed in a primary fashion.  The pedicle was then rotated into position and sutured.  Once the tube was sutured into place, adequate blood supply was confirmed with blanching and refill.  The pedicle was then wrapped with xeroform gauze and dressed appropriately with a telfa and gauze bandage to ensure continued blood supply and protect the attached pedicle. Complex Repair And Dermal Autograft Text: The defect edges were debeveled with a #15 scalpel blade.  The primary defect was closed partially with a complex linear closure.  Given the location of the defect, shape of the defect and the proximity to free margins an dermal autograft was deemed most appropriate to repair the remaining defect.  The graft was trimmed to fit the size of the remaining defect.  The graft was then placed in the primary defect, oriented appropriately, and sutured into place. Anesthesia Volume In Cc: 0 Fusiform Excision Additional Text (Leave Blank If You Do Not Want): The margin was drawn around the clinically apparent lesion.  A fusiform shape was then drawn on the skin incorporating the lesion and margins.  Incisions were then made along these lines to the appropriate tissue plane and the lesion was extirpated. Complex Repair And Skin Substitute Graft Text: The defect edges were debeveled with a #15 scalpel blade.  The primary defect was closed partially with a complex linear closure.  Given the location of the remaining defect, shape of the defect and the proximity to free margins a skin substitute graft was deemed most appropriate to repair the remaining defect.  The graft was trimmed to fit the size of the remaining defect.  The graft was then placed in the primary defect, oriented appropriately, and sutured into place. Island Pedicle Flap Text: The defect edges were debeveled with a #15 scalpel blade.  Given the location of the defect, shape of the defect and the proximity to free margins an island pedicle advancement flap was deemed most appropriate.  Using a sterile surgical marker, an appropriate advancement flap was drawn incorporating the defect, outlining the appropriate donor tissue and placing the expected incisions within the relaxed skin tension lines where possible.    The area thus outlined was incised deep to adipose tissue with a #15 scalpel blade.  The skin margins were undermined to an appropriate distance in all directions around the primary defect and laterally outward around the island pedicle utilizing iris scissors.  There was minimal undermining beneath the pedicle flap. Complex Repair And O-L Flap Text: The defect edges were debeveled with a #15 scalpel blade.  The primary defect was closed partially with a complex linear closure.  Given the location of the remaining defect, shape of the defect and the proximity to free margins an O-L flap was deemed most appropriate for complete closure of the defect.  Using a sterile surgical marker, an appropriate flap was drawn incorporating the defect and placing the expected incisions within the relaxed skin tension lines where possible.    The area thus outlined was incised deep to adipose tissue with a #15 scalpel blade.  The skin margins were undermined to an appropriate distance in all directions utilizing iris scissors. Mucosal Advancement Flap Text: Given the location of the defect, shape of the defect and the proximity to free margins a mucosal advancement flap was deemed most appropriate. Incisions were made with a 15 blade scalpel in the appropriate fashion along the cutaneous vermillion border and the mucosal lip. The remaining actinically damaged mucosal tissue was excised.  The mucosal advancement flap was then elevated to the gingival sulcus with care taken to preserve the neurovascular structures and advanced into the primary defect. Care was taken to ensure that precise realignment of the vermilion border was achieved. Posterior Auricular Interpolation Flap Text: A decision was made to reconstruct the defect utilizing an interpolation axial flap and a staged reconstruction.  A telfa template was made of the defect.  This telfa template was then used to outline the posterior auricular interpolation flap.  The donor area for the pedicle flap was then injected with anesthesia.  The flap was excised through the skin and subcutaneous tissue down to the layer of the underlying musculature.  The pedicle flap was carefully excised within this deep plane to maintain its blood supply.  The edges of the donor site were undermined.   The donor site was closed in a primary fashion.  The pedicle was then rotated into position and sutured.  Once the tube was sutured into place, adequate blood supply was confirmed with blanching and refill.  The pedicle was then wrapped with xeroform gauze and dressed appropriately with a telfa and gauze bandage to ensure continued blood supply and protect the attached pedicle. Lab: [id number] Complex Repair And Epidermal Autograft Text: The defect edges were debeveled with a #15 scalpel blade.  The primary defect was closed partially with a complex linear closure.  Given the location of the defect, shape of the defect and the proximity to free margins an epidermal autograft was deemed most appropriate to repair the remaining defect.  The graft was trimmed to fit the size of the remaining defect.  The graft was then placed in the primary defect, oriented appropriately, and sutured into place. Path Notes (To The Dermatopathologist): Please check margins. Information: Selecting Yes will display possible errors in your note based on the variables you have selected. This validation is only offered as a suggestion for you. PLEASE NOTE THAT THE VALIDATION TEXT WILL BE REMOVED WHEN YOU FINALIZE YOUR NOTE. IF YOU WANT TO FAX A PRELIMINARY NOTE YOU WILL NEED TO TOGGLE THIS TO 'NO' IF YOU DO NOT WANT IT IN YOUR FAXED NOTE. Double M-Plasty Intermediate Repair Preamble Text (Leave Blank If You Do Not Want): Undermining was performed with blunt dissection. Intermediate / Complex Repair - Final Wound Length In Cm: 2.7 Modified Advancement Flap Text: The defect edges were debeveled with a #15 scalpel blade.  Given the location of the defect, shape of the defect and the proximity to free margins a modified advancement flap was deemed most appropriate.  Using a sterile surgical marker, an appropriate advancement flap was drawn incorporating the defect and placing the expected incisions within the relaxed skin tension lines where possible.    The area thus outlined was incised deep to adipose tissue with a #15 scalpel blade.  The skin margins were undermined to an appropriate distance in all directions utilizing iris scissors. Complex Repair And O-T Advancement Flap Text: The defect edges were debeveled with a #15 scalpel blade.  The primary defect was closed partially with a complex linear closure.  Given the location of the remaining defect, shape of the defect and the proximity to free margins an O-T advancement flap was deemed most appropriate for complete closure of the defect.  Using a sterile surgical marker, an appropriate advancement flap was drawn incorporating the defect and placing the expected incisions within the relaxed skin tension lines where possible.    The area thus outlined was incised deep to adipose tissue with a #15 scalpel blade.  The skin margins were undermined to an appropriate distance in all directions utilizing iris scissors. Lab Facility: [id number] Mastoid Interpolation Flap Text: A decision was made to reconstruct the defect utilizing an interpolation axial flap and a staged reconstruction.  A telfa template was made of the defect.  This telfa template was then used to outline the mastoid interpolation flap.  The donor area for the pedicle flap was then injected with anesthesia.  The flap was excised through the skin and subcutaneous tissue down to the layer of the underlying musculature.  The pedicle flap was carefully excised within this deep plane to maintain its blood supply.  The edges of the donor site were undermined.   The donor site was closed in a primary fashion.  The pedicle was then rotated into position and sutured.  Once the tube was sutured into place, adequate blood supply was confirmed with blanching and refill.  The pedicle was then wrapped with xeroform gauze and dressed appropriately with a telfa and gauze bandage to ensure continued blood supply and protect the attached pedicle. Dorsal Nasal Flap Text: The defect edges were debeveled with a #15 scalpel blade.  Given the location of the defect and the proximity to free margins a dorsal nasal flap was deemed most appropriate.  Using a sterile surgical marker, an appropriate dorsal nasal flap was drawn around the defect.    The area thus outlined was incised deep to adipose tissue with a #15 scalpel blade.  The skin margins were undermined to an appropriate distance in all directions utilizing iris scissors. Anesthesia Type: 1% lidocaine with 1:100,000 epinephrine Complex Repair And Split-Thickness Skin Graft Text: The defect edges were debeveled with a #15 scalpel blade.  The primary defect was closed partially with a complex linear closure.  Given the location of the defect, shape of the defect and the proximity to free margins a split thickness skin graft was deemed most appropriate to repair the remaining defect.  The graft was trimmed to fit the size of the remaining defect.  The graft was then placed in the primary defect, oriented appropriately, and sutured into place. Billing Type: Third-Party Bill Epidermal Closure: running Mercedes Flap Text: The defect edges were debeveled with a #15 scalpel blade.  Given the location of the defect, shape of the defect and the proximity to free margins a Mercedes flap was deemed most appropriate.  Using a sterile surgical marker, an appropriate advancement flap was drawn incorporating the defect and placing the expected incisions within the relaxed skin tension lines where possible. The area thus outlined was incised deep to adipose tissue with a #15 scalpel blade.  The skin margins were undermined to an appropriate distance in all directions utilizing iris scissors. Complex Repair And A-T Advancement Flap Text: The defect edges were debeveled with a #15 scalpel blade.  The primary defect was closed partially with a complex linear closure.  Given the location of the remaining defect, shape of the defect and the proximity to free margins an A-T advancement flap was deemed most appropriate for complete closure of the defect.  Using a sterile surgical marker, an appropriate advancement flap was drawn incorporating the defect and placing the expected incisions within the relaxed skin tension lines where possible.    The area thus outlined was incised deep to adipose tissue with a #15 scalpel blade.  The skin margins were undermined to an appropriate distance in all directions utilizing iris scissors. Melolabial Interpolation Flap Text: A decision was made to reconstruct the defect utilizing an interpolation axial flap and a staged reconstruction.  A telfa template was made of the defect.  This telfa template was then used to outline the melolabial interpolation flap.  The donor area for the pedicle flap was then injected with anesthesia.  The flap was excised through the skin and subcutaneous tissue down to the layer of the underlying musculature.  The pedicle flap was carefully excised within this deep plane to maintain its blood supply.  The edges of the donor site were undermined.   The donor site was closed in a primary fashion.  The pedicle was then rotated into position and sutured.  Once the tube was sutured into place, adequate blood supply was confirmed with blanching and refill.  The pedicle was then wrapped with xeroform gauze and dressed appropriately with a telfa and gauze bandage to ensure continued blood supply and protect the attached pedicle. Body Location Override (Optional - Billing Will Still Be Based On Selected Body Map Location If Applicable): left deltoid Trilobed Flap Text: The defect edges were debeveled with a #15 scalpel blade.  Given the location of the defect and the proximity to free margins a trilobed flap was deemed most appropriate.  Using a sterile surgical marker, an appropriate trilobed flap drawn around the defect.    The area thus outlined was incised deep to adipose tissue with a #15 scalpel blade.  The skin margins were undermined to an appropriate distance in all directions utilizing iris scissors. Complex Repair And Ftsg Text: The defect edges were debeveled with a #15 scalpel blade.  The primary defect was closed partially with a complex linear closure.  Given the location of the defect, shape of the defect and the proximity to free margins a full thickness skin graft was deemed most appropriate to repair the remaining defect.  The graft was trimmed to fit the size of the remaining defect.  The graft was then placed in the primary defect, oriented appropriately, and sutured into place. V-Y Flap Text: The defect edges were debeveled with a #15 scalpel blade.  Given the location of the defect, shape of the defect and the proximity to free margins a V-Y flap was deemed most appropriate.  Using a sterile surgical marker, an appropriate advancement flap was drawn incorporating the defect and placing the expected incisions within the relaxed skin tension lines where possible.    The area thus outlined was incised deep to adipose tissue with a #15 scalpel blade.  The skin margins were undermined to an appropriate distance in all directions utilizing iris scissors. Complex Repair And Modified Advancement Flap Text: The defect edges were debeveled with a #15 scalpel blade.  The primary defect was closed partially with a complex linear closure.  Given the location of the remaining defect, shape of the defect and the proximity to free margins a modified advancement flap was deemed most appropriate for complete closure of the defect.  Using a sterile surgical marker, an appropriate advancement flap was drawn incorporating the defect and placing the expected incisions within the relaxed skin tension lines where possible.    The area thus outlined was incised deep to adipose tissue with a #15 scalpel blade.  The skin margins were undermined to an appropriate distance in all directions utilizing iris scissors. Interpolation Flap Text: A decision was made to reconstruct the defect utilizing an interpolation axial flap and a staged reconstruction.  A telfa template was made of the defect.  This telfa template was then used to outline the interpolation flap.  The donor area for the pedicle flap was then injected with anesthesia.  The flap was excised through the skin and subcutaneous tissue down to the layer of the underlying musculature.  The interpolation flap was carefully excised within this deep plane to maintain its blood supply.  The edges of the donor site were undermined.   The donor site was closed in a primary fashion.  The pedicle was then rotated into position and sutured.  Once the tube was sutured into place, adequate blood supply was confirmed with blanching and refill.  The pedicle was then wrapped with xeroform gauze and dressed appropriately with a telfa and gauze bandage to ensure continued blood supply and protect the attached pedicle. Complex Repair And Dorsal Nasal Flap Text: The defect edges were debeveled with a #15 scalpel blade.  The primary defect was closed partially with a complex linear closure.  Given the location of the remaining defect, shape of the defect and the proximity to free margins a dorsal nasal flap was deemed most appropriate for complete closure of the defect.  Using a sterile surgical marker, an appropriate flap was drawn incorporating the defect and placing the expected incisions within the relaxed skin tension lines where possible.    The area thus outlined was incised deep to adipose tissue with a #15 scalpel blade.  The skin margins were undermined to an appropriate distance in all directions utilizing iris scissors. Bilobed Transposition Flap Text: The defect edges were debeveled with a #15 scalpel blade.  Given the location of the defect and the proximity to free margins a bilobed transposition flap was deemed most appropriate.  Using a sterile surgical marker, an appropriate bilobe flap drawn around the defect.    The area thus outlined was incised deep to adipose tissue with a #15 scalpel blade.  The skin margins were undermined to an appropriate distance in all directions utilizing iris scissors. Consent was obtained from the patient. The risks and benefits to therapy were discussed in detail. Specifically, the risks of pain infection, scarring, bleeding, prolonged wound healing, incomplete removal, allergy to anesthesia, nerve injury and recurrence were addressed. Prior to the procedure, the treatment site was clearly identified and confirmed by the patient. All components of Universal Protocol/PAUSE Rule completed. O-L Flap Text: The defect edges were debeveled with a #15 scalpel blade.  Given the location of the defect, shape of the defect and the proximity to free margins an O-L flap was deemed most appropriate.  Using a sterile surgical marker, an appropriate advancement flap was drawn incorporating the defect and placing the expected incisions within the relaxed skin tension lines where possible.    The area thus outlined was incised deep to adipose tissue with a #15 scalpel blade.  The skin margins were undermined to an appropriate distance in all directions utilizing iris scissors. Cheek-To-Nose Interpolation Flap Text: A decision was made to reconstruct the defect utilizing an interpolation axial flap and a staged reconstruction.  A telfa template was made of the defect.  This telfa template was then used to outline the Cheek-To-Nose Interpolation flap.  The donor area for the pedicle flap was then injected with anesthesia.  The flap was excised through the skin and subcutaneous tissue down to the layer of the underlying musculature.  The interpolation flap was carefully excised within this deep plane to maintain its blood supply.  The edges of the donor site were undermined.   The donor site was closed in a primary fashion.  The pedicle was then rotated into position and sutured.  Once the tube was sutured into place, adequate blood supply was confirmed with blanching and refill.  The pedicle was then wrapped with xeroform gauze and dressed appropriately with a telfa and gauze bandage to ensure continued blood supply and protect the attached pedicle. Complex Repair And Z Plasty Text: The defect edges were debeveled with a #15 scalpel blade.  The primary defect was closed partially with a complex linear closure.  Given the location of the remaining defect, shape of the defect and the proximity to free margins a Z plasty was deemed most appropriate for complete closure of the defect.  Using a sterile surgical marker, an appropriate advancement flap was drawn incorporating the defect and placing the expected incisions within the relaxed skin tension lines where possible.    The area thus outlined was incised deep to adipose tissue with a #15 scalpel blade.  The skin margins were undermined to an appropriate distance in all directions utilizing iris scissors. Bilobed Flap Text: The defect edges were debeveled with a #15 scalpel blade.  Given the location of the defect and the proximity to free margins a bilobe flap was deemed most appropriate.  Using a sterile surgical marker, an appropriate bilobe flap drawn around the defect.    The area thus outlined was incised deep to adipose tissue with a #15 scalpel blade.  The skin margins were undermined to an appropriate distance in all directions utilizing iris scissors. Complex Repair And Double Advancement Flap Text: The defect edges were debeveled with a #15 scalpel blade.  The primary defect was closed partially with a complex linear closure.  Given the location of the remaining defect, shape of the defect and the proximity to free margins a double advancement flap was deemed most appropriate for complete closure of the defect.  Using a sterile surgical marker, an appropriate advancement flap was drawn incorporating the defect and placing the expected incisions within the relaxed skin tension lines where possible.    The area thus outlined was incised deep to adipose tissue with a #15 scalpel blade.  The skin margins were undermined to an appropriate distance in all directions utilizing iris scissors. Double M-Plasty Complex Repair Preamble Text (Leave Blank If You Do Not Want): Extensive wide undermining was performed. O-T Advancement Flap Text: The defect edges were debeveled with a #15 scalpel blade.  Given the location of the defect, shape of the defect and the proximity to free margins an O-T advancement flap was deemed most appropriate.  Using a sterile surgical marker, an appropriate advancement flap was drawn incorporating the defect and placing the expected incisions within the relaxed skin tension lines where possible.    The area thus outlined was incised deep to adipose tissue with a #15 scalpel blade.  The skin margins were undermined to an appropriate distance in all directions utilizing iris scissors. Cheek Interpolation Flap Text: A decision was made to reconstruct the defect utilizing an interpolation axial flap and a staged reconstruction.  A telfa template was made of the defect.  This telfa template was then used to outline the Cheek Interpolation flap.  The donor area for the pedicle flap was then injected with anesthesia.  The flap was excised through the skin and subcutaneous tissue down to the layer of the underlying musculature.  The interpolation flap was carefully excised within this deep plane to maintain its blood supply.  The edges of the donor site were undermined.   The donor site was closed in a primary fashion.  The pedicle was then rotated into position and sutured.  Once the tube was sutured into place, adequate blood supply was confirmed with blanching and refill.  The pedicle was then wrapped with xeroform gauze and dressed appropriately with a telfa and gauze bandage to ensure continued blood supply and protect the attached pedicle. Banner Transposition Flap Text: The defect edges were debeveled with a #15 scalpel blade.  Given the location of the defect and the proximity to free margins a Banner transposition flap was deemed most appropriate.  Using a sterile surgical marker, an appropriate flap drawn around the defect. The area thus outlined was incised deep to adipose tissue with a #15 scalpel blade.  The skin margins were undermined to an appropriate distance in all directions utilizing iris scissors. Complex Repair And W Plasty Text: The defect edges were debeveled with a #15 scalpel blade.  The primary defect was closed partially with a complex linear closure.  Given the location of the remaining defect, shape of the defect and the proximity to free margins a W plasty was deemed most appropriate for complete closure of the defect.  Using a sterile surgical marker, an appropriate advancement flap was drawn incorporating the defect and placing the expected incisions within the relaxed skin tension lines where possible.    The area thus outlined was incised deep to adipose tissue with a #15 scalpel blade.  The skin margins were undermined to an appropriate distance in all directions utilizing iris scissors. Complex Repair And Single Advancement Flap Text: The defect edges were debeveled with a #15 scalpel blade.  The primary defect was closed partially with a complex linear closure.  Given the location of the remaining defect, shape of the defect and the proximity to free margins a single advancement flap was deemed most appropriate for complete closure of the defect.  Using a sterile surgical marker, an appropriate advancement flap was drawn incorporating the defect and placing the expected incisions within the relaxed skin tension lines where possible.    The area thus outlined was incised deep to adipose tissue with a #15 scalpel blade.  The skin margins were undermined to an appropriate distance in all directions utilizing iris scissors. Additional Anesthesia Volume In Cc: 6 A-T Advancement Flap Text: The defect edges were debeveled with a #15 scalpel blade.  Given the location of the defect, shape of the defect and the proximity to free margins an A-T advancement flap was deemed most appropriate.  Using a sterile surgical marker, an appropriate advancement flap was drawn incorporating the defect and placing the expected incisions within the relaxed skin tension lines where possible.    The area thus outlined was incised deep to adipose tissue with a #15 scalpel blade.  The skin margins were undermined to an appropriate distance in all directions utilizing iris scissors. Z Plasty Text: The lesion was extirpated to the level of the fat with a #15 scalpel blade.  Given the location of the defect, shape of the defect and the proximity to free margins a Z-plasty was deemed most appropriate for repair.  Using a sterile surgical marker, the appropriate transposition arms of the Z-plasty were drawn incorporating the defect and placing the expected incisions within the relaxed skin tension lines where possible.    The area thus outlined was incised deep to adipose tissue with a #15 scalpel blade.  The skin margins were undermined to an appropriate distance in all directions utilizing iris scissors.  The opposing transposition arms were then transposed into place in opposite direction and anchored with interrupted buried subcutaneous sutures. Ear Star Wedge Flap Text: The defect edges were debeveled with a #15 blade scalpel.  Given the location of the defect and the proximity to free margins (helical rim) an ear star wedge flap was deemed most appropriate.  Using a sterile surgical marker, the appropriate flap was drawn incorporating the defect and placing the expected incisions between the helical rim and antihelix where possible.  The area thus outlined was incised through and through with a #15 scalpel blade. Complex Repair And Double M Plasty Text: The defect edges were debeveled with a #15 scalpel blade.  The primary defect was closed partially with a complex linear closure.  Given the location of the remaining defect, shape of the defect and the proximity to free margins a double M plasty was deemed most appropriate for complete closure of the defect.  Using a sterile surgical marker, an appropriate advancement flap was drawn incorporating the defect and placing the expected incisions within the relaxed skin tension lines where possible.    The area thus outlined was incised deep to adipose tissue with a #15 scalpel blade.  The skin margins were undermined to an appropriate distance in all directions utilizing iris scissors. Purse String (Simple) Text: Given the location of the defect and the characteristics of the surrounding skin a purse string simple closure was deemed most appropriate.  Undermining was performed circumferentially around the surgical defect.  A purse string suture was then placed and tightened. Crescentic Advancement Flap Text: The defect edges were debeveled with a #15 scalpel blade.  Given the location of the defect and the proximity to free margins a crescentic advancement flap was deemed most appropriate.  Using a sterile surgical marker, the appropriate advancement flap was drawn incorporating the defect and placing the expected incisions within the relaxed skin tension lines where possible.    The area thus outlined was incised deep to adipose tissue with a #15 scalpel blade.  The skin margins were undermined to an appropriate distance in all directions utilizing iris scissors. Post-Care Instructions: I reviewed with the patient in detail post-care instructions. Patient is not to engage in any heavy lifting, strenuous exercise, or swimming for the next 14 days. Should the patient develop any fevers, chills, bleeding, severe pain patient will contact the office immediately. W Plasty Text: The lesion was extirpated to the level of the fat with a #15 scalpel blade.  Given the location of the defect, shape of the defect and the proximity to free margins a W-plasty was deemed most appropriate for repair.  Using a sterile surgical marker, the appropriate transposition arms of the W-plasty were drawn incorporating the defect and placing the expected incisions within the relaxed skin tension lines where possible.    The area thus outlined was incised deep to adipose tissue with a #15 scalpel blade.  The skin margins were undermined to an appropriate distance in all directions utilizing iris scissors.  The opposing transposition arms were then transposed into place in opposite direction and anchored with interrupted buried subcutaneous sutures. Complex Repair And M Plasty Text: The defect edges were debeveled with a #15 scalpel blade.  The primary defect was closed partially with a complex linear closure.  Given the location of the remaining defect, shape of the defect and the proximity to free margins an M plasty was deemed most appropriate for complete closure of the defect.  Using a sterile surgical marker, an appropriate advancement flap was drawn incorporating the defect and placing the expected incisions within the relaxed skin tension lines where possible.    The area thus outlined was incised deep to adipose tissue with a #15 scalpel blade.  The skin margins were undermined to an appropriate distance in all directions utilizing iris scissors. Bilateral Helical Rim Advancement Flap Text: The defect edges were debeveled with a #15 blade scalpel.  Given the location of the defect and the proximity to free margins (helical rim) a bilateral helical rim advancement flap was deemed most appropriate.  Using a sterile surgical marker, the appropriate advancement flaps were drawn incorporating the defect and placing the expected incisions between the helical rim and antihelix where possible.  The area thus outlined was incised through and through with a #15 scalpel blade.  With a skin hook and iris scissors, the flaps were gently and sharply undermined and freed up. Purse String (Intermediate) Text: Given the location of the defect and the characteristics of the surrounding skin a purse string intermediate closure was deemed most appropriate.  Undermining was performed circumferentially around the surgical defect.  A purse string suture was then placed and tightened. Epidermal Closure Graft Donor Site (Optional): simple interrupted Epidermal Sutures: 4-0 Prolene Burow's Advancement Flap Text: The defect edges were debeveled with a #15 scalpel blade.  Given the location of the defect and the proximity to free margins a Burow's advancement flap was deemed most appropriate.  Using a sterile surgical marker, the appropriate advancement flap was drawn incorporating the defect and placing the expected incisions within the relaxed skin tension lines where possible.    The area thus outlined was incised deep to adipose tissue with a #15 scalpel blade.  The skin margins were undermined to an appropriate distance in all directions utilizing iris scissors. Home Suture Removal Text: Patient was provided a home suture removal kit and will remove their sutures at home.  If they have any questions or difficulties they will call the office. H Plasty Text: Given the location of the defect, shape of the defect and the proximity to free margins a H-plasty was deemed most appropriate for repair.  Using a sterile surgical marker, the appropriate advancement arms of the H-plasty were drawn incorporating the defect and placing the expected incisions within the relaxed skin tension lines where possible. The area thus outlined was incised deep to adipose tissue with a #15 scalpel blade. The skin margins were undermined to an appropriate distance in all directions utilizing iris scissors.  The opposing advancement arms were then advanced into place in opposite direction and anchored with interrupted buried subcutaneous sutures. Helical Rim Advancement Flap Text: The defect edges were debeveled with a #15 blade scalpel.  Given the location of the defect and the proximity to free margins (helical rim) a double helical rim advancement flap was deemed most appropriate.  Using a sterile surgical marker, the appropriate advancement flaps were drawn incorporating the defect and placing the expected incisions between the helical rim and antihelix where possible.  The area thus outlined was incised through and through with a #15 scalpel blade.  With a skin hook and iris scissors, the flaps were gently and sharply undermined and freed up. Xenograft Text: The defect edges were debeveled with a #15 scalpel blade.  Given the location of the defect, shape of the defect and the proximity to free margins a xenograft was deemed most appropriate.  The graft was then trimmed to fit the size of the defect.  The graft was then placed in the primary defect and oriented appropriately. Hemostasis: Electrodesiccation Advancement Flap (Double) Text: The defect edges were debeveled with a #15 scalpel blade.  Given the location of the defect and the proximity to free margins a double advancement flap was deemed most appropriate.  Using a sterile surgical marker, the appropriate advancement flaps were drawn incorporating the defect and placing the expected incisions within the relaxed skin tension lines where possible.    The area thus outlined was incised deep to adipose tissue with a #15 scalpel blade.  The skin margins were undermined to an appropriate distance in all directions utilizing iris scissors. V-Y Plasty Text: The defect edges were debeveled with a #15 scalpel blade.  Given the location of the defect, shape of the defect and the proximity to free margins an V-Y advancement flap was deemed most appropriate.  Using a sterile surgical marker, an appropriate advancement flap was drawn incorporating the defect and placing the expected incisions within the relaxed skin tension lines where possible.    The area thus outlined was incised deep to adipose tissue with a #15 scalpel blade.  The skin margins were undermined to an appropriate distance in all directions utilizing iris scissors. Bi-Rhombic Flap Text: The defect edges were debeveled with a #15 scalpel blade.  Given the location of the defect and the proximity to free margins a bi-rhombic flap was deemed most appropriate.  Using a sterile surgical marker, an appropriate rhombic flap was drawn incorporating the defect. The area thus outlined was incised deep to adipose tissue with a #15 scalpel blade.  The skin margins were undermined to an appropriate distance in all directions utilizing iris scissors. Tissue Cultured Epidermal Autograft Text: The defect edges were debeveled with a #15 scalpel blade.  Given the location of the defect, shape of the defect and the proximity to free margins a tissue cultured epidermal autograft was deemed most appropriate.  The graft was then trimmed to fit the size of the defect.  The graft was then placed in the primary defect and oriented appropriately. Graft Donor Site Bandage (Optional-Leave Blank If You Don't Want In Note): Steri-strips and a pressure bandage were applied to the donor site. Advancement Flap (Single) Text: The defect edges were debeveled with a #15 scalpel blade.  Given the location of the defect and the proximity to free margins a single advancement flap was deemed most appropriate.  Using a sterile surgical marker, an appropriate advancement flap was drawn incorporating the defect and placing the expected incisions within the relaxed skin tension lines where possible.    The area thus outlined was incised deep to adipose tissue with a #15 scalpel blade.  The skin margins were undermined to an appropriate distance in all directions utilizing iris scissors. S Plasty Text: Given the location and shape of the defect, and the orientation of relaxed skin tension lines, an S-plasty was deemed most appropriate for repair.  Using a sterile surgical marker, the appropriate outline of the S-plasty was drawn, incorporating the defect and placing the expected incisions within the relaxed skin tension lines where possible.  The area thus outlined was incised deep to adipose tissue with a #15 scalpel blade.  The skin margins were undermined to an appropriate distance in all directions utilizing iris scissors. The skin flaps were advanced over the defect.  The opposing margins were then approximated with interrupted buried subcutaneous sutures. Rhomboid Transposition Flap Text: The defect edges were debeveled with a #15 scalpel blade.  Given the location of the defect and the proximity to free margins a rhomboid transposition flap was deemed most appropriate.  Using a sterile surgical marker, an appropriate rhomboid flap was drawn incorporating the defect.    The area thus outlined was incised deep to adipose tissue with a #15 scalpel blade.  The skin margins were undermined to an appropriate distance in all directions utilizing iris scissors. Skin Substitute Text: The defect edges were debeveled with a #15 scalpel blade.  Given the location of the defect, shape of the defect and the proximity to free margins a skin substitute graft was deemed most appropriate.  The graft material was trimmed to fit the size of the defect. The graft was then placed in the primary defect and oriented appropriately. Detail Level: Detailed Estimated Blood Loss (Cc): minimal Medical Necessity Information: It is in your best interest to select a reason for this procedure from the list below. All of these items fulfill various CMS LCD requirements except lesion extends to a margin. No Repair - Repaired With Adjacent Surgical Defect Text (Leave Blank If You Do Not Want): After the excision the defect was repaired concurrently with another surgical defect which was in close approximation. Double O-Z Plasty Text: The defect edges were debeveled with a #15 scalpel blade.  Given the location of the defect, shape of the defect and the proximity to free margins a Double O-Z plasty (double transposition flap) was deemed most appropriate.  Using a sterile surgical marker, the appropriate transposition flaps were drawn incorporating the defect and placing the expected incisions within the relaxed skin tension lines where possible. The area thus outlined was incised deep to adipose tissue with a #15 scalpel blade.  The skin margins were undermined to an appropriate distance in all directions utilizing iris scissors.  Hemostasis was achieved with electrocautery.  The flaps were then transposed into place, one clockwise and the other counterclockwise, and anchored with interrupted buried subcutaneous sutures. Rhombic Flap Text: The defect edges were debeveled with a #15 scalpel blade.  Given the location of the defect and the proximity to free margins a rhombic flap was deemed most appropriate.  Using a sterile surgical marker, an appropriate rhombic flap was drawn incorporating the defect.    The area thus outlined was incised deep to adipose tissue with a #15 scalpel blade.  The skin margins were undermined to an appropriate distance in all directions utilizing iris scissors. Dermal Autograft Text: The defect edges were debeveled with a #15 scalpel blade.  Given the location of the defect, shape of the defect and the proximity to free margins a dermal autograft was deemed most appropriate.  Using a sterile surgical marker, the primary defect shape was transferred to the donor site. The area thus outlined was incised deep to adipose tissue with a #15 scalpel blade.  The harvested graft was then trimmed of adipose and epidermal tissue until only dermis was left.  The skin graft was then placed in the primary defect and oriented appropriately. Repair Performed By Another Provider Text (Leave Blank If You Do Not Want): After the tissue was excised the defect was repaired by another provider. Size Of Lesion In Cm: 0.5 O-Z Plasty Text: The defect edges were debeveled with a #15 scalpel blade.  Given the location of the defect, shape of the defect and the proximity to free margins an O-Z plasty (double transposition flap) was deemed most appropriate.  Using a sterile surgical marker, the appropriate transposition flaps were drawn incorporating the defect and placing the expected incisions within the relaxed skin tension lines where possible.    The area thus outlined was incised deep to adipose tissue with a #15 scalpel blade.  The skin margins were undermined to an appropriate distance in all directions utilizing iris scissors.  Hemostasis was achieved with electrocautery.  The flaps were then transposed into place, one clockwise and the other counterclockwise, and anchored with interrupted buried subcutaneous sutures. Melolabial Transposition Flap Text: The defect edges were debeveled with a #15 scalpel blade.  Given the location of the defect and the proximity to free margins a melolabial flap was deemed most appropriate.  Using a sterile surgical marker, an appropriate melolabial transposition flap was drawn incorporating the defect.    The area thus outlined was incised deep to adipose tissue with a #15 scalpel blade.  The skin margins were undermined to an appropriate distance in all directions utilizing iris scissors. Epidermal Autograft Text: The defect edges were debeveled with a #15 scalpel blade.  Given the location of the defect, shape of the defect and the proximity to free margins an epidermal autograft was deemed most appropriate.  Using a sterile surgical marker, the primary defect shape was transferred to the donor site. The epidermal graft was then harvested.  The skin graft was then placed in the primary defect and oriented appropriately. Excision Depth: adipose tissue Suture Removal: 14 days O-T Plasty Text: The defect edges were debeveled with a #15 scalpel blade.  Given the location of the defect, shape of the defect and the proximity to free margins an O-T plasty was deemed most appropriate.  Using a sterile surgical marker, an appropriate O-T plasty was drawn incorporating the defect and placing the expected incisions within the relaxed skin tension lines where possible.    The area thus outlined was incised deep to adipose tissue with a #15 scalpel blade.  The skin margins were undermined to an appropriate distance in all directions utilizing iris scissors. Muscle Hinge Flap Text: The defect edges were debeveled with a #15 scalpel blade.  Given the size, depth and location of the defect and the proximity to free margins a muscle hinge flap was deemed most appropriate.  Using a sterile surgical marker, an appropriate hinge flap was drawn incorporating the defect. The area thus outlined was incised with a #15 scalpel blade.  The skin margins were undermined to an appropriate distance in all directions utilizing iris scissors. Complex Repair And V-Y Plasty Text: The defect edges were debeveled with a #15 scalpel blade.  The primary defect was closed partially with a complex linear closure.  Given the location of the remaining defect, shape of the defect and the proximity to free margins a V-Y plasty was deemed most appropriate for complete closure of the defect.  Using a sterile surgical marker, an appropriate advancement flap was drawn incorporating the defect and placing the expected incisions within the relaxed skin tension lines where possible.    The area thus outlined was incised deep to adipose tissue with a #15 scalpel blade.  The skin margins were undermined to an appropriate distance in all directions utilizing iris scissors. Composite Graft Text: The defect edges were debeveled with a #15 scalpel blade.  Given the location of the defect, shape of the defect, the proximity to free margins and the fact the defect was full thickness a composite graft was deemed most appropriate.  The defect was outline and then transferred to the donor site.  A full thickness graft was then excised from the donor site. The graft was then placed in the primary defect, oriented appropriately and then sutured into place.  The secondary defect was then repaired using a primary closure. Perilesional Excision Additional Text (Leave Blank If You Do Not Want): The margin was drawn around the clinically apparent lesion. Incisions were then made along these lines to the appropriate tissue plane and the lesion was extirpated. Keystone Flap Text: The defect edges were debeveled with a #15 scalpel blade.  Given the location of the defect, shape of the defect a keystone flap was deemed most appropriate.  Using a sterile surgical marker, an appropriate keystone flap was drawn incorporating the defect, outlining the appropriate donor tissue and placing the expected incisions within the relaxed skin tension lines where possible. The area thus outlined was incised deep to adipose tissue with a #15 scalpel blade.  The skin margins were undermined to an appropriate distance in all directions around the primary defect and laterally outward around the flap utilizing iris scissors. Size Of Margin In Cm: 0.2 Transposition Flap Text: The defect edges were debeveled with a #15 scalpel blade.  Given the location of the defect and the proximity to free margins a transposition flap was deemed most appropriate.  Using a sterile surgical marker, an appropriate transposition flap was drawn incorporating the defect.    The area thus outlined was incised deep to adipose tissue with a #15 scalpel blade.  The skin margins were undermined to an appropriate distance in all directions utilizing iris scissors. Complex Repair And Transposition Flap Text: The defect edges were debeveled with a #15 scalpel blade.  The primary defect was closed partially with a complex linear closure.  Given the location of the remaining defect, shape of the defect and the proximity to free margins a transposition flap was deemed most appropriate for complete closure of the defect.  Using a sterile surgical marker, an appropriate advancement flap was drawn incorporating the defect and placing the expected incisions within the relaxed skin tension lines where possible.    The area thus outlined was incised deep to adipose tissue with a #15 scalpel blade.  The skin margins were undermined to an appropriate distance in all directions utilizing iris scissors. Cartilage Graft Text: The defect edges were debeveled with a #15 scalpel blade.  Given the location of the defect, shape of the defect, the fact the defect involved a full thickness cartilage defect a cartilage graft was deemed most appropriate.  An appropriate donor site was identified, cleansed, and anesthetized. The cartilage graft was then harvested and transferred to the recipient site, oriented appropriately and then sutured into place.  The secondary defect was then repaired using a primary closure. Scalpel Size: 15 blade Excisional Biopsy Additional Text (Leave Blank If You Do Not Want): The margin was drawn around the clinically apparent lesion. An elliptical shape was then drawn on the skin incorporating the lesion and margins.  Incisions were then made along these lines to the appropriate tissue plane and the lesion was extirpated. Dressing: dry sterile dressing Anesthesia Volume In Cc: 3 Island Pedicle Flap-Requiring Vessel Identification Text: The defect edges were debeveled with a #15 scalpel blade.  Given the location of the defect, shape of the defect and the proximity to free margins an island pedicle advancement flap was deemed most appropriate.  Using a sterile surgical marker, an appropriate advancement flap was drawn, based on the axial vessel mentioned above, incorporating the defect, outlining the appropriate donor tissue and placing the expected incisions within the relaxed skin tension lines where possible.    The area thus outlined was incised deep to adipose tissue with a #15 scalpel blade.  The skin margins were undermined to an appropriate distance in all directions around the primary defect and laterally outward around the island pedicle utilizing iris scissors.  There was minimal undermining beneath the pedicle flap. Complex Repair And Rhombic Flap Text: The defect edges were debeveled with a #15 scalpel blade.  The primary defect was closed partially with a complex linear closure.  Given the location of the remaining defect, shape of the defect and the proximity to free margins a rhombic flap was deemed most appropriate for complete closure of the defect.  Using a sterile surgical marker, an appropriate advancement flap was drawn incorporating the defect and placing the expected incisions within the relaxed skin tension lines where possible.    The area thus outlined was incised deep to adipose tissue with a #15 scalpel blade.  The skin margins were undermined to an appropriate distance in all directions utilizing iris scissors. Star Wedge Flap Text: The defect edges were debeveled with a #15 scalpel blade.  Given the location of the defect, shape of the defect and the proximity to free margins a star wedge flap was deemed most appropriate.  Using a sterile surgical marker, an appropriate rotation flap was drawn incorporating the defect and placing the expected incisions within the relaxed skin tension lines where possible. The area thus outlined was incised deep to adipose tissue with a #15 scalpel blade.  The skin margins were undermined to an appropriate distance in all directions utilizing iris scissors. Split-Thickness Skin Graft Text: The defect edges were debeveled with a #15 scalpel blade.  Given the location of the defect, shape of the defect and the proximity to free margins a split thickness skin graft was deemed most appropriate.  Using a sterile surgical marker, the primary defect shape was transferred to the donor site. The split thickness graft was then harvested.  The skin graft was then placed in the primary defect and oriented appropriately. Deep Sutures: 4-0 PDS Slit Excision Additional Text (Leave Blank If You Do Not Want): A linear line was drawn on the skin overlying the lesion. An incision was made slowly until the lesion was visualized.  Once visualized, the lesion was removed with blunt dissection. Excision Method: Elliptical Double Island Pedicle Flap Text: The defect edges were debeveled with a #15 scalpel blade.  Given the location of the defect, shape of the defect and the proximity to free margins a double island pedicle advancement flap was deemed most appropriate.  Using a sterile surgical marker, an appropriate advancement flap was drawn incorporating the defect, outlining the appropriate donor tissue and placing the expected incisions within the relaxed skin tension lines where possible.    The area thus outlined was incised deep to adipose tissue with a #15 scalpel blade.  The skin margins were undermined to an appropriate distance in all directions around the primary defect and laterally outward around the island pedicle utilizing iris scissors.  There was minimal undermining beneath the pedicle flap. Spiral Flap Text: The defect edges were debeveled with a #15 scalpel blade.  Given the location of the defect, shape of the defect and the proximity to free margins a spiral flap was deemed most appropriate.  Using a sterile surgical marker, an appropriate rotation flap was drawn incorporating the defect and placing the expected incisions within the relaxed skin tension lines where possible. The area thus outlined was incised deep to adipose tissue with a #15 scalpel blade.  The skin margins were undermined to an appropriate distance in all directions utilizing iris scissors. Complex Repair And Rotation Flap Text: The defect edges were debeveled with a #15 scalpel blade.  The primary defect was closed partially with a complex linear closure.  Given the location of the remaining defect, shape of the defect and the proximity to free margins a rotation flap was deemed most appropriate for complete closure of the defect.  Using a sterile surgical marker, an appropriate advancement flap was drawn incorporating the defect and placing the expected incisions within the relaxed skin tension lines where possible.    The area thus outlined was incised deep to adipose tissue with a #15 scalpel blade.  The skin margins were undermined to an appropriate distance in all directions utilizing iris scissors. Ftsg Text: The defect edges were debeveled with a #15 scalpel blade.  Given the location of the defect, shape of the defect and the proximity to free margins a full thickness skin graft was deemed most appropriate.  Using a sterile surgical marker, the primary defect shape was transferred to the donor site. The area thus outlined was incised deep to adipose tissue with a #15 scalpel blade.  The harvested graft was then trimmed of adipose tissue until only dermis and epidermis was left.  The skin margins of the secondary defect were undermined to an appropriate distance in all directions utilizing iris scissors.  The secondary defect was closed with interrupted buried subcutaneous sutures.  The skin edges were then re-apposed with running  sutures.  The skin graft was then placed in the primary defect and oriented appropriately. Saucerization Excision Additional Text (Leave Blank If You Do Not Want): The margin was drawn around the clinically apparent lesion.  Incisions were then made along these lines, in a tangential fashion, to the appropriate tissue plane and the lesion was extirpated. Alar Island Pedicle Flap Text: The defect edges were debeveled with a #15 scalpel blade.  Given the location of the defect, shape of the defect and the proximity to the alar rim an island pedicle advancement flap was deemed most appropriate.  Using a sterile surgical marker, an appropriate advancement flap was drawn incorporating the defect, outlining the appropriate donor tissue and placing the expected incisions within the nasal ala running parallel to the alar rim. The area thus outlined was incised with a #15 scalpel blade.  The skin margins were undermined minimally to an appropriate distance in all directions around the primary defect and laterally outward around the island pedicle utilizing iris scissors.  There was minimal undermining beneath the pedicle flap. Complex Repair And Tissue Cultured Epidermal Autograft Text: The defect edges were debeveled with a #15 scalpel blade.  The primary defect was closed partially with a complex linear closure.  Given the location of the defect, shape of the defect and the proximity to free margins an tissue cultured epidermal autograft was deemed most appropriate to repair the remaining defect.  The graft was trimmed to fit the size of the remaining defect.  The graft was then placed in the primary defect, oriented appropriately, and sutured into place. Wound Care: Petrolatum Rotation Flap Text: The defect edges were debeveled with a #15 scalpel blade.  Given the location of the defect, shape of the defect and the proximity to free margins a rotation flap was deemed most appropriate.  Using a sterile surgical marker, an appropriate rotation flap was drawn incorporating the defect and placing the expected incisions within the relaxed skin tension lines where possible.    The area thus outlined was incised deep to adipose tissue with a #15 scalpel blade.  The skin margins were undermined to an appropriate distance in all directions utilizing iris scissors. Complex Repair And Melolabial Flap Text: The defect edges were debeveled with a #15 scalpel blade.  The primary defect was closed partially with a complex linear closure.  Given the location of the remaining defect, shape of the defect and the proximity to free margins a melolabial flap was deemed most appropriate for complete closure of the defect.  Using a sterile surgical marker, an appropriate advancement flap was drawn incorporating the defect and placing the expected incisions within the relaxed skin tension lines where possible.    The area thus outlined was incised deep to adipose tissue with a #15 scalpel blade.  The skin margins were undermined to an appropriate distance in all directions utilizing iris scissors. Intermediate / Complex Repair - Final Wound Length In Cm: 2.5 Body Location Override (Optional - Billing Will Still Be Based On Selected Body Map Location If Applicable): left medial upper back Size Of Lesion In Cm: 0.6 X Size Of Lesion In Cm (Optional): 0.4

## 2023-03-20 NOTE — PROCEDURES
Nephrology/Hemodialysis note    Patient with ESRD/HD, s/p parathyroidectomy and autotransplantation   On HD MWF schedule  Seen and examined during dialysis  Tolerates well  VS stable  UF 2-3 L  Lab results reviewed  Hypocalcemia -correcting with 3.5 Ca bath  Please see dialysis flow sheet for details

## 2023-03-20 NOTE — HOSPITAL COURSE
Mr. Carmelo Bustillos is a 32 y.o. male with past medical history of hypertension, ESRD on HD MWF and secondary hyperparathyroidism who presented 3/17/2023 for elective parathyroid exploration with intraoperative PTH monitoring, subtotal parathyroidectomy with autotransplantation into left forearm. Medicine was consulted for help with monitoring of calcium postoperatively. Nephrology was also consulted for dialysis.    Admission was prolonged as patient required multiple doses of IV calcium as well as oral supplementation over the span of several days to allow his calcium to be in a acceptable, stable level.  On day of discharge his calcium was 8.1.  He will follow up with renal transplant in [place] for consideration of renal transplant.

## 2023-03-20 NOTE — PROGRESS NOTES
Hospital Medicine Daily Progress Note    Date of Service  3/20/2023    Chief Complaint  Elier Bustillos is a 32 y.o. male admitted 3/17/2023 with elective surgery.     Hospital Course  Mr. Carmelo Bustillos is a 32 y.o. male with past medical history of hypertension, end-stage renal disease on hemodialysis and secondary hyperparathyroidism Who presented 3/17/2023 for elective parathyroid exploration with intraoperative PTH monitoring, subtotal parathyroidectomy with autotransplantation into left forearm.  Medicine was consulted for help with monitoring of calcium postoperatively.    Nephrology was also consulted for dialysis.  Patient was given multiple doses of IV calcium and was transitioned over to oral calcium.  He will then be discharged and will follow up with renal transplant in [place] for consideration of transplant.    Interval Problem Update  3/18: Mr. Carmelo Bustillos was evaluated and examined in the CDU. His calcium this morning was 6.8 therefore he was given one dose of IV calcium gluconate and had dialysis with increased calcium bath. Afterwards his calcium remains low at 7.1 and another dose of IV calcium ordered. Q6 hour checks. The dose of TUMS has been increased to 5,000 mg TID. I discussed with Dr. Jay nephrology in person. His blood pressure is low and no fluid was pulled during dialysis.   3/19: Calcium continues to be low.  Continuing IV calcium.  Patient also having cough but declines cough drops or cough medication.  3/20: Giving larger dose of IV calcium.  Discussed with wife about discharging soon.  They are eager to discharge as patient has a appointment for renal transplant consideration on 3/23 1 PM in [place].    I have discussed this patient's plan of care and discharge plan at IDT rounds today with Case Management, Nursing, Nursing leadership, and other members of the IDT team.    Consultants/Specialty  nephrology    Code Status  Full Code    Disposition  Patient  is not medically cleared for discharge.   Anticipate discharge to to home with close outpatient follow-up.  I have placed the appropriate orders for post-discharge needs.    Review of Systems  Review of Systems   Constitutional:  Negative for chills and fever.   Respiratory:  Positive for cough. Negative for shortness of breath.    Gastrointestinal:         No troubles swallowing   Musculoskeletal:  Positive for neck pain.      Physical Exam  Temp:  [37 °C (98.6 °F)-38.2 °C (100.8 °F)] 37 °C (98.6 °F)  Pulse:  [74-93] 83  Resp:  [16-18] 16  BP: (106-137)/(64-76) 136/76  SpO2:  [88 %-94 %] 90 %    Physical Exam  Vitals and nursing note reviewed.   Constitutional:       General: He is not in acute distress.     Appearance: He is not toxic-appearing.   HENT:      Head: Normocephalic and atraumatic.   Neck:      Comments: Anterior neck surgical site covered.  Cardiovascular:      Rate and Rhythm: Normal rate and regular rhythm.   Pulmonary:      Effort: No respiratory distress.   Abdominal:      General: There is no distension.      Tenderness: There is no abdominal tenderness.   Musculoskeletal:      Cervical back: No rigidity.      Right lower leg: No edema.      Left lower leg: No edema.      Comments: Left forearm surgical site covered  Fistula with a thrill   Neurological:      Mental Status: He is alert. Mental status is at baseline.   Psychiatric:         Mood and Affect: Mood normal.         Behavior: Behavior normal.       Fluids    Intake/Output Summary (Last 24 hours) at 3/20/2023 1455  Last data filed at 3/20/2023 1125  Gross per 24 hour   Intake 500 ml   Output 3500 ml   Net -3000 ml         Laboratory  Recent Labs     03/18/23  0006 03/19/23  2354   WBC 9.9 7.6   RBC 3.39* 2.83*   HEMOGLOBIN 11.0* 9.1*   HEMATOCRIT 32.9* 27.9*   MCV 97.1 98.6*   MCH 32.4 32.2   MCHC 33.4* 32.6*   RDW 42.7 43.9   PLATELETCT 200 169   MPV 10.7 10.4       Recent Labs     03/18/23  0006 03/18/23  0619 03/19/23  0914  03/19/23  1202 03/19/23  2354 03/20/23  0549 03/20/23  1418   SODIUM 132*  --  138  --  134*  --   --    POTASSIUM 6.3*  --  4.9  --  5.6*  --   --    CHLORIDE 94*  --  98  --  95*  --   --    CO2 23  --  26  --  22  --   --    GLUCOSE 135*  --  99  --  99  --   --    BUN 46*  --  41*  --  57*  --   --    CREATININE 8.11*  --  8.69*  --  10.00*  --   --    CALCIUM 6.9*   < > 7.1*   < > 6.6* 7.0* 8.4*    < > = values in this interval not displayed.                     Imaging  No orders to display        Assessment/Plan  * Secondary hyperparathyroidism, renal (HCC)- (present on admission)  Assessment & Plan  Secondary hyperparathyroidism status post parathyroidectomy ablation and parathyroidectomy   During dialysis he had an increased calcium bath.   Increase scheduled calcium carbonate 5,000 mg TID    -Monitor on telemetry       Hypocalcemia- (present on admission)  Assessment & Plan  Severe hypocalcemia  Scheduled oral replacement  IV replacement as needed    Essential hypertension- (present on admission)  Assessment & Plan  Chronic hypertension, currently controlled  -Continue all home antihypertensives  -As needed hydralazine    ESRD (end stage renal disease) on dialysis (HCC)- (present on admission)  Assessment & Plan  ESRD on chronic HD left arm fistula  Nephro consulted  Dialysis per nephro         VTE prophylaxis: SCDs/TEDs    I have performed a physical exam and reviewed and updated ROS and Plan today (3/20/2023). In review of yesterday's note (3/19/2023), there are no changes except as documented above.

## 2023-03-20 NOTE — PROGRESS NOTES
Monitor Summary:    Rhythm: SR w/ BBB  Rate: 82-94  Ectopy: (R) PVC  Measurement : .17/.09/.37

## 2023-03-21 LAB
ALBUMIN SERPL BCP-MCNC: 3.7 G/DL (ref 3.2–4.9)
BUN SERPL-MCNC: 45 MG/DL (ref 8–22)
CA-I SERPL-SCNC: 0.8 MMOL/L (ref 1.1–1.3)
CALCIUM ALBUM COR SERPL-MCNC: 7.1 MG/DL (ref 8.5–10.5)
CALCIUM SERPL-MCNC: 6.9 MG/DL (ref 8.5–10.5)
CALCIUM SERPL-MCNC: 7.6 MG/DL (ref 8.5–10.5)
CALCIUM SERPL-MCNC: 7.9 MG/DL (ref 8.5–10.5)
CHLORIDE SERPL-SCNC: 94 MMOL/L (ref 96–112)
CO2 SERPL-SCNC: 27 MMOL/L (ref 20–33)
CREAT SERPL-MCNC: 7.18 MG/DL (ref 0.5–1.4)
ERYTHROCYTE [DISTWIDTH] IN BLOOD BY AUTOMATED COUNT: 40 FL (ref 35.9–50)
GFR SERPLBLD CREATININE-BSD FMLA CKD-EPI: 10 ML/MIN/1.73 M 2
GLUCOSE SERPL-MCNC: 101 MG/DL (ref 65–99)
HCT VFR BLD AUTO: 27.9 % (ref 42–52)
HGB BLD-MCNC: 9.8 G/DL (ref 14–18)
MCH RBC QN AUTO: 32 PG (ref 27–33)
MCHC RBC AUTO-ENTMCNC: 35.1 G/DL (ref 33.7–35.3)
MCV RBC AUTO: 91.2 FL (ref 81.4–97.8)
PHOSPHATE SERPL-MCNC: 5 MG/DL (ref 2.5–4.5)
PLATELET # BLD AUTO: 177 K/UL (ref 164–446)
PMV BLD AUTO: 10.6 FL (ref 9–12.9)
POTASSIUM SERPL-SCNC: 4.7 MMOL/L (ref 3.6–5.5)
RBC # BLD AUTO: 3.06 M/UL (ref 4.7–6.1)
SODIUM SERPL-SCNC: 136 MMOL/L (ref 135–145)
WBC # BLD AUTO: 5.2 K/UL (ref 4.8–10.8)

## 2023-03-21 PROCEDURE — 770020 HCHG ROOM/CARE - TELE (206)

## 2023-03-21 PROCEDURE — 99232 SBSQ HOSP IP/OBS MODERATE 35: CPT | Performed by: INTERNAL MEDICINE

## 2023-03-21 PROCEDURE — 700105 HCHG RX REV CODE 258: Performed by: GENERAL PRACTICE

## 2023-03-21 PROCEDURE — 700102 HCHG RX REV CODE 250 W/ 637 OVERRIDE(OP): Performed by: INTERNAL MEDICINE

## 2023-03-21 PROCEDURE — 80069 RENAL FUNCTION PANEL: CPT

## 2023-03-21 PROCEDURE — 85027 COMPLETE CBC AUTOMATED: CPT

## 2023-03-21 PROCEDURE — 99233 SBSQ HOSP IP/OBS HIGH 50: CPT | Performed by: GENERAL PRACTICE

## 2023-03-21 PROCEDURE — A9270 NON-COVERED ITEM OR SERVICE: HCPCS | Performed by: INTERNAL MEDICINE

## 2023-03-21 PROCEDURE — 700105 HCHG RX REV CODE 258: Performed by: INTERNAL MEDICINE

## 2023-03-21 PROCEDURE — A9270 NON-COVERED ITEM OR SERVICE: HCPCS | Performed by: SURGERY

## 2023-03-21 PROCEDURE — 700102 HCHG RX REV CODE 250 W/ 637 OVERRIDE(OP): Performed by: SURGERY

## 2023-03-21 PROCEDURE — 82310 ASSAY OF CALCIUM: CPT | Mod: 91

## 2023-03-21 PROCEDURE — 700111 HCHG RX REV CODE 636 W/ 250 OVERRIDE (IP): Performed by: GENERAL PRACTICE

## 2023-03-21 PROCEDURE — 700111 HCHG RX REV CODE 636 W/ 250 OVERRIDE (IP): Performed by: SURGERY

## 2023-03-21 PROCEDURE — 700111 HCHG RX REV CODE 636 W/ 250 OVERRIDE (IP): Performed by: INTERNAL MEDICINE

## 2023-03-21 PROCEDURE — 82330 ASSAY OF CALCIUM: CPT

## 2023-03-21 PROCEDURE — 36415 COLL VENOUS BLD VENIPUNCTURE: CPT

## 2023-03-21 RX ORDER — CALCITRIOL 0.5 UG/1
1.5 CAPSULE, LIQUID FILLED ORAL DAILY
Status: DISCONTINUED | OUTPATIENT
Start: 2023-03-22 | End: 2023-03-23 | Stop reason: HOSPADM

## 2023-03-21 RX ORDER — CALCIUM CHLORIDE 100 MG/ML
1 INJECTION INTRAVENOUS; INTRAVENTRICULAR ONCE
Status: DISCONTINUED | OUTPATIENT
Start: 2023-03-21 | End: 2023-03-21

## 2023-03-21 RX ORDER — CALCIUM ACETATE 667 MG/1
1334 TABLET ORAL
Status: DISCONTINUED | OUTPATIENT
Start: 2023-03-21 | End: 2023-03-23 | Stop reason: HOSPADM

## 2023-03-21 RX ADMIN — CALCIUM GLUCONATE 1 G: 20 INJECTION, SOLUTION INTRAVENOUS at 04:45

## 2023-03-21 RX ADMIN — CALCIUM CARBONATE 5000 MG: 500 TABLET, CHEWABLE ORAL at 16:28

## 2023-03-21 RX ADMIN — Medication 1334 MG: at 16:27

## 2023-03-21 RX ADMIN — CALCIUM CHLORIDE 1000 MG: 100 INJECTION, SOLUTION INTRAVENOUS at 08:36

## 2023-03-21 RX ADMIN — Medication 667 MG: at 06:42

## 2023-03-21 RX ADMIN — ONDANSETRON 4 MG: 2 INJECTION INTRAMUSCULAR; INTRAVENOUS at 16:46

## 2023-03-21 RX ADMIN — ONDANSETRON 4 MG: 2 INJECTION INTRAMUSCULAR; INTRAVENOUS at 23:26

## 2023-03-21 RX ADMIN — CALCIUM CHLORIDE 2000 MG: 100 INJECTION, SOLUTION INTRAVENOUS at 12:15

## 2023-03-21 RX ADMIN — CALCIUM CARBONATE 5000 MG: 500 TABLET, CHEWABLE ORAL at 23:26

## 2023-03-21 RX ADMIN — CALCITRIOL 1.25 MCG: 0.5 CAPSULE, LIQUID FILLED ORAL at 04:45

## 2023-03-21 RX ADMIN — CALCIUM CARBONATE 5000 MG: 500 TABLET, CHEWABLE ORAL at 12:12

## 2023-03-21 RX ADMIN — AMLODIPINE BESYLATE 10 MG: 10 TABLET ORAL at 16:28

## 2023-03-21 RX ADMIN — Medication 1334 MG: at 12:11

## 2023-03-21 RX ADMIN — CALCIUM CARBONATE 5000 MG: 500 TABLET, CHEWABLE ORAL at 04:44

## 2023-03-21 ASSESSMENT — ENCOUNTER SYMPTOMS
FEVER: 0
SHORTNESS OF BREATH: 0
PALPITATIONS: 0
WHEEZING: 0
VOMITING: 0
CHILLS: 0
ORTHOPNEA: 0
WEIGHT LOSS: 0
ABDOMINAL PAIN: 0
HEMOPTYSIS: 0
NAUSEA: 0
EYES NEGATIVE: 1
COUGH: 0
SINUS PAIN: 0

## 2023-03-21 ASSESSMENT — PAIN DESCRIPTION - PAIN TYPE
TYPE: ACUTE PAIN

## 2023-03-21 NOTE — PROGRESS NOTES
Spiritual Care Note    Patient Information     Patient's Name: Elier Bustillos   MRN: [id number]    YOB: 1990   Age and Gender: 32 y.o. male   Service Area:    Room (and Bed): [place] [address]   Ethnicity or Nationality:     Primary Language: Setswana   Congregational/Spiritual preference: Yarsani   Place of Residence: [place]   Family/Friends/Others Present:    Clinical Team Present:    Medical Diagnosis(-es)/Procedure(s):    Code Status: Full Code    Date of Admission: 3/17/2023   Length of Stay: 3 days        Spiritual Care Provider Information:  Name of Spiritual Care Provider: Vic Mendez  Title of Spiritual Care Provider: Manager  Phone Number: [phone]  E-mail: [email or web address] They Like    minutes    Spiritual Screen Results:    Gen Nursing  Spiritual Screen  Was spiritual care education provided to the patient?: Yes     Palliative Care  PC Congregational/Spiritual Screening  Was spiritual care education provided to the patient?: Yes      Encounter/Request Information  Encounter/Request Type   Visited With: Patient  Nature of the Visit: Initial, On shift  Continue Visiting: No  General Visit: Yes  Referral From/ Origin of Request: [facility] nursing  Referral To: Community clergy    Religous Needs/Values  Congregational Needs Visit  Congregational Needs: Prayer  Ritual Needs Visit  Ritual Needs: [place]    Spiritual Assessment   Spiritual Care Encounters  Interventions: Referred to     Notes:  Referred to Fr. Huang Osuna for Yarsani support on 3/20/23.  provided prayer and blessing.

## 2023-03-21 NOTE — CARE PLAN
The patient is Watcher - Medium risk of patient condition declining or worsening    Shift Goals  Clinical Goals: monitor labs, monitor VS  Patient Goals: sleep  Family Goals: n/a    Progress made toward(s) clinical / shift goals:      Problem: Pain - Standard  Goal: Alleviation of pain or a reduction in pain to the patient’s comfort goal  Outcome: Progressing     Problem: Knowledge Deficit - Standard  Goal: Patient and family/care givers will demonstrate understanding of plan of care, disease process/condition, diagnostic tests and medications  Outcome: Progressing

## 2023-03-21 NOTE — PROGRESS NOTES
Nephrology Daily Progress Note    Date of Service  3/21/2023    Chief Complaint  32 y.o. male with ESRD/HD, admitted 3/17/2023 for parathyroidectomy     Interval Problem Update    Hemodialysis yesterday, UF of 3 L.  Is on calcium replacement.  Expresses eagerness to go home  Review of Systems  Review of Systems   Constitutional:  Negative for chills, fever, malaise/fatigue and weight loss.   HENT:  Negative for congestion, hearing loss and sinus pain.    Eyes: Negative.    Respiratory:  Negative for cough, hemoptysis, shortness of breath and wheezing.    Cardiovascular:  Negative for chest pain, palpitations, orthopnea and leg swelling.   Gastrointestinal:  Negative for abdominal pain, nausea and vomiting.   Skin: Negative.       Physical Exam  Temp:  [37.1 °C (98.8 °F)-37.2 °C (99 °F)] 37.1 °C (98.8 °F)  Pulse:  [70-88] 70  Resp:  [16-18] 18  BP: (118-143)/(68-99) 143/99  SpO2:  [92 %-98 %] 98 %    Physical Exam  Vitals reviewed.   Constitutional:       General: He is not in acute distress.     Appearance: Normal appearance. He is well-developed. He is not diaphoretic.   HENT:      Head: Normocephalic and atraumatic.      Nose: Nose normal.      Mouth/Throat:      Mouth: Mucous membranes are moist.      Pharynx: Oropharynx is clear.   Eyes:      Extraocular Movements: Extraocular movements intact.      Conjunctiva/sclera: Conjunctivae normal.      Pupils: Pupils are equal, round, and reactive to light.   Neck:      Comments: Incision site covered with dressing. Dressing clean dry intact.   Cardiovascular:      Rate and Rhythm: Normal rate and regular rhythm.      Pulses: Normal pulses.      Heart sounds: Normal heart sounds.   Pulmonary:      Effort: Pulmonary effort is normal. No respiratory distress.      Breath sounds: Normal breath sounds. No wheezing or rales.   Abdominal:      General: Bowel sounds are normal. There is no distension.      Palpations: Abdomen is soft. There is no mass.      Tenderness: There  is no abdominal tenderness. There is no right CVA tenderness or left CVA tenderness.   Musculoskeletal:      Right lower leg: No edema.      Left lower leg: No edema.   Skin:     General: Skin is warm.      Coloration: Skin is not pale.      Findings: No rash.   Neurological:      General: No focal deficit present.      Mental Status: He is alert and oriented to person, place, and time.      Cranial Nerves: No cranial nerve deficit.   Psychiatric:      Comments: Flat affect.    Right AV fistula in place positive bruit and thrill.  Virtuous AV fistula noted with aneurysmal dilatations.    Fluids    Intake/Output Summary (Last 24 hours) at 3/21/2023 1656  Last data filed at 3/21/2023 1000  Gross per 24 hour   Intake 240 ml   Output --   Net 240 ml         Laboratory  Recent Labs     03/19/23 2354 03/21/23 0222   WBC 7.6 5.2   RBC 2.83* 3.06*   HEMOGLOBIN 9.1* 9.8*   HEMATOCRIT 27.9* 27.9*   MCV 98.6* 91.2   MCH 32.2 32.0   MCHC 32.6* 35.1   RDW 43.9 40.0   PLATELETCT 169 177   MPV 10.4 10.6       Recent Labs     03/19/23  0914 03/19/23  1202 03/19/23 2354 03/20/23  0549 03/20/23 2017 03/21/23 0222 03/21/23  1206   SODIUM 138  --  134*  --   --  136  --    POTASSIUM 4.9  --  5.6*  --   --  4.7  --    CHLORIDE 98  --  95*  --   --  94*  --    CO2 26  --  22  --   --  27  --    GLUCOSE 99  --  99  --   --  101*  --    BUN 41*  --  57*  --   --  45*  --    CREATININE 8.69*  --  10.00*  --   --  7.18*  --    CALCIUM 7.1*   < > 6.6*   < > 7.7* 6.9* 7.6*    < > = values in this interval not displayed.           No results for input(s): NTPROBNP in the last 72 hours.          Assessment/Plan    The patient is a 32-year-old male with end-stage renal   disease, on hemodialysis, admitted for elective surgery for subtotal   parathyroidectomy with autotransplantation.  1.  End-stage renal disease: on Monday, Wednesday and Friday.       We will plan for hemodialysis today.    Dose all medications for patient dialysis.    2.   Electrolytes: to monitor calcium closely: replace po and iv       Phos binders calcium based       Calcitriol 1.25 mcg daily  Continue to monitor.    3.  Hypertension; pressure medications on hold.    4.  Anemia.  To monitor hemoglobin level.  Infuse for hemoglobin greater than 7.      5.  Status post parathyroidectomy.  To monitor calcium.       Continue advance dose of calcitriol  to 1.25 mcg daily.

## 2023-03-21 NOTE — PROGRESS NOTES
Hospital Medicine Daily Progress Note    Date of Service  3/21/2023    Chief Complaint  Elier Bustillos is a 32 y.o. male admitted 3/17/2023 with hypocalcemia    Hospital Course  Mr. Carmelo Bustillos is a 32 y.o. male with past medical history of hypertension, ESRD on HD MWF and secondary hyperparathyroidism who presented 3/17/2023 for elective parathyroid exploration with intraoperative PTH monitoring, subtotal parathyroidectomy with autotransplantation into left forearm. Medicine was consulted for help with monitoring of calcium postoperatively. Nephrology was also consulted for dialysis.    Patient required multiple IV doses of calcium supplementation as well as an abundance of oral supplementation.  He will follow up with renal transplant in [place] for consideration of renal transplant.    Interval Problem Update  Increased calcitriol to 1.5mcg daily, PhosLo 1,334mg TID, calcium carbonate 5000 mg QID    Ordered 2000 mg of calcium chloride  Patient received 1000 mg calcium gluconate earlier in the morning    Serial calcium labs  Continue to monitor on telemetry for any arrhythmias, given severe hypocalcemia    I have discussed this patient's plan of care and discharge plan at IDT rounds today with Case Management, Nursing, Nursing leadership, and other members of the IDT team.    Consultants/Specialty  nephrology    Code Status  Full Code    Disposition  Patient is not medically cleared for discharge.   Anticipate discharge to to home with close outpatient follow-up.  I have placed the appropriate orders for post-discharge needs.    Review of Systems  Review of Systems   All other systems reviewed and are negative.     Physical Exam  Temp:  [37.1 °C (98.8 °F)-37.2 °C (99 °F)] 37.1 °C (98.8 °F)  Pulse:  [70-88] 70  Resp:  [16-18] 18  BP: (118-143)/(68-99) 143/99  SpO2:  [92 %-98 %] 98 %    Physical Exam  Vitals and nursing note reviewed.   Constitutional:       General: He is not in acute  distress.     Appearance: Normal appearance.   HENT:      Head: Normocephalic and atraumatic.   Eyes:      Extraocular Movements: Extraocular movements intact.      Conjunctiva/sclera: Conjunctivae normal.      Pupils: Pupils are equal, round, and reactive to light.   Cardiovascular:      Rate and Rhythm: Normal rate and regular rhythm.      Pulses: Normal pulses.      Heart sounds: No murmur heard.    No friction rub. No gallop.   Pulmonary:      Effort: Pulmonary effort is normal. No respiratory distress.      Breath sounds: Normal breath sounds. No wheezing, rhonchi or rales.   Abdominal:      General: Bowel sounds are normal. There is no distension.      Palpations: Abdomen is soft.      Tenderness: There is no abdominal tenderness.   Musculoskeletal:         General: No swelling or tenderness. Normal range of motion.      Cervical back: Normal range of motion and neck supple. No muscular tenderness.      Right lower leg: No edema.      Left lower leg: No edema.      Comments: LUE AVF + Thrill   Skin:     General: Skin is warm and dry.      Capillary Refill: Capillary refill takes less than 2 seconds.      Findings: No bruising, erythema or rash.   Neurological:      General: No focal deficit present.      Mental Status: He is alert and oriented to person, place, and time.       Fluids    Intake/Output Summary (Last 24 hours) at 3/21/2023 1651  Last data filed at 3/21/2023 1000  Gross per 24 hour   Intake 240 ml   Output --   Net 240 ml       Laboratory  Recent Labs     03/19/23 2354 03/21/23 0222   WBC 7.6 5.2   RBC 2.83* 3.06*   HEMOGLOBIN 9.1* 9.8*   HEMATOCRIT 27.9* 27.9*   MCV 98.6* 91.2   MCH 32.2 32.0   MCHC 32.6* 35.1   RDW 43.9 40.0   PLATELETCT 169 177   MPV 10.4 10.6     Recent Labs     03/19/23  0914 03/19/23  1202 03/19/23 2354 03/20/23  0549 03/20/23 2017 03/21/23 0222 03/21/23  1206   SODIUM 138  --  134*  --   --  136  --    POTASSIUM 4.9  --  5.6*  --   --  4.7  --    CHLORIDE 98  --  95*   --   --  94*  --    CO2 26  --  22  --   --  27  --    GLUCOSE 99  --  99  --   --  101*  --    BUN 41*  --  57*  --   --  45*  --    CREATININE 8.69*  --  10.00*  --   --  7.18*  --    CALCIUM 7.1*   < > 6.6*   < > 7.7* 6.9* 7.6*    < > = values in this interval not displayed.                   Imaging  No orders to display        Assessment/Plan  * Hypocalcemia- (present on admission)  Assessment & Plan  Severe hypocalcemia  Increased calcitriol to 1.5mcg daily, PhosLo 1,334mg TID, calcium carbonate 5000 mg QID  Ordered 2000 mg of calcium chloride  Patient received 1000 mg calcium gluconate earlier in the morning  Serial calcium labs  Monitor on telemetry for any arrhythmias    Secondary hyperparathyroidism, renal (HCC)- (present on admission)  Assessment & Plan  Secondary hyperparathyroidism status post parathyroidectomy ablation and parathyroidectomy  During dialysis he had an increased calcium bath.   Increased calcitriol to 1.5mcg daily, PhosLo 1,334mg TID, calcium carbonate 5000 mg QID  Serial calcium labs  Monitor on telemetry for any arrhythmia      Essential hypertension- (present on admission)  Assessment & Plan  Chronic hypertension, currently controlled  -Continue all home antihypertensives  -As needed hydralazine    ESRD (end stage renal disease) on dialysis (HCC)- (present on admission)  Assessment & Plan  ESRD on chronic HD MWF, left arm fistula  Nephro consulted         VTE prophylaxis: SCDs/TEDs and heparin ppx    I have performed a physical exam and reviewed and updated ROS and Plan today (3/21/2023). In review of yesterday's note (3/20/2023), there are no changes except as documented above.

## 2023-03-22 ENCOUNTER — PHARMACY VISIT (OUTPATIENT)
Dept: PHARMACY | Facility: MEDICAL CENTER | Age: 33
End: 2023-03-22
Payer: COMMERCIAL

## 2023-03-22 LAB
ALBUMIN SERPL BCP-MCNC: 3.5 G/DL (ref 3.2–4.9)
BUN SERPL-MCNC: 60 MG/DL (ref 8–22)
CALCIUM ALBUM COR SERPL-MCNC: 7.5 MG/DL (ref 8.5–10.5)
CALCIUM SERPL-MCNC: 7.1 MG/DL (ref 8.5–10.5)
CALCIUM SERPL-MCNC: 7.5 MG/DL (ref 8.5–10.5)
CALCIUM SERPL-MCNC: 8.5 MG/DL (ref 8.5–10.5)
CALCIUM SERPL-MCNC: 8.8 MG/DL (ref 8.5–10.5)
CHLORIDE SERPL-SCNC: 96 MMOL/L (ref 96–112)
CO2 SERPL-SCNC: 24 MMOL/L (ref 20–33)
CREAT SERPL-MCNC: 9.95 MG/DL (ref 0.5–1.4)
ERYTHROCYTE [DISTWIDTH] IN BLOOD BY AUTOMATED COUNT: 40.2 FL (ref 35.9–50)
GFR SERPLBLD CREATININE-BSD FMLA CKD-EPI: 6 ML/MIN/1.73 M 2
GLUCOSE SERPL-MCNC: 94 MG/DL (ref 65–99)
HCT VFR BLD AUTO: 28.4 % (ref 42–52)
HGB BLD-MCNC: 10 G/DL (ref 14–18)
MCH RBC QN AUTO: 32.3 PG (ref 27–33)
MCHC RBC AUTO-ENTMCNC: 35.2 G/DL (ref 33.7–35.3)
MCV RBC AUTO: 91.6 FL (ref 81.4–97.8)
PHOSPHATE SERPL-MCNC: 5.2 MG/DL (ref 2.5–4.5)
PLATELET # BLD AUTO: 176 K/UL (ref 164–446)
PMV BLD AUTO: 10.5 FL (ref 9–12.9)
POTASSIUM SERPL-SCNC: 4.9 MMOL/L (ref 3.6–5.5)
RBC # BLD AUTO: 3.1 M/UL (ref 4.7–6.1)
SODIUM SERPL-SCNC: 135 MMOL/L (ref 135–145)
WBC # BLD AUTO: 4.2 K/UL (ref 4.8–10.8)

## 2023-03-22 PROCEDURE — 700102 HCHG RX REV CODE 250 W/ 637 OVERRIDE(OP): Performed by: SURGERY

## 2023-03-22 PROCEDURE — 700105 HCHG RX REV CODE 258: Performed by: GENERAL PRACTICE

## 2023-03-22 PROCEDURE — 700111 HCHG RX REV CODE 636 W/ 250 OVERRIDE (IP): Performed by: SURGERY

## 2023-03-22 PROCEDURE — 700102 HCHG RX REV CODE 250 W/ 637 OVERRIDE(OP): Performed by: INTERNAL MEDICINE

## 2023-03-22 PROCEDURE — 80069 RENAL FUNCTION PANEL: CPT

## 2023-03-22 PROCEDURE — 700111 HCHG RX REV CODE 636 W/ 250 OVERRIDE (IP): Performed by: GENERAL PRACTICE

## 2023-03-22 PROCEDURE — 82310 ASSAY OF CALCIUM: CPT

## 2023-03-22 PROCEDURE — 700102 HCHG RX REV CODE 250 W/ 637 OVERRIDE(OP): Performed by: GENERAL PRACTICE

## 2023-03-22 PROCEDURE — 85027 COMPLETE CBC AUTOMATED: CPT | Mod: 91

## 2023-03-22 PROCEDURE — 90935 HEMODIALYSIS ONE EVALUATION: CPT

## 2023-03-22 PROCEDURE — 36415 COLL VENOUS BLD VENIPUNCTURE: CPT

## 2023-03-22 PROCEDURE — 99231 SBSQ HOSP IP/OBS SF/LOW 25: CPT | Performed by: INTERNAL MEDICINE

## 2023-03-22 PROCEDURE — A9270 NON-COVERED ITEM OR SERVICE: HCPCS | Performed by: GENERAL PRACTICE

## 2023-03-22 PROCEDURE — A9270 NON-COVERED ITEM OR SERVICE: HCPCS | Performed by: SURGERY

## 2023-03-22 PROCEDURE — 770020 HCHG ROOM/CARE - TELE (206)

## 2023-03-22 PROCEDURE — 99233 SBSQ HOSP IP/OBS HIGH 50: CPT | Performed by: GENERAL PRACTICE

## 2023-03-22 PROCEDURE — RXMED WILLOW AMBULATORY MEDICATION CHARGE: Performed by: GENERAL PRACTICE

## 2023-03-22 PROCEDURE — A9270 NON-COVERED ITEM OR SERVICE: HCPCS | Performed by: INTERNAL MEDICINE

## 2023-03-22 RX ORDER — CALCIUM CARBONATE 500 MG/1
5000 TABLET, CHEWABLE ORAL 4 TIMES DAILY
Status: DISCONTINUED | OUTPATIENT
Start: 2023-03-22 | End: 2023-03-23 | Stop reason: HOSPADM

## 2023-03-22 RX ORDER — CALCIUM ACETATE 667 MG/1
1334 CAPSULE ORAL
Qty: 180 CAPSULE | Refills: 0 | Status: SHIPPED | OUTPATIENT
Start: 2023-03-22 | End: 2023-04-21

## 2023-03-22 RX ORDER — CALCITRIOL 0.5 UG/1
1.5 CAPSULE, LIQUID FILLED ORAL DAILY
Qty: 90 CAPSULE | Refills: 0 | Status: SHIPPED | OUTPATIENT
Start: 2023-03-23 | End: 2023-04-22

## 2023-03-22 RX ADMIN — Medication 1334 MG: at 08:33

## 2023-03-22 RX ADMIN — ONDANSETRON 4 MG: 2 INJECTION INTRAMUSCULAR; INTRAVENOUS at 21:12

## 2023-03-22 RX ADMIN — ONDANSETRON 4 MG: 2 INJECTION INTRAMUSCULAR; INTRAVENOUS at 05:28

## 2023-03-22 RX ADMIN — CALCIUM CARBONATE 5000 MG: 500 TABLET, CHEWABLE ORAL at 14:24

## 2023-03-22 RX ADMIN — CALCIUM CARBONATE 5000 MG: 500 TABLET, CHEWABLE ORAL at 17:49

## 2023-03-22 RX ADMIN — ONDANSETRON 4 MG: 2 INJECTION INTRAMUSCULAR; INTRAVENOUS at 14:24

## 2023-03-22 RX ADMIN — AMLODIPINE BESYLATE 10 MG: 10 TABLET ORAL at 17:49

## 2023-03-22 RX ADMIN — Medication 1334 MG: at 17:49

## 2023-03-22 RX ADMIN — Medication 1334 MG: at 14:24

## 2023-03-22 RX ADMIN — CALCIUM CHLORIDE 1000 MG: 100 INJECTION, SOLUTION INTRAVENOUS at 05:33

## 2023-03-22 RX ADMIN — CALCIUM CARBONATE 5000 MG: 500 TABLET, CHEWABLE ORAL at 21:12

## 2023-03-22 RX ADMIN — CALCIUM CARBONATE 5000 MG: 500 TABLET, CHEWABLE ORAL at 05:28

## 2023-03-22 RX ADMIN — CALCITRIOL 1.5 MCG: 0.5 CAPSULE, LIQUID FILLED ORAL at 05:28

## 2023-03-22 ASSESSMENT — PAIN DESCRIPTION - PAIN TYPE
TYPE: ACUTE PAIN
TYPE: ACUTE PAIN

## 2023-03-22 ASSESSMENT — FIBROSIS 4 INDEX: FIB4 SCORE: .5625

## 2023-03-22 ASSESSMENT — ENCOUNTER SYMPTOMS
SHORTNESS OF BREATH: 0
NAUSEA: 0
ORTHOPNEA: 0
COUGH: 0
PALPITATIONS: 0
HEMOPTYSIS: 0
WEIGHT LOSS: 0
WHEEZING: 0
ABDOMINAL PAIN: 0
FEVER: 0
EYES NEGATIVE: 1
VOMITING: 0
SINUS PAIN: 0
CHILLS: 0

## 2023-03-22 NOTE — PROGRESS NOTES
[facility] Nursing Notes     HD today x 3.5 hrs per Dr Rosa Elena De Leon  Initiated at 0927 and ended -1259     Pre HD assessment     Received patient alert and oriented. No Sob.  Lungs Clear. Vital Signs stable  No complains pre HD.     Edema - pitting edema mild on both legs     Access: - Right AVF fistula      No s/s of infection: no redness, no tenderness, no pus, no oozing of blood, warm to touch.     Intra HD    No issues intra HD,  Vital signs stable        Post HD     UF goal  achieved:    3500mls - 500 mL (200mls prime + 300mls rinseback)       Target Net UF : 3000mls     Completed HD tolerated well  Post vital signs stable  Nil complaints  Post AVF no bleeding (waited 10mins)  Dressing: dry and intact     Documented by  SONU BECKER RN    Report given to LISA Carter

## 2023-03-22 NOTE — PROGRESS NOTES
Nephrology Daily Progress Note    Date of Service  3/22/2023    Chief Complaint  32 y.o. male with ESRD/HD, admitted 3/17/2023 for parathyroidectomy     Interval Problem Update  No overnight events.    Review of Systems  Review of Systems   Constitutional:  Negative for chills, fever, malaise/fatigue and weight loss.   HENT:  Negative for congestion, hearing loss and sinus pain.    Eyes: Negative.    Respiratory:  Negative for cough, hemoptysis, shortness of breath and wheezing.    Cardiovascular:  Negative for chest pain, palpitations, orthopnea and leg swelling.   Gastrointestinal:  Negative for abdominal pain, nausea and vomiting.   Skin: Negative.       Physical Exam  Temp:  [36.8 °C (98.2 °F)-37.1 °C (98.7 °F)] 37.1 °C (98.7 °F)  Pulse:  [70-76] 76  Resp:  [18-19] 19  BP: (114-143)/(71-99) 142/76  SpO2:  [92 %-98 %] 96 %    Physical Exam  Vitals reviewed.   Constitutional:       General: He is not in acute distress.     Appearance: Normal appearance. He is well-developed. He is not diaphoretic.   HENT:      Head: Normocephalic and atraumatic.      Nose: Nose normal.      Mouth/Throat:      Mouth: Mucous membranes are moist.      Pharynx: Oropharynx is clear.   Eyes:      Extraocular Movements: Extraocular movements intact.      Conjunctiva/sclera: Conjunctivae normal.      Pupils: Pupils are equal, round, and reactive to light.   Neck:      Comments: Incision site covered with dressing. Dressing clean dry intact.   Cardiovascular:      Rate and Rhythm: Normal rate and regular rhythm.      Pulses: Normal pulses.      Heart sounds: Normal heart sounds.   Pulmonary:      Effort: Pulmonary effort is normal. No respiratory distress.      Breath sounds: Normal breath sounds. No wheezing or rales.   Abdominal:      General: Bowel sounds are normal. There is no distension.      Palpations: Abdomen is soft. There is no mass.      Tenderness: There is no abdominal tenderness. There is no right CVA tenderness or left CVA  tenderness.   Musculoskeletal:      Right lower leg: No edema.      Left lower leg: No edema.   Skin:     General: Skin is warm.      Coloration: Skin is not pale.      Findings: No rash.   Neurological:      General: No focal deficit present.      Mental Status: He is alert and oriented to person, place, and time.      Cranial Nerves: No cranial nerve deficit.   Psychiatric:      Comments: Flat affect.    Right AV fistula in place positive bruit and thrill.  Virtuous AV fistula noted with aneurysmal dilatations.    Fluids    Intake/Output Summary (Last 24 hours) at 3/22/2023 1534  Last data filed at 3/22/2023 1319  Gross per 24 hour   Intake 740 ml   Output 3500 ml   Net -2760 ml         Laboratory  Recent Labs     03/19/23 2354 03/21/23 0222 03/22/23  0036   WBC 7.6 5.2 4.2*   RBC 2.83* 3.06* 3.10*   HEMOGLOBIN 9.1* 9.8* 10.0*   HEMATOCRIT 27.9* 27.9* 28.4*   MCV 98.6* 91.2 91.6   MCH 32.2 32.0 32.3   MCHC 32.6* 35.1 35.2   RDW 43.9 40.0 40.2   PLATELETCT 169 177 176   MPV 10.4 10.6 10.5       Recent Labs     03/19/23 2354 03/20/23  0549 03/21/23 0222 03/21/23  1206 03/22/23  0036 03/22/23  0553 03/22/23  1417   SODIUM 134*  --  136  --  135  --   --    POTASSIUM 5.6*  --  4.7  --  4.9  --   --    CHLORIDE 95*  --  94*  --  96  --   --    CO2 22  --  27  --  24  --   --    GLUCOSE 99  --  101*  --  94  --   --    BUN 57*  --  45*  --  60*  --   --    CREATININE 10.00*  --  7.18*  --  9.95*  --   --    CALCIUM 6.6*   < > 6.9*   < > 7.1* 7.5* 8.8    < > = values in this interval not displayed.           No results for input(s): NTPROBNP in the last 72 hours.          Assessment/Plan    The patient is a 32-year-old male with end-stage renal   disease, on hemodialysis, admitted for elective surgery for subtotal   parathyroidectomy with autotransplantation.  1.  End-stage renal disease: on Monday, Wednesday and Friday.       Anticipate hemodialysis today.    Dose all medications for patient dialysis.  From  nephrology standpoint, okay for discharge.    Calcium supplementation ordered for dialysis outpatient unit.   Encourage patient follow up with [facility] Nephrology appointment this week.     2.  Electrolytes: to monitor calcium closely: replace po and iv       Phos binders calcium based       Calcitriol 1.25 mcg daily. Ordered for dialysis unit.   Continue to monitor.    3.  Hypertension; pressure medications on hold.    4.  Anemia.  To monitor hemoglobin level.  Infuse for hemoglobin greater than 7.      5.  Status post parathyroidectomy.  To monitor calcium.       Continue advance dose of calcitriol  to 1.25 mcg daily.

## 2023-03-22 NOTE — CARE PLAN
Problem: Pain - Standard  Goal: Alleviation of pain or a reduction in pain to the patient’s comfort goal  Outcome: Progressing     Problem: Knowledge Deficit - Standard  Goal: Patient and family/care givers will demonstrate understanding of plan of care, disease process/condition, diagnostic tests and medications  Outcome: Progressing   The patient is Stable - Low risk of patient condition declining or worsening    Shift Goals  Clinical Goals: monitor calcium  Patient Goals: sleep  Family Goals: n/a    Progress made toward(s) clinical / shift goals:  Progressing    Patient is not progressing towards the following goals:

## 2023-03-23 VITALS
HEIGHT: 68 IN | DIASTOLIC BLOOD PRESSURE: 77 MMHG | SYSTOLIC BLOOD PRESSURE: 115 MMHG | OXYGEN SATURATION: 95 % | WEIGHT: 168.43 LBS | RESPIRATION RATE: 16 BRPM | BODY MASS INDEX: 25.53 KG/M2 | TEMPERATURE: 97.7 F | HEART RATE: 74 BPM

## 2023-03-23 LAB
ALBUMIN SERPL BCP-MCNC: 3.7 G/DL (ref 3.2–4.9)
BUN SERPL-MCNC: 40 MG/DL (ref 8–22)
CALCIUM ALBUM COR SERPL-MCNC: 8.3 MG/DL (ref 8.5–10.5)
CALCIUM SERPL-MCNC: 8.1 MG/DL (ref 8.5–10.5)
CHLORIDE SERPL-SCNC: 96 MMOL/L (ref 96–112)
CO2 SERPL-SCNC: 26 MMOL/L (ref 20–33)
CREAT SERPL-MCNC: 6.07 MG/DL (ref 0.5–1.4)
ERYTHROCYTE [DISTWIDTH] IN BLOOD BY AUTOMATED COUNT: 39.9 FL (ref 35.9–50)
GFR SERPLBLD CREATININE-BSD FMLA CKD-EPI: 12 ML/MIN/1.73 M 2
GLUCOSE SERPL-MCNC: 84 MG/DL (ref 65–99)
HCT VFR BLD AUTO: 32.1 % (ref 42–52)
HGB BLD-MCNC: 11 G/DL (ref 14–18)
MCH RBC QN AUTO: 32 PG (ref 27–33)
MCHC RBC AUTO-ENTMCNC: 34.3 G/DL (ref 33.7–35.3)
MCV RBC AUTO: 93.3 FL (ref 81.4–97.8)
PHOSPHATE SERPL-MCNC: 3.9 MG/DL (ref 2.5–4.5)
PLATELET # BLD AUTO: 184 K/UL (ref 164–446)
PMV BLD AUTO: 10.2 FL (ref 9–12.9)
POTASSIUM SERPL-SCNC: 5.3 MMOL/L (ref 3.6–5.5)
RBC # BLD AUTO: 3.44 M/UL (ref 4.7–6.1)
SODIUM SERPL-SCNC: 137 MMOL/L (ref 135–145)
WBC # BLD AUTO: 4.7 K/UL (ref 4.8–10.8)

## 2023-03-23 PROCEDURE — A9270 NON-COVERED ITEM OR SERVICE: HCPCS | Performed by: INTERNAL MEDICINE

## 2023-03-23 PROCEDURE — 700102 HCHG RX REV CODE 250 W/ 637 OVERRIDE(OP): Performed by: GENERAL PRACTICE

## 2023-03-23 PROCEDURE — A9270 NON-COVERED ITEM OR SERVICE: HCPCS | Performed by: GENERAL PRACTICE

## 2023-03-23 PROCEDURE — A9270 NON-COVERED ITEM OR SERVICE: HCPCS | Performed by: SURGERY

## 2023-03-23 PROCEDURE — 700102 HCHG RX REV CODE 250 W/ 637 OVERRIDE(OP): Performed by: INTERNAL MEDICINE

## 2023-03-23 PROCEDURE — 700111 HCHG RX REV CODE 636 W/ 250 OVERRIDE (IP): Performed by: SURGERY

## 2023-03-23 PROCEDURE — 99239 HOSP IP/OBS DSCHRG MGMT >30: CPT | Performed by: GENERAL PRACTICE

## 2023-03-23 PROCEDURE — 700102 HCHG RX REV CODE 250 W/ 637 OVERRIDE(OP): Performed by: SURGERY

## 2023-03-23 RX ADMIN — CALCIUM CARBONATE 5000 MG: 500 TABLET, CHEWABLE ORAL at 05:27

## 2023-03-23 RX ADMIN — CALCITRIOL 1.5 MCG: 0.5 CAPSULE, LIQUID FILLED ORAL at 05:27

## 2023-03-23 RX ADMIN — ONDANSETRON 4 MG: 2 INJECTION INTRAMUSCULAR; INTRAVENOUS at 05:27

## 2023-03-23 RX ADMIN — CLONIDINE 1 PATCH: 0.2 PATCH TRANSDERMAL at 05:27

## 2023-03-23 NOTE — DISCHARGE SUMMARY
Discharge Summary    CHIEF COMPLAINT ON ADMISSION  No chief complaint on file.      Reason for Admission  Secondary hyperparathyroidism (HCC)     Admission Date  3/17/2023    CODE STATUS  Full Code    HPI & HOSPITAL COURSE  Mr. Carmelo Bustillos is a 32 y.o. male with past medical history of hypertension, ESRD on HD MWF and secondary hyperparathyroidism who presented 3/17/2023 for elective parathyroid exploration with intraoperative PTH monitoring, subtotal parathyroidectomy with autotransplantation into left forearm. Medicine was consulted for help with monitoring of calcium postoperatively. Nephrology was also consulted for dialysis.    Admission was prolonged as patient required multiple doses of IV calcium as well as oral supplementation over the span of several days to allow his calcium to be in a acceptable, stable level.  On day of discharge his calcium was 8.1.  He will follow up with renal transplant in [place] for consideration of renal transplant.    Therefore, he is discharged in good and stable condition to home with close outpatient follow-up.    The patient met 2-midnight criteria for an inpatient stay at the time of discharge.    Discharge Date  3/23/2023    FOLLOW UP ITEMS POST DISCHARGE  Primary care physician  Nephrology  General surgery    DISCHARGE DIAGNOSES  Principal Problem:    Hypocalcemia POA: Yes  Active Problems:    Secondary hyperparathyroidism, renal (HCC) POA: Yes      Overview: ICD-10 transition    ESRD (end stage renal disease) on dialysis (HCC) POA: Yes    Anemia in chronic kidney disease, on chronic dialysis (HCC) POA: Yes    Essential hypertension POA: Yes      Overview: Patient has been on hydralazine 3 times daily, losartan 50 mg 2 times       daily, amlodipine 10 mg once daily.  He is getting dialysis Monday Wednesday Friday including today.  He does not have any headaches.  He       does describe some peripheral numbness and tingling which is fairly       consistent  with electrolyte abnormalities and hypertension.  Given       end-stage renal disease on hemodialysis his blood pressure is likely to       right eye typically was recently 198/92 3 days ago primary visit with PA.  Resolved Problems:    * No resolved hospital problems. *      FOLLOW UP  Abigail Irvin M.D.  [address]  [facility] [id number] [phone]    Follow up      Candy Godoy M.D.  [address]  [address]  [facility] [id number] [phone]    Follow up        MEDICATIONS ON DISCHARGE     Medication List        START taking these medications        Instructions   Antacid Ultra Strength 1000 MG Chew  Generic drug: Calcium Carbonate Antacid   Chew 5 Tablets every 6 hours for 30 days.  Dose: 5,000 mg     calcitRIOL 0.5 MCG Caps  Commonly known as: ROCALTROL   Take 3 Capsules by mouth every day for 30 days.  Dose: 1.5 mcg     calcium acetate 667 MG Caps  Commonly known as: Phos-Lo   Take 2 Capsules by mouth 3 times a day before meals for 30 days.  Dose: 1,334 mg            CONTINUE taking these medications        Instructions   amLODIPine 10 MG Tabs  Commonly known as: NORVASC   Take 10 mg by mouth 2 times a day.  Dose: 10 mg     cloNIDine 0.2 MG/24HR Ptwk patch  Commonly known as: CATAPRES             STOP taking these medications      chlorhexidine 0.12 % Soln  Commonly known as: PERIDEX     hydrALAZINE 100 MG tablet  Commonly known as: APRESOLINE     losartan 100 MG Tabs  Commonly known as: COZAAR     sevelamer carbonate 800 MG Tabs tablet  Commonly known as: RENVELA     sildenafil citrate 50 MG tablet  Commonly known as: VIAGRA              Allergies  No Known Allergies    DIET  Orders Placed This Encounter   Procedures    Diet Order Diet: Regular     Standing Status:   Standing     Number of Occurrences:   1     Order Specific Question:   ERAS     Answer:   Yes     Order Specific Question:   Diet:     Answer:   Regular [1]       ACTIVITY  As tolerated.  Weight bearing as  tolerated    CONSULTATIONS  General surgery  Nephrology    PROCEDURES  Parathyroidectomy    LABORATORY  Lab Results   Component Value Date    SODIUM 137 03/22/2023    POTASSIUM 5.3 03/22/2023    CHLORIDE 96 03/22/2023    CO2 26 03/22/2023    GLUCOSE 84 03/22/2023    BUN 40 (H) 03/22/2023    CREATININE 6.07 (HH) 03/22/2023        Lab Results   Component Value Date    WBC 4.7 (L) 03/22/2023    HEMOGLOBIN 11.0 (L) 03/22/2023    HEMATOCRIT 32.1 (L) 03/22/2023    PLATELETCT 184 03/22/2023      No orders to display      Total time of the discharge process exceeds 45 minutes.

## 2023-03-23 NOTE — CARE PLAN
Problem: Pain - Standard  Goal: Alleviation of pain or a reduction in pain to the patient’s comfort goal  Outcome: Progressing     Problem: Knowledge Deficit - Standard  Goal: Patient and family/care givers will demonstrate understanding of plan of care, disease process/condition, diagnostic tests and medications  Outcome: Progressing   The patient is Stable - Low risk of patient condition declining or worsening    Shift Goals  Clinical Goals: monitor calcium, discharge 0600  Patient Goals: sleep  Family Goals: n/a    Progress made toward(s) clinical / shift goals:  hope for discharge at 0600    Patient is not progressing towards the following goals:

## 2023-03-23 NOTE — PROGRESS NOTES
Hospital Medicine Daily Progress Note    Date of Service  3/22/2023    Chief Complaint  Elier Bustillos is a 32 y.o. male admitted 3/17/2023 with hypocalcemia    Hospital Course  Mr. Carmelo Bustillos is a 32 y.o. male with past medical history of hypertension, ESRD on HD MWF and secondary hyperparathyroidism who presented 3/17/2023 for elective parathyroid exploration with intraoperative PTH monitoring, subtotal parathyroidectomy with autotransplantation into left forearm. Medicine was consulted for help with monitoring of calcium postoperatively. Nephrology was also consulted for dialysis.    Patient required multiple IV doses of calcium supplementation as well as an abundance of oral supplementation.  He will follow up with renal transplant in [place] for consideration of renal transplant.    Interval Problem Update  Continue with calcitriol to 1.5mcg daily, PhosLo 1,334mg TID, calcium carbonate 5000 mg QID    Ordered 1000 mg of calcium chloride x 1 today     Serial calcium labs -last calcium level 8.8.    Continue to monitor on telemetry for any arrhythmias, given severe hypocalcemia    I have discussed this patient's plan of care and discharge plan at IDT rounds today with Case Management, Nursing, Nursing leadership, and other members of the IDT team.    Consultants/Specialty  nephrology    Code Status  Full Code    Disposition  Patient is not medically cleared for discharge.   Anticipate discharge to to home with close outpatient follow-up.  I have placed the appropriate orders for post-discharge needs.    Review of Systems  Review of Systems   All other systems reviewed and are negative.     Physical Exam  Temp:  [36.7 °C (98.1 °F)-37.1 °C (98.7 °F)] 36.7 °C (98.1 °F)  Pulse:  [68-76] 68  Resp:  [18-19] 18  BP: (114-142)/(71-83) 127/83  SpO2:  [92 %-96 %] 96 %    Physical Exam  Vitals and nursing note reviewed.   Constitutional:       General: He is not in acute distress.     Appearance:  Normal appearance.   HENT:      Head: Normocephalic and atraumatic.   Eyes:      Extraocular Movements: Extraocular movements intact.      Conjunctiva/sclera: Conjunctivae normal.      Pupils: Pupils are equal, round, and reactive to light.   Cardiovascular:      Rate and Rhythm: Normal rate and regular rhythm.      Pulses: Normal pulses.      Heart sounds: No murmur heard.    No friction rub. No gallop.   Pulmonary:      Effort: Pulmonary effort is normal. No respiratory distress.      Breath sounds: Normal breath sounds. No wheezing, rhonchi or rales.   Abdominal:      General: Bowel sounds are normal. There is no distension.      Palpations: Abdomen is soft.      Tenderness: There is no abdominal tenderness.   Musculoskeletal:         General: No swelling or tenderness. Normal range of motion.      Cervical back: Normal range of motion and neck supple. No muscular tenderness.      Right lower leg: No edema.      Left lower leg: No edema.      Comments: LUE AVF + Thrill   Skin:     General: Skin is warm and dry.      Capillary Refill: Capillary refill takes less than 2 seconds.      Findings: No bruising, erythema or rash.   Neurological:      General: No focal deficit present.      Mental Status: He is alert and oriented to person, place, and time.       Fluids    Intake/Output Summary (Last 24 hours) at 3/22/2023 1818  Last data filed at 3/22/2023 1319  Gross per 24 hour   Intake 740 ml   Output 3500 ml   Net -2760 ml       Laboratory  Recent Labs     03/19/23 2354 03/21/23  0222 03/22/23  0036   WBC 7.6 5.2 4.2*   RBC 2.83* 3.06* 3.10*   HEMOGLOBIN 9.1* 9.8* 10.0*   HEMATOCRIT 27.9* 27.9* 28.4*   MCV 98.6* 91.2 91.6   MCH 32.2 32.0 32.3   MCHC 32.6* 35.1 35.2   RDW 43.9 40.0 40.2   PLATELETCT 169 177 176   MPV 10.4 10.6 10.5     Recent Labs     03/19/23  2354 03/20/23  0549 03/21/23  0222 03/21/23  1206 03/22/23  0036 03/22/23  0553 03/22/23  1417   SODIUM 134*  --  136  --  135  --   --    POTASSIUM 5.6*  --   4.7  --  4.9  --   --    CHLORIDE 95*  --  94*  --  96  --   --    CO2 22  --  27  --  24  --   --    GLUCOSE 99  --  101*  --  94  --   --    BUN 57*  --  45*  --  60*  --   --    CREATININE 10.00*  --  7.18*  --  9.95*  --   --    CALCIUM 6.6*   < > 6.9*   < > 7.1* 7.5* 8.8    < > = values in this interval not displayed.                   Imaging  No orders to display        Assessment/Plan  * Hypocalcemia- (present on admission)  Assessment & Plan  Severe hypocalcemia  Increased calcitriol to 1.5mcg daily, PhosLo 1,334mg TID, calcium carbonate 5000 mg QID  Patient received 1000 mg IV calcium chloride 3/22  Monitor on telemetry for any arrhythmias  Slowly improving  Latest calcium is 8.8  Continue to trend and monitor    Secondary hyperparathyroidism, renal (HCC)- (present on admission)  Assessment & Plan  Secondary hyperparathyroidism status post parathyroidectomy ablation and parathyroidectomy  During dialysis he had an increased calcium bath.   Increased calcitriol to 1.5mcg daily, PhosLo 1,334mg TID, calcium carbonate 5000 mg QID  Serial calcium labs  Monitor on telemetry for any arrhythmia      Essential hypertension- (present on admission)  Assessment & Plan  Chronic hypertension, currently controlled  -Continue all home antihypertensives  -As needed hydralazine    ESRD (end stage renal disease) on dialysis (HCC)- (present on admission)  Assessment & Plan  ESRD on chronic HD MWF, left arm fistula  Nephro consulted         VTE prophylaxis: SCDs/TEDs and heparin ppx    I have performed a physical exam and reviewed and updated ROS and Plan today (3/22/2023). In review of yesterday's note (3/21/2023), there are no changes except as documented above.

## 2023-03-23 NOTE — CARE PLAN
The patient is Stable - Low risk of patient condition declining or worsening    Shift Goals  Clinical Goals: Monitor labs, replace calcium  Patient Goals: Rest  Family Goals: MANDI    Progress made toward(s) clinical / shift goals:      Problem: Pain - Standard  Goal: Alleviation of pain or a reduction in pain to the patient’s comfort goal  Description: Target End Date:  Prior to discharge or change in level of care    Document on Vitals flowsheet    1.  Document pain using the appropriate pain scale per order or unit policy  2.  Educate and implement non-pharmacologic comfort measures (i.e. relaxation, distraction, massage, cold/heat therapy, etc.)  3.  Pain management medications as ordered  4.  Reassess pain after pain med administration per policy  5.  If opiods administered assess patient's response to pain medication is appropriate per POSS sedation scale  6.  Follow pain management plan developed in collaboration with patient and interdisciplinary team (including palliative care or pain specialists if applicable)  Outcome: Progressing     Problem: Knowledge Deficit - Standard  Goal: Patient and family/care givers will demonstrate understanding of plan of care, disease process/condition, diagnostic tests and medications  Description: Target End Date:  1-3 days or as soon as patient condition allows    Document in Patient Education    1.  Patient and family/caregiver oriented to unit, equipment, visitation policy and means for communicating concern  2.  Complete/review Learning Assessment  3.  Assess knowledge level of disease process/condition, treatment plan, diagnostic tests and medications  4.  Explain disease process/condition, treatment plan, diagnostic tests and medications  Outcome: Progressing

## 2023-03-23 NOTE — PROGRESS NOTES
Assumed care of patient, bedside report received from Radha day shift RN. Updated on plan of care, call light within reach and fall precautions are in place and bed lock and in lowest position. Patient instructed to call for assistance before getting out of bed. All questions answered at this time. No other needs.

## 2023-03-27 ENCOUNTER — HOSPITAL ENCOUNTER (EMERGENCY)
Facility: MEDICAL CENTER | Age: 33
End: 2023-03-27
Attending: EMERGENCY MEDICINE
Payer: COMMERCIAL

## 2023-03-27 ENCOUNTER — APPOINTMENT (OUTPATIENT)
Dept: RADIOLOGY | Facility: MEDICAL CENTER | Age: 33
End: 2023-03-27
Attending: EMERGENCY MEDICINE
Payer: COMMERCIAL

## 2023-03-27 VITALS
SYSTOLIC BLOOD PRESSURE: 114 MMHG | DIASTOLIC BLOOD PRESSURE: 78 MMHG | HEIGHT: 68 IN | BODY MASS INDEX: 25.46 KG/M2 | WEIGHT: 168 LBS | RESPIRATION RATE: 18 BRPM | HEART RATE: 71 BPM | OXYGEN SATURATION: 99 % | TEMPERATURE: 99 F

## 2023-03-27 DIAGNOSIS — R51.9 NONINTRACTABLE HEADACHE, UNSPECIFIED CHRONICITY PATTERN, UNSPECIFIED HEADACHE TYPE: ICD-10-CM

## 2023-03-27 LAB
ANION GAP SERPL CALC-SCNC: 17 MMOL/L (ref 7–16)
APTT PPP: 32.9 SEC (ref 24.7–36)
BASOPHILS # BLD AUTO: 0.5 % (ref 0–1.8)
BASOPHILS # BLD: 0.03 K/UL (ref 0–0.12)
BUN SERPL-MCNC: 34 MG/DL (ref 8–22)
CALCIUM SERPL-MCNC: 9 MG/DL (ref 8.5–10.5)
CHLORIDE SERPL-SCNC: 94 MMOL/L (ref 96–112)
CO2 SERPL-SCNC: 25 MMOL/L (ref 20–33)
CREAT SERPL-MCNC: 5.8 MG/DL (ref 0.5–1.4)
EOSINOPHIL # BLD AUTO: 0.07 K/UL (ref 0–0.51)
EOSINOPHIL NFR BLD: 1.2 % (ref 0–6.9)
ERYTHROCYTE [DISTWIDTH] IN BLOOD BY AUTOMATED COUNT: 39.9 FL (ref 35.9–50)
GFR SERPLBLD CREATININE-BSD FMLA CKD-EPI: 12 ML/MIN/1.73 M 2
GLUCOSE SERPL-MCNC: 94 MG/DL (ref 65–99)
HCT VFR BLD AUTO: 31 % (ref 42–52)
HGB BLD-MCNC: 10.6 G/DL (ref 14–18)
IMM GRANULOCYTES # BLD AUTO: 0.02 K/UL (ref 0–0.11)
IMM GRANULOCYTES NFR BLD AUTO: 0.4 % (ref 0–0.9)
INR PPP: 1.14 (ref 0.87–1.13)
LYMPHOCYTES # BLD AUTO: 0.63 K/UL (ref 1–4.8)
LYMPHOCYTES NFR BLD: 11.2 % (ref 22–41)
MCH RBC QN AUTO: 31.6 PG (ref 27–33)
MCHC RBC AUTO-ENTMCNC: 34.2 G/DL (ref 33.7–35.3)
MCV RBC AUTO: 92.5 FL (ref 81.4–97.8)
MONOCYTES # BLD AUTO: 0.43 K/UL (ref 0–0.85)
MONOCYTES NFR BLD AUTO: 7.7 % (ref 0–13.4)
NEUTROPHILS # BLD AUTO: 4.44 K/UL (ref 1.82–7.42)
NEUTROPHILS NFR BLD: 79 % (ref 44–72)
NRBC # BLD AUTO: 0 K/UL
NRBC BLD-RTO: 0 /100 WBC
PLATELET # BLD AUTO: 201 K/UL (ref 164–446)
PMV BLD AUTO: 10.3 FL (ref 9–12.9)
POTASSIUM SERPL-SCNC: 3.8 MMOL/L (ref 3.6–5.5)
PROTHROMBIN TIME: 14.5 SEC (ref 12–14.6)
RBC # BLD AUTO: 3.35 M/UL (ref 4.7–6.1)
SODIUM SERPL-SCNC: 136 MMOL/L (ref 135–145)
WBC # BLD AUTO: 5.6 K/UL (ref 4.8–10.8)

## 2023-03-27 PROCEDURE — 36415 COLL VENOUS BLD VENIPUNCTURE: CPT

## 2023-03-27 PROCEDURE — 96374 THER/PROPH/DIAG INJ IV PUSH: CPT

## 2023-03-27 PROCEDURE — 70450 CT HEAD/BRAIN W/O DYE: CPT

## 2023-03-27 PROCEDURE — 85610 PROTHROMBIN TIME: CPT

## 2023-03-27 PROCEDURE — 85730 THROMBOPLASTIN TIME PARTIAL: CPT

## 2023-03-27 PROCEDURE — 85025 COMPLETE CBC W/AUTO DIFF WBC: CPT

## 2023-03-27 PROCEDURE — 96375 TX/PRO/DX INJ NEW DRUG ADDON: CPT

## 2023-03-27 PROCEDURE — 99284 EMERGENCY DEPT VISIT MOD MDM: CPT

## 2023-03-27 PROCEDURE — 700111 HCHG RX REV CODE 636 W/ 250 OVERRIDE (IP): Performed by: EMERGENCY MEDICINE

## 2023-03-27 PROCEDURE — 80048 BASIC METABOLIC PNL TOTAL CA: CPT

## 2023-03-27 RX ORDER — ONDANSETRON 2 MG/ML
4 INJECTION INTRAMUSCULAR; INTRAVENOUS ONCE
Status: COMPLETED | OUTPATIENT
Start: 2023-03-27 | End: 2023-03-27

## 2023-03-27 RX ORDER — MORPHINE SULFATE 4 MG/ML
4 INJECTION INTRAVENOUS ONCE
Status: COMPLETED | OUTPATIENT
Start: 2023-03-27 | End: 2023-03-27

## 2023-03-27 RX ADMIN — MORPHINE SULFATE 4 MG: 4 INJECTION INTRAVENOUS at 20:23

## 2023-03-27 RX ADMIN — ONDANSETRON HYDROCHLORIDE 4 MG: 2 SOLUTION INTRAMUSCULAR; INTRAVENOUS at 20:25

## 2023-03-27 ASSESSMENT — PAIN DESCRIPTION - PAIN TYPE
TYPE: ACUTE PAIN
TYPE: ACUTE PAIN

## 2023-03-27 ASSESSMENT — FIBROSIS 4 INDEX: FIB4 SCORE: 0.54

## 2023-03-28 NOTE — ED PROVIDER NOTES
ED Provider Note          Primary Care Provider: Abigail Irvin M.D.    CHIEF COMPLAINT  Chief Complaint   Patient presents with    Headache     10/10 pain     LIMITATION TO HISTORY   Select: : None    HPI/ROS  OUTSIDE HISTORIAN(S):      EXTERNAL RECORDS REVIEWED  Inpatient Notes from recent admission 1 week prior.  Patient during that admission had subtotal parathyroidectomy and autotransplantation into his left forearm.  Required extended duration admission form Hypocalcemia requiring IV calcium administration.      Elier Bustillos is a 32 y.o. male who presents to the ED for headache.  Patient reported cute onset of headache during dialysis today.  Headache is frontal left retrobulbar but also goes to the vertex the occiput and down his neck.  He states never having a headache similar to this before.  Dialysis was discontinued due to the headache and patient was sent here for further evaluation.  Pain severe is worse by loud noises and light there is no alleviating factors she is no fevers no chills no chest pain shortness of breath abdominal pain.  Patient does have significant past medical history of end-stage renal disease dialysis dependent he also had recent parathyroidectomy with autotransplantation left forearm.  He also had extended hospitalization for hypocalcemia requiring IV repletion      PAST MEDICAL HISTORY  Past Medical History:   Diagnosis Date    Dialysis     Mo-We-Fri at [facility]     for 12 years    Hypertension     Renal disorder     dailysis pt. 3 times per week.        SURGICAL HISTORY  Past Surgical History:   Procedure Laterality Date    MA EXPLORE PARATHYROID GLANDS N/A 3/17/2023    Procedure: PARATHYROID EXPLORATION WITH PARATHYROID AUTOTRANSPLANTATION AND PARATHYROID HORMONE MONITORING.;  Surgeon: Candy Godoy M.D.;  Location: SURGERY SAME DAY [facility];  Service: General    EXPLORATORY LAPAROTOMY  11/11/2018    Procedure: EXPLORATORY LAPAROTOMY;   "Surgeon: Faith Martin M.D.;  Location: SURGERY [facility];  Service: General    AV FISTULA REVISION Right 11/28/2016    Procedure: AV FISTULA REVISION UPPER EXTREMITY (ANEURYSM REDUCTION);  Surgeon: Slade Perez M.D.;  Location: SURGERY [facility];  Service:     AV FISTULA CREATION  8/21/2012    Performed by ANAI TYSON at SURGERY [facility]    RECOVERY  7/20/2012    Performed by SURGERY, IR-RECOVERY at SURGERY SAME DAY [facility]    RECOVERY  7/3/2012    Performed by SURGERY, IR-RECOVERY at SURGERY SAME DAY [facility]    AV FISTULA CREATION  6/7/2012    Performed by ANAI TYSON at SURGERY [facility]    CATH PLACEMENT  6/7/2012    Performed by ANAI TYSON at SURGERY [facility]    AV FISTULA CREATION  12/29/2010    Performed by ANT ALONZO at SURGERY [facility]    OTHER ORTHOPEDIC SURGERY      r arm fx       FAMILY HISTORY  Family History   Problem Relation Age of Onset    Diabetes Mother     Hypertension Mother     Heart Attack Father     Hypertension Father        SOCIAL HISTORY   reports that he has been smoking cigarettes. He has never used smokeless tobacco. He reports that he does not drink alcohol and does not use drugs.    CURRENT MEDICATIONS  Previous Medications    AMLODIPINE (NORVASC) 10 MG TAB    Take 10 mg by mouth 2 times a day.    CALCITRIOL (ROCALTROL) 0.5 MCG CAP    Take 3 Capsules by mouth every day for 30 days.    CALCIUM ACETATE (PHOS-LO) 667 MG CAP    Take 2 Capsules by mouth 3 times a day before meals for 30 days.    CALCIUM CARBONATE ANTACID 1000 MG CHEW TAB    Chew 5 Tablets every 6 hours for 30 days.    CLONIDINE (CATAPRES) 0.2 MG/24HR PATCH WEEKLY PATCH           ALLERGIES  Patient has no known allergies.    PHYSICAL EXAM  BP (!) 185/98   Pulse 87   Temp 37.4 °C (99.3 °F) (Temporal)   Resp 18   Ht 1.727 m (5' 8\")   Wt 76.2 kg (168 lb)   SpO2 100%   BMI 25.54 kg/m²   Pulse ox interpretation: I interpret this pulse ox as " normal.  Constitutional: Alert and oriented x 3, normal distress  HEENT: Atraumatic normocephalic, pupils are equal round reactive to light extraocular movements are intact. The nares is clear, external ears are normal, mouth shows moist mucous membranes normal dentition for age  Neck: Supple, no JVD no tracheal deviation, low transverse incision clean dry and intact  Cardiovascular: Regular rate and rhythm no murmur rub or gallop 2+ pulses peripherally x4  Thorax & Lungs: No respiratory distress, no wheezes rales or rhonchi, No chest tenderness.   GI: Soft nontender nondistended positive bowel sounds, no peritoneal signs  Skin: Warm dry no acute rash or lesion seen surgical sites clean dry and intact  Musculoskeletal: Moving all extremities with full range and 5 of 5 strength no acute  deformity  Neurologic: Cranial nerves III through XII are grossly intact no sensory deficit no cerebellar dysfunction   Psychiatric: Appropriate affect for situation at this time          DIAGNOSTIC STUDIES & PROCEDURES      Results for orders placed or performed during the hospital encounter of 03/17/23   Comp Metabolic Panel   Result Value Ref Range    Sodium 137 135 - 145 mmol/L    Potassium 5.0 3.6 - 5.5 mmol/L    Chloride 97 96 - 112 mmol/L    Co2 25 20 - 33 mmol/L    Anion Gap 15.0 7.0 - 16.0    Glucose 97 65 - 99 mg/dL    Bun 30 (H) 8 - 22 mg/dL    Creatinine 6.09 (HH) 0.50 - 1.40 mg/dL    Calcium 9.1 8.5 - 10.5 mg/dL    AST(SGOT) 9 (L) 12 - 45 U/L    ALT(SGPT) 9 2 - 50 U/L    Alkaline Phosphatase 595 (H) 30 - 99 U/L    Total Bilirubin 0.4 0.1 - 1.5 mg/dL    Albumin 4.3 3.2 - 4.9 g/dL    Total Protein 7.3 6.0 - 8.2 g/dL    Globulin 3.0 1.9 - 3.5 g/dL    A-G Ratio 1.4 g/dL   CORRECTED CALCIUM   Result Value Ref Range    Correct Calcium 8.9 8.5 - 10.5 mg/dL   ESTIMATED GFR   Result Value Ref Range    GFR (CKD-EPI) 12 (A) >60 mL/min/1.73 m 2   INTRA-OPERATIVE PTH   Result Value Ref Range    IOPTH Baseline 1872.0 (H) 14.0 - 72.0  pg/mL    IOPTH Collection 927 hh:mm    IOPTH Pre-Excision 1308.0 pg/mL    IOPTH Collection 953 hh:mm    IOPTH 5 Minute 533.0 pg/mL    IOPTH Collection 1004 hh:mm    IOPTH %Drop 72 %    IOPTH 10 Minute 449.0 pg/mL    IOPTH Collection 1013 hh:mm    IOPTH %Drop 76 %    IOPTH Additional 400.0 pg/mL    IOPTH Collection 1018 hh:mm    IOPTH %Drop 79 %   Histology Request   Result Value Ref Range    Pathology Request Sent to [facility]    Albumin   Result Value Ref Range    Albumin 4.3 3.2 - 4.9 g/dL   Calcium   Result Value Ref Range    Calcium 8.3 (L) 8.5 - 10.5 mg/dL   Albumin   Result Value Ref Range    Albumin 3.9 3.2 - 4.9 g/dL   Calcium   Result Value Ref Range    Calcium 7.4 (L) 8.5 - 10.5 mg/dL   CBC with Differential   Result Value Ref Range    WBC 9.9 4.8 - 10.8 K/uL    RBC 3.39 (L) 4.70 - 6.10 M/uL    Hemoglobin 11.0 (L) 14.0 - 18.0 g/dL    Hematocrit 32.9 (L) 42.0 - 52.0 %    MCV 97.1 81.4 - 97.8 fL    MCH 32.4 27.0 - 33.0 pg    MCHC 33.4 (L) 33.7 - 35.3 g/dL    RDW 42.7 35.9 - 50.0 fL    Platelet Count 200 164 - 446 K/uL    MPV 10.7 9.0 - 12.9 fL    Neutrophils-Polys 88.90 (H) 44.00 - 72.00 %    Lymphocytes 6.40 (L) 22.00 - 41.00 %    Monocytes 4.20 0.00 - 13.40 %    Eosinophils 0.00 0.00 - 6.90 %    Basophils 0.10 0.00 - 1.80 %    Immature Granulocytes 0.40 0.00 - 0.90 %    Nucleated RBC 0.00 /100 WBC    Neutrophils (Absolute) 8.76 (H) 1.82 - 7.42 K/uL    Lymphs (Absolute) 0.63 (L) 1.00 - 4.80 K/uL    Monos (Absolute) 0.41 0.00 - 0.85 K/uL    Eos (Absolute) 0.00 0.00 - 0.51 K/uL    Baso (Absolute) 0.01 0.00 - 0.12 K/uL    Immature Granulocytes (abs) 0.04 0.00 - 0.11 K/uL    NRBC (Absolute) 0.00 K/uL   Basic Metabolic Panel (BMP)   Result Value Ref Range    Sodium 132 (L) 135 - 145 mmol/L    Potassium 6.3 (H) 3.6 - 5.5 mmol/L    Chloride 94 (L) 96 - 112 mmol/L    Co2 23 20 - 33 mmol/L    Glucose 135 (H) 65 - 99 mg/dL    Bun 46 (H) 8 - 22 mg/dL    Creatinine 8.11 (HH) 0.50 - 1.40 mg/dL    Calcium 6.9 (LL) 8.5 - 10.5  mg/dL    Anion Gap 15.0 7.0 - 16.0   PHOSPHORUS   Result Value Ref Range    Phosphorus 3.6 2.5 - 4.5 mg/dL   ALBUMIN   Result Value Ref Range    Albumin 3.9 3.2 - 4.9 g/dL   ESTIMATED GFR   Result Value Ref Range    GFR (CKD-EPI) 8 (A) >60 mL/min/1.73 m 2   Albumin   Result Value Ref Range    Albumin 3.7 3.2 - 4.9 g/dL   Calcium   Result Value Ref Range    Calcium 6.8 (LL) 8.5 - 10.5 mg/dL   Hepatitis Panel Acute (4 components)   Result Value Ref Range    Hepatitis B Surface Antigen Non-Reactive Non-Reactive    Hepatitis B Cors Ab,IgM Non-Reactive Non-Reactive    Hepatitis A Virus Ab, IgM Non-Reactive Non-Reactive    Hepatitis C Antibody Non-Reactive Non-Reactive   Hep B Surface AB   Result Value Ref Range    Hep B Surface Antibody Quant 228.00 (H) 0.00 - 10.00 mIU/mL   Albumin   Result Value Ref Range    Albumin 3.5 3.2 - 4.9 g/dL   Calcium   Result Value Ref Range    Calcium 7.1 (L) 8.5 - 10.5 mg/dL   Albumin   Result Value Ref Range    Albumin 3.5 3.2 - 4.9 g/dL   Calcium   Result Value Ref Range    Calcium 6.9 (LL) 8.5 - 10.5 mg/dL   Albumin   Result Value Ref Range    Albumin 3.5 3.2 - 4.9 g/dL   Calcium   Result Value Ref Range    Calcium 6.7 (LL) 8.5 - 10.5 mg/dL   Albumin   Result Value Ref Range    Albumin 3.4 3.2 - 4.9 g/dL   Calcium   Result Value Ref Range    Calcium 6.9 (LL) 8.5 - 10.5 mg/dL   Albumin   Result Value Ref Range    Albumin 3.4 3.2 - 4.9 g/dL   Calcium   Result Value Ref Range    Calcium 7.0 (L) 8.5 - 10.5 mg/dL   Basic Metabolic Panel   Result Value Ref Range    Sodium 138 135 - 145 mmol/L    Potassium 4.9 3.6 - 5.5 mmol/L    Chloride 98 96 - 112 mmol/L    Co2 26 20 - 33 mmol/L    Glucose 99 65 - 99 mg/dL    Bun 41 (H) 8 - 22 mg/dL    Creatinine 8.69 (HH) 0.50 - 1.40 mg/dL    Calcium 7.1 (L) 8.5 - 10.5 mg/dL    Anion Gap 14.0 7.0 - 16.0   ESTIMATED GFR   Result Value Ref Range    GFR (CKD-EPI) 8 (A) >60 mL/min/1.73 m 2   Albumin   Result Value Ref Range    Albumin 3.3 3.2 - 4.9 g/dL    Calcium   Result Value Ref Range    Calcium 6.5 (LL) 8.5 - 10.5 mg/dL   CBC WITHOUT DIFFERENTIAL   Result Value Ref Range    WBC 7.6 4.8 - 10.8 K/uL    RBC 2.83 (L) 4.70 - 6.10 M/uL    Hemoglobin 9.1 (L) 14.0 - 18.0 g/dL    Hematocrit 27.9 (L) 42.0 - 52.0 %    MCV 98.6 (H) 81.4 - 97.8 fL    MCH 32.2 27.0 - 33.0 pg    MCHC 32.6 (L) 33.7 - 35.3 g/dL    RDW 43.9 35.9 - 50.0 fL    Platelet Count 169 164 - 446 K/uL    MPV 10.4 9.0 - 12.9 fL   Renal Function Panel   Result Value Ref Range    Sodium 134 (L) 135 - 145 mmol/L    Potassium 5.6 (H) 3.6 - 5.5 mmol/L    Chloride 95 (L) 96 - 112 mmol/L    Co2 22 20 - 33 mmol/L    Glucose 99 65 - 99 mg/dL    Creatinine 10.00 (HH) 0.50 - 1.40 mg/dL    Bun 57 (H) 8 - 22 mg/dL    Calcium 6.6 (LL) 8.5 - 10.5 mg/dL    Phosphorus 4.2 2.5 - 4.5 mg/dL    Albumin 3.4 3.2 - 4.9 g/dL   CORRECTED CALCIUM   Result Value Ref Range    Correct Calcium 7.1 (L) 8.5 - 10.5 mg/dL   ESTIMATED GFR   Result Value Ref Range    GFR (CKD-EPI) 6 (A) >60 mL/min/1.73 m 2   Albumin   Result Value Ref Range    Albumin 3.5 3.2 - 4.9 g/dL   Calcium   Result Value Ref Range    Calcium 7.0 (L) 8.5 - 10.5 mg/dL   Calcium   Result Value Ref Range    Calcium 8.4 (L) 8.5 - 10.5 mg/dL   Calcium   Result Value Ref Range    Calcium 7.7 (L) 8.5 - 10.5 mg/dL   Renal Function Panel   Result Value Ref Range    Sodium 136 135 - 145 mmol/L    Potassium 4.7 3.6 - 5.5 mmol/L    Chloride 94 (L) 96 - 112 mmol/L    Co2 27 20 - 33 mmol/L    Glucose 101 (H) 65 - 99 mg/dL    Creatinine 7.18 (HH) 0.50 - 1.40 mg/dL    Bun 45 (H) 8 - 22 mg/dL    Calcium 6.9 (LL) 8.5 - 10.5 mg/dL    Phosphorus 5.0 (H) 2.5 - 4.5 mg/dL    Albumin 3.7 3.2 - 4.9 g/dL   CBC WITHOUT DIFFERENTIAL   Result Value Ref Range    WBC 5.2 4.8 - 10.8 K/uL    RBC 3.06 (L) 4.70 - 6.10 M/uL    Hemoglobin 9.8 (L) 14.0 - 18.0 g/dL    Hematocrit 27.9 (L) 42.0 - 52.0 %    MCV 91.2 81.4 - 97.8 fL    MCH 32.0 27.0 - 33.0 pg    MCHC 35.1 33.7 - 35.3 g/dL    RDW 40.0 35.9 - 50.0  fL    Platelet Count 177 164 - 446 K/uL    MPV 10.6 9.0 - 12.9 fL   IONIZED CALCIUM   Result Value Ref Range    Ionized Calcium 0.8 (L) 1.1 - 1.3 mmol/L   CORRECTED CALCIUM   Result Value Ref Range    Correct Calcium 7.1 (L) 8.5 - 10.5 mg/dL   ESTIMATED GFR   Result Value Ref Range    GFR (CKD-EPI) 10 (A) >60 mL/min/1.73 m 2   Calcium   Result Value Ref Range    Calcium 7.6 (L) 8.5 - 10.5 mg/dL   Calcium   Result Value Ref Range    Calcium 7.9 (L) 8.5 - 10.5 mg/dL   CBC WITHOUT DIFFERENTIAL   Result Value Ref Range    WBC 4.2 (L) 4.8 - 10.8 K/uL    RBC 3.10 (L) 4.70 - 6.10 M/uL    Hemoglobin 10.0 (L) 14.0 - 18.0 g/dL    Hematocrit 28.4 (L) 42.0 - 52.0 %    MCV 91.6 81.4 - 97.8 fL    MCH 32.3 27.0 - 33.0 pg    MCHC 35.2 33.7 - 35.3 g/dL    RDW 40.2 35.9 - 50.0 fL    Platelet Count 176 164 - 446 K/uL    MPV 10.5 9.0 - 12.9 fL   Renal Function Panel   Result Value Ref Range    Sodium 135 135 - 145 mmol/L    Potassium 4.9 3.6 - 5.5 mmol/L    Chloride 96 96 - 112 mmol/L    Co2 24 20 - 33 mmol/L    Glucose 94 65 - 99 mg/dL    Creatinine 9.95 (HH) 0.50 - 1.40 mg/dL    Bun 60 (H) 8 - 22 mg/dL    Calcium 7.1 (L) 8.5 - 10.5 mg/dL    Phosphorus 5.2 (H) 2.5 - 4.5 mg/dL    Albumin 3.5 3.2 - 4.9 g/dL   Calcium   Result Value Ref Range    Calcium 7.5 (L) 8.5 - 10.5 mg/dL   CORRECTED CALCIUM   Result Value Ref Range    Correct Calcium 7.5 (L) 8.5 - 10.5 mg/dL   ESTIMATED GFR   Result Value Ref Range    GFR (CKD-EPI) 6 (A) >60 mL/min/1.73 m 2   Calcium   Result Value Ref Range    Calcium 8.8 8.5 - 10.5 mg/dL   Calcium   Result Value Ref Range    Calcium 8.5 8.5 - 10.5 mg/dL   CBC WITHOUT DIFFERENTIAL   Result Value Ref Range    WBC 4.7 (L) 4.8 - 10.8 K/uL    RBC 3.44 (L) 4.70 - 6.10 M/uL    Hemoglobin 11.0 (L) 14.0 - 18.0 g/dL    Hematocrit 32.1 (L) 42.0 - 52.0 %    MCV 93.3 81.4 - 97.8 fL    MCH 32.0 27.0 - 33.0 pg    MCHC 34.3 33.7 - 35.3 g/dL    RDW 39.9 35.9 - 50.0 fL    Platelet Count 184 164 - 446 K/uL    MPV 10.2 9.0 - 12.9  fL   Renal Function Panel   Result Value Ref Range    Sodium 137 135 - 145 mmol/L    Potassium 5.3 3.6 - 5.5 mmol/L    Chloride 96 96 - 112 mmol/L    Co2 26 20 - 33 mmol/L    Glucose 84 65 - 99 mg/dL    Creatinine 6.07 (HH) 0.50 - 1.40 mg/dL    Bun 40 (H) 8 - 22 mg/dL    Calcium 8.1 (L) 8.5 - 10.5 mg/dL    Phosphorus 3.9 2.5 - 4.5 mg/dL    Albumin 3.7 3.2 - 4.9 g/dL   CORRECTED CALCIUM   Result Value Ref Range    Correct Calcium 8.3 (L) 8.5 - 10.5 mg/dL   ESTIMATED GFR   Result Value Ref Range    GFR (CKD-EPI) 12 (A) >60 mL/min/1.73 m 2         Radiology:     The attending Emergency Physician has independently interpreted the diagnostic imaging associated with this visit and is awaiting the final reading from the radiologist, which will be displayed below.    Preliminary interpretation is a follows: No obvious acute intracranial hemorrhage  Radiologist interpretation:     CT-HEAD W/O   Final Result      1.  No acute intracranial hemorrhage or territorial infarct.              COURSE & MEDICAL DECISION MAKING    ED Observation Status? No; Patient does not meet criteria for ED Observation.     ASSESSMENT AND PLAN  Care Narrative: Pleasant 32-year-old male end-stage renal disease dialysis dependent presents with cute onset headache during dialysis.  His head CT is negative.  He also had recent parathyroidectomy and autotransplanted into his left forearm was having issues with hypocalcemia.  His serum calcium tonight is 9.0 corrected 8.3 on the lower end but no need for intervention at this time he is to continue his outpatient management he is to return here should he have worsening headaches altered mental status nausea vomiting any other acute symptom changes or concerns.  He has no sign of infection no altered mental status I see no in medication for lumbar puncture or other more invasive testing/imaging.  Patient is understandable agreeable to plan appreciative of treatment discharged in stable and improved  "condition.              HTN/IDDM FOLLOW UP:  The patient has known hypertension and is being followed by their primary care doctor    ADDITIONAL PROBLEM LIST AND DISPOSITION    Recent parathyroidectomy and hypocalcemia, continue current management               DISPOSITION AND DISCUSSIONS    I have discussed management of the patient with the following physicians and AWIAS's:     Discussion of management with other QHP or appropriate source(s):      Escalation of care considered, and ultimately not performed: acute inpatient care management, however at this time, the patient is most appropriate for outpatient management.    Barriers to care at this time, including but not limited to: .     Decision tools and prescription drugs considered including, but not limited to: Consider discharge with pain medicine however headaches resolved here given symptomatic instructions to treat with Tylenol at home.  This will also assure the patient report return should he have worsening headache or changes in severity..      /78   Pulse 71   Temp 37.2 °C (99 °F) (Temporal)   Resp 18   Ht 1.727 m (5' 8\")   Wt 76.2 kg (168 lb)   SpO2 99%   BMI 25.54 kg/m²         FINAL IMPRESSION  1. Nonintractable headache, unspecified chronicity pattern, unspecified headache type        PRESCRIPTIONS  Discharge Medication List as of 3/27/2023 10:08 PM          FOLLOW UP  Abigail Irvin M.D.  [address] [facility] [id number] [phone]          [facility], Emergency Dept  [address] [facility] [id number] [phone]    in 12-24 hours if symptoms persist, immediately If symptoms worsen, or if you develop any other symptoms or concerns        -DISCHARGE-        "

## 2023-03-28 NOTE — ED TRIAGE NOTES
.  Chief Complaint   Patient presents with    Headache     10/10 pain    32yr patient JULIO REDDY from [facility]. Patient was getting dialysis treatment. He had a headache but it became worse. Treatment was stopped and EMS was called.

## 2023-04-03 RX ORDER — LOSARTAN POTASSIUM 50 MG/1
100 TABLET ORAL DAILY
COMMUNITY
End: 2023-05-03

## 2023-04-03 RX ORDER — CARVEDILOL 6.25 MG/1
6.25 TABLET ORAL 2 TIMES DAILY WITH MEALS
Qty: 180 TABLET | Refills: 3 | Status: SHIPPED | OUTPATIENT
Start: 2023-04-03 | End: 2023-12-07

## 2023-04-03 RX ORDER — HYDRALAZINE HYDROCHLORIDE 100 MG/1
100 TABLET, FILM COATED ORAL DAILY
COMMUNITY

## 2023-04-04 NOTE — PROGRESS NOTES
ESRD on HD, seen at [facility]. HTN uncontrolled despite amlodipine, clonidine patch, losartan, and hydralazine.     Start Coreg 6.25mg BID.    Ervin Rosas MD  Nephrology  [facility]

## 2023-04-16 DIAGNOSIS — N18.6 ESRD (END STAGE RENAL DISEASE) (HCC): ICD-10-CM

## 2023-04-16 DIAGNOSIS — Z01.818 PRE-TRANSPLANT EVALUATION FOR KIDNEY TRANSPLANT: ICD-10-CM

## 2023-05-02 ENCOUNTER — HOSPITAL ENCOUNTER (OUTPATIENT)
Dept: LAB | Facility: MEDICAL CENTER | Age: 33
End: 2023-05-02
Attending: INTERNAL MEDICINE
Payer: COMMERCIAL

## 2023-05-02 LAB
ABO GROUP BLD: NORMAL
HBV CORE AB SERPL QL IA: NONREACTIVE
HBV SURFACE AB SERPL IA-ACNC: 306 MIU/ML (ref 0–10)
HBV SURFACE AG SER QL: NORMAL
HCV AB SER QL: NORMAL
HIV 1+2 AB+HIV1 P24 AG SERPL QL IA: NORMAL
RH BLD: NORMAL
T PALLIDUM AB SER QL IA: NORMAL

## 2023-05-02 PROCEDURE — 86780 TREPONEMA PALLIDUM: CPT

## 2023-05-02 PROCEDURE — 86480 TB TEST CELL IMMUN MEASURE: CPT

## 2023-05-02 PROCEDURE — 86900 BLOOD TYPING SEROLOGIC ABO: CPT

## 2023-05-02 PROCEDURE — 36415 COLL VENOUS BLD VENIPUNCTURE: CPT

## 2023-05-02 PROCEDURE — 87340 HEPATITIS B SURFACE AG IA: CPT

## 2023-05-02 PROCEDURE — 86706 HEP B SURFACE ANTIBODY: CPT

## 2023-05-02 PROCEDURE — 87389 HIV-1 AG W/HIV-1&-2 AB AG IA: CPT

## 2023-05-02 PROCEDURE — 86664 EPSTEIN-BARR NUCLEAR ANTIGEN: CPT

## 2023-05-02 PROCEDURE — G0480 DRUG TEST DEF 1-7 CLASSES: HCPCS

## 2023-05-02 PROCEDURE — 86663 EPSTEIN-BARR ANTIBODY: CPT

## 2023-05-02 PROCEDURE — 86644 CMV ANTIBODY: CPT

## 2023-05-02 PROCEDURE — 86704 HEP B CORE ANTIBODY TOTAL: CPT

## 2023-05-02 PROCEDURE — 86803 HEPATITIS C AB TEST: CPT

## 2023-05-02 PROCEDURE — 86665 EPSTEIN-BARR CAPSID VCA: CPT | Mod: 91

## 2023-05-02 PROCEDURE — 86901 BLOOD TYPING SEROLOGIC RH(D): CPT

## 2023-05-03 DIAGNOSIS — I10 ESSENTIAL HYPERTENSION: ICD-10-CM

## 2023-05-03 DIAGNOSIS — N18.6 ESRD (END STAGE RENAL DISEASE) (HCC): ICD-10-CM

## 2023-05-03 LAB
GAMMA INTERFERON BACKGROUND BLD IA-ACNC: 0.08 IU/ML
M TB IFN-G BLD-IMP: NEGATIVE
M TB IFN-G CD4+ BCKGRND COR BLD-ACNC: 0 IU/ML
MITOGEN IGNF BCKGRD COR BLD-ACNC: >10 IU/ML
QFT TB2 - NIL TBQ2: -0.01 IU/ML

## 2023-05-03 RX ORDER — AMLODIPINE BESYLATE 10 MG/1
10 TABLET ORAL DAILY
Qty: 90 TABLET | Refills: 3 | Status: SHIPPED | OUTPATIENT
Start: 2023-05-03

## 2023-05-03 RX ORDER — LOSARTAN POTASSIUM 100 MG/1
100 TABLET ORAL DAILY
Qty: 90 TABLET | Refills: 3 | Status: SHIPPED | OUTPATIENT
Start: 2023-05-03 | End: 2023-05-03 | Stop reason: SDUPTHER

## 2023-05-03 RX ORDER — LOSARTAN POTASSIUM 100 MG/1
100 TABLET ORAL DAILY
Qty: 90 TABLET | Refills: 3 | Status: SHIPPED | OUTPATIENT
Start: 2023-05-03

## 2023-05-04 LAB
CMV IGG SERPL IA-ACNC: 9.9 U/ML
EBV EA-D IGG SER-ACNC: 7.5 U/ML (ref 0–10.9)
EBV NA IGG SER IA-ACNC: 135 U/ML (ref 0–21.9)
EBV VCA IGG SER IA-ACNC: >750 U/ML (ref 0–21.9)
EBV VCA IGM SER IA-ACNC: <10 U/ML (ref 0–43.9)

## 2023-05-05 LAB
COTININE SERPL-MCNC: 29 NG/ML
NICOTINE SERPL-MCNC: <5 NG/ML

## 2023-05-28 NOTE — PROGRESS NOTES
[facility] transplant letter received.   Patient had Echo in 1/2023.  Patient had EKG in 3/2023.  Will order Chest Xray.  Will fax cardiac tests to [facility].    Ervin Rosas MD  Nephrology  [facility] [place] [facility]   
Stable

## 2023-07-17 DIAGNOSIS — N18.6 ESRD (END STAGE RENAL DISEASE) (HCC): ICD-10-CM

## 2023-07-17 DIAGNOSIS — N52.9 VASCULOGENIC ERECTILE DYSFUNCTION, UNSPECIFIED VASCULOGENIC ERECTILE DYSFUNCTION TYPE: ICD-10-CM

## 2023-07-18 NOTE — PROGRESS NOTES
ESRD on HD. Complains of ED, not responding to sildenafil or tadalafil. Refer to Urology.     Ervin Rosas MD  Nephrology  [facility]

## 2023-08-07 ENCOUNTER — TELEPHONE (OUTPATIENT)
Dept: HEALTH INFORMATION MANAGEMENT | Facility: OTHER | Age: 33
End: 2023-08-07

## 2023-09-25 ENCOUNTER — HOSPITAL ENCOUNTER (OUTPATIENT)
Dept: LAB | Facility: MEDICAL CENTER | Age: 33
End: 2023-09-25
Attending: INTERNAL MEDICINE
Payer: COMMERCIAL

## 2023-09-25 PROCEDURE — G0480 DRUG TEST DEF 1-7 CLASSES: HCPCS

## 2023-09-25 PROCEDURE — 36415 COLL VENOUS BLD VENIPUNCTURE: CPT

## 2023-09-28 LAB
COTININE SERPL-MCNC: <5 NG/ML
NICOTINE SERPL-MCNC: <5 NG/ML

## 2023-10-05 ENCOUNTER — HOSPITAL ENCOUNTER (OUTPATIENT)
Dept: RADIOLOGY | Facility: MEDICAL CENTER | Age: 33
End: 2023-10-05
Attending: INTERNAL MEDICINE
Payer: COMMERCIAL

## 2023-10-05 DIAGNOSIS — N18.6 ESRD (END STAGE RENAL DISEASE) (HCC): ICD-10-CM

## 2023-10-05 DIAGNOSIS — Z01.818 PRE-TRANSPLANT EVALUATION FOR KIDNEY TRANSPLANT: ICD-10-CM

## 2023-10-05 PROCEDURE — 71046 X-RAY EXAM CHEST 2 VIEWS: CPT

## 2023-12-07 ENCOUNTER — OFFICE VISIT (OUTPATIENT)
Dept: CARDIOLOGY | Facility: MEDICAL CENTER | Age: 33
End: 2023-12-07
Attending: INTERNAL MEDICINE
Payer: COMMERCIAL

## 2023-12-07 VITALS
WEIGHT: 182 LBS | BODY MASS INDEX: 27.58 KG/M2 | OXYGEN SATURATION: 100 % | HEART RATE: 100 BPM | DIASTOLIC BLOOD PRESSURE: 74 MMHG | RESPIRATION RATE: 16 BRPM | SYSTOLIC BLOOD PRESSURE: 108 MMHG | HEIGHT: 68 IN

## 2023-12-07 DIAGNOSIS — Z99.2 ESRD (END STAGE RENAL DISEASE) ON DIALYSIS (HCC): ICD-10-CM

## 2023-12-07 DIAGNOSIS — N52.9 ERECTILE DYSFUNCTION, UNSPECIFIED ERECTILE DYSFUNCTION TYPE: ICD-10-CM

## 2023-12-07 DIAGNOSIS — N18.6 ESRD (END STAGE RENAL DISEASE) ON DIALYSIS (HCC): ICD-10-CM

## 2023-12-07 PROBLEM — R07.9 CHEST PAIN: Status: RESOLVED | Noted: 2018-11-14 | Resolved: 2023-12-07

## 2023-12-07 PROBLEM — I16.1 HYPERTENSIVE EMERGENCY: Status: RESOLVED | Noted: 2018-12-17 | Resolved: 2023-12-07

## 2023-12-07 PROCEDURE — 3074F SYST BP LT 130 MM HG: CPT | Performed by: INTERNAL MEDICINE

## 2023-12-07 PROCEDURE — 3078F DIAST BP <80 MM HG: CPT | Performed by: INTERNAL MEDICINE

## 2023-12-07 PROCEDURE — 99214 OFFICE O/P EST MOD 30 MIN: CPT | Performed by: INTERNAL MEDICINE

## 2023-12-07 PROCEDURE — 99211 OFF/OP EST MAY X REQ PHY/QHP: CPT | Performed by: INTERNAL MEDICINE

## 2023-12-07 PROCEDURE — 99212 OFFICE O/P EST SF 10 MIN: CPT | Performed by: INTERNAL MEDICINE

## 2023-12-07 RX ORDER — SEVELAMER CARBONATE 800 MG/1
800 TABLET, FILM COATED ORAL
COMMUNITY

## 2023-12-07 ASSESSMENT — FIBROSIS 4 INDEX: FIB4 SCORE: 0.51

## 2023-12-07 NOTE — PROGRESS NOTES
Cardiology Clinic Note    Date of note: 12/7/2023  Primary Care Provider: Abigail Irvin M.D.    Patient Name: Elier Bustillos  YOB: 1990  MRN:              [id number]    Reason for Visit:  Erectile dysfunction, medication change    History of Present Illness:  Mr. Elier Bustillos is a 33 year-old gentleman with a past medical history of end-stage renal disease on hemodialysis every Monday/Wednesday/Friday (currently undergoing transplant evaluation at [facility]) and hypertension.  He also recently noted to have erectile dysfunction resistant to lower doses of PDE5 inhibitors and was seen at [facility].  He was recommended to have a cardiovascular evaluation prior to increasing his dose of PDE5 inhibitor.    Today, he reports feeling well with no acute complaints.  He notes that he can walk up stairs or jog without chest pain, although he will sometimes feel short of breath if he really pushes himself.  He is a smoker.  He has no known history of cardiovascular disease and previously had an unremarkable nuclear MPI.  He saw Dr. Duran last year and at that time her note indicates that he was not currently a transplant candidate, although he reports he is still seeing [facility] and is on the transplant wait list.    Current Outpatient Medications   Medication Sig Dispense Refill    sevelamer carbonate (RENVELA) 800 MG Tab tablet Take 800 mg by mouth 3 times a day with meals.      amLODIPine (NORVASC) 10 MG Tab Take 1 Tablet by mouth every day. 90 Tablet 3    losartan (COZAAR) 100 MG Tab Take 1 Tablet by mouth every day. 90 Tablet 3    hydrALAZINE (APRESOLINE) 100 MG tablet Take 100 mg by mouth every day.       No current facility-administered medications for this visit.     No Known Allergies    Family History   Problem Relation Age of Onset    Diabetes Mother     Hypertension Mother     Heart Attack Father     Hypertension Father      Vitals:  /74 (BP Location: Left arm,  "Patient Position: Sitting)   Pulse 100   Resp 16   Ht 1.727 m (5' 8\")   Wt 82.6 kg (182 lb)   SpO2 100%    Oxygen Therapy:  Pulse Oximetry: 100 %  BP Readings from Last 4 Encounters:   12/07/23 108/74   03/27/23 114/78   03/23/23 115/77   12/21/22 130/72     Weight/BMI:   Body mass index is 27.67 kg/m².  Wt Readings from Last 2 Encounters:   12/07/23 82.6 kg (182 lb)   03/27/23 76.2 kg (168 lb)     Physical Exam:  General: Well-developed, no acute distress  Eyes: Extraocular movements intact, anicteric  Neck: Full range of motion, no gross jugular venous distension  Pulmonary: Normal respiratory effort, no distress  Cardiovascular: Regular rate, regular rhythm, no murmurs appreciated  Extremities: Moves all extremities normally, distended right AV fistula, no gross lower extremity edema  Neurological: Alert and oriented, no gross focal motor deficits  Psychiatric: Normal affect, normal judgment    Labs:  Lab Results   Component Value Date/Time    SODIUM 136 03/27/2023 07:53 PM    POTASSIUM 3.8 03/27/2023 07:53 PM    CHLORIDE 94 (L) 03/27/2023 07:53 PM    CO2 25 03/27/2023 07:53 PM    GLUCOSE 94 03/27/2023 07:53 PM    BUN 34 (H) 03/27/2023 07:53 PM    CREATININE 5.80 (HH) 03/27/2023 07:53 PM     Lab Results   Component Value Date/Time    ALKPHOSPHAT 595 (H) 03/17/2023 07:51 AM    ASTSGOT 9 (L) 03/17/2023 07:51 AM    ALTSGPT 9 03/17/2023 07:51 AM    TBILIRUBIN 0.4 03/17/2023 07:51 AM      Lab Results   Component Value Date/Time    WBC 5.6 03/27/2023 07:53 PM    HEMOGLOBIN 10.6 (L) 03/27/2023 07:53 PM     Lab Results   Component Value Date/Time    TSHULTRASEN 1.470 12/17/2018 09:40 AM      Studies:  TTE (1/20/2023)  Normal left and right ventricular systolic function.  Mild to moderate left ventricular concentric hypertrophy. Visually estimtated LVEF of 60-65%.   Mild to moderate left and mild right atrial enlargement.  Sclerotic and mildly thickened mitral valve leaflets without   significant stenosis and mild " regurgitation.   Mild tricuspid regurgitation.  Estimated RVSP of 42 mmHg.     SPECT MPI (5/23/2017)   No evidence of significant jeopardized viable myocardium or prior myocardial infarction.  Normal left ventricular wall motion. LV ejection fraction = 66%.    Medical Decision Making:  # Erectile dysfunction: He does not regularly use any nitroglycerin containing medications and does not have evidence of significant left ventricular systolic dysfunction.  I do not see a clear cardiac reason why he cannot proceed with the intermittent use of FDA-approved higher doses of PDE5 inhibitors for erectile dysfunction if symptomatically tolerating.  Would recommend the use of agents that are not renally metabolized or use doses that are appropriate for his renal dysfunction.  If these medications cause significant hypotension/lightheadedness the dose should also be reduced.    # End-stage renal disease: Per Dr. Duran's most recent note, he was not currently a transplant candidate, although he reports still being on the transplant wait list today.  I will discuss with Dr. Rosas if any cardiac testing needs to be updated.      Sanford Mejia MD  [facility]  [address]  [place] [address]  Phone:  [phone]  Fax:  [phone]    Please note that this dictation was created using voice recognition software. I have made every reasonable attempt to correct obvious errors, but it is possible there are errors of grammar and possibly content that I did not discover before finalizing the note.

## 2023-12-15 ENCOUNTER — TELEPHONE (OUTPATIENT)
Dept: CARDIOLOGY | Facility: MEDICAL CENTER | Age: 33
End: 2023-12-15
Payer: COMMERCIAL

## 2023-12-15 NOTE — TELEPHONE ENCOUNTER
Call [employer], [phone] s/w Shan to request translation assistance.  Unable to reach pt.  Left voicemail to return this call.

## 2023-12-15 NOTE — TELEPHONE ENCOUNTER
----- Message from Sanford Mejia M.D. sent at 12/13/2023  5:20 PM PST -----  Mireya,    Are you able to touch base with his transplant team and see if we need to update an ischemic evaluation (either stress test or cath).    Thanks    ----- Message -----  From: Ervin Rosas M.D.  Sent: 12/12/2023   8:41 AM PST  To: Sanford Mejia M.D.    Yeah, he should be on the list. He quit smoking Dec, 2022.  Ervin Rosas M.D.    ----- Message -----  From: Sanford Mejia M.D.  Sent: 12/7/2023   4:38 PM PST  To: Ervin Rosas M.D.    Is he still on the transplant waiting list?  Per Dr. Duran's last note he was not currently a candidate, although that was last year.  In clinic today he reported that he was on the list, so I was not sure of any further cardiac workup needed to be done.  He saw me for a separate consultation from urology.

## 2023-12-19 ENCOUNTER — HOSPITAL ENCOUNTER (OUTPATIENT)
Dept: LAB | Facility: MEDICAL CENTER | Age: 33
End: 2023-12-19
Attending: INTERNAL MEDICINE
Payer: COMMERCIAL

## 2023-12-19 LAB
ALBUMIN SERPL BCP-MCNC: 4.9 G/DL (ref 3.2–4.9)
ALBUMIN/GLOB SERPL: 1.4 G/DL
ALP SERPL-CCNC: 106 U/L (ref 30–99)
ALT SERPL-CCNC: 13 U/L (ref 2–50)
AMYLASE SERPL-CCNC: 159 U/L (ref 20–103)
ANION GAP SERPL CALC-SCNC: 18 MMOL/L (ref 7–16)
AST SERPL-CCNC: 5 U/L (ref 12–45)
BASOPHILS # BLD AUTO: 0.7 % (ref 0–1.8)
BASOPHILS # BLD: 0.06 K/UL (ref 0–0.12)
BILIRUB SERPL-MCNC: 0.5 MG/DL (ref 0.1–1.5)
BUN SERPL-MCNC: 45 MG/DL (ref 8–22)
CALCIUM ALBUM COR SERPL-MCNC: 8.8 MG/DL (ref 8.5–10.5)
CALCIUM SERPL-MCNC: 9.5 MG/DL (ref 8.5–10.5)
CHLORIDE SERPL-SCNC: 94 MMOL/L (ref 96–112)
CHOLEST SERPL-MCNC: 126 MG/DL (ref 100–199)
CO2 SERPL-SCNC: 26 MMOL/L (ref 20–33)
CREAT SERPL-MCNC: 9.01 MG/DL (ref 0.5–1.4)
EOSINOPHIL # BLD AUTO: 0.17 K/UL (ref 0–0.51)
EOSINOPHIL NFR BLD: 2 % (ref 0–6.9)
ERYTHROCYTE [DISTWIDTH] IN BLOOD BY AUTOMATED COUNT: 44.1 FL (ref 35.9–50)
EST. AVERAGE GLUCOSE BLD GHB EST-MCNC: 91 MG/DL
GFR SERPLBLD CREATININE-BSD FMLA CKD-EPI: 7 ML/MIN/1.73 M 2
GLOBULIN SER CALC-MCNC: 3.4 G/DL (ref 1.9–3.5)
GLUCOSE SERPL-MCNC: 87 MG/DL (ref 65–99)
HBA1C MFR BLD: 4.8 % (ref 4–5.6)
HCT VFR BLD AUTO: 41.4 % (ref 42–52)
HDLC SERPL-MCNC: 41 MG/DL
HGB BLD-MCNC: 14 G/DL (ref 14–18)
IMM GRANULOCYTES # BLD AUTO: 0.02 K/UL (ref 0–0.11)
IMM GRANULOCYTES NFR BLD AUTO: 0.2 % (ref 0–0.9)
IRON SATN MFR SERPL: 28 % (ref 15–55)
IRON SERPL-MCNC: 75 UG/DL (ref 50–180)
LDLC SERPL CALC-MCNC: 61 MG/DL
LIPASE SERPL-CCNC: 71 U/L (ref 11–82)
LYMPHOCYTES # BLD AUTO: 0.94 K/UL (ref 1–4.8)
LYMPHOCYTES NFR BLD: 11.2 % (ref 22–41)
MCH RBC QN AUTO: 33.4 PG (ref 27–33)
MCHC RBC AUTO-ENTMCNC: 33.8 G/DL (ref 32.3–36.5)
MCV RBC AUTO: 98.8 FL (ref 81.4–97.8)
MONOCYTES # BLD AUTO: 0.58 K/UL (ref 0–0.85)
MONOCYTES NFR BLD AUTO: 6.9 % (ref 0–13.4)
NEUTROPHILS # BLD AUTO: 6.64 K/UL (ref 1.82–7.42)
NEUTROPHILS NFR BLD: 79 % (ref 44–72)
NRBC # BLD AUTO: 0 K/UL
NRBC BLD-RTO: 0 /100 WBC (ref 0–0.2)
PLATELET # BLD AUTO: 205 K/UL (ref 164–446)
PMV BLD AUTO: 10.5 FL (ref 9–12.9)
POTASSIUM SERPL-SCNC: 4.4 MMOL/L (ref 3.6–5.5)
PROT SERPL-MCNC: 8.3 G/DL (ref 6–8.2)
RBC # BLD AUTO: 4.19 M/UL (ref 4.7–6.1)
SODIUM SERPL-SCNC: 138 MMOL/L (ref 135–145)
T4 FREE SERPL-MCNC: 1.19 NG/DL (ref 0.93–1.7)
TIBC SERPL-MCNC: 269 UG/DL (ref 250–450)
TRIGL SERPL-MCNC: 120 MG/DL (ref 0–149)
TSH SERPL DL<=0.005 MIU/L-ACNC: 0.76 UIU/ML (ref 0.38–5.33)
UIBC SERPL-MCNC: 194 UG/DL (ref 110–370)
URATE SERPL-MCNC: 5 MG/DL (ref 2.5–8.3)
WBC # BLD AUTO: 8.4 K/UL (ref 4.8–10.8)

## 2023-12-19 PROCEDURE — 83540 ASSAY OF IRON: CPT

## 2023-12-19 PROCEDURE — 84443 ASSAY THYROID STIM HORMONE: CPT

## 2023-12-19 PROCEDURE — 83690 ASSAY OF LIPASE: CPT

## 2023-12-19 PROCEDURE — 83550 IRON BINDING TEST: CPT

## 2023-12-19 PROCEDURE — 84550 ASSAY OF BLOOD/URIC ACID: CPT

## 2023-12-19 PROCEDURE — 86038 ANTINUCLEAR ANTIBODIES: CPT

## 2023-12-19 PROCEDURE — 83036 HEMOGLOBIN GLYCOSYLATED A1C: CPT

## 2023-12-19 PROCEDURE — 80061 LIPID PANEL: CPT

## 2023-12-19 PROCEDURE — 85025 COMPLETE CBC W/AUTO DIFF WBC: CPT

## 2023-12-19 PROCEDURE — 80053 COMPREHEN METABOLIC PANEL: CPT

## 2023-12-19 PROCEDURE — 84439 ASSAY OF FREE THYROXINE: CPT

## 2023-12-19 PROCEDURE — 82150 ASSAY OF AMYLASE: CPT

## 2023-12-19 PROCEDURE — 36415 COLL VENOUS BLD VENIPUNCTURE: CPT

## 2023-12-21 LAB — NUCLEAR IGG SER QL IA: NORMAL

## 2023-12-21 NOTE — TELEPHONE ENCOUNTER
Called [employer], [phone], and s/w Elvie who assisted with translating BN recommendations.  Received transplant team phone number, [phone].     Called [phone] and left detailed voicemail regarding BN recommendations.  Awaiting return call

## 2024-01-05 NOTE — TELEPHONE ENCOUNTER
2nd attempt to call transplant center, Klaudia [phone] , left detailed voicemail regarding BN request if update on ischemic evaluation is needed and to return this call with her response. Awaiting return call.

## 2024-01-08 ENCOUNTER — TELEPHONE (OUTPATIENT)
Dept: CARDIOLOGY | Facility: MEDICAL CENTER | Age: 34
End: 2024-01-08

## 2024-01-08 NOTE — TELEPHONE ENCOUNTER
[place]    Caller: Klaudia from [facility]     Topic/issue: Klaudia returned a call from Mireya. She said that the patients studies are on hold due to their insurance. She said there is nothing further needed from our cardiology office.     Please advise.     Callback Number: no callback number left    Thank you,    Pili AGUILAR

## 2024-01-10 NOTE — TELEPHONE ENCOUNTER
Sanford Mejia M.D.  You; Mireya Huynh R.N.16 hours ago (4:20 PM)     Thanks!!     Noted provider response.

## 2024-05-08 ENCOUNTER — APPOINTMENT (OUTPATIENT)
Dept: RADIOLOGY | Facility: MEDICAL CENTER | Age: 34
DRG: 377 | End: 2024-05-08
Attending: STUDENT IN AN ORGANIZED HEALTH CARE EDUCATION/TRAINING PROGRAM
Payer: COMMERCIAL

## 2024-05-08 ENCOUNTER — HOSPITAL ENCOUNTER (INPATIENT)
Facility: MEDICAL CENTER | Age: 34
LOS: 1 days | DRG: 377 | End: 2024-05-10
Attending: STUDENT IN AN ORGANIZED HEALTH CARE EDUCATION/TRAINING PROGRAM | Admitting: STUDENT IN AN ORGANIZED HEALTH CARE EDUCATION/TRAINING PROGRAM
Payer: COMMERCIAL

## 2024-05-08 DIAGNOSIS — K92.2 GASTROINTESTINAL HEMORRHAGE, UNSPECIFIED GASTROINTESTINAL HEMORRHAGE TYPE: ICD-10-CM

## 2024-05-08 DIAGNOSIS — K92.0 HEMATEMESIS WITH NAUSEA: ICD-10-CM

## 2024-05-08 DIAGNOSIS — N18.6 ESRD (END STAGE RENAL DISEASE) ON DIALYSIS (HCC): ICD-10-CM

## 2024-05-08 DIAGNOSIS — Z99.2 ESRD (END STAGE RENAL DISEASE) ON DIALYSIS (HCC): ICD-10-CM

## 2024-05-08 LAB
ALBUMIN SERPL BCP-MCNC: 4.1 G/DL (ref 3.2–4.9)
ALBUMIN/GLOB SERPL: 1.6 G/DL
ALP SERPL-CCNC: 65 U/L (ref 30–99)
ALT SERPL-CCNC: 47 U/L (ref 2–50)
ANION GAP SERPL CALC-SCNC: 13 MMOL/L (ref 7–16)
APTT PPP: 30.5 SEC (ref 24.7–36)
AST SERPL-CCNC: 146 U/L (ref 12–45)
BASOPHILS # BLD AUTO: 0.2 % (ref 0–1.8)
BASOPHILS # BLD: 0.01 K/UL (ref 0–0.12)
BILIRUB SERPL-MCNC: 0.9 MG/DL (ref 0.1–1.5)
BUN SERPL-MCNC: 29 MG/DL (ref 8–22)
CALCIUM ALBUM COR SERPL-MCNC: 9 MG/DL (ref 8.5–10.5)
CALCIUM SERPL-MCNC: 9.1 MG/DL (ref 8.5–10.5)
CHLORIDE SERPL-SCNC: 96 MMOL/L (ref 96–112)
CO2 SERPL-SCNC: 28 MMOL/L (ref 20–33)
CREAT SERPL-MCNC: 5.91 MG/DL (ref 0.5–1.4)
EOSINOPHIL # BLD AUTO: 0.03 K/UL (ref 0–0.51)
EOSINOPHIL NFR BLD: 0.7 % (ref 0–6.9)
ERYTHROCYTE [DISTWIDTH] IN BLOOD BY AUTOMATED COUNT: 40.1 FL (ref 35.9–50)
GFR SERPLBLD CREATININE-BSD FMLA CKD-EPI: 12 ML/MIN/1.73 M 2
GLOBULIN SER CALC-MCNC: 2.6 G/DL (ref 1.9–3.5)
GLUCOSE SERPL-MCNC: 101 MG/DL (ref 65–99)
HCT VFR BLD AUTO: 27.1 % (ref 42–52)
HGB BLD-MCNC: 9.7 G/DL (ref 14–18)
IMM GRANULOCYTES # BLD AUTO: 0.01 K/UL (ref 0–0.11)
IMM GRANULOCYTES NFR BLD AUTO: 0.2 % (ref 0–0.9)
INR PPP: 1.06 (ref 0.87–1.13)
LYMPHOCYTES # BLD AUTO: 0.21 K/UL (ref 1–4.8)
LYMPHOCYTES NFR BLD: 4.6 % (ref 22–41)
MCH RBC QN AUTO: 33.3 PG (ref 27–33)
MCHC RBC AUTO-ENTMCNC: 35.8 G/DL (ref 32.3–36.5)
MCV RBC AUTO: 93.1 FL (ref 81.4–97.8)
MONOCYTES # BLD AUTO: 0.36 K/UL (ref 0–0.85)
MONOCYTES NFR BLD AUTO: 8 % (ref 0–13.4)
NEUTROPHILS # BLD AUTO: 3.9 K/UL (ref 1.82–7.42)
NEUTROPHILS NFR BLD: 86.3 % (ref 44–72)
NRBC # BLD AUTO: 0 K/UL
NRBC BLD-RTO: 0 /100 WBC (ref 0–0.2)
PLATELET # BLD AUTO: 120 K/UL (ref 164–446)
PMV BLD AUTO: 11 FL (ref 9–12.9)
POTASSIUM SERPL-SCNC: 3.8 MMOL/L (ref 3.6–5.5)
PROT SERPL-MCNC: 6.7 G/DL (ref 6–8.2)
PROTHROMBIN TIME: 13.9 SEC (ref 12–14.6)
RBC # BLD AUTO: 2.91 M/UL (ref 4.7–6.1)
SODIUM SERPL-SCNC: 137 MMOL/L (ref 135–145)
WBC # BLD AUTO: 4.5 K/UL (ref 4.8–10.8)

## 2024-05-08 RX ORDER — ONDANSETRON 2 MG/ML
4 INJECTION INTRAMUSCULAR; INTRAVENOUS ONCE
Status: COMPLETED | OUTPATIENT
Start: 2024-05-08 | End: 2024-05-08

## 2024-05-08 RX ORDER — ONDANSETRON 4 MG/1
4 TABLET, ORALLY DISINTEGRATING ORAL ONCE
Status: COMPLETED | OUTPATIENT
Start: 2024-05-08 | End: 2024-05-08

## 2024-05-08 RX ORDER — METOCLOPRAMIDE HYDROCHLORIDE 5 MG/ML
5 INJECTION INTRAMUSCULAR; INTRAVENOUS ONCE
Status: COMPLETED | OUTPATIENT
Start: 2024-05-09 | End: 2024-05-08

## 2024-05-08 RX ORDER — PANTOPRAZOLE SODIUM 40 MG/10ML
80 INJECTION, POWDER, LYOPHILIZED, FOR SOLUTION INTRAVENOUS ONCE
Status: COMPLETED | OUTPATIENT
Start: 2024-05-08 | End: 2024-05-08

## 2024-05-08 RX ORDER — ACETAMINOPHEN 500 MG
1000 TABLET ORAL ONCE
Status: COMPLETED | OUTPATIENT
Start: 2024-05-09 | End: 2024-05-08

## 2024-05-08 RX ADMIN — ACETAMINOPHEN 1000 MG: 500 TABLET, FILM COATED ORAL at 23:47

## 2024-05-08 RX ADMIN — ONDANSETRON 4 MG: 4 TABLET, ORALLY DISINTEGRATING ORAL at 21:00

## 2024-05-08 RX ADMIN — ONDANSETRON 4 MG: 2 INJECTION INTRAMUSCULAR; INTRAVENOUS at 22:38

## 2024-05-08 RX ADMIN — METOCLOPRAMIDE 5 MG: 5 INJECTION, SOLUTION INTRAMUSCULAR; INTRAVENOUS at 23:47

## 2024-05-08 RX ADMIN — PANTOPRAZOLE SODIUM 80 MG: 40 INJECTION, POWDER, FOR SOLUTION INTRAVENOUS at 23:10

## 2024-05-08 ASSESSMENT — FIBROSIS 4 INDEX: FIB4 SCORE: 0.23

## 2024-05-09 ENCOUNTER — ANESTHESIA EVENT (OUTPATIENT)
Dept: SURGERY | Facility: MEDICAL CENTER | Age: 34
DRG: 377 | End: 2024-05-09
Payer: COMMERCIAL

## 2024-05-09 ENCOUNTER — ANESTHESIA (OUTPATIENT)
Dept: SURGERY | Facility: MEDICAL CENTER | Age: 34
DRG: 377 | End: 2024-05-09
Payer: COMMERCIAL

## 2024-05-09 PROBLEM — K92.0 HEMATEMESIS: Status: ACTIVE | Noted: 2024-05-09

## 2024-05-09 PROBLEM — R11.2 INTRACTABLE NAUSEA AND VOMITING: Status: ACTIVE | Noted: 2024-05-09

## 2024-05-09 PROBLEM — Z94.0 KIDNEY TRANSPLANTED: Status: ACTIVE | Noted: 2024-05-09

## 2024-05-09 PROBLEM — K92.2 GI BLEED: Status: ACTIVE | Noted: 2024-05-09

## 2024-05-09 PROBLEM — E78.5 HYPERLIPIDEMIA: Status: ACTIVE | Noted: 2024-05-09

## 2024-05-09 LAB
ALBUMIN SERPL BCP-MCNC: 3.7 G/DL (ref 3.2–4.9)
APPEARANCE UR: ABNORMAL
BACTERIA #/AREA URNS HPF: ABNORMAL /HPF
BASOPHILS # BLD AUTO: 0.2 % (ref 0–1.8)
BASOPHILS # BLD: 0.01 K/UL (ref 0–0.12)
BILIRUB UR QL STRIP.AUTO: NEGATIVE
BUN SERPL-MCNC: 47 MG/DL (ref 8–22)
CALCIUM ALBUM COR SERPL-MCNC: 9.2 MG/DL (ref 8.5–10.5)
CALCIUM SERPL-MCNC: 9 MG/DL (ref 8.5–10.5)
CHLORIDE SERPL-SCNC: 97 MMOL/L (ref 96–112)
CO2 SERPL-SCNC: 26 MMOL/L (ref 20–33)
COLOR UR: ABNORMAL
CREAT SERPL-MCNC: 6.93 MG/DL (ref 0.5–1.4)
CRP SERPL HS-MCNC: 1.18 MG/DL (ref 0–0.75)
CRP SERPL HS-MCNC: 1.19 MG/DL (ref 0–0.75)
EOSINOPHIL # BLD AUTO: 0.03 K/UL (ref 0–0.51)
EOSINOPHIL NFR BLD: 0.7 % (ref 0–6.9)
EPI CELLS #/AREA URNS HPF: ABNORMAL /HPF
ERYTHROCYTE [DISTWIDTH] IN BLOOD BY AUTOMATED COUNT: 40.9 FL (ref 35.9–50)
FERRITIN SERPL-MCNC: 2604 NG/ML (ref 22–322)
FOLATE SERPL-MCNC: 6.3 NG/ML
GFR SERPLBLD CREATININE-BSD FMLA CKD-EPI: 10 ML/MIN/1.73 M 2
GLUCOSE SERPL-MCNC: 100 MG/DL (ref 65–99)
GLUCOSE UR STRIP.AUTO-MCNC: NEGATIVE MG/DL
HCT VFR BLD AUTO: 26.7 % (ref 42–52)
HCT VFR BLD AUTO: 27.3 % (ref 42–52)
HCT VFR BLD AUTO: 27.3 % (ref 42–52)
HEMOCCULT STL QL: POSITIVE
HGB BLD-MCNC: 9.4 G/DL (ref 14–18)
HGB BLD-MCNC: 9.4 G/DL (ref 14–18)
HGB BLD-MCNC: 9.6 G/DL (ref 14–18)
HGB RETIC QN AUTO: 36.1 PG/CELL (ref 29–35)
HYALINE CASTS #/AREA URNS LPF: ABNORMAL /LPF
IMM GRANULOCYTES # BLD AUTO: 0.01 K/UL (ref 0–0.11)
IMM GRANULOCYTES NFR BLD AUTO: 0.2 % (ref 0–0.9)
IMM RETICS NFR: 9.8 % (ref 2.6–16.1)
INR PPP: 1.15 (ref 0.87–1.13)
IRON SATN MFR SERPL: 53 % (ref 15–55)
IRON SERPL-MCNC: 129 UG/DL (ref 50–180)
KETONES UR STRIP.AUTO-MCNC: ABNORMAL MG/DL
LDH SERPL L TO P-CCNC: 342 U/L (ref 107–266)
LDH SERPL L TO P-CCNC: 343 U/L (ref 107–266)
LEUKOCYTE ESTERASE UR QL STRIP.AUTO: ABNORMAL
LYMPHOCYTES # BLD AUTO: 0.32 K/UL (ref 1–4.8)
LYMPHOCYTES NFR BLD: 6.9 % (ref 22–41)
MCH RBC QN AUTO: 33.3 PG (ref 27–33)
MCHC RBC AUTO-ENTMCNC: 35.2 G/DL (ref 32.3–36.5)
MCV RBC AUTO: 94.8 FL (ref 81.4–97.8)
MICRO URNS: ABNORMAL
MONOCYTES # BLD AUTO: 0.45 K/UL (ref 0–0.85)
MONOCYTES NFR BLD AUTO: 9.8 % (ref 0–13.4)
NEUTROPHILS # BLD AUTO: 3.79 K/UL (ref 1.82–7.42)
NEUTROPHILS NFR BLD: 82.2 % (ref 44–72)
NITRITE UR QL STRIP.AUTO: NEGATIVE
NRBC # BLD AUTO: 0 K/UL
NRBC BLD-RTO: 0 /100 WBC (ref 0–0.2)
PH UR STRIP.AUTO: 8.5 [PH] (ref 5–8)
PHOSPHATE SERPL-MCNC: 4.8 MG/DL (ref 2.5–4.5)
PLATELET # BLD AUTO: 119 K/UL (ref 164–446)
PMV BLD AUTO: 10.9 FL (ref 9–12.9)
POTASSIUM SERPL-SCNC: 4 MMOL/L (ref 3.6–5.5)
PROT UR QL STRIP: 300 MG/DL
PROTHROMBIN TIME: 14.9 SEC (ref 12–14.6)
RBC # BLD AUTO: 2.88 M/UL (ref 4.7–6.1)
RBC # URNS HPF: >150 /HPF
RBC UR QL AUTO: ABNORMAL
RETICS # AUTO: 0.02 M/UL (ref 0.04–0.12)
RETICS/RBC NFR: 0.8 % (ref 0.8–2.6)
SODIUM SERPL-SCNC: 137 MMOL/L (ref 135–145)
SP GR UR STRIP.AUTO: 1.01
TIBC SERPL-MCNC: 243 UG/DL (ref 250–450)
TRANSFERRIN SERPL-MCNC: 192 MG/DL (ref 200–370)
TSH SERPL DL<=0.005 MIU/L-ACNC: 0.77 UIU/ML (ref 0.38–5.33)
UIBC SERPL-MCNC: 114 UG/DL (ref 110–370)
UROBILINOGEN UR STRIP.AUTO-MCNC: 0.2 MG/DL
VIT B12 SERPL-MCNC: 1013 PG/ML (ref 211–911)
WBC # BLD AUTO: 4.6 K/UL (ref 4.8–10.8)
WBC #/AREA URNS HPF: ABNORMAL /HPF

## 2024-05-09 PROCEDURE — 0DJ08ZZ INSPECTION OF UPPER INTESTINAL TRACT, VIA NATURAL OR ARTIFICIAL OPENING ENDOSCOPIC: ICD-10-PCS | Performed by: INTERNAL MEDICINE

## 2024-05-09 PROCEDURE — 43255 EGD CONTROL BLEEDING ANY: CPT | Performed by: INTERNAL MEDICINE

## 2024-05-09 PROCEDURE — 99223 1ST HOSP IP/OBS HIGH 75: CPT | Performed by: INTERNAL MEDICINE

## 2024-05-09 PROCEDURE — 99223 1ST HOSP IP/OBS HIGH 75: CPT | Performed by: STUDENT IN AN ORGANIZED HEALTH CARE EDUCATION/TRAINING PROGRAM

## 2024-05-09 PROCEDURE — 99254 IP/OBS CNSLTJ NEW/EST MOD 60: CPT | Mod: 25 | Performed by: NURSE PRACTITIONER

## 2024-05-09 DEVICE — IMPLANTABLE DEVICE: Type: IMPLANTABLE DEVICE | Site: ESOPHAGUS | Status: FUNCTIONAL

## 2024-05-09 RX ORDER — SULFAMETHOXAZOLE AND TRIMETHOPRIM 400; 80 MG/1; MG/1
1 TABLET ORAL
COMMUNITY
Start: 2024-05-04 | End: 2025-04-29

## 2024-05-09 RX ORDER — MYCOPHENOLATE MOFETIL 250 MG/1
750 CAPSULE ORAL 2 TIMES DAILY
Status: DISCONTINUED | OUTPATIENT
Start: 2024-05-09 | End: 2024-05-10 | Stop reason: HOSPADM

## 2024-05-09 RX ORDER — HYDRALAZINE HYDROCHLORIDE 50 MG/1
100 TABLET, FILM COATED ORAL EVERY EVENING
Status: DISCONTINUED | OUTPATIENT
Start: 2024-05-09 | End: 2024-05-09

## 2024-05-09 RX ORDER — SODIUM CHLORIDE 9 MG/ML
INJECTION, SOLUTION INTRAVENOUS ONCE
Status: DISCONTINUED | OUTPATIENT
Start: 2024-05-09 | End: 2024-05-09 | Stop reason: HOSPADM

## 2024-05-09 RX ORDER — HALOPERIDOL 5 MG/ML
1 INJECTION INTRAMUSCULAR
Status: DISCONTINUED | OUTPATIENT
Start: 2024-05-09 | End: 2024-05-09 | Stop reason: HOSPADM

## 2024-05-09 RX ORDER — MYCOPHENOLATE MOFETIL 250 MG/1
750 CAPSULE ORAL 2 TIMES DAILY
COMMUNITY

## 2024-05-09 RX ORDER — METOCLOPRAMIDE HYDROCHLORIDE 5 MG/ML
10 INJECTION INTRAMUSCULAR; INTRAVENOUS EVERY 6 HOURS
Status: COMPLETED | OUTPATIENT
Start: 2024-05-09 | End: 2024-05-09

## 2024-05-09 RX ORDER — ACETAMINOPHEN 325 MG/1
650 TABLET ORAL EVERY 6 HOURS PRN
Status: DISCONTINUED | OUTPATIENT
Start: 2024-05-09 | End: 2024-05-10 | Stop reason: HOSPADM

## 2024-05-09 RX ORDER — HYDRALAZINE HYDROCHLORIDE 50 MG/1
50 TABLET, FILM COATED ORAL 3 TIMES DAILY
Status: DISCONTINUED | OUTPATIENT
Start: 2024-05-09 | End: 2024-05-10 | Stop reason: HOSPADM

## 2024-05-09 RX ORDER — PROMETHAZINE HYDROCHLORIDE 25 MG/1
12.5-25 TABLET ORAL EVERY 4 HOURS PRN
Status: DISCONTINUED | OUTPATIENT
Start: 2024-05-09 | End: 2024-05-10 | Stop reason: HOSPADM

## 2024-05-09 RX ORDER — AMLODIPINE BESYLATE 10 MG/1
1 TABLET ORAL
Status: SHIPPED | COMMUNITY
End: 2024-05-09

## 2024-05-09 RX ORDER — OXYCODONE HYDROCHLORIDE 5 MG/1
2.5-5 TABLET ORAL EVERY 6 HOURS PRN
COMMUNITY
Start: 2024-05-06 | End: 2024-06-06

## 2024-05-09 RX ORDER — PANTOPRAZOLE SODIUM 40 MG/10ML
40 INJECTION, POWDER, LYOPHILIZED, FOR SOLUTION INTRAVENOUS 2 TIMES DAILY
Status: DISCONTINUED | OUTPATIENT
Start: 2024-05-09 | End: 2024-05-10 | Stop reason: HOSPADM

## 2024-05-09 RX ORDER — SEVELAMER CARBONATE 800 MG/1
2400 TABLET, FILM COATED ORAL
Status: DISCONTINUED | OUTPATIENT
Start: 2024-05-09 | End: 2024-05-09

## 2024-05-09 RX ORDER — FAMOTIDINE 20 MG/1
20 TABLET, FILM COATED ORAL DAILY
Status: DISCONTINUED | OUTPATIENT
Start: 2024-05-09 | End: 2024-05-10 | Stop reason: HOSPADM

## 2024-05-09 RX ORDER — DIPHENHYDRAMINE HYDROCHLORIDE 50 MG/ML
12.5 INJECTION INTRAMUSCULAR; INTRAVENOUS
Status: DISCONTINUED | OUTPATIENT
Start: 2024-05-09 | End: 2024-05-09 | Stop reason: HOSPADM

## 2024-05-09 RX ORDER — SEVELAMER CARBONATE 800 MG/1
800 TABLET, FILM COATED ORAL
Status: DISCONTINUED | OUTPATIENT
Start: 2024-05-09 | End: 2024-05-10 | Stop reason: HOSPADM

## 2024-05-09 RX ORDER — PRAVASTATIN SODIUM 20 MG
20 TABLET ORAL
Status: DISCONTINUED | OUTPATIENT
Start: 2024-05-09 | End: 2024-05-10 | Stop reason: HOSPADM

## 2024-05-09 RX ORDER — SODIUM CHLORIDE, SODIUM LACTATE, POTASSIUM CHLORIDE, CALCIUM CHLORIDE 600; 310; 30; 20 MG/100ML; MG/100ML; MG/100ML; MG/100ML
INJECTION, SOLUTION INTRAVENOUS
Status: DISCONTINUED | OUTPATIENT
Start: 2024-05-09 | End: 2024-05-09 | Stop reason: SURG

## 2024-05-09 RX ORDER — HYDRALAZINE HYDROCHLORIDE 100 MG/1
100 TABLET, FILM COATED ORAL DAILY
Status: SHIPPED | COMMUNITY
Start: 2024-05-07 | End: 2024-05-09

## 2024-05-09 RX ORDER — PROCHLORPERAZINE EDISYLATE 5 MG/ML
5-10 INJECTION INTRAMUSCULAR; INTRAVENOUS EVERY 4 HOURS PRN
Status: DISCONTINUED | OUTPATIENT
Start: 2024-05-09 | End: 2024-05-10 | Stop reason: HOSPADM

## 2024-05-09 RX ORDER — OXYCODONE HYDROCHLORIDE 5 MG/1
5 TABLET ORAL
Status: DISCONTINUED | OUTPATIENT
Start: 2024-05-09 | End: 2024-05-10 | Stop reason: HOSPADM

## 2024-05-09 RX ORDER — OXYCODONE HYDROCHLORIDE 5 MG/1
2.5 TABLET ORAL
Status: DISCONTINUED | OUTPATIENT
Start: 2024-05-09 | End: 2024-05-10 | Stop reason: HOSPADM

## 2024-05-09 RX ORDER — POLYETHYLENE GLYCOL 3350 17 G/17G
17 POWDER, FOR SOLUTION ORAL DAILY
COMMUNITY
Start: 2024-05-04 | End: 2024-08-02

## 2024-05-09 RX ORDER — SEVELAMER CARBONATE 800 MG/1
1 TABLET, FILM COATED ORAL
Status: SHIPPED | COMMUNITY
End: 2024-05-09

## 2024-05-09 RX ORDER — AMOXICILLIN 250 MG
2 CAPSULE ORAL EVERY EVENING
Status: DISCONTINUED | OUTPATIENT
Start: 2024-05-09 | End: 2024-05-10 | Stop reason: HOSPADM

## 2024-05-09 RX ORDER — AMLODIPINE BESYLATE 10 MG/1
10 TABLET ORAL DAILY
Status: DISCONTINUED | OUTPATIENT
Start: 2024-05-09 | End: 2024-05-10 | Stop reason: HOSPADM

## 2024-05-09 RX ORDER — SODIUM POLYSTYRENE SULFONATE 15 G/60ML
120 SUSPENSION ORAL; RECTAL
COMMUNITY
Start: 2024-05-04 | End: 2025-04-29

## 2024-05-09 RX ORDER — ACETAMINOPHEN 500 MG
500 TABLET ORAL 2 TIMES DAILY PRN
COMMUNITY

## 2024-05-09 RX ORDER — VALGANCICLOVIR 450 MG/1
1 TABLET, FILM COATED ORAL
COMMUNITY
Start: 2024-05-08 | End: 2025-05-03

## 2024-05-09 RX ORDER — SULFAMETHOXAZOLE AND TRIMETHOPRIM 400; 80 MG/1; MG/1
1 TABLET ORAL
Status: DISCONTINUED | OUTPATIENT
Start: 2024-05-09 | End: 2024-05-10 | Stop reason: HOSPADM

## 2024-05-09 RX ORDER — POLYETHYLENE GLYCOL 3350 17 G/17G
1 POWDER, FOR SOLUTION ORAL
Status: DISCONTINUED | OUTPATIENT
Start: 2024-05-09 | End: 2024-05-10 | Stop reason: HOSPADM

## 2024-05-09 RX ORDER — TACROLIMUS 1 MG/1
6 CAPSULE ORAL 2 TIMES DAILY
COMMUNITY
Start: 2024-05-04 | End: 2025-04-29

## 2024-05-09 RX ORDER — ONDANSETRON 4 MG/1
4 TABLET, ORALLY DISINTEGRATING ORAL EVERY 4 HOURS PRN
Status: DISCONTINUED | OUTPATIENT
Start: 2024-05-09 | End: 2024-05-10 | Stop reason: HOSPADM

## 2024-05-09 RX ORDER — PROMETHAZINE HYDROCHLORIDE 25 MG/1
12.5-25 SUPPOSITORY RECTAL EVERY 4 HOURS PRN
Status: DISCONTINUED | OUTPATIENT
Start: 2024-05-09 | End: 2024-05-10 | Stop reason: HOSPADM

## 2024-05-09 RX ORDER — PRAVASTATIN SODIUM 20 MG
20 TABLET ORAL
COMMUNITY
Start: 2024-05-04 | End: 2025-04-29

## 2024-05-09 RX ORDER — FAMOTIDINE 20 MG/1
20 TABLET, FILM COATED ORAL DAILY
COMMUNITY

## 2024-05-09 RX ORDER — CALCITRIOL 0.25 UG/1
0.25 CAPSULE, LIQUID FILLED ORAL
Status: DISCONTINUED | OUTPATIENT
Start: 2024-05-10 | End: 2024-05-10 | Stop reason: HOSPADM

## 2024-05-09 RX ORDER — HYDROMORPHONE HYDROCHLORIDE 1 MG/ML
0.5 INJECTION, SOLUTION INTRAMUSCULAR; INTRAVENOUS; SUBCUTANEOUS ONCE
Status: COMPLETED | OUTPATIENT
Start: 2024-05-09 | End: 2024-05-09

## 2024-05-09 RX ORDER — SODIUM CHLORIDE, SODIUM LACTATE, POTASSIUM CHLORIDE, AND CALCIUM CHLORIDE .6; .31; .03; .02 G/100ML; G/100ML; G/100ML; G/100ML
500 INJECTION, SOLUTION INTRAVENOUS
Status: DISCONTINUED | OUTPATIENT
Start: 2024-05-09 | End: 2024-05-10 | Stop reason: HOSPADM

## 2024-05-09 RX ORDER — HYDROMORPHONE HYDROCHLORIDE 1 MG/ML
0.25 INJECTION, SOLUTION INTRAMUSCULAR; INTRAVENOUS; SUBCUTANEOUS
Status: DISCONTINUED | OUTPATIENT
Start: 2024-05-09 | End: 2024-05-09

## 2024-05-09 RX ORDER — EPINEPHRINE 0.1 MG/ML
SYRINGE (ML) INJECTION
Status: DISCONTINUED | OUTPATIENT
Start: 2024-05-09 | End: 2024-05-09 | Stop reason: HOSPADM

## 2024-05-09 RX ORDER — ONDANSETRON 2 MG/ML
4 INJECTION INTRAMUSCULAR; INTRAVENOUS EVERY 4 HOURS PRN
Status: DISCONTINUED | OUTPATIENT
Start: 2024-05-09 | End: 2024-05-10 | Stop reason: HOSPADM

## 2024-05-09 RX ORDER — VALGANCICLOVIR 450 MG/1
450 TABLET, FILM COATED ORAL
Status: DISCONTINUED | OUTPATIENT
Start: 2024-05-10 | End: 2024-05-10 | Stop reason: HOSPADM

## 2024-05-09 RX ORDER — IPRATROPIUM BROMIDE AND ALBUTEROL SULFATE 2.5; .5 MG/3ML; MG/3ML
3 SOLUTION RESPIRATORY (INHALATION)
Status: DISCONTINUED | OUTPATIENT
Start: 2024-05-09 | End: 2024-05-09 | Stop reason: HOSPADM

## 2024-05-09 RX ORDER — SEVELAMER HYDROCHLORIDE 800 MG/1
800 TABLET, FILM COATED ORAL
COMMUNITY
Start: 2024-05-07

## 2024-05-09 RX ORDER — LABETALOL HYDROCHLORIDE 5 MG/ML
10 INJECTION, SOLUTION INTRAVENOUS EVERY 4 HOURS PRN
Status: DISCONTINUED | OUTPATIENT
Start: 2024-05-09 | End: 2024-05-10 | Stop reason: HOSPADM

## 2024-05-09 RX ADMIN — MYCOPHENOLATE MOFETIL 750 MG: 250 CAPSULE ORAL at 17:20

## 2024-05-09 RX ADMIN — OXYCODONE 5 MG: 5 TABLET ORAL at 01:11

## 2024-05-09 RX ADMIN — TACROLIMUS 6 MG: 5 CAPSULE ORAL at 20:14

## 2024-05-09 RX ADMIN — METOCLOPRAMIDE 10 MG: 5 INJECTION, SOLUTION INTRAMUSCULAR; INTRAVENOUS at 12:01

## 2024-05-09 RX ADMIN — PROPOFOL 200 MCG/KG/MIN: 10 INJECTION, EMULSION INTRAVENOUS at 14:10

## 2024-05-09 RX ADMIN — HYDRALAZINE HYDROCHLORIDE 50 MG: 50 TABLET ORAL at 17:20

## 2024-05-09 RX ADMIN — SEVELAMER CARBONATE 800 MG: 800 TABLET, FILM COATED ORAL at 09:39

## 2024-05-09 RX ADMIN — PRAVASTATIN SODIUM 20 MG: 20 TABLET ORAL at 20:13

## 2024-05-09 RX ADMIN — MYCOPHENOLATE MOFETIL 750 MG: 250 CAPSULE ORAL at 05:37

## 2024-05-09 RX ADMIN — METOCLOPRAMIDE 10 MG: 5 INJECTION, SOLUTION INTRAMUSCULAR; INTRAVENOUS at 05:42

## 2024-05-09 RX ADMIN — OXYCODONE 5 MG: 5 TABLET ORAL at 20:13

## 2024-05-09 RX ADMIN — SODIUM CHLORIDE, POTASSIUM CHLORIDE, SODIUM LACTATE AND CALCIUM CHLORIDE: 600; 310; 30; 20 INJECTION, SOLUTION INTRAVENOUS at 14:07

## 2024-05-09 RX ADMIN — METOCLOPRAMIDE 10 MG: 5 INJECTION, SOLUTION INTRAMUSCULAR; INTRAVENOUS at 01:11

## 2024-05-09 RX ADMIN — SEVELAMER CARBONATE 800 MG: 800 TABLET, FILM COATED ORAL at 20:13

## 2024-05-09 RX ADMIN — SULFAMETHOXAZOLE AND TRIMETHOPRIM 1 TABLET: 400; 80 TABLET ORAL at 18:11

## 2024-05-09 RX ADMIN — SENNOSIDES AND DOCUSATE SODIUM 2 TABLET: 50; 8.6 TABLET ORAL at 17:20

## 2024-05-09 RX ADMIN — PANTOPRAZOLE SODIUM 40 MG: 40 INJECTION, POWDER, FOR SOLUTION INTRAVENOUS at 17:20

## 2024-05-09 RX ADMIN — PANTOPRAZOLE SODIUM 40 MG: 40 INJECTION, POWDER, FOR SOLUTION INTRAVENOUS at 05:42

## 2024-05-09 RX ADMIN — AMLODIPINE BESYLATE 10 MG: 10 TABLET ORAL at 05:37

## 2024-05-09 RX ADMIN — FAMOTIDINE 20 MG: 20 TABLET, FILM COATED ORAL at 12:02

## 2024-05-09 RX ADMIN — TACROLIMUS 6 MG: 5 CAPSULE ORAL at 05:35

## 2024-05-09 RX ADMIN — HYDROMORPHONE HYDROCHLORIDE 0.25 MG: 1 INJECTION, SOLUTION INTRAMUSCULAR; INTRAVENOUS; SUBCUTANEOUS at 14:49

## 2024-05-09 RX ADMIN — HYDROMORPHONE HYDROCHLORIDE 0.25 MG: 1 INJECTION, SOLUTION INTRAMUSCULAR; INTRAVENOUS; SUBCUTANEOUS at 14:39

## 2024-05-09 RX ADMIN — HYDROMORPHONE HYDROCHLORIDE 0.5 MG: 1 INJECTION, SOLUTION INTRAMUSCULAR; INTRAVENOUS; SUBCUTANEOUS at 14:47

## 2024-05-09 RX ADMIN — ONDANSETRON 4 MG: 2 INJECTION INTRAMUSCULAR; INTRAVENOUS at 14:39

## 2024-05-09 ASSESSMENT — ENCOUNTER SYMPTOMS
BRUISES/BLEEDS EASILY: 1
HEADACHES: 0
ROS GI COMMENTS: HEMATEMESIS
NAUSEA: 1
FEVER: 0
WEAKNESS: 1
VOMITING: 1
DEPRESSION: 0
DIZZINESS: 0
SHORTNESS OF BREATH: 0
ABDOMINAL PAIN: 0
BLURRED VISION: 0
COUGH: 0
HEARTBURN: 1
DOUBLE VISION: 0
BACK PAIN: 0
MYALGIAS: 0
PALPITATIONS: 0
CHILLS: 0

## 2024-05-09 ASSESSMENT — LIFESTYLE VARIABLES
TOTAL SCORE: 0
HOW MANY TIMES IN THE PAST YEAR HAVE YOU HAD 5 OR MORE DRINKS IN A DAY: 0
EVER FELT BAD OR GUILTY ABOUT YOUR DRINKING: NO
HAVE YOU EVER FELT YOU SHOULD CUT DOWN ON YOUR DRINKING: NO
TOTAL SCORE: 0
HAVE PEOPLE ANNOYED YOU BY CRITICIZING YOUR DRINKING: NO
ON A TYPICAL DAY WHEN YOU DRINK ALCOHOL HOW MANY DRINKS DO YOU HAVE: 0
TOTAL SCORE: 0
EVER HAD A DRINK FIRST THING IN THE MORNING TO STEADY YOUR NERVES TO GET RID OF A HANGOVER: NO
DOES PATIENT WANT TO STOP DRINKING: CANNOT ASSESS
ALCOHOL_USE: NO
SUBSTANCE_ABUSE: 0
CONSUMPTION TOTAL: NEGATIVE
AVERAGE NUMBER OF DAYS PER WEEK YOU HAVE A DRINK CONTAINING ALCOHOL: 0

## 2024-05-09 ASSESSMENT — PATIENT HEALTH QUESTIONNAIRE - PHQ9
SUM OF ALL RESPONSES TO PHQ9 QUESTIONS 1 AND 2: 0
2. FEELING DOWN, DEPRESSED, IRRITABLE, OR HOPELESS: NOT AT ALL
2. FEELING DOWN, DEPRESSED, IRRITABLE, OR HOPELESS: NOT AT ALL
1. LITTLE INTEREST OR PLEASURE IN DOING THINGS: NOT AT ALL
SUM OF ALL RESPONSES TO PHQ9 QUESTIONS 1 AND 2: 0
1. LITTLE INTEREST OR PLEASURE IN DOING THINGS: NOT AT ALL

## 2024-05-09 ASSESSMENT — COGNITIVE AND FUNCTIONAL STATUS - GENERAL
SUGGESTED CMS G CODE MODIFIER DAILY ACTIVITY: CH
MOBILITY SCORE: 18
DAILY ACTIVITIY SCORE: 24
MOVING FROM LYING ON BACK TO SITTING ON SIDE OF FLAT BED: A LITTLE
WALKING IN HOSPITAL ROOM: A LITTLE
SUGGESTED CMS G CODE MODIFIER MOBILITY: CK
STANDING UP FROM CHAIR USING ARMS: A LITTLE
TURNING FROM BACK TO SIDE WHILE IN FLAT BAD: A LITTLE
MOVING TO AND FROM BED TO CHAIR: A LITTLE
CLIMB 3 TO 5 STEPS WITH RAILING: A LITTLE

## 2024-05-09 ASSESSMENT — PAIN DESCRIPTION - PAIN TYPE
TYPE: ACUTE PAIN
TYPE: SURGICAL PAIN
TYPE: ACUTE PAIN
TYPE: ACUTE PAIN;SURGICAL PAIN

## 2024-05-09 ASSESSMENT — FIBROSIS 4 INDEX: FIB4 SCORE: 6.03

## 2024-05-09 NOTE — ED PROVIDER NOTES
ED Provider Note    CHIEF COMPLAINT  Chief Complaint   Patient presents with    N/V     Patient with recent renal transplant x 5 days. Patient D/C yesterday. Patient reports vomiting blood clots 2 times today with chills.        EXTERNAL RECORDS REVIEWED  Inpatient Notes dc summary on bactrim valcyclovir tacro myco no steroids.     HPI/ROS  LIMITATION TO HISTORY   Select: Language Armenian,  Used   OUTSIDE HISTORIAN(S):  Family sister is at bedside and helps provide history.    Elier Bustillos is a 33 y.o. male who presents WITH nV hematemesis 3 episodes today. Discharged after renal transplant yesterday.  Still needing dialysis last one today. Completed full treatment. No SMILEY.  Reports persistent abdominal pain since surgery.  No melena.  No history of GI bleed previously.  Also having generalized headache for the past 24 hours which started on the drive home.  Not on any anticoagulation currently.      No EtOH history.    PAST MEDICAL HISTORY   has a past medical history of Dialysis, Dialysis care, Hypertension, and Renal disorder.    SURGICAL HISTORY   has a past surgical history that includes other orthopedic surgery; av fistula creation (12/29/2010); av fistula creation (06/07/2012); cath placement (06/07/2012); recovery (07/03/2012); recovery (07/20/2012); av fistula creation (08/21/2012); av fistula revision (Right, 11/28/2016); exploratory laparotomy (11/11/2018); explore parathyroid glands (N/A, 03/17/2023); and transplant, kidney (Right, 05/03/2024).    FAMILY HISTORY  Family History   Problem Relation Age of Onset    Diabetes Mother     Hypertension Mother     Heart Attack Father     Hypertension Father        SOCIAL HISTORY  Social History     Tobacco Use    Smoking status: Former     Types: Cigarettes    Smokeless tobacco: Never   Vaping Use    Vaping Use: Never used   Substance and Sexual Activity    Alcohol use: No    Drug use: No    Sexual activity: Not on file       CURRENT  MEDICATIONS  Home Medications       Reviewed by Jesu Diop (Pharmacy Tech) on 05/09/24 at 0039  Med List Status: Partial     Medication Last Dose Status   acetaminophen (TYLENOL) 500 MG Tab 5/9/2024 Active   amLODIPine (NORVASC) 10 MG Tab 5/9/2024 Active   famotidine (PEPCID) 20 MG Tab 5/9/2024 Active   hydrALAZINE (APRESOLINE) 100 MG tablet 5/9/2024 Active   mycophenolate (CELLCEPT) 250 MG Cap 5/9/2024 Active   oxyCODONE immediate-release (ROXICODONE) 5 MG Tab 5/8/2024 Active   polyethylene glycol 3350 (MIRALAX) 17 GM/SCOOP Powder 5/9/2024 Active   pravastatin (PRAVACHOL) 20 MG Tab 5/8/2024 Active   sevelamer (RENAGEL) 800 MG Tab 5/9/2024 Active   sodium polystyrene (SPS) 15 GM/60ML Suspension prn Active   sulfamethoxazole-trimethoprim (BACTRIM) 400-80 MG Tab 5/8/2024 Active   tacrolimus (PROGRAF) 1 MG Cap 5/9/2024 Active   valGANciclovir (VALCYTE) 450 MG tablet unk Active                    ALLERGIES  No Known Allergies    PHYSICAL EXAM  VITAL SIGNS: BP (!) 168/104 Comment: Rn notified  Pulse 99   Temp 36.9 °C (98.5 °F) (Oral)   Resp 16   Wt 80.8 kg (178 lb 2.1 oz)   SpO2 99%   BMI 27.08 kg/m²    General: Pleasant chronically ill-appearing male, uncomfortable appearing.  Eyes closed lying supine.  Head: Normocephalic atraumatic  Eyes: Extraocular motion intact, no pallor  Neck: Supple, no rigidity  Cardiovascular: Regular rate and rhythm no murmurs rubs or gallops  Respiratory: Clear to auscultation bilaterally, equal chest rise and fall, no increased work of breathing  Abdomen: Soft nontender no guarding  Mildly distended, there is multiple healing surgical incisions which appear clean/dry/intact without purulence or surrounding erythema.    Musculoskeletal: Warm a right upper extremity with fistula in place, palpable thrill.  Warm well-perfused distal extremities.  No peripheral edema.,   Neuro: Alert, no focal deficits  Integumentary: No wounds or rashes        EKG/LABS  I have independently  interpreted this EKG  Labs Reviewed   CBC WITH DIFFERENTIAL - Abnormal; Notable for the following components:       Result Value    WBC 4.5 (*)     RBC 2.91 (*)     Hemoglobin 9.7 (*)     Hematocrit 27.1 (*)     MCH 33.3 (*)     Platelet Count 120 (*)     Neutrophils-Polys 86.30 (*)     Lymphocytes 4.60 (*)     Lymphs (Absolute) 0.21 (*)     All other components within normal limits   COMP METABOLIC PANEL - Abnormal; Notable for the following components:    Glucose 101 (*)     Bun 29 (*)     Creatinine 5.91 (*)     AST(SGOT) 146 (*)     All other components within normal limits   ESTIMATED GFR - Abnormal; Notable for the following components:    GFR (CKD-EPI) 12 (*)     All other components within normal limits   URINALYSIS - Abnormal; Notable for the following components:    Color Red (*)     Character Cloudy (*)     Ph 8.5 (*)     Ketones Trace (*)     Protein 300 (*)     Leukocyte Esterase Trace (*)     Occult Blood Large (*)     All other components within normal limits   URINE MICROSCOPIC (W/UA) - Abnormal; Notable for the following components:    WBC 2-5 (*)     RBC >150 (*)     Bacteria Moderate (*)     All other components within normal limits   PROTHROMBIN TIME   APTT   TACROLIMUS BLOOD   MYCOPHENOLIC ACID   BLOOD CULTURE   BLOOD CULTURE   TACROLIMUS BLOOD   HEMOGLOBIN AND HEMATOCRIT   VITAMIN B12   FOLATE   TSH WITH REFLEX TO FT4   FERRITIN   IRON/TOTAL IRON BIND   LDH   HAPTOGLOBIN   CRP QUANTITIVE (NON-CARDIAC)   RETICULOCYTES COUNT   TRANSFERRIN   OCCULT BLOOD STOOL   GASTRIC OCCULT BLOOD   PROTHROMBIN TIME   URINE CULTURE(NEW)   HEMOGLOBIN AND HEMATOCRIT   RENAL FUNCTION PANEL   CBC WITH DIFFERENTIAL     RADIOLOGY/PROCEDURES       Radiologist interpretation:  US-RENAL TRANSPLANT COMP   Final Result         1.  Normal intrarenal resistive indices and acceleration times.          COURSE & MEDICAL DECISION MAKING    ASSESSMENT, COURSE AND PLAN  Care Narrative: 33-year-old recently postop from renal  transplant presenting with hematemesis multiple episodes today, as well as generalized headache, chills.  Vital signs are notable for elevated blood pressure otherwise unremarkable.  On exam, patient has diffuse abdominal tenderness, with no guarding, not actively vomiting, he is uncomfortable appearing.  He is chronically ill-appearing.  He is not pale.    Differential: Given recent prednisone use, ulcer seems most likely, also considered variceal bleed, coagulopathy, anemia, complication from transplant    I spoke with the nephrologist on-call, who recommends [facility] admission instead of transfer.     Platelets are low however improved compared to recent postop labs.  Urinalysis is likely contaminated, not give antibiotics based on current labs. Laboratory testing notable for hemoglobin of 9.7, most recent available was 10.12 days prior. Blood cultures were sent out of abundance of caution given immunocompromise status, headache and chills    Gave Protonix for possible ulcer.  DISPOSITION AND DISCUSSIONS  I have discussed management of the patient with the following physicians and AWAIS's: Nephrologist on-call Najjar who reports patient can be admitted here for workup and observation of hematemesis  And does not need to be transferred to their transplant center.    I spoke with Dr. Kelly who accepts admission    Discussion of management with other [facility] or appropriate source(s): None     FINAL DIAGNOSIS  1. Hematemesis with nausea           Electronically signed by: Bryson Walter M.D., 5/8/2024 10:00 PM

## 2024-05-09 NOTE — CARE PLAN
The patient is Stable - Low risk of patient condition declining or worsening     Patient is alert and oriented. Medicated for pain and nausea.all needs met at this time. Call light within reach.    Progress made toward(s) clinical / shift goals:      Patient is not progressing towards the following goals:

## 2024-05-09 NOTE — ED NOTES
Medication history reviewed with patients family at bedside.   Med rec is partially complete  Allergies reviewed.   Patient takes bactrim DS daily qpm- last dose 5/7/24- 2100.  Anticoagulants: No    Patient gets dialysis 3 times weekly on MWF from [facility] [phone]... re-open 0500 5/9/24.    Jaret Murillo, PhT

## 2024-05-09 NOTE — ASSESSMENT & PLAN NOTE
S/p renal transplant at [facility] 5 days prior to ER presentation  Still on HD  Has RUE AVF  Continue phosphate binders  Renally dose meds  Consult nephrology in a.m.

## 2024-05-09 NOTE — CONSULTS
..GASTROENTEROLOGY CONSULTATION    PATIENT NAME: Elier Bustillos  : 1990  CSN: [id number]  MRN:  [id number]     CONSULTATION DATE:  2024    PRIMARY CARE PROVIDER:  Pcp Pt States None      REASON FOR CONSULT:  Hematemesis    Consult requested by Dr. Jono Jin    HISTORY OF PRESENT ILLNESS:  Elier Bustillos is a 33 y.o. male with history of renal transplant at [facility] on 5/3/2024 discharged yesterday who presents with hematemesis.  Patient reports he has had severe nausea and vomiting since being discharged and noted bloody vomitus x 2 on  after dialysis.  He is on CellCept, tacrolimus, and valganciclovir.  Hemoglobin on admission 9.7 with creatinine 5.91.  Renal ultrasound completed on  with normal intrarenal resistive indices and acceleration times.    No previous colonoscopy or endoscopy. Denies NSAID use, no alcohol or drugs. Previous smoker.     PAST MEDICAL HISTORY:  Past Medical History:   Diagnosis Date    Dialysis      at [facility]    Dialysis [facility]     for 12 years    Hypertension     Renal disorder     dailysis pt. 3 times per week.        PAST SURGICAL HISTORY:  Past Surgical History:   Procedure Laterality Date    TRANSPLANT, KIDNEY Right 2024    OR EXPLORE PARATHYROID GLANDS N/A 2023    Procedure: PARATHYROID EXPLORATION WITH PARATHYROID AUTOTRANSPLANTATION AND PARATHYROID HORMONE MONITORING.;  Surgeon: Candy Godoy M.D.;  Location: SURGERY SAME DAY [facility];  Service: General    EXPLORATORY LAPAROTOMY  2018    Procedure: EXPLORATORY LAPAROTOMY;  Surgeon: Faith Martin M.D.;  Location: SURGERY [facility];  Service: General    AV FISTULA REVISION Right 2016    Procedure: AV FISTULA REVISION UPPER EXTREMITY (ANEURYSM REDUCTION);  Surgeon: Slade Perez M.D.;  Location: SURGERY [facility];  Service:     AV FISTULA CREATION  2012    Performed by ANAI TYSON at [facility]    RECOVERY   07/20/2012    Performed by SURGERY, IR-RECOVERY at SURGERY SAME DAY [facility] ORS    RECOVERY  07/03/2012    Performed by SURGERY, IR-RECOVERY at SURGERY SAME DAY [facility] ORS    AV FISTULA CREATION  06/07/2012    Performed by ANAI TYSON at SURGERY [facility] ORS    CATH PLACEMENT  06/07/2012    Performed by ANAI TYSON at SURGERY [facility] ORS    AV FISTULA CREATION  12/29/2010    Performed by ANT ALONZO at SURGERY [facility] ORS    OTHER ORTHOPEDIC SURGERY      r arm fx        CURRENT MEDS:  Current Facility-Administered Medications   Medication Dose Route Frequency Provider Last Rate Last Admin    pantoprazole (Protonix) injection 40 mg  40 mg Intravenous BID Casye Kelly M.D.   40 mg at 05/09/24 0542    acetaminophen (Tylenol) tablet 650 mg  650 mg Oral Q6HRS PRN Casey Kelly M.D.        labetalol (Normodyne/Trandate) injection 10 mg  10 mg Intravenous Q4HRS PRN Casey Kelly M.D.        ondansetron (Zofran) syringe/vial injection 4 mg  4 mg Intravenous Q4HRS PRN Casey Kelly M.D.        ondansetron (Zofran ODT) dispertab 4 mg  4 mg Oral Q4HRS PRN Casey Kelly M.D.        promethazine (Phenergan) tablet 12.5-25 mg  12.5-25 mg Oral Q4HRS PRN Casey Kelly M.D.        promethazine (Phenergan) suppository 12.5-25 mg  12.5-25 mg Rectal Q4HRS PRN Casey Kelly M.D.        prochlorperazine (Compazine) injection 5-10 mg  5-10 mg Intravenous Q4HRS PRN Casey Kelly M.D.        senna-docusate (Pericolace Or Senokot S) 8.6-50 MG per tablet 2 Tablet  2 Tablet Oral Q EVENING Casey Kelly M.D.        And    polyethylene glycol/lytes (Miralax) Packet 1 Packet  1 Packet Oral QDAY PRN Casey Kelly M.D.        LR (Bolus) infusion 500 mL  500 mL Intravenous Once PRN Casey Kelly M.D.        amLODIPine (Norvasc) tablet 10 mg  10 mg Oral DAILY Casey Kelly M.D.   10 mg at 05/09/24 0537    mycophenolate (Cellcept) capsule 750 mg  750 mg Oral BID Casey Kelly M.D.   750 mg at 05/09/24 0537    pravastatin  (Pravachol) tablet 20 mg  20 mg Oral QHS Casey Kelly M.D.        tacrolimus (Prograf) capsule 6 mg  6 mg Oral BID Casey Kelly M.D.   6 mg at 05/09/24 0535    Pharmacy Consult Request ...Pain Management Review 1 Each  1 Each Other PHARMACY TO DOSE Casey Kelly M.D.        oxyCODONE immediate-release (Roxicodone) tablet 2.5 mg  2.5 mg Oral Q3HRS PRN Casey Kelly M.D.        Or    oxyCODONE immediate-release (Roxicodone) tablet 5 mg  5 mg Oral Q3HRS PRN Casey Kelly M.D.   5 mg at 05/09/24 0111    Or    HYDROmorphone (Dilaudid) injection 0.25 mg  0.25 mg Intravenous Q3HRS PRN Casey Kelly M.D.        famotidine (Pepcid) tablet 20 mg  20 mg Oral DAILY Jono Jin M.D.   20 mg at 05/09/24 1202    [START ON 5/10/2024] valGANciclovir (Valcyte) tablet 450 mg  450 mg Oral MO, WE + FR Jono Jin M.D.        hydrALAZINE (Apresoline) tablet 50 mg  50 mg Oral TID Jono Jin M.D.        sevelamer carbonate (Renvela) tablet 800 mg  800 mg Oral TID WITH MEALS Jono Jin M.D.            ALLERGIES:  Allergies   Allergen Reactions    Mircera [Methoxy Polyethylene Glycol-Epoetin Beta] Unspecified     Nausea, chest pain, dizziness, and tachycardia per dialysis center        SOCIAL HISTORY:  Social History     Socioeconomic History    Marital status:      Spouse name: Not on file    Number of children: Not on file    Years of education: Not on file    Highest education level: Not on file   Occupational History    Not on file   Tobacco Use    Smoking status: Former     Types: Cigarettes    Smokeless tobacco: Never   Vaping Use    Vaping Use: Never used   Substance and Sexual Activity    Alcohol use: No    Drug use: No    Sexual activity: Not on file   Other Topics Concern    Not on file   Social History Narrative    Not on file     Social Determinants of Health     Financial Resource Strain: Not on file   Food Insecurity: Not on file   Transportation Needs: Not on file   Physical Activity: Not on file   Stress:  "Not on file   Social Connections: Not on file   Intimate Partner Violence: Not on file   Housing Stability: Not on file       FAMILY HISTORY:  Family History   Problem Relation Age of Onset    Diabetes Mother     Hypertension Mother     Heart Attack Father     Hypertension Father         REVIEW OF SYSTEMS:  General ROS: Negative for - chills, fever, night sweats or weight loss.  HEENT ROS: Negative  Respiratory ROS: Negative for - cough or shortness of breath.  Cardiovascular ROS:  Negative for - chest pain or palpitations.  Gastrointestinal ROS: As per the history of present illness.  Genito-Urinary ROS: Negative  Musculoskeletal ROS: Negative.  Neurological ROS: Negative  Skin ROS: negative  Hematology ROS: negative  Endocrinology ROS: Negative        PHYSICAL EXAM:  VITALS: /68   Pulse 66   Temp 36.9 °C (98.4 °F) (Temporal)   Resp 16   Wt 80.8 kg (178 lb 2.1 oz)   SpO2 97%   BMI 27.08 kg/m²   GEN:  Elier Bustillos is a 33 y.o. male in no acute distress.  HEENT: Mucous membranes pink and moist.  Sclera anicteric.    NECK:    Neck supple without lymphadenopathy or thyromegaly.  LUNGS: Clear to auscultation posteriorly.  HEART: Regular rate and rhythm. S1 and S2 normal. No murmurs, gallops. RUE fistual +bruit and thrill  ABD:  RLQ surgical incision, well approximated no erythema or edema, tender to palpation   RECTAL: Not done at this time.  EXT:  Without cyanosis, deformity or pitting edema.  SKIN:  Pink, warm, dry.  NEURO: Grossly intact, A/OR.    LABS:  Recent Labs     05/08/24 2240 05/09/24  0757   WBC 4.5* 4.6*   MCV 93.1 94.8     Recent Labs     05/08/24 2240 05/09/24  0757   GLUCOSE 101* 100*   BUN 29* 47*   CO2 28 26     Lab Results   Component Value Date    INR 1.15 (H) 05/09/2024    INR 1.06 05/08/2024    INR 1.14 (H) 03/27/2023     No components found for: \"ALT\", \"AST\", \"GGT\", \"ALKPHOS\"  No results found for: \"BILINEO\"      @LASTGCAT(ZW5642)@     @LASTGCAT(ND5482)@ "       IMPRESSION/PLAN:  Acute blood loss anemia  Hematemesis  Pancytopenia  Recent renal transplant 5/3/2024 at [facility] on antirejection drugs and antiviral medication  End-stage renal disease on dialysis    Plan:  Keep n.p.o.  EGD today       Dacia Sarkar, SIM,  APRN  Gastroenterology

## 2024-05-09 NOTE — ASSESSMENT & PLAN NOTE
S/p recent renal transplant at [facility] 5days ago prior to ER presentation  Continue Prograf, CellCept

## 2024-05-09 NOTE — ED TRIAGE NOTES
Chief Complaint   Patient presents with    N/V     Patient with recent renal transplant x 5 days. Patient D/C yesterday. Patient reports vomiting blood clots 2 times today with chills.         Patient educated on triage process. Informed to notified ED staff of change in symptoms.     Vitals:    05/08/24 2025   BP: (!) 147/103   Pulse: 97   Resp: 14   Temp: 36.8 °C (98.3 °F)   SpO2: 100%

## 2024-05-09 NOTE — PROCEDURES
OPERATIVE REPORT    PATIENT:   Elier Rhodes Wiseman   1990       PREOPERATIVE DIAGNOSES/INDICATIONS: Upper Gi bleeding     POSTOPERATIVE DIAGNOSIS:   Hiatal hernia 4cm, one ulcer with vessel and bleeding, s/p 5cc diluted epi and 4 clips.       PROCEDURE:  ESOPHAGOGASTRODUODENOSCOPY    PHYSICIAN:  Clarita Cueva MD. MPH.    ANESTHESIA:  Per anesthesiologist or ICU/ED team.     LOCATION: [facility]    CONSENT:  OBTAINED. The risks, benefits and alternatives of the procedure were discussed in details. The risks include and are not limited to bleeding, infection, perforation, missed lesions, and sedations risks (cardiopulmonary compromise and allergic reaction to medications).    DESCRIPTION: Scope team at bedside  Patient was placed on his left lateral decubitus position. A bite block was placed in patient's mouth. Patient was sedated by anesthesia.  Vital signs were monitored throughout the procedure.  Oxygenation support was provided throughout the procedure. Upper endoscope was inserted into patient's mouth and advanced to the second portion of the duodenum under direct visualization.      Once the site was reached and examined, the upper endoscope was withdrawn.  Retroflexion was made within the stomach.  The stomach was decompressed, scope was withdrawn and the procedure was terminated.    The patient tolerated the procedure well.  There were no immediate complications.    OPERATIVE FINDINGS:    1. Esophagus: Z line at 35cm, grossly normal.   2. Stomach: Hiatal hernia 4cm, one ulcer with vessel and bleeding, s/p 5cc diluted epi and 4 clips.   3. Duodenum: Grossly normal.     RECOMMENDATIONS:  IV PPI bid for total three days, then,   PPI 40mg bid oral dose for 8 weeks then taper.    Consider outpatient GI referral to have follow up and repeat EGD.   Clear liquid today.   GI team will follow up.       This note has been transcribed with digital voice recognition software and although it has been reviewed  may contain grammatical or word errors

## 2024-05-09 NOTE — OR NURSING
1424 - Pt to PACU 1 from OR.  Bedside report from anesthesiologist and RN.  Attached to monitoring, VSS, breathing is calm and unlabored.  6L mask in place.      1445 - Pt medicated for reported pain / signs of nausea.  Per Dr. Rayray vázquez with giving full 1mg dilaudid at this time.  Pt shows that the pain is where his previous incision site is.  On 2L NC.     1456 - Report to floor RN Emily.     1518 - Pt stable to go back to the floor, VSS, on 2L NC, pt sleeping on and off.

## 2024-05-09 NOTE — ASSESSMENT & PLAN NOTE
[facility]  Transfuse as needed  IV protonix for now    May consider GI consult in AM if further decline in Hgb

## 2024-05-09 NOTE — CONSULTS
DATE OF SERVICE:  05/09/2024     REQUESTING PHYSICIAN:  Jono Jin MD     REASON FOR CONSULTATION:  Management of chronic kidney disease, assessing the   need for dialysis.     The patient seen and examined, medical record reviewed.     HISTORY OF PRESENT ILLNESS:  The patient is a 33-year-old gentleman who is   very well known to our service.  He has a history of end-stage renal disease   who has been on hemodialysis for over 12 years. He is on hemodialysis on a   Monday, Wednesday, Friday schedule at [facility]. The patient   underwent cadaveric kidney transplant 5 days ago with delayed graft function.    The patient was receiving dialysis post-transplant.  His last dialysis was   yesterday, presented to the hospital yesterday evening with nausea, vomiting   and hematemesis.  The patient has been admitted and has been evaluated for GI   bleed, we were called to evaluate his kidney disease, assess the need for   dialysis.     The patient has no chest pain or shortness of breath, he is making urine as he   is urinating frequently small amount, no hematuria or dysuria.     PAST MEDICAL HISTORY:  Significant for:  1.  End-stage renal disease.  2.  Hypertension.  3.  Recent kidney transplant with delayed graft function.     ALLERGIES:  The list was reviewed.     SOCIAL HISTORY:  The patient had remote history of smoking.     FAMILY HISTORY:  Positive for diabetes in his mother.     MEDICATIONS:  Reviewed.     REVIEW OF SYSTEMS:  All systems were reviewed; they were negative except   outlined in the history of present illness.     PHYSICAL EXAMINATION:  GENERAL:  The patient appears well, in no apparent distress.  VITAL SIGNS:  Showed blood pressure of 127/68, heart rate was 66, respiratory   rate was 16.  HEENT:  Normocephalic, atraumatic.  Sclerae are anicteric.  Pupils are   reactive.  Nose normal.  Mucous membranes moist.  NECK:  No lymphadenopathy, no JVD, no thyroid mass.  CHEST:   Normal.  LUNGS:  Clear to auscultation.  HEART:  S1, S2.  ABDOMEN:  There is a surgical scar in the right lower quadrant with mild   tenderness, but there is no drainage, there is no hepatosplenomegaly.  There   is no inguinal lymphadenopathy.  EXTREMITIES:  There is no lower extremity edema.  SKIN:  No skin rash.  NEUROLOGIC:  The patient is alert, oriented x3.  MOOD:  Normal.     LABORATORY DATA:  His recent labs from today were reviewed.     DIAGNOSTIC DATA:  The patient had a renal allograft ultrasound done yesterday,   I reviewed the image, which showed no hydronephrosis.     ASSESSMENT:   1.  End-stage renal disease.  2.  Status post  donor kidney transplant.  3.  Delayed graft function.  4.  Anemia.  5.  Possible GI bleed.  6.  Hypertension.     PLAN:  1.  There is no acute need for dialysis today.  2.  Plan on dialysis tomorrow.  3.  Continue immunosuppressants.  4.  Continue prophylactic antibiotic.  5.  Check tacrolimus level.  6.  Daily labs.  7.  Rule out GI bleed.  8.  Prognosis is guarded.     Plan discussed with Dr. Jin.        ______________________________  FADI NAJJAR, MD    FN/RUB    DD:  2024 12:43  DT:  2024 13:13    Job#:  [id number]

## 2024-05-09 NOTE — ANESTHESIA TIME REPORT
Anesthesia Start and Stop Event Times       Date Time Event    5/9/2024 1353 Ready for Procedure     1407 Anesthesia Start     1431 Anesthesia Stop          Responsible Staff  05/09/24      Name Role Begin End    Trae Seo M.D. Anesth 1407 1431          Overtime Reason:  no overtime (within assigned shift)    Comments:

## 2024-05-09 NOTE — ANESTHESIA PREPROCEDURE EVALUATION
Case: [id number] Date/Time: 05/09/24 1324    Procedure: GASTROSCOPY    Location: [facility] ROOM 26 / SURGERY SAME DAY [facility]    Surgeons: Clarita Cueva M.D.            Relevant Problems   CARDIAC   (positive) A-V fistula aneurysm causing pain   (positive) Hypertension   (positive) Ischemic bowel disease (HCC)         (positive) Dependence on renal dialysis (HCC)   (positive) ESRD (end stage renal disease) on dialysis (HCC)      Other   (positive) Acute on chronic blood loss anemia   (positive) GI bleed   (positive) Hematemesis   (positive) Intractable nausea and vomiting   (positive) Kidney transplanted     Anes H&P:  PAST MEDICAL HISTORY:   33 y.o. male who presents for Procedure(s):  GASTROSCOPY.  He has current and past medical problems significant for:    Past Medical History:   Diagnosis Date    Dialysis     Mo-We-Fri at [facility]     for 12 years    Hypertension     Renal disorder     dailysis pt. 3 times per week.        SMOKING/ALCOHOL/RECREATIONAL DRUG USE:  Social History     Tobacco Use    Smoking status: Former     Types: Cigarettes    Smokeless tobacco: Never   Vaping Use    Vaping Use: Never used   Substance Use Topics    Alcohol use: No    Drug use: No     Social History     Substance and Sexual Activity   Drug Use No       PAST SURGICAL HISTORY:  Past Surgical History:   Procedure Laterality Date    TRANSPLANT, KIDNEY Right 05/03/2024    VT EXPLORE PARATHYROID GLANDS N/A 03/17/2023    Procedure: PARATHYROID EXPLORATION WITH PARATHYROID AUTOTRANSPLANTATION AND PARATHYROID HORMONE MONITORING.;  Surgeon: Candy Godoy M.D.;  Location: SURGERY SAME DAY [facility];  Service: General    EXPLORATORY LAPAROTOMY  11/11/2018    Procedure: EXPLORATORY LAPAROTOMY;  Surgeon: Faith Martin M.D.;  Location: SURGERY [facility];  Service: General    AV FISTULA REVISION Right 11/28/2016    Procedure: AV FISTULA REVISION UPPER EXTREMITY (ANEURYSM REDUCTION);  Surgeon: Slade Perez M.D.;   Location: SURGERY [facility];  Service:     AV FISTULA CREATION  08/21/2012    Performed by ANAI TYSON at SURGERY [facility]    RECOVERY  07/20/2012    Performed by SURGERY, IR-RECOVERY at SURGERY SAME DAY [facility] ORS    RECOVERY  07/03/2012    Performed by SURGERY, IR-RECOVERY at SURGERY SAME DAY [facility] ORS    AV FISTULA CREATION  06/07/2012    Performed by ANAI TYSON at SURGERY [facility]    CATH PLACEMENT  06/07/2012    Performed by ANAI TYSON at SURGERY [facility]    AV FISTULA CREATION  12/29/2010    Performed by ANT ALONZO at SURGERY [facility]    OTHER ORTHOPEDIC SURGERY      r arm fx       ALLERGIES:   Allergies   Allergen Reactions    Mircera [Methoxy Polyethylene Glycol-Epoetin Beta] Unspecified     Nausea, chest pain, dizziness, and tachycardia per dialysis center        MEDICATIONS:  No current facility-administered medications on file prior to encounter.     Current Outpatient Medications on File Prior to Encounter   Medication Sig Dispense Refill    oxyCODONE immediate-release (ROXICODONE) 5 MG Tab Take 2.5-5 mg by mouth every 6 hours as needed for Severe Pain. 1/2-1 tabet= 2.5-5mg      polyethylene glycol 3350 (MIRALAX) 17 GM/SCOOP Powder Take 17 g by mouth every day.      pravastatin (PRAVACHOL) 20 MG Tab Take 20 mg by mouth at bedtime.      sevelamer (RENAGEL) 800 MG Tab Take 800 mg by mouth 3 times a day with meals. 3 tablets=2400mg      sodium polystyrene (SPS) 15 GM/60ML Suspension Take 120 mL by mouth one time as needed (hyperkalemia).      sulfamethoxazole-trimethoprim (BACTRIM) 400-80 MG Tab Take 1 Tablet by mouth every day.      tacrolimus (PROGRAF) 1 MG Cap Take 6 mg by mouth 2 times a day. 6 capsules=6mg      valGANciclovir (VALCYTE) 450 MG tablet Take 1 Tablet by mouth every Monday, Wednesday, and Friday.      acetaminophen (TYLENOL) 500 MG Tab Take 500 mg by mouth 2 times a day as needed for Mild Pain.      famotidine (PEPCID) 20 MG Tab Take  20 mg by mouth every day.      mycophenolate (CELLCEPT) 250 MG Cap Take 750 mg by mouth 2 times a day. 3 capsules=750mg      amLODIPine (NORVASC) 10 MG Tab Take 1 Tablet by mouth every day. 90 Tablet 3    hydrALAZINE (APRESOLINE) 100 MG tablet Take 50 mg by mouth 3 times a day.         LABS:  Lab Results   Component Value Date/Time    HEMOGLOBIN 9.6 (L) 05/09/2024 0757    HEMATOCRIT 27.3 (L) 05/09/2024 0757    WBC 4.6 (L) 05/09/2024 0757     Lab Results   Component Value Date/Time    SODIUM 137 05/09/2024 0757    POTASSIUM 4.0 05/09/2024 0757    CHLORIDE 97 05/09/2024 0757    CO2 26 05/09/2024 0757    GLUCOSE 100 (H) 05/09/2024 0757    BUN 47 (H) 05/09/2024 0757    CALCIUM 9.0 05/09/2024 0757         PREVIOUS ANESTHETICS: See EMR  __________________________________________      Physical Exam    Airway   Mallampati: II  TM distance: >3 FB  Neck ROM: full       Cardiovascular - normal exam  Rhythm: regular  Rate: normal  (-) murmur     Dental - normal exam        Facial Hair   Pulmonary - normal exam  Breath sounds clear to auscultation     Abdominal    Neurological - normal exam                   Anesthesia Plan    ASA 3       Plan - MAC                     Pertinent diagnostic labs and testing reviewed    Informed Consent:    Anesthetic plan and risks discussed with patient.

## 2024-05-09 NOTE — ASSESSMENT & PLAN NOTE
Anemia due to GIB vs chronic CKD/ESRD  Anemia workup -- replace Fe/B12/folate as needed  [facility]  Transfuse for Hgb<7 or hemodynamic instability

## 2024-05-09 NOTE — ANESTHESIA POSTPROCEDURE EVALUATION
Patient: Elier Bustillos    Procedure Summary       Date: 05/09/24 Room / Location: [facility] ROOM 26 / SURGERY SAME DAY [facility]    Anesthesia Start: 1407 Anesthesia Stop: 1431    Procedures:       GASTROSCOPY (Esophagus)      GASTROSCOPY, WITH SCLEROTHERAPY (Esophagus)      EGD, WITH CLIP PLACEMENT (Esophagus) Diagnosis: (GASTRIC ULCER, HIATAL HERNIA)    Surgeons: Clarita Cueva M.D. Responsible Provider: Trae Seo M.D.    Anesthesia Type: MAC ASA Status: 3            Final Anesthesia Type: MAC  Last vitals  BP   Blood Pressure: (!) 150/77    Temp   36.8 °C (98.2 °F)    Pulse   (!) 109   Resp   19    SpO2   99 %      Anesthesia Post Evaluation    Patient location during evaluation: PACU  Patient participation: complete - patient participated  Level of consciousness: awake and alert    Airway patency: patent  Anesthetic complications: no  Cardiovascular status: hemodynamically stable  Respiratory status: acceptable  Hydration status: euvolemic    PONV: none          No notable events documented.     Nurse Pain Score: 10 (NPRS)

## 2024-05-09 NOTE — PROGRESS NOTES
Med rec completed per dialysis center   Per Iman pt has an allergy to Mircera and is not receiving this medication

## 2024-05-09 NOTE — H&P
Hospital Medicine History & Physical Note    Date of Service  5/9/2024    Primary Care Physician  Pcp Pt States None    Consultants  None    Code Status  Full Code    Chief Complaint  Chief Complaint   Patient presents with    N/V     Patient with recent renal transplant x 5 days. Patient D/C yesterday. Patient reports vomiting blood clots 2 times today with chills.        History of Presenting Illness  Elier Bustillos is a 33 y.o. male with recent renal transplant at [facility] and discharged 5/7/2024 who presented 5/8/2024 with evaluation for hematemesis.  Patient reported to have severe nausea and vomiting since being discharged, noted to have bloody vomitus 5/8.  He did complete a full session of dialysis 5/8.  In ER, initial hemoglobin 9.7.  Admission requested by ERP.  Admitted to medicine service for further evaluation and treatment.    I discussed the plan of care with patient, family, bedside RN, and pharmacy.    Review of Systems  Review of Systems   Constitutional:  Positive for malaise/fatigue. Negative for chills and fever.   HENT:  Negative for hearing loss and tinnitus.    Eyes:  Negative for blurred vision and double vision.   Respiratory:  Negative for cough and shortness of breath.    Cardiovascular:  Negative for chest pain and palpitations.   Gastrointestinal:  Positive for heartburn, nausea and vomiting. Negative for abdominal pain.        Hematemesis   Genitourinary:  Negative for dysuria and urgency.   Musculoskeletal:  Negative for back pain, joint pain and myalgias.   Skin:  Negative for itching and rash.   Neurological:  Positive for weakness. Negative for dizziness and headaches.   Endo/Heme/Allergies:  Negative for environmental allergies. Bruises/bleeds easily.   Psychiatric/Behavioral:  Negative for depression and substance abuse.    All other systems reviewed and are negative.      Past Medical History   has a past medical history of Dialysis, Dialysis care, Hypertension,  and Renal disorder.    Surgical History   has a past surgical history that includes other orthopedic surgery; av fistula creation (12/29/2010); av fistula creation (06/07/2012); cath placement (06/07/2012); recovery (07/03/2012); recovery (07/20/2012); av fistula creation (08/21/2012); av fistula revision (Right, 11/28/2016); exploratory laparotomy (11/11/2018); pr explore parathyroid glands (N/A, 03/17/2023); and transplant, kidney (Right, 05/03/2024).     Family History  family history includes Diabetes in his mother; Heart Attack in his father; Hypertension in his father and mother.   Family history reviewed with patient. There is family history that is pertinent to the chief complaint.     Social History   reports that he has quit smoking. His smoking use included cigarettes. He has never used smokeless tobacco. He reports that he does not drink alcohol and does not use drugs.    Allergies  No Known Allergies    Medications  Prior to Admission Medications   Prescriptions Last Dose Informant Patient Reported? Taking?   amLODIPine (NORVASC) 10 MG Tab   No No   Sig: Take 1 Tablet by mouth every day.   hydrALAZINE (APRESOLINE) 100 MG tablet   Yes No   Sig: Take 100 mg by mouth every day.   losartan (COZAAR) 100 MG Tab   No No   Sig: Take 1 Tablet by mouth every day.   sevelamer carbonate (RENVELA) 800 MG Tab tablet   Yes No   Sig: Take 800 mg by mouth 3 times a day with meals.      Facility-Administered Medications: None       Physical Exam  Temp:  [36.8 °C (98.3 °F)-36.9 °C (98.5 °F)] 36.9 °C (98.5 °F)  Pulse:  [89-99] 99  Resp:  [14-18] 16  BP: (139-168)/() 168/104  SpO2:  [92 %-100 %] 99 %  Blood Pressure: (!) 146/91   Temperature: 36.8 °C (98.3 °F)   Pulse: 89   Respiration: 18   Pulse Oximetry: 92 %       Physical Exam  Vitals and nursing note reviewed.   Constitutional:       General: He is not in acute distress.  HENT:      Head: Normocephalic and atraumatic.      Nose: Nose normal.      Mouth/Throat:  "     Mouth: Mucous membranes are moist.      Pharynx: Oropharynx is clear.   Eyes:      General: No scleral icterus.     Extraocular Movements: Extraocular movements intact.   Cardiovascular:      Rate and Rhythm: Normal rate and regular rhythm.      Pulses: Normal pulses.      Heart sounds:      No friction rub.   Pulmonary:      Effort: No respiratory distress.      Breath sounds: No wheezing or rales.   Chest:      Chest wall: No tenderness.   Abdominal:      General: There is no distension.      Tenderness: There is no abdominal tenderness. There is no guarding or rebound.   Genitourinary:     Comments: RUE AVF - palpable thrill  Musculoskeletal:      Cervical back: Neck supple. No tenderness.   Skin:     General: Skin is dry.      Capillary Refill: Capillary refill takes less than 2 seconds.      Coloration: Skin is pale.   Neurological:      General: No focal deficit present.      Mental Status: He is alert and oriented to person, place, and time.   Psychiatric:         Mood and Affect: Mood normal.         Laboratory:  Recent Labs     05/08/24  2240   WBC 4.5*   RBC 2.91*   HEMOGLOBIN 9.7*   HEMATOCRIT 27.1*   MCV 93.1   MCH 33.3*   MCHC 35.8   RDW 40.1   PLATELETCT 120*   MPV 11.0     Recent Labs     05/08/24  2240   SODIUM 137   POTASSIUM 3.8   CHLORIDE 96   CO2 28   GLUCOSE 101*   BUN 29*   CREATININE 5.91*   CALCIUM 9.1     Recent Labs     05/08/24  2240   ALTSGPT 47   ASTSGOT 146*   ALKPHOSPHAT 65   TBILIRUBIN 0.9   GLUCOSE 101*     Recent Labs     05/08/24  2240   APTT 30.5   INR 1.06     No results for input(s): \"NTPROBNP\" in the last 72 hours.      No results for input(s): \"TROPONINT\" in the last 72 hours.    Imaging:  US-RENAL TRANSPLANT COMP   Final Result         1.  Normal intrarenal resistive indices and acceleration times.          no X-Ray or EKG requiring interpretation    Assessment/Plan:  Justification for Admission Status  I anticipate this patient will require at least 2 midnights of " hospitalization, therefore appropriate for inpatient status.      * Hematemesis- (present on admission)  Assessment & Plan  Trend HH  Transfuse as needed  IV protonix for now    May consider GI consult in AM if further decline in Hgb    GI bleed  Assessment & Plan  Plan as above    Kidney transplanted  Assessment & Plan  S/p recent renal transplant at [facility] 5days ago prior to ER presentation  Continue Prograf, CellCept    Intractable nausea and vomiting  Assessment & Plan  Supportive antiemetic  Scheduled reglan  IV protonix  CLD    Hyperlipidemia  Assessment & Plan  Pravastatin    Hypertension- (present on admission)  Assessment & Plan  Amlodipine, hydralazine    Acute on chronic blood loss anemia- (present on admission)  Assessment & Plan  Anemia due to GIB vs chronic CKD/ESRD  Anemia workup -- replace Fe/B12/folate as needed  Trend HH  Transfuse for Hgb<7 or hemodynamic instability    ESRD (end stage renal disease) on dialysis (HCC)- (present on admission)  Assessment & Plan  S/p renal transplant at [facility] 5 days prior to ER presentation  Still on HD  Has RUE AVF  Continue phosphate binders  Renally dose meds  Consult nephrology in a.m.        VTE prophylaxis: SCDs/TEDs

## 2024-05-10 ENCOUNTER — PHARMACY VISIT (OUTPATIENT)
Dept: PHARMACY | Facility: MEDICAL CENTER | Age: 34
End: 2024-05-10
Payer: COMMERCIAL

## 2024-05-10 VITALS
OXYGEN SATURATION: 98 % | BODY MASS INDEX: 27.08 KG/M2 | WEIGHT: 178.13 LBS | RESPIRATION RATE: 18 BRPM | DIASTOLIC BLOOD PRESSURE: 103 MMHG | HEART RATE: 104 BPM | SYSTOLIC BLOOD PRESSURE: 147 MMHG | TEMPERATURE: 98 F

## 2024-05-10 LAB
HAPTOGLOB SERPL-MCNC: <10 MG/DL (ref 30–200)
HCT VFR BLD AUTO: 27.8 % (ref 42–52)
HGB BLD-MCNC: 9.8 G/DL (ref 14–18)
TACROLIMUS BLD-MCNC: 10.5 NG/ML (ref 5–20)
TACROLIMUS BLD-MCNC: 10.6 NG/ML (ref 5–20)
TACROLIMUS BLD-MCNC: 8.4 NG/ML (ref 5–20)

## 2024-05-10 PROCEDURE — 5A1D70Z PERFORMANCE OF URINARY FILTRATION, INTERMITTENT, LESS THAN 6 HOURS PER DAY: ICD-10-PCS | Performed by: INTERNAL MEDICINE

## 2024-05-10 PROCEDURE — 99232 SBSQ HOSP IP/OBS MODERATE 35: CPT | Performed by: NURSE PRACTITIONER

## 2024-05-10 PROCEDURE — RXMED WILLOW AMBULATORY MEDICATION CHARGE: Performed by: STUDENT IN AN ORGANIZED HEALTH CARE EDUCATION/TRAINING PROGRAM

## 2024-05-10 PROCEDURE — 99239 HOSP IP/OBS DSCHRG MGMT >30: CPT | Performed by: STUDENT IN AN ORGANIZED HEALTH CARE EDUCATION/TRAINING PROGRAM

## 2024-05-10 PROCEDURE — 90935 HEMODIALYSIS ONE EVALUATION: CPT | Performed by: INTERNAL MEDICINE

## 2024-05-10 RX ORDER — CALCITRIOL 0.25 UG/1
0.25 CAPSULE, LIQUID FILLED ORAL
Qty: 30 CAPSULE | Refills: 0
Start: 2024-05-13

## 2024-05-10 RX ORDER — PANTOPRAZOLE SODIUM 40 MG/1
40 TABLET, DELAYED RELEASE ORAL 2 TIMES DAILY
Qty: 120 TABLET | Refills: 0 | Status: SHIPPED | OUTPATIENT
Start: 2024-05-10 | End: 2024-07-09

## 2024-05-10 RX ADMIN — SEVELAMER CARBONATE 800 MG: 800 TABLET, FILM COATED ORAL at 17:27

## 2024-05-10 RX ADMIN — HYDRALAZINE HYDROCHLORIDE 50 MG: 50 TABLET ORAL at 17:27

## 2024-05-10 RX ADMIN — OXYCODONE 5 MG: 5 TABLET ORAL at 04:19

## 2024-05-10 RX ADMIN — MYCOPHENOLATE MOFETIL 750 MG: 250 CAPSULE ORAL at 17:27

## 2024-05-10 RX ADMIN — HYDRALAZINE HYDROCHLORIDE 50 MG: 50 TABLET ORAL at 04:19

## 2024-05-10 RX ADMIN — TACROLIMUS 6 MG: 5 CAPSULE ORAL at 09:06

## 2024-05-10 RX ADMIN — OXYCODONE 5 MG: 5 TABLET ORAL at 01:12

## 2024-05-10 RX ADMIN — SULFAMETHOXAZOLE AND TRIMETHOPRIM 1 TABLET: 400; 80 TABLET ORAL at 17:28

## 2024-05-10 RX ADMIN — PANTOPRAZOLE SODIUM 40 MG: 40 INJECTION, POWDER, FOR SOLUTION INTRAVENOUS at 17:27

## 2024-05-10 RX ADMIN — CALCITRIOL CAPSULES 0.25 MCG 0.25 MCG: 0.25 CAPSULE ORAL at 09:07

## 2024-05-10 RX ADMIN — SENNOSIDES AND DOCUSATE SODIUM 2 TABLET: 50; 8.6 TABLET ORAL at 17:27

## 2024-05-10 RX ADMIN — FAMOTIDINE 20 MG: 20 TABLET, FILM COATED ORAL at 04:19

## 2024-05-10 RX ADMIN — MYCOPHENOLATE MOFETIL 750 MG: 250 CAPSULE ORAL at 04:18

## 2024-05-10 RX ADMIN — AMLODIPINE BESYLATE 10 MG: 10 TABLET ORAL at 04:19

## 2024-05-10 RX ADMIN — VALGANCICLOVIR 450 MG: 450 TABLET, FILM COATED ORAL at 09:06

## 2024-05-10 RX ADMIN — PANTOPRAZOLE SODIUM 40 MG: 40 INJECTION, POWDER, FOR SOLUTION INTRAVENOUS at 04:20

## 2024-05-10 RX ADMIN — SEVELAMER CARBONATE 800 MG: 800 TABLET, FILM COATED ORAL at 09:07

## 2024-05-10 RX ADMIN — LABETALOL HYDROCHLORIDE 10 MG: 5 INJECTION INTRAVENOUS at 04:19

## 2024-05-10 ASSESSMENT — ENCOUNTER SYMPTOMS
NAUSEA: 0
SHORTNESS OF BREATH: 0
FEVER: 0
HEADACHES: 0
FALLS: 0
ABDOMINAL PAIN: 1
BLOOD IN STOOL: 0
CONSTIPATION: 0
VOMITING: 0
DIARRHEA: 0
COUGH: 0
FLANK PAIN: 0
CHILLS: 0
MYALGIAS: 0
WEAKNESS: 0
SORE THROAT: 0
NERVOUS/ANXIOUS: 0
HEARTBURN: 0
INSOMNIA: 0

## 2024-05-10 NOTE — CARE PLAN
The patient is Stable - Low risk of patient condition declining or worsening    Shift Goals  Clinical Goals: pain control, monitor H&H  Patient Goals: rest  Family Goals: sylvia    Progress made toward(s) clinical / shift goals:  Patient is alert and oriented. Medicated for pain. BP has wilbert on the higher side, on meds.call light within reach.    Patient is not progressing towards the following goals:

## 2024-05-10 NOTE — DISCHARGE PLANNING
Outpatient Dialysis Note     Confirmed patient is active at:     [facility]  [address], [place] [address]     Schedule: Mon, Wed, Fri  Time: 2:00 PM     Patient is followed by Dr. Rosas.     Spoke with Shira at facility who confirmed patient information.   Forwarded records for review.     Xitlaly Reyes   Dialysis Coordinator / [facility]  Ph: [phone]

## 2024-05-10 NOTE — DISCHARGE SUMMARY
Discharge Summary    CHIEF COMPLAINT ON ADMISSION  Chief Complaint   Patient presents with    N/V     Patient with recent renal transplant x 5 days. Patient D/C yesterday. Patient reports vomiting blood clots 2 times today with chills.        Reason for Admission  Poat op     Admission Date  5/8/2024    CODE STATUS  Full Code    HPI & HOSPITAL COURSE  This is a 33 y.o. male with ESRD on HD MWF, recent renal transplant at [facility] 5/4/24 and discharged 5/7/2024 who presented 5/8/2024 with evaluation for hematemesis.  Patient reported to have severe nausea and vomiting since being discharged, noted to have bloody vomitus 5/8.  He did complete a full session of dialysis 5/8.    In ER, initial hemoglobin 9.7. He was placed on IV PPI. GI was consulted. He underwent EGD on 5/9 which showed hiatal hernia 4cm, one ulcer with vessel and bleeding, s/p 5cc diluted epi and 4 clips. Gi recommends iv PPI bid for total 3 days and then oral 40mg bid for 8 weeks then taper. However patient has no hematemesis since admitted. He would like to go home today after 4 doses of IV PPI. His Hb has been stable. Last Hb was checked which was 9.8. He has received 2 days of iv PPI. He has been tolerating full liquid diet and is advised to advance to soft diet in the next 5-7 days. I have placed GI outpatient referral. He is advised to take PPI 40mg po bid for 8 weeks and then taper per GI follow up and repeat EGD. I also advised to come back to hospital if he develops hematemesis or severe abdominal pain. Avoid any NSAIDs use. He and his wife voiced understanding.     He has completed HD on 5/10.     Therefore, he is discharged in good and stable condition to home with close outpatient follow-up.    The patient recovered much more quickly than anticipated on admission.    Discharge Date  5/10/24    FOLLOW UP ITEMS POST DISCHARGE  GI  nephrology    DISCHARGE DIAGNOSES  Principal Problem:    Hematemesis (POA: Yes)  Active Problems:    ESRD (end  stage renal disease) on dialysis (HCC) (POA: Yes)    Acute on chronic blood loss anemia (POA: Yes)    Hypertension (POA: Yes)      Overview: Patient has been on hydralazine 3 times daily, losartan 50 mg 2 times       daily, amlodipine 10 mg once daily.  He is getting dialysis Monday Wednesday Friday including today.  He does not have any headaches.  He       does describe some peripheral numbness and tingling which is fairly       consistent with electrolyte abnormalities and hypertension.  Given       end-stage renal disease on hemodialysis his blood pressure is likely to       right eye typically was recently 198/92 3 days ago primary visit with PA.    Intractable nausea and vomiting (POA: Unknown)    Kidney transplanted (POA: Unknown)    Hyperlipidemia (POA: Unknown)    GI bleed (POA: Unknown)  Resolved Problems:    * No resolved hospital problems. *      FOLLOW UP  No future appointments.  No follow-up provider specified.    MEDICATIONS ON DISCHARGE     Medication List        START taking these medications        Instructions   pantoprazole 40 MG Tbec  Commonly known as: Protonix   Take 1 Tablet by mouth 2 times a day for 60 days.  Dose: 40 mg            CONTINUE taking these medications        Instructions   acetaminophen 500 MG Tabs  Commonly known as: Tylenol   Take 500 mg by mouth 2 times a day as needed for Mild Pain.  Dose: 500 mg     amLODIPine 10 MG Tabs  Commonly known as: Norvasc   Take 1 Tablet by mouth every day.  Dose: 10 mg     famotidine 20 MG Tabs  Commonly known as: Pepcid   Take 20 mg by mouth every day.  Dose: 20 mg     hydrALAZINE 100 MG tablet  Commonly known as: Apresoline   Take 50 mg by mouth 3 times a day.  Dose: 50 mg     mycophenolate 250 MG Caps  Commonly known as: Cellcept   Take 750 mg by mouth 2 times a day. 3 capsules=750mg  Dose: 750 mg     oxyCODONE immediate-release 5 MG Tabs  Commonly known as: Roxicodone   Take 2.5-5 mg by mouth every 6 hours as needed for Severe Pain.  1/2-1 tabet= 2.5-5mg  Dose: 2.5-5 mg     polyethylene glycol 3350 17 GM/SCOOP Powd  Commonly known as: Miralax   Take 17 g by mouth every day.  Dose: 17 g     pravastatin 20 MG Tabs  Commonly known as: Pravachol   Take 20 mg by mouth at bedtime.  Dose: 20 mg            ASK your doctor about these medications        Instructions   sevelamer 800 MG Tabs  Commonly known as: Renagel   Take 800 mg by mouth 3 times a day with meals. 3 tablets=2400mg  Dose: 800 mg     SPS 15 GM/60ML Susp  Generic drug: sodium polystyrene   Take 120 mL by mouth one time as needed (hyperkalemia).  Dose: 120 mL     sulfamethoxazole-trimethoprim 400-80 MG Tabs  Commonly known as: Bactrim   Take 1 Tablet by mouth every day.  Dose: 1 Tablet     tacrolimus 1 MG Caps  Commonly known as: Prograf   Take 6 mg by mouth 2 times a day. 6 capsules=6mg  Dose: 6 mg     valGANciclovir 450 MG tablet  Commonly known as: Valcyte   Take 1 Tablet by mouth every Monday, Wednesday, and Friday.  Dose: 1 Tablet              Allergies  Allergies   Allergen Reactions    Mircera [Methoxy Polyethylene Glycol-Epoetin Beta] Unspecified     Nausea, chest pain, dizziness, and tachycardia per dialysis center        DIET  Orders Placed This Encounter   Procedures    Diet Order Diet: Full Liquid     Standing Status:   Standing     Number of Occurrences:   1     Order Specific Question:   Diet:     Answer:   Full Liquid [11]       ACTIVITY  As tolerated.  Weight bearing as tolerated    CONSULTATIONS  GI  nephrology    PROCEDURES  S/p EGD 5/9/24    LABORATORY  Lab Results   Component Value Date    SODIUM 137 05/09/2024    POTASSIUM 4.0 05/09/2024    CHLORIDE 97 05/09/2024    CO2 26 05/09/2024    GLUCOSE 100 (H) 05/09/2024    BUN 47 (H) 05/09/2024    CREATININE 6.93 (HH) 05/09/2024        Lab Results   Component Value Date    WBC 4.6 (L) 05/09/2024    HEMOGLOBIN 9.8 (L) 05/10/2024    HEMATOCRIT 27.8 (L) 05/10/2024    PLATELETCT 119 (L) 05/09/2024        US-RENAL TRANSPLANT COMP    Final Result         1.  Normal intrarenal resistive indices and acceleration times.          Total time of the discharge process 35 minutes.

## 2024-05-10 NOTE — DISCHARGE INSTRUCTIONS
Discharge Instructions per Jono Jin M.D.    Please follow-up with PCP as outpatient.  Please take protonix 40mg twice a day oral dose for 8 weeks then taper to once a day  I have placed outpatient GI referral to follow up and repeat EGD  If you are tolerating full liquid diet, may advance to a maximum of GI soft tomorrow for the next 5-7 days.  If developing abdominal pain and vomiting with blood, please seek medical attention   Avoid NSAID such as ibuprofen, Aleve or Motrin    Return to ER in the event of new or worsening symptoms. Please note importance of compliance and the patient has agreed to proceed with all medical recommendations and follow up plan indicated above. All medications come with benefits and risks. Risks may include permanent injury or death and these risks can be minimized with close reassessment and monitoring. Please make it to your scheduled follow ups with PCP and GI and nephrology

## 2024-05-10 NOTE — CARE PLAN
Problem: Knowledge Deficit - Standard  Goal: Patient and family/care givers will demonstrate understanding of plan of care, disease process/condition, diagnostic tests and medications  Outcome: Progressing  Note: Education provided regarding signs and symptoms of GI bleeding, started on PPI this afternoon. Diet instruction given, currently on clear liquid diet, verbalized understanding   The patient is Stable - Low risk of patient condition declining or worsening    Shift Goals  Clinical Goals: monitor h&h  Patient Goals: rest  Family Goals: sylvia    Progress made toward(s) clinical / shift goals:      Patient is not progressing towards the following goals:

## 2024-05-10 NOTE — PROGRESS NOTES
Gastroenterology Progress Note               Author:  ARTHUR Paul Date & Time Created: 5/10/2024 11:03 AM       Patient ID:  Name:             Elier Brian  YOB: 1990  Age:                 33 y.o.  male  MRN:               [id number]        Medical Decision Making, by Problem:  Active Hospital Problems    Diagnosis     Hematemesis [K92.0]     Intractable nausea and vomiting [R11.2]     Kidney transplanted [Z94.0]     Hyperlipidemia [E78.5]     GI bleed [K92.2]     Hypertension [I10]     ESRD (end stage renal disease) on dialysis (HCC) [N18.6, Z99.2]     Acute on chronic blood loss anemia [D62]            Presenting Chief Complaint:  Hematemesis     Consult requested by Dr. Jono Jin     HISTORY OF PRESENT ILLNESS:  Elier Bustillos is a 33 y.o. male with history of renal transplant at [facility] on 5/3/2024 discharged yesterday who presents with hematemesis.  Patient reports he has had severe nausea and vomiting since being discharged and noted bloody vomitus x 2 on 5/8 after dialysis.  He is on CellCept, tacrolimus, and valganciclovir.  Hemoglobin on admission 9.7 with creatinine 5.91.  Renal ultrasound completed on 5/8 with normal intrarenal resistive indices and acceleration times.     No previous colonoscopy or endoscopy. Denies NSAID use, no alcohol or drugs. Previous smoker.       Interval History:  5/10/2024: Postprocedure day #1 s/p EGD.  Patient seen at bedside.   Yvonne [id number] utilized.  Patient reports feeling well without nausea, vomiting.  He reports mild postoperative abdominal pain.  Tolerating clear liquids without difficulty.  Hemoglobin stable 9.4-9.8.         Hospital Medications:  Current Facility-Administered Medications   Medication Dose Frequency Provider Last Rate Last Admin    pantoprazole (Protonix) injection 40 mg  40 mg BID Casey Kelly M.D.   40 mg at 05/10/24 0980    acetaminophen (Tylenol) tablet 650 mg   650 mg Q6HRS PRN Casey Kelly M.D.        labetalol (Normodyne/Trandate) injection 10 mg  10 mg Q4HRS PRN Casey Kelly M.D.   10 mg at 05/10/24 0419    ondansetron (Zofran) syringe/vial injection 4 mg  4 mg Q4HRS PRN Casey Kelly M.D.   4 mg at 05/09/24 1439    ondansetron (Zofran ODT) dispertab 4 mg  4 mg Q4HRS PRN Casey Kelly M.D.        promethazine (Phenergan) tablet 12.5-25 mg  12.5-25 mg Q4HRS PRN Casey Kelly M.D.        promethazine (Phenergan) suppository 12.5-25 mg  12.5-25 mg Q4HRS PRN Casey eKlly M.D.        prochlorperazine (Compazine) injection 5-10 mg  5-10 mg Q4HRS PRN Casey Kelly M.D.        senna-docusate (Pericolace Or Senokot S) 8.6-50 MG per tablet 2 Tablet  2 Tablet Q EVENING Casey Kelly M.D.   2 Tablet at 05/09/24 1720    And    polyethylene glycol/lytes (Miralax) Packet 1 Packet  1 Packet QDAY PRN Casey Kelly M.D.        LR (Bolus) infusion 500 mL  500 mL Once PRN Casey Kelly M.D.        amLODIPine (Norvasc) tablet 10 mg  10 mg DAILY Casey Kelly M.D.   10 mg at 05/10/24 0419    mycophenolate (Cellcept) capsule 750 mg  750 mg BID Casey Kelly M.D.   750 mg at 05/10/24 0418    pravastatin (Pravachol) tablet 20 mg  20 mg QHS Casey Kelly M.D.   20 mg at 05/09/24 2013    tacrolimus (Prograf) capsule 6 mg  6 mg BID Casey Kelly M.D.   6 mg at 05/10/24 0906    Pharmacy Consult Request ...Pain Management Review 1 Each  1 Each PHARMACY TO DOSE Casey Kelly M.D.        oxyCODONE immediate-release (Roxicodone) tablet 2.5 mg  2.5 mg Q3HRS PRN Casey Kelly M.D.        Or    oxyCODONE immediate-release (Roxicodone) tablet 5 mg  5 mg Q3HRS PRN Casey Kelly M.D.   5 mg at 05/10/24 0419    famotidine (Pepcid) tablet 20 mg  20 mg DAILY Jono Jin M.D.   20 mg at 05/10/24 0419    valGANciclovir (Valcyte) tablet 450 mg  450 mg MO, WE + FR Jono Jin M.D.   450 mg at 05/10/24 0906    hydrALAZINE (Apresoline) tablet 50 mg  50 mg TID Jono Jin M.D.   50 mg at 05/10/24 0419     sevelamer carbonate (Renvela) tablet 800 mg  800 mg TID WITH MEALS Jono Jin M.D.   800 mg at 05/10/24 0907    sulfamethoxazole-trimethoprim (Bactrim) 400-80 MG per tablet 1 Tablet  1 Tablet QDAY Jono Jin M.D.   1 Tablet at 05/09/24 1811    calcitRIOL (Rocaltrol) capsule 0.25 mcg  0.25 mcg MO, WE + FR Jono Jin M.D.   0.25 mcg at 05/10/24 0907   Last reviewed on 5/9/2024  7:40 AM by Arlen Landaverde, T       Review of Systems:  Review of Systems   Constitutional:  Negative for chills, fever and malaise/fatigue.   HENT:  Negative for congestion and sore throat.    Respiratory:  Negative for cough and shortness of breath.    Cardiovascular:  Negative for chest pain and leg swelling.   Gastrointestinal:  Positive for abdominal pain. Negative for blood in stool, constipation, diarrhea, heartburn, melena, nausea and vomiting.   Genitourinary:  Negative for dysuria and flank pain.   Musculoskeletal:  Negative for falls and myalgias.   Neurological:  Negative for weakness and headaches.   Psychiatric/Behavioral:  The patient is not nervous/anxious and does not have insomnia.    All other systems reviewed and are negative.        Vital signs:  Weight/BMI: Body mass index is 27.08 kg/m².  BP (!) 147/103   Pulse (!) 104   Temp 36.7 °C (98 °F) (Temporal)   Resp 18   Wt 80.8 kg (178 lb 2.1 oz)   SpO2 98%   Vitals:    05/10/24 0112 05/10/24 0405 05/10/24 0500 05/10/24 0851   BP: (!) 162/96 (!) 175/116 (!) 164/94 (!) 147/103   Pulse: 100 (!) 102 100 (!) 104   Resp: 18 18 18 18   Temp: 36.7 °C (98 °F) 36.8 °C (98.3 °F) 36.7 °C (98 °F) 36.7 °C (98 °F)   TempSrc: Temporal Temporal Temporal Temporal   SpO2: 97% 98% 98% 98%   Weight:         Oxygen Therapy:  Pulse Oximetry: 98 %, O2 (LPM): 0, O2 Delivery Device: None - Room Air    Intake/Output Summary (Last 24 hours) at 5/10/2024 1103  Last data filed at 5/10/2024 0851  Gross per 24 hour   Intake 460 ml   Output --   Net 460 ml         Physical Exam:  Physical  Exam  Vitals and nursing note reviewed.   Constitutional:       General: He is not in acute distress.     Appearance: He is normal weight. He is not ill-appearing.   HENT:      Head: Normocephalic.      Nose: Nose normal. No congestion.      Mouth/Throat:      Mouth: Mucous membranes are moist.      Pharynx: Oropharynx is clear. No oropharyngeal exudate.   Eyes:      General: No scleral icterus.     Extraocular Movements: Extraocular movements intact.      Conjunctiva/sclera: Conjunctivae normal.   Cardiovascular:      Rate and Rhythm: Normal rate and regular rhythm.      Pulses: Normal pulses.      Heart sounds: Normal heart sounds.      Comments: AV fistula right upper extremity.  + bruit and thrill  Pulmonary:      Effort: Pulmonary effort is normal.      Breath sounds: Normal breath sounds.   Abdominal:      General: Abdomen is flat. Bowel sounds are normal.      Palpations: Abdomen is soft.      Comments: Surgical incision right lower quadrant well-approximated.  Clean, dry, intact.   Musculoskeletal:      Right lower leg: No edema.      Left lower leg: No edema.   Skin:     General: Skin is warm and dry.      Capillary Refill: Capillary refill takes less than 2 seconds.      Coloration: Skin is not jaundiced.   Neurological:      General: No focal deficit present.      Mental Status: He is alert and oriented to person, place, and time. Mental status is at baseline.   Psychiatric:         Mood and Affect: Mood normal.         Behavior: Behavior normal.             Labs:  Recent Labs     05/08/24 2240 05/09/24  0757   SODIUM 137 137   POTASSIUM 3.8 4.0   CHLORIDE 96 97   CO2 28 26   BUN 29* 47*   CREATININE 5.91* 6.93*   PHOSPHORUS  --  4.8*   CALCIUM 9.1 9.0     Recent Labs     05/08/24 2240 05/09/24  0757   ALTSGPT 47  --    ASTSGOT 146*  --    ALKPHOSPHAT 65  --    TBILIRUBIN 0.9  --    GLUCOSE 101* 100*     Recent Labs     05/08/24 2240 05/09/24  0757   WBC 4.5* 4.6*   NEUTSPOLYS 86.30* 82.20*    LYMPHOCYTES 4.60* 6.90*   MONOCYTES 8.00 9.80   EOSINOPHILS 0.70 0.70   BASOPHILS 0.20 0.20   ASTSGOT 146*  --    ALTSGPT 47  --    ALKPHOSPHAT 65  --    TBILIRUBIN 0.9  --      Recent Labs     05/08/24  2240 05/09/24  0544 05/09/24  0757 05/09/24  1704 05/10/24  0109   RBC 2.91*  --  2.88*  --   --    HEMOGLOBIN 9.7* 9.4* 9.6* 9.4* 9.8*   HEMATOCRIT 27.1* 27.3* 27.3* 26.7* 27.8*   PLATELETCT 120*  --  119*  --   --    PROTHROMBTM 13.9 14.9*  --   --   --    APTT 30.5  --   --   --   --    INR 1.06 1.15*  --   --   --    IRON  --  129  --   --   --    FERRITIN  --  2604.0*  --   --   --    TOTIRONBC  --  243*  --   --   --      Recent Results (from the past 24 hour(s))   OCCULT BLOOD STOOL    Collection Time: 05/09/24  1:13 PM   Result Value Ref Range    Occult Blood Feces Positive (A) Negative   HEMOGLOBIN AND HEMATOCRIT    Collection Time: 05/09/24  5:04 PM   Result Value Ref Range    Hemoglobin 9.4 (L) 14.0 - 18.0 g/dL    Hematocrit 26.7 (L) 42.0 - 52.0 %   HEMOGLOBIN AND HEMATOCRIT    Collection Time: 05/10/24  1:09 AM   Result Value Ref Range    Hemoglobin 9.8 (L) 14.0 - 18.0 g/dL    Hematocrit 27.8 (L) 42.0 - 52.0 %       Radiology Review:  US-RENAL TRANSPLANT COMP   Final Result         1.  Normal intrarenal resistive indices and acceleration times.            MDM (Data Review):   -Records reviewed and summarized in current documentation  -I personally reviewed and interpreted the laboratory results  -I personally reviewed the radiology images    Assessment/Recommendations:  Hiatal hernia  Gastric ulcer with visible vessel and bleeding s/p treatment with epinephrine injection and Hemoclip x 4  Acute blood loss anemia  Hematemesis-resolved  Pancytopenia  Recent renal transplant 5/3/2024 at [facility] on antirejection drugs and antiviral medication  End-stage renal disease on dialysis      Plan:  Monitor H/H and for signs of rebleeding  IV PPI bid for total three days, then,   PPI 40mg bid oral dose for 8 weeks  then taper.    Consider outpatient GI referral to have follow up and repeat EGD.   Okay to advance to full liquid diet today.  If patient tolerates may advance to a maximum of GI soft tomorrow for the next 5-7 days.    No further interventions from acute GI service. GI to sign off. Please reconsult for any further questions or concerns.     Discussed with patient, Dr. Jin, Dr. Cueva, Dr. Silva        Core Quality Measures   Reviewed items::  Labs, Medications and Radiology reports reviewed

## 2024-05-10 NOTE — PROGRESS NOTES
Patient was admitted earlier this am. Please refer H&P for details    Wife at bedside     33 y.o. male with ESRD on HD MWF, recent renal transplant at [facility] 5/4/24 and discharged 5/7/2024 who presented 5/8/2024 with evaluation for hematemesis.  Patient reported to have severe nausea and vomiting since being discharged, noted to have bloody vomitus 5/8.  He did complete a full session of dialysis 5/8.    In ER, initial hemoglobin 9.7.     Plans  - consulted GI and nephrology  - continue cellcept, bactrim, Prograf, valganciclovir. I have reviewed [facility] record and verify the medications  - PPI  - EGD today  - HD tomorrow

## 2024-05-11 LAB
BACTERIA UR CULT: NORMAL
SIGNIFICANT IND 70042: NORMAL
SITE SITE: NORMAL
SOURCE SOURCE: NORMAL

## 2024-05-11 NOTE — PROGRESS NOTES
Hemodialysis ordered by Dr. Najjar. Treatment started at 1206 and ended at 1506. Pt stable, vss, no c/o post tx. Net UF 1.0 L. Report to SHAQUILLE Mcclain RN. Lt ua avf dsg cdi.

## 2024-05-11 NOTE — PROGRESS NOTES
"30-45\" abdominal binder sent to tube station 61    Contact traction for any questions or concerns.   "

## 2024-05-11 NOTE — PROCEDURES
Pt with ESRD, S/P kidney transplant with DGF, presented with GI bleed.  Pt is doing better.  Seen and examined while getting HD.

## 2024-05-11 NOTE — PROGRESS NOTES
Discharge summary given and explained to patient and patient verbalizes understanding. Patient accompanied by CNA to the pharmacy and to the parking.

## 2024-05-11 NOTE — PROGRESS NOTES
As per bedside report patient will be discharge tonight. Patient able to tolerate full liquid diet, IV cannula removed. Will  patient home medications in the Pharmacy.

## 2024-05-12 LAB
MYCOPHENOLATE SERPL LC/MS/MS-MCNC: 7.5 UG/ML (ref 1–3.5)
MYCOPHENOLATE-G SERPL LC/MS/MS-MCNC: 85.8 UG/ML (ref 35–100)

## 2024-05-13 LAB — TACROLIMUS BLD-MCNC: >30 NG/ML (ref 5–20)

## 2025-04-01 ENCOUNTER — TELEPHONE (OUTPATIENT)
Dept: NEPHROLOGY | Facility: MEDICAL CENTER | Age: 35
End: 2025-04-01
Payer: COMMERCIAL

## 2025-04-01 DIAGNOSIS — Z87.440 HISTORY OF UTI: ICD-10-CM

## 2025-04-01 DIAGNOSIS — Z94.0 KIDNEY TRANSPLANTED: Chronic | ICD-10-CM

## 2025-04-01 DIAGNOSIS — E55.9 VITAMIN D DEFICIENCY: ICD-10-CM

## 2025-04-01 NOTE — TELEPHONE ENCOUNTER
Pt called with an  and stated that he had a kidney transplant 5/24 and needs to be seen by his local nephrologist to resume care. I scheduled him for 5/22/25. Please order new labs for this appointment if necessary. Pt also asked for an order to be sent to [facility] to have the fistula removed.

## 2025-05-22 ENCOUNTER — OFFICE VISIT (OUTPATIENT)
Dept: NEPHROLOGY | Facility: MEDICAL CENTER | Age: 35
End: 2025-05-22
Payer: COMMERCIAL

## 2025-05-22 VITALS
WEIGHT: 187.1 LBS | TEMPERATURE: 98.9 F | DIASTOLIC BLOOD PRESSURE: 64 MMHG | HEIGHT: 69 IN | OXYGEN SATURATION: 97 % | BODY MASS INDEX: 27.71 KG/M2 | HEART RATE: 80 BPM | RESPIRATION RATE: 12 BRPM | SYSTOLIC BLOOD PRESSURE: 122 MMHG

## 2025-05-22 DIAGNOSIS — E21.3 HYPERPARATHYROIDISM (HCC): ICD-10-CM

## 2025-05-22 DIAGNOSIS — Z94.0 KIDNEY TRANSPLANTED: Primary | Chronic | ICD-10-CM

## 2025-05-22 DIAGNOSIS — Z87.440 HISTORY OF UTI: ICD-10-CM

## 2025-05-22 DIAGNOSIS — Z94.0 IMMUNOSUPPRESSIVE MANAGEMENT ENCOUNTER FOLLOWING KIDNEY TRANSPLANT: ICD-10-CM

## 2025-05-22 DIAGNOSIS — Z79.899 IMMUNOSUPPRESSIVE MANAGEMENT ENCOUNTER FOLLOWING KIDNEY TRANSPLANT: ICD-10-CM

## 2025-05-22 DIAGNOSIS — I10 PRIMARY HYPERTENSION: ICD-10-CM

## 2025-05-22 RX ORDER — TACROLIMUS 1 MG/1
1 CAPSULE ORAL 2 TIMES DAILY
COMMUNITY
Start: 2025-05-13 | End: 2026-08-13

## 2025-05-22 RX ORDER — SODIUM BICARBONATE 650 MG/1
1300 TABLET ORAL 3 TIMES DAILY
COMMUNITY
Start: 2025-04-23 | End: 2026-04-18

## 2025-05-22 RX ORDER — LISINOPRIL 5 MG/1
1 TABLET ORAL
COMMUNITY
Start: 2024-10-10 | End: 2025-10-05

## 2025-05-22 RX ORDER — PANTOPRAZOLE SODIUM 40 MG/1
40 TABLET, DELAYED RELEASE ORAL
COMMUNITY
Start: 2025-02-06

## 2025-05-22 ASSESSMENT — PATIENT HEALTH QUESTIONNAIRE - PHQ9: CLINICAL INTERPRETATION OF PHQ2 SCORE: 0

## 2025-05-22 ASSESSMENT — ENCOUNTER SYMPTOMS
ABDOMINAL PAIN: 0
FEVER: 0
SHORTNESS OF BREATH: 0

## 2025-05-22 ASSESSMENT — FIBROSIS 4 INDEX: FIB4 SCORE: 6.26

## 2025-05-22 NOTE — Clinical Note
Please call the patient, and let him know, in [place], that I ordered a dermatology referral.  All posttransplant patients should be seen by dermatologist for skin check for skin cancers at least once a year. Thanks! Ervin Rosas M.D.

## 2025-05-22 NOTE — PROGRESS NOTES
"Chief Complaint   Patient presents with    Chronic Kidney Disease     CC: follow up kidney transplant     services were used in the patient's primary language of [place]ese.   Name or Number: [id number]  Mode of interpretation: iPad       HPI:  Elier Bustillos is a 34 y.o. male with a history of ESRD on hemodialysis, s/p parathyroidectomy with left arm transplant 3/2023, status post  donor kidney transplant 2024 at [facility] complicated by short course of delayed graft function requiring 2 extra sessions of dialysis, who presents today to reestablish nephrology care.     Re: ESRD.  Patient was on hemodialysis since around  or . Says HTN caused his kidney failure. He was anuric prior to transplant. He had  donor kidney transplant 2024 that was complicated by delayed graft function, requiring dialysis only 2 or 3 times more. He is urinating well.     Re: Immunosuppression.  Patient had surveillance biopsy at 3 months posttransplant that showed 311 globally sclerotic glomeruli, minimal interstitial fibrosis and tubular atrophy, sparse glomerular mesangial staining for IgA on immunofluorescence, with no evidence of rejection or BK virus.    Re: HTN. He checks BP at home, has not been over 160 systolic, says \"it's regular.\" Denies Light-headedness. He takes lisinopril 5mg daily.         Past Medical History:   Diagnosis Date    Dialysis      at [facility]     for 12 years    Hypertension     Kidney transplanted 2024    Renal disorder     dailysis pt. 3 times per week.        Past Surgical History:   Procedure Laterality Date    RI UPPER GI ENDOSCOPY,DIAGNOSIS N/A 2024    Procedure: GASTROSCOPY;  Surgeon: Clarita Cueva M.D.;  Location: SURGERY SAME DAY [facility];  Service: Gastroenterology    RI UPPER GI ENDOSCOPY,SCLER INJECT N/A 2024    Procedure: GASTROSCOPY, WITH SCLEROTHERAPY;  Surgeon: Clarita Cueva M.D.;  " Location: SURGERY SAME DAY [facility];  Service: Gastroenterology    IN UPPER GI ENDOSCOPY,CTRL BLEED N/A 05/09/2024    Procedure: EGD, WITH CLIP PLACEMENT;  Surgeon: Clarita Cueva M.D.;  Location: SURGERY SAME DAY [facility];  Service: Gastroenterology    TRANSPLANT, KIDNEY Right 05/03/2024    [facility]    IN EXPLORE PARATHYROID GLANDS N/A 03/17/2023    Procedure: PARATHYROID EXPLORATION WITH PARATHYROID AUTOTRANSPLANTATION AND PARATHYROID HORMONE MONITORING.;  Surgeon: Candy Godoy M.D.;  Location: SURGERY SAME DAY [facility];  Service: General    EXPLORATORY LAPAROTOMY  11/11/2018    Procedure: EXPLORATORY LAPAROTOMY;  Surgeon: Faith Martin M.D.;  Location: SURGERY [facility];  Service: General    AV FISTULA REVISION Right 11/28/2016    Procedure: AV FISTULA REVISION UPPER EXTREMITY (ANEURYSM REDUCTION);  Surgeon: Slade Perez M.D.;  Location: SURGERY [facility];  Service:     AV FISTULA CREATION  08/21/2012    Performed by ANAI TYSON at SURGERY [facility]    RECOVERY  07/20/2012    Performed by SURGERY, IR-RECOVERY at SURGERY SAME DAY [facility] ORS    RECOVERY  07/03/2012    Performed by SURGERY, IR-RECOVERY at SURGERY SAME DAY [facility] ORS    AV FISTULA CREATION  06/07/2012    Performed by ANAI TYSON at [facility] ORS    CATH PLACEMENT  06/07/2012    Performed by ANAI TYSON at SURGERY [facility] ORS    AV FISTULA CREATION  12/29/2010    Performed by ANT ALONZO at SURGERY [facility] ORS    OTHER ORTHOPEDIC SURGERY      r arm fx        Encounter Medications[1]     Allergies[2]    Social History     Socioeconomic History    Marital status:      Spouse name: Not on file    Number of children: Not on file    Years of education: Not on file    Highest education level: Not on file   Occupational History    Not on file   Tobacco Use    Smoking status: Former     Types: Cigarettes    Smokeless tobacco: Never   Vaping Use    Vaping status: Never Used   Substance  "and Sexual Activity    Alcohol use: No    Drug use: No    Sexual activity: Not on file   Other Topics Concern    Not on file   Social History Narrative    Not on file     Social Drivers of Health     Financial Resource Strain: Not on file   Food Insecurity: Not on file   Transportation Needs: Not on file   Physical Activity: Not on file   Stress: Not on file   Social Connections: Not on file   Intimate Partner Violence: Not on file   Housing Stability: Not on file       Family History   Problem Relation Age of Onset    Diabetes Mother     Hypertension Mother     Heart Attack Father     Hypertension Father        Review of Systems   Constitutional:  Negative for fever.   Respiratory:  Negative for shortness of breath.    Cardiovascular:  Negative for chest pain.   Gastrointestinal:  Negative for abdominal pain.   Genitourinary:  Negative for dysuria and hematuria.   All other systems reviewed and are negative.      /64 (BP Location: Left arm, Patient Position: Sitting, BP Cuff Size: Adult)   Pulse 80   Temp 37.2 °C (98.9 °F) (Temporal)   Resp 12   Ht 1.753 m (5' 9\")   Wt 84.9 kg (187 lb 1.6 oz)   SpO2 97%   BMI 27.63 kg/m²     Physical Exam  Constitutional:       General: He is not in acute distress.  HENT:      Mouth/Throat:      Pharynx: No oropharyngeal exudate.   Eyes:      General: No scleral icterus.  Neck:      Trachea: No tracheal deviation.   Cardiovascular:      Rate and Rhythm: Normal rate and regular rhythm.      Heart sounds: Normal heart sounds. No murmur heard.  Pulmonary:      Effort: Pulmonary effort is normal.      Breath sounds: Normal breath sounds. No stridor. No rales.   Abdominal:      General: Bowel sounds are normal.      Palpations: Abdomen is soft.      Tenderness: There is no abdominal tenderness.   Musculoskeletal:         General: Normal range of motion.      Cervical back: Neck supple.      Right lower leg: No edema.      Left lower leg: No edema.   Skin:     General: Skin " "is warm and dry.      Findings: No rash.   Neurological:      General: No focal deficit present.      Mental Status: He is alert and oriented to person, place, and time.   Psychiatric:         Mood and Affect: Mood and affect normal.         Behavior: Behavior normal.   Dialysis access: Right forearm AVF, aneurysmal, patent bruit and thrill      Labs reviewed.      Related to Comprehensive Metabolic Panel (External Lab)  Component 05/20/25 05/20/25 05/20/25 04/15/25 04/15/25 04/15/25   Glucose_Ext 86 Negative -- Negative -- 94   BUN_Ext 17 -- -- -- -- 20   Creatinine_Ext 1.02 -- -- -- -- 0.96   eGFR_Ext 98 -- -- -- -- 106   BUN/Creatinine Ratio_Ext 17 -- -- -- -- 21 High    Sodium_Ext 137 -- -- -- -- 138   Potassium_Ext 4.8 -- -- -- -- 4.2   Chloride_Ext 106 -- -- -- -- 106   Carbon Dioxide, Total_Ext 20 -- -- -- -- 19 Low    Calcium_Ext 10.3 High  -- -- -- -- 10.3 High    Protein, Total_Ext 7.3 -- -- -- -- 7.6   Albumin_Ext 4.5 -- -- -- -- 4.4   Globulin, Total_Ext 2.8 -- -- -- -- 3.2   Bilirubin, Total_Ext 0.7 -- -- -- -- 0.6   Alkaline Phosphatase_Ext 132 High  -- -- -- -- 131 High    AST (SGOT)_Ext 29 -- -- -- -- 19   ALT (SGPT)_Ext 32 -- -- -- -- 25            Related to CBC (Includes Diff/Plt) (External Lab)    Component 05/20/25 04/15/25 02/24/25 01/28/25 01/06/25 10/08/24   White Blood Cell Count 6.5 8 4.1 5.8 6.7 6.3   Red Blood Cell Count 4.75 4.79 4.69 4.78 4.84 5.84 High    Hemoglobin 15.2 14.8 15.3 14.6 15.2 18.2 High    Hematocrit 44.5 44.1 43.3 43.9 43.6 53.3 High    MCV 94 92 92 92 90 91   MCH 32 30.9 32.6 30.5 31.4 31.2   MCHC g/dL 34.2 33.6 35.3 33.3 34.9 34.1   RDW 12.7 12.7 12.4 13.3 14.5 12.2   Platelet Count 207 192 193 193 164 181                      No results for input(s): \"HGB\", \"ALBUMIN\", \"HDL\", \"TRIGLYCERIDE\", \"SODIUM\", \"POTASSIUM\", \"CHLORIDE\", \"CO2\", \"BUN\", \"CREATININE\", \"PHOSPHORUS\" in the last 8544 hours.    Invalid input(s): \"CHOLESTEROL\", \"LDL CLACULATED\", \"URIC ACID\", \"INTACT PTH\", " "\"PRO CREAT RATIO\"    Lab Results   Component Value Date/Time    WBC 4.6 (L) 05/09/2024 07:57 AM    RBC 2.88 (L) 05/09/2024 07:57 AM    HEMOGLOBIN 9.8 (L) 05/10/2024 01:09 AM    HEMATOCRIT 27.8 (L) 05/10/2024 01:09 AM    MCV 94.8 05/09/2024 07:57 AM    MCH 33.3 (H) 05/09/2024 07:57 AM    MCHC 35.2 05/09/2024 07:57 AM    MPV 10.9 05/09/2024 07:57 AM           URINALYSIS:  Lab Results   Component Value Date/Time    COLORURINE Red (A) 05/08/2024 2351    CLARITY Cloudy (A) 05/08/2024 2351    SPECGRAVITY 1.010 05/08/2024 2351    PHURINE 8.5 (A) 05/08/2024 2351    KETONES Trace (A) 05/08/2024 2351    PROTEINURIN 300 (A) 05/08/2024 2351    BILIRUBINUR Negative 05/08/2024 2351    UROBILU 0.2 05/08/2024 2351    NITRITE Negative 05/08/2024 2351    LEUKESTERAS Trace (A) 05/08/2024 2351    OCCULTBLOOD Large (A) 05/08/2024 2351     UPC  Lab Results   Component Value Date/Time    TOTPROTUR 399.0 (H) 12/26/2010 1645      Lab Results   Component Value Date/Time    CREATININEU 127.1 05/30/2024 0906         Imaging report(s) reviewed  No orders to display       Kidney biopsy: 8/5/24  Component   FINAL DIAGNOSIS   TRANSPLANT KIDNEY, SURVEILLANCE BIOPSY (3 MONTHS POST-TRANSPLANTATION):  - Few (3/11) globally sclerotic glomeruli.  - Interstitial fibrosis and tubular atrophy, minimal.  - Sparse (1-2+) glomerular mesangial staining for IgA on immunofluorescence, see comment.  - Mild hyaline arteriolosclerosis.  - No evidence of acute T-cell-mediated rejection.  - No evidence of acute antibody-mediated rejection.  - No evidence of polyomavirus infection.  Comment: The sparse glomerular mesangial staining for IgA is noted; non-specific staining cannot be ruled out. If immunofluorescence on a follow-up biopsy also shows positive IgA staining, an EM may need to be performed to confirm presence of mesangial deposits. Clinical correlation is indicated.       Assessment:  Elier Rhodes Apollo is a 34 y.o. male with a history of ESRD on " hemodialysis, s/p parathyroidectomy with left arm transplant 3/2023, status post  donor kidney transplant 2024 at [facility] complicated by short course of delayed graft function requiring 2 extra sessions of dialysis, who presents today to reestablish nephrology care.     Plan:  1.  ESRD status post  donor kidney transplant, now with transplant CKD stage I  - Underlying CKD/ESRD likely from hypertensive disorder, possibly undiagnosed IgA nephropathy.  Patient was anuric prior to transplant.  Kidney transplant is working quite well, with creatinine and GFR within stage I CKD category.  I recommend avoiding NSAIDs and other nephrotoxins.  I recommend a whole food, plant-based, low-salt diet.  I explained the importance of blood pressure and glycemic control to help slow CKD progression. I recommend maintaining lisinopril for long-term kidney protection.      2. Immunosuppressive management encounter following kidney transplant  -Patient is stable on tacrolimus 1 mg p.o. twice daily, mycophenolate 750 mg p.o. twice daily.  He does not believe he is on prednisone.  Goal tacrolimus trough level between 6 and 8 per [facility] transplant.  Most recent tacrolimus level 5.6, which is very close to appropriate.  I recommend referral to dermatology for skin cancer screening, given the increased risk of skin cancer in posttransplant patients on immunosuppression.    3. Primary hypertension  -Blood pressure well-controlled on lisinopril 5 mg daily, continue this dose for now.  I recommend a low-sodium diet.    4.  Secondary hyperparathyroidism  - Patient had parathyroidectomy with autotransplantation in 2023.  Patient does not believe he is taking calcitriol or Cinacalcet anymore.  Monitor PTH and phosphorus levels.    5.  Dialysis access  - Patient has functioning right forearm AV fistula.  Skin appears healthy and intact.  Fistula is aneurysmal.  I expressed my preference that the patient keep the  fistula, in case he ever does have worsening of his allograft function.  However, if he is bothered by pain or the appearance of the fistula, he could be referred to vascular surgery for fistula ligation, as his transplant is functioning quite well.    Standing labs ordered every 3 months on 5/22/2025  Return to clinic in 6 months with pre-clinic labs    Ervin Rosas MD  Nephrology  [facility]         [1]   Outpatient Encounter Medications as of 5/22/2025   Medication Sig Dispense Refill    tacrolimus (PROGRAF) 1 MG Cap Take 1 mg by mouth 2 times a day. Taking 1mg in AM and 2mg in PM      lisinopril (PRINIVIL) 5 MG Tab Take 1 Tablet by mouth every day.      pantoprazole (PROTONIX) 40 MG Tablet Delayed Response Take 40 mg by mouth every morning before breakfast.      sodium bicarbonate (SODIUM BICARBONATE) 650 MG Tab Take 1,300 mg by mouth 3 times a day.      acetaminophen (TYLENOL) 500 MG Tab Take 500 mg by mouth 2 times a day as needed for Mild Pain.      mycophenolate (CELLCEPT) 250 MG Cap Take 750 mg by mouth 2 times a day. 3 capsules=750mg      amLODIPine (NORVASC) 10 MG Tab Take 1 Tablet by mouth every day. 90 Tablet 3    [DISCONTINUED] calcitRIOL (ROCALTROL) 0.25 MCG Cap Take 1 Capsule by mouth every Monday, Wednesday, and Friday. 30 Capsule 0    [DISCONTINUED] sevelamer (RENAGEL) 800 MG Tab Take 800 mg by mouth 3 times a day with meals. 3 tablets=2400mg (Patient not taking: Reported on 5/22/2025)      [DISCONTINUED] famotidine (PEPCID) 20 MG Tab Take 20 mg by mouth every day. (Patient not taking: Reported on 5/22/2025)      [DISCONTINUED] hydrALAZINE (APRESOLINE) 100 MG tablet Take 50 mg by mouth 3 times a day. (Patient not taking: Reported on 5/22/2025)       No facility-administered encounter medications on file as of 5/22/2025.   [2]   Allergies  Allergen Reactions    Mircera [Methoxy Polyethylene Glycol-Epoetin Beta] Unspecified     Nausea, chest pain, dizziness, and tachycardia per dialysis center

## 2025-05-23 ENCOUNTER — TELEPHONE (OUTPATIENT)
Dept: NEPHROLOGY | Facility: MEDICAL CENTER | Age: 35
End: 2025-05-23
Payer: COMMERCIAL

## 2025-05-23 NOTE — Clinical Note
REFERRAL APPROVAL NOTICE         Sent on May 23, 2025                   Elier Farias Carmelo Bustillos  [address]   [address]  [place] [address]                   Dear Mr. Carmelo Bustillos,    After a careful review of the medical information and benefit coverage, [facility] has processed your referral. See below for additional details.    If applicable, you must be actively enrolled with your insurance for coverage of the authorized service. If you have any questions regarding your coverage, please contact your insurance directly.    REFERRAL INFORMATION   Referral #:  [id number]  Referred-To Provider    Referred-By Provider:  Dermatology    Ervin Rosas M.D.   [facility] DER[facility]      [address]  [address]  [place] [address]  [phone] [address]  [place] [address]  [phone]    Referral Start Date:  05/22/2025  Referral End Date:   05/22/2026             SCHEDULING  If you do not already have an appointment, please call [phone] to make an appointment.     MORE INFORMATION  If you do not already have a [employer] account, sign up at: [email or web address]  You can access your medical information, make appointments, see lab results, billing information, and more.  If you have questions regarding this referral, please contact  the [facility] Referrals department at:             [phone]. Monday - Friday 8:00AM - 5:00PM.     Sincerely,    [facility]

## 2025-05-23 NOTE — TELEPHONE ENCOUNTER
Per Dr. Rosas request I informed patient that a Dermatology referral has been placed. 05/23.    Thank you,   Aleshia CORBIN

## (undated) DEVICE — SUTURE 4-0 MONOCRYL PLUS PS-2 - 27 INCH (36/BX)

## (undated) DEVICE — SPONGE RADIOPAQUE CTN X-LG - STERILE (50PK/CA) MADE TO ORDER ITEM AND HAS A 4-6 WEEK LEAD TIME

## (undated) DEVICE — SODIUM CHL IRRIGATION 0.9% 1000ML (12EA/CA)

## (undated) DEVICE — FILM CASSETTE ENDO

## (undated) DEVICE — TOWELS CLOTH SURGICAL - (4/PK 20PK/CA)

## (undated) DEVICE — BLOCK BITE MAXI DENTAL RETENTION RIM (100EA/BX)

## (undated) DEVICE — Device

## (undated) DEVICE — WATER IRRIGATION STERILE 1000ML (12EA/CA)

## (undated) DEVICE — KIT  I.V. START (100EA/CA)

## (undated) DEVICE — DRAPE MAGNETIC (INSTRA-MAG) - (30/CA)

## (undated) DEVICE — SET LEADWIRE 5 LEAD BEDSIDE DISPOSABLE ECG (1SET OF 5/EA)

## (undated) DEVICE — SUCTION INSTRUMENT YANKAUER BULBOUS TIP W/O VENT (50EA/CA)

## (undated) DEVICE — GLOVE BIOGEL INDICATOR SZ 8.5 SURGICAL PF LTX - (50/BX 4BX/CA)

## (undated) DEVICE — NEPTUNE 4 PORT MANIFOLD - (20/PK)

## (undated) DEVICE — TUBE CONNECTING SUCTION - CLEAR PLASTIC STERILE 72 IN (50EA/CA)

## (undated) DEVICE — SUTURE 3-0 VICRYL PLUS SH - 8X 18 INCH (12/BX)

## (undated) DEVICE — DRAIN J-VAC 10MM FLAT - (10/CA)

## (undated) DEVICE — GOWN SURGEONS LARGE - (32/CA)

## (undated) DEVICE — GOWN SURGEONS X-LARGE - DISP. (30/CA)

## (undated) DEVICE — CLIP LG INTNL HRZN TI ESCP LGT - (20/BX)

## (undated) DEVICE — PACK MAJOR BASIN - (2EA/CA)

## (undated) DEVICE — SUTURE 3-0 VICRYL PLUS SH - 27 INCH (36/BX)

## (undated) DEVICE — DRESSING TRANSPARENT FILM TEGADERM 4 X 4.75" (50EA/BX)"

## (undated) DEVICE — SENSOR OXIMETER ADULT SPO2 RD SET (20EA/BX)

## (undated) DEVICE — SPONGE PEANUT - (5/PK 50PK/CA)

## (undated) DEVICE — SLEEVE, VASO, THIGH, MED

## (undated) DEVICE — CHLORAPREP 26 ML APPLICATOR - ORANGE TINT(25/CA)

## (undated) DEVICE — SHEAR HS FOCUS 9CM CVD - (6/BX)

## (undated) DEVICE — GLOVE BIOGEL PI INDICATOR SZ 7.0 SURGICAL PF LF - (50/BX 4BX/CA)

## (undated) DEVICE — SUTURE GENERAL

## (undated) DEVICE — GLOVE BIOGEL INDICATOR SZ 7SURGICAL PF LTX - (50/BX 4BX/CA)

## (undated) DEVICE — STAPLER SKIN DISP - (6/BX 10BX/CA) VISISTAT

## (undated) DEVICE — SUTURE 5-0 MONOCRYL PLUS PC-3 - (12/BX)

## (undated) DEVICE — DERMABOND ADVANCED - (12EA/BX)

## (undated) DEVICE — DRAPE IOBAN II INCISE 23X17 - (10EA/BX 4BX/CA)

## (undated) DEVICE — CANISTER SUCTION RIGID RED 1500CC (40EA/CA)

## (undated) DEVICE — GLOVE BIOGEL SZ 7 SURGICAL PF LTX - (50PR/BX 4BX/CA)

## (undated) DEVICE — SHEET THYROID - (10EA/CA)

## (undated) DEVICE — BOVIE NEEDLE TIP 3CM COLORADO

## (undated) DEVICE — CLIP SM INTNL HRZN TI ESCP LGT - (24EA/PK 25PK/BX)

## (undated) DEVICE — TOWEL STOP TIMEOUT SAFETY FLAG (40EA/CA)

## (undated) DEVICE — TUBING CLEARLINK DUO-VENT - C-FLO (48EA/CA)

## (undated) DEVICE — SUTURE 1 VICRYL PLUS CTX - 8 X 18 INCH (12/BX)

## (undated) DEVICE — BUTTON ENDOSCOPY DISPOSABLE

## (undated) DEVICE — GLOVE BIOGEL SZ 8.5 SURGICAL PF LTX - (50PR/BX 4BX/CA)

## (undated) DEVICE — SODIUM CHL. INJ. 0.9% 500ML (24EA/CA 50CA/PF)

## (undated) DEVICE — SUTURE 0 COATED VICRYL 6-18IN - (12PK/BX)

## (undated) DEVICE — DRAPE LAPAROTOMY T SHEET - (12EA/CA)

## (undated) DEVICE — SUTURE 2-0 COATED VICRYL PLUS - 12 X 18 INCH (12/BX)

## (undated) DEVICE — FIBRILLAR SURGICEL 4X4 - 10/CA

## (undated) DEVICE — RESERVOIR SUCTION 100 CC - SILICONE (20EA/CA)

## (undated) DEVICE — SUTURE 3-0 VICRYL PLUS - 12 X 18 INCH (12/BX)

## (undated) DEVICE — GLOVE BIOGEL SZ 8 SURGICAL PF LTX - (50PR/BX 4BX/CA)

## (undated) DEVICE — GLOVE BIOGEL INDICATOR SZ 8 SURGICAL PF LTX - (50/BX 4BX/CA)

## (undated) DEVICE — LACTATED RINGERS INJ 1000 ML - (14EA/CA 60CA/PF)

## (undated) DEVICE — MASK OXYGEN VNYL ADLT MED CONC WITH 7 FOOT TUBING  - (50EA/CA)

## (undated) DEVICE — CATHERTER CLEAR SINGLE USE INJECTION THERAPY NEEDLE 25GA X 4MM  2.3MM X 240CM (5EA/BX)

## (undated) DEVICE — LIGASURE TISSUE FUSION  - SINGLE USE (6/CA)

## (undated) DEVICE — CANISTER SUCTION 3000ML MECHANICAL FILTER AUTO SHUTOFF MEDI-VAC NONSTERILE LF DISP  (40EA/CA)

## (undated) DEVICE — KIT CUSTOM PROCEDURE SINGLE FOR ENDO  (15/CA)

## (undated) DEVICE — CLIP MED INTNL HRZN TI ESCP - (25/BX)

## (undated) DEVICE — MASK PANORAMIC OXYGEN PRO2 (30EA/CA)

## (undated) DEVICE — CANNULA W/ SUPPLY TUBING O2 - (50/CA)

## (undated) DEVICE — CLIP MED LG INTNL HRZN TI ESCP - (20/BX)

## (undated) DEVICE — GLOVE BIOGEL SZ 6.5 SURGICAL PF LTX (50PR/BX 4BX/CA)

## (undated) DEVICE — TRAY CATHETER FOLEY URINE METER W/STATLOCK 350ML (10EA/CA)

## (undated) DEVICE — BOVIE  BLADE 6 EXTENDED - (50/PK)

## (undated) DEVICE — PROBE TAPERED TIP MONOPOLAR HUMMINGBIRD (5EA/BX)

## (undated) DEVICE — GOWN WARMING STANDARD FLEX - (30/CA)

## (undated) DEVICE — GLOVE SZ 7 BIOGEL PI MICRO - PF LF (50PR/BX 4BX/CA)

## (undated) DEVICE — ELECTRODE 850 FOAM ADHESIVE - HYDROGEL RADIOTRNSPRNT (50/PK)

## (undated) DEVICE — GLOVE BIOGEL PI ORTHO SZ 7.5 PF LF (40PR/BX)

## (undated) DEVICE — SPONGE GAUZESTER 4 X 4 4PLY - (128PK/CA)

## (undated) DEVICE — PORT AUXILLARY WATER (50EA/BX)

## (undated) DEVICE — GLOVE BIOGEL INDICATOR SZ 6.5 SURGICAL PF LTX - (50PR/BX 4BX/CA)

## (undated) DEVICE — SLEEVE VASO CALF MED - (10PR/CA)

## (undated) DEVICE — ELECTRODE DUAL RETURN W/ CORD - (50/PK)